# Patient Record
Sex: MALE | Race: WHITE | NOT HISPANIC OR LATINO | URBAN - METROPOLITAN AREA
[De-identification: names, ages, dates, MRNs, and addresses within clinical notes are randomized per-mention and may not be internally consistent; named-entity substitution may affect disease eponyms.]

---

## 2018-01-19 PROBLEM — Z00.00 ENCOUNTER FOR PREVENTIVE HEALTH EXAMINATION: Status: ACTIVE | Noted: 2018-01-19

## 2018-09-23 ENCOUNTER — EMERGENCY (EMERGENCY)
Facility: HOSPITAL | Age: 68
LOS: 1 days | Discharge: HOME | End: 2018-09-23
Admitting: EMERGENCY MEDICINE
Payer: COMMERCIAL

## 2018-09-23 VITALS
TEMPERATURE: 99 F | OXYGEN SATURATION: 97 % | SYSTOLIC BLOOD PRESSURE: 148 MMHG | HEART RATE: 116 BPM | DIASTOLIC BLOOD PRESSURE: 86 MMHG | RESPIRATION RATE: 18 BRPM

## 2018-09-23 VITALS
OXYGEN SATURATION: 97 % | DIASTOLIC BLOOD PRESSURE: 82 MMHG | RESPIRATION RATE: 16 BRPM | HEART RATE: 97 BPM | TEMPERATURE: 98 F | SYSTOLIC BLOOD PRESSURE: 137 MMHG

## 2018-09-23 DIAGNOSIS — I48.91 UNSPECIFIED ATRIAL FIBRILLATION: ICD-10-CM

## 2018-09-23 DIAGNOSIS — K13.79 OTHER LESIONS OF ORAL MUCOSA: ICD-10-CM

## 2018-09-23 DIAGNOSIS — Z79.899 OTHER LONG TERM (CURRENT) DRUG THERAPY: ICD-10-CM

## 2018-09-23 LAB
ALBUMIN SERPL ELPH-MCNC: 3.5 G/DL — SIGNIFICANT CHANGE UP (ref 3.4–5)
ALP SERPL-CCNC: 93 U/L — SIGNIFICANT CHANGE UP (ref 40–120)
ALT FLD-CCNC: 33 U/L — SIGNIFICANT CHANGE UP (ref 12–42)
ANION GAP SERPL CALC-SCNC: 11 MMOL/L — SIGNIFICANT CHANGE UP (ref 9–16)
APTT BLD: 36.9 SEC — HIGH (ref 27.5–36.5)
AST SERPL-CCNC: 31 U/L — SIGNIFICANT CHANGE UP (ref 15–37)
BILIRUB SERPL-MCNC: 0.2 MG/DL — SIGNIFICANT CHANGE UP (ref 0.2–1.2)
BUN SERPL-MCNC: 21 MG/DL — SIGNIFICANT CHANGE UP (ref 7–23)
CALCIUM SERPL-MCNC: 8.6 MG/DL — SIGNIFICANT CHANGE UP (ref 8.5–10.5)
CHLORIDE SERPL-SCNC: 104 MMOL/L — SIGNIFICANT CHANGE UP (ref 96–108)
CO2 SERPL-SCNC: 22 MMOL/L — SIGNIFICANT CHANGE UP (ref 22–31)
CREAT SERPL-MCNC: 1.15 MG/DL — SIGNIFICANT CHANGE UP (ref 0.5–1.3)
GLUCOSE SERPL-MCNC: 153 MG/DL — HIGH (ref 70–99)
HCT VFR BLD CALC: 37.9 % — LOW (ref 39–50)
HGB BLD-MCNC: 11.6 G/DL — LOW (ref 13–17)
INR BLD: 1.48 — HIGH (ref 0.88–1.16)
MCHC RBC-ENTMCNC: 25.1 PG — LOW (ref 27–34)
MCHC RBC-ENTMCNC: 30.6 G/DL — LOW (ref 32–36)
MCV RBC AUTO: 81.9 FL — SIGNIFICANT CHANGE UP (ref 80–100)
PLATELET # BLD AUTO: 255 K/UL — SIGNIFICANT CHANGE UP (ref 150–400)
POTASSIUM SERPL-MCNC: 4.3 MMOL/L — SIGNIFICANT CHANGE UP (ref 3.5–5.3)
POTASSIUM SERPL-SCNC: 4.3 MMOL/L — SIGNIFICANT CHANGE UP (ref 3.5–5.3)
PROT SERPL-MCNC: 7.8 G/DL — SIGNIFICANT CHANGE UP (ref 6.4–8.2)
PROTHROM AB SERPL-ACNC: 16.4 SEC — HIGH (ref 9.8–12.7)
RBC # BLD: 4.63 M/UL — SIGNIFICANT CHANGE UP (ref 4.2–5.8)
RBC # FLD: 15.3 % — HIGH (ref 10.3–14.5)
SODIUM SERPL-SCNC: 137 MMOL/L — SIGNIFICANT CHANGE UP (ref 132–145)
WBC # BLD: 8 K/UL — SIGNIFICANT CHANGE UP (ref 3.8–10.5)
WBC # FLD AUTO: 8 K/UL — SIGNIFICANT CHANGE UP (ref 3.8–10.5)

## 2018-09-23 PROCEDURE — 99284 EMERGENCY DEPT VISIT MOD MDM: CPT | Mod: 25

## 2018-09-23 RX ORDER — DIPHENHYDRAMINE HCL 50 MG
25 CAPSULE ORAL ONCE
Qty: 0 | Refills: 0 | Status: COMPLETED | OUTPATIENT
Start: 2018-09-23 | End: 2018-09-23

## 2018-09-23 RX ORDER — SODIUM CHLORIDE 9 MG/ML
3 INJECTION INTRAMUSCULAR; INTRAVENOUS; SUBCUTANEOUS ONCE
Qty: 0 | Refills: 0 | Status: COMPLETED | OUTPATIENT
Start: 2018-09-23 | End: 2018-09-23

## 2018-09-23 RX ADMIN — Medication 25 MILLIGRAM(S): at 02:41

## 2018-09-23 RX ADMIN — SODIUM CHLORIDE 3 MILLILITER(S): 9 INJECTION INTRAMUSCULAR; INTRAVENOUS; SUBCUTANEOUS at 01:46

## 2018-09-23 NOTE — ED ADULT TRIAGE NOTE - CHIEF COMPLAINT QUOTE
Pt with complaint of lower gum bleeding that he noticed as he was in bed.  Pt on ASA and xarelto. History of afib and CABG. HR of 142, states he feels his heart is beating fast. Denies any CP.

## 2018-09-23 NOTE — ED PROVIDER NOTE - OBJECTIVE STATEMENT
68 y/o M with PMH of afib on Pradaxa and metoprolol presents to ED c/o spontaneous bleeding of his lower lip.  He states he cannot stop the bleeding and shows paper towels from home with multiple small blood stains.  He denies bleeding from other orifices (gums, penis, ears, rectum), denies blood in stool or dark tarry stool, denies hemoptysis.  Pt denies chest pain, SOB, abd pain, trauma/falls.

## 2018-09-23 NOTE — ED PROVIDER NOTE - PROGRESS NOTE DETAILS
Upon arrival to ED, pt heart rate noted to be elevated 140's.  Pt anxious and requests his IV to be removed and wants to leave.  Pt fully dressed but reassured and asked to stay.  Pt educated the lack of reversal/anecdotal agent for Pradaxa but the need to check H&H and examine for other sources of bleeding.  Pt tearful and explained he is scared.  Pt advised to apply constant pressure to lip for 15 minutes before recheck and amenable to stay.

## 2018-09-23 NOTE — ED PROVIDER NOTE - SKIN WOUND TYPE
small area of approx 0.5 cm to inner lower lip oozing minimal sanguinous drainage, no laceration or ulcr

## 2018-09-23 NOTE — ED PROVIDER NOTE - MEDICAL DECISION MAKING DETAILS
66 y/o M presents to ED with bleeding of lower inner lip.  Hemostasis achieved with constant pressure to area with gauze for approx 15 minutes.  Labs unremarkable.  No other areas of bleeding.  Pt heart rate initially 140's in afib but decreased to 110's once deescalated.  Pt noted to have ETOH on breath but appropriate, clear speech and not ataxic.  Pt has copy of results and will f/u with PCP.  Strict return precautions advised.

## 2018-09-23 NOTE — ED ADULT NURSE NOTE - OBJECTIVE STATEMENT
pt aox4. neurologically wnl. no sob or difficulty breathing noted. lung sounds clear bilaterally. skin appropriate for ethnicity, warm and dry. cap refill < 2 secs. pulses 2+ and irregular. rapid afib noted to tele with hr 120's. pt presents with bleeding from gums. pt on blood thinners for afib. denies any pain or trauma.

## 2018-09-23 NOTE — ED PROVIDER NOTE - CONSTITUTIONAL, MLM
normal... Well appearing, well nourished, awake, alert, oriented to person, place, time/situation and in no apparent distress.  + ETOH on breath

## 2019-01-04 ENCOUNTER — EMERGENCY (EMERGENCY)
Facility: HOSPITAL | Age: 69
LOS: 1 days | Discharge: HOME | End: 2019-01-04
Attending: EMERGENCY MEDICINE | Admitting: EMERGENCY MEDICINE
Payer: COMMERCIAL

## 2019-01-04 VITALS
HEART RATE: 101 BPM | DIASTOLIC BLOOD PRESSURE: 85 MMHG | RESPIRATION RATE: 18 BRPM | OXYGEN SATURATION: 98 % | SYSTOLIC BLOOD PRESSURE: 147 MMHG | TEMPERATURE: 100 F

## 2019-01-04 VITALS
DIASTOLIC BLOOD PRESSURE: 68 MMHG | TEMPERATURE: 99 F | OXYGEN SATURATION: 97 % | RESPIRATION RATE: 18 BRPM | HEART RATE: 92 BPM | SYSTOLIC BLOOD PRESSURE: 120 MMHG

## 2019-01-04 DIAGNOSIS — M62.838 OTHER MUSCLE SPASM: ICD-10-CM

## 2019-01-04 DIAGNOSIS — M79.662 PAIN IN LEFT LOWER LEG: ICD-10-CM

## 2019-01-04 DIAGNOSIS — F17.210 NICOTINE DEPENDENCE, CIGARETTES, UNCOMPLICATED: ICD-10-CM

## 2019-01-04 DIAGNOSIS — M79.661 PAIN IN RIGHT LOWER LEG: ICD-10-CM

## 2019-01-04 DIAGNOSIS — Z88.6 ALLERGY STATUS TO ANALGESIC AGENT: ICD-10-CM

## 2019-01-04 DIAGNOSIS — Z95.1 PRESENCE OF AORTOCORONARY BYPASS GRAFT: Chronic | ICD-10-CM

## 2019-01-04 DIAGNOSIS — I25.10 ATHEROSCLEROTIC HEART DISEASE OF NATIVE CORONARY ARTERY WITHOUT ANGINA PECTORIS: ICD-10-CM

## 2019-01-04 DIAGNOSIS — R25.1 TREMOR, UNSPECIFIED: ICD-10-CM

## 2019-01-04 DIAGNOSIS — G47.00 INSOMNIA, UNSPECIFIED: ICD-10-CM

## 2019-01-04 PROBLEM — I48.91 UNSPECIFIED ATRIAL FIBRILLATION: Chronic | Status: ACTIVE | Noted: 2018-09-30

## 2019-01-04 LAB
ALBUMIN SERPL ELPH-MCNC: 4 G/DL — SIGNIFICANT CHANGE UP (ref 3.4–5)
ALP SERPL-CCNC: 106 U/L — SIGNIFICANT CHANGE UP (ref 40–120)
ALT FLD-CCNC: 48 U/L — HIGH (ref 12–42)
ANION GAP SERPL CALC-SCNC: 7 MMOL/L — LOW (ref 9–16)
APTT BLD: 38.7 SEC — HIGH (ref 27.5–36.3)
AST SERPL-CCNC: 49 U/L — HIGH (ref 15–37)
BASOPHILS NFR BLD AUTO: 0.3 % — SIGNIFICANT CHANGE UP (ref 0–2)
BILIRUB SERPL-MCNC: 0.5 MG/DL — SIGNIFICANT CHANGE UP (ref 0.2–1.2)
BUN SERPL-MCNC: 19 MG/DL — SIGNIFICANT CHANGE UP (ref 7–23)
CALCIUM SERPL-MCNC: 9 MG/DL — SIGNIFICANT CHANGE UP (ref 8.5–10.5)
CHLORIDE SERPL-SCNC: 100 MMOL/L — SIGNIFICANT CHANGE UP (ref 96–108)
CK SERPL-CCNC: 590 U/L — HIGH (ref 39–308)
CO2 SERPL-SCNC: 29 MMOL/L — SIGNIFICANT CHANGE UP (ref 22–31)
CREAT SERPL-MCNC: 1.34 MG/DL — HIGH (ref 0.5–1.3)
EOSINOPHIL NFR BLD AUTO: 0.5 % — SIGNIFICANT CHANGE UP (ref 0–6)
GLUCOSE SERPL-MCNC: 80 MG/DL — SIGNIFICANT CHANGE UP (ref 70–99)
HCT VFR BLD CALC: 31.6 % — LOW (ref 39–50)
HGB BLD-MCNC: 9.6 G/DL — LOW (ref 13–17)
IMM GRANULOCYTES NFR BLD AUTO: 0.3 % — SIGNIFICANT CHANGE UP (ref 0–1.5)
INR BLD: 1.78 — HIGH (ref 0.88–1.16)
LYMPHOCYTES # BLD AUTO: 16.4 % — SIGNIFICANT CHANGE UP (ref 13–44)
MAGNESIUM SERPL-MCNC: 2.1 MG/DL — SIGNIFICANT CHANGE UP (ref 1.6–2.6)
MCHC RBC-ENTMCNC: 22.2 PG — LOW (ref 27–34)
MCHC RBC-ENTMCNC: 30.4 G/DL — LOW (ref 32–36)
MCV RBC AUTO: 73.1 FL — LOW (ref 80–100)
MONOCYTES NFR BLD AUTO: 12.2 % — SIGNIFICANT CHANGE UP (ref 2–14)
NEUTROPHILS NFR BLD AUTO: 70.3 % — SIGNIFICANT CHANGE UP (ref 43–77)
NT-PROBNP SERPL-SCNC: 4002 PG/ML — HIGH
PLATELET # BLD AUTO: 200 K/UL — SIGNIFICANT CHANGE UP (ref 150–400)
POTASSIUM SERPL-MCNC: 4.1 MMOL/L — SIGNIFICANT CHANGE UP (ref 3.5–5.3)
POTASSIUM SERPL-SCNC: 4.1 MMOL/L — SIGNIFICANT CHANGE UP (ref 3.5–5.3)
PROT SERPL-MCNC: 7.7 G/DL — SIGNIFICANT CHANGE UP (ref 6.4–8.2)
PROTHROM AB SERPL-ACNC: 20.1 SEC — HIGH (ref 10–12.9)
RBC # BLD: 4.32 M/UL — SIGNIFICANT CHANGE UP (ref 4.2–5.8)
RBC # FLD: 16.3 % — HIGH (ref 10.3–14.5)
SODIUM SERPL-SCNC: 136 MMOL/L — SIGNIFICANT CHANGE UP (ref 132–145)
TROPONIN I SERPL-MCNC: <0.017 NG/ML — LOW (ref 0.02–0.06)
WBC # BLD: 9.7 K/UL — SIGNIFICANT CHANGE UP (ref 3.8–10.5)
WBC # FLD AUTO: 9.7 K/UL — SIGNIFICANT CHANGE UP (ref 3.8–10.5)

## 2019-01-04 PROCEDURE — 93010 ELECTROCARDIOGRAM REPORT: CPT

## 2019-01-04 PROCEDURE — 99284 EMERGENCY DEPT VISIT MOD MDM: CPT | Mod: 25

## 2019-01-04 RX ADMIN — Medication 75 MILLIGRAM(S): at 11:27

## 2019-01-04 RX ADMIN — Medication 0.5 MILLIGRAM(S): at 09:10

## 2019-01-04 NOTE — ED PROVIDER NOTE - PROGRESS NOTE DETAILS
Pt has slept for the past 4 hours.  Pt admits to RN that he drank heavily last night.  Sxs or shaking and spasms have resolved.  Will ensure safe discharge and have him f/u with his PCP.

## 2019-01-04 NOTE — ED PROVIDER NOTE - NSFOLLOWUPCLINICS_GEN_ALL_ED_FT
[facility]  Family Medicine  [facility]. [address]  [place], [place] [facility]  Phone: [phone]  Fax:   Follow Up Time:

## 2019-01-04 NOTE — ED PROVIDER NOTE - OBJECTIVE STATEMENT
Pt is a 69yo M with a h/o AF, CHF, and CAD.  P/w body shaking, LE muscle spasms, and insomnia x 2 days.  Denies similar in past.  Denies any current CP, SOB, N/V, F/C, or other concerns.  Shaking appears to be tremors that come and go.  Causing pt much distress.  Denies any alcohol use.

## 2019-01-04 NOTE — ED PROVIDER NOTE - MEDICAL DECISION MAKING DETAILS
Tremors and muscle spasms.  Pt does not know his medications and does not carry list with him.  Will attempt to contact his pharmacy.  Will check blood work to evaluate for electrolyte abnormalities.  Will give valium now for anxiety and spasms.

## 2019-01-04 NOTE — ED PROVIDER NOTE - PHYSICAL EXAMINATION
VITAL SIGNS: I have reviewed nursing notes and confirm.  CONSTITUTIONAL: Well-developed; well-nourished; pt is anxious and has coarse tremors throughout that come and go.  SKIN: Skin is warm and dry, no acute rash.  HEAD: Normocephalic; atraumatic.  EYES: PERRL, EOM intact; conjunctiva and sclera clear.  ENT: No nasal discharge; airway clear.  NECK: Supple; non tender.  CARD: S1, S2 normal; no murmurs, gallops, or rubs. Regular rate and rhythm.  RESP: No wheezes, rales or rhonchi.  ABD: Normal bowel sounds; soft; non-distended; non-tender; no hepatosplenomegaly.  EXT: Normal ROM.  No palpable spasms at this time.  No significant edema to LEs.   NEURO: Alert, oriented. Grossly unremarkable.  PSYCH: Cooperative, appropriate but anxious.

## 2019-01-04 NOTE — ED ADULT NURSE NOTE - NSIMPLEMENTINTERV_GEN_ALL_ED
Implemented All Universal Safety Interventions:  [place] to call system. Call bell, personal items and telephone within reach. Instruct patient to call for assistance. Room bathroom lighting operational. Non-slip footwear when patient is off stretcher. Physically safe environment: no spills, clutter or unnecessary equipment. Stretcher in lowest position, wheels locked, appropriate side rails in place.

## 2019-01-04 NOTE — ED PROVIDER NOTE - NSFOLLOWUPINSTRUCTIONS_ED_ALL_ED_FT
PLEASE FOLLOW-UP WITH YOUR PRIMARY CARE DOCTOR IN 1-2 DAY FOR FURTHER EVALUATION.  PLEASE TAKE ALL PAPERWORK FROM TODAY'S VISIT TO YOUR PRIMARY DOCTOR.  IF YOU DO NOT HAVE A PRIMARY CARE DOCTOR PLEASE REFER TO THE OFFICE/CLINIC INFORMATION GIVEN ABOVE.  YOU MAY ALSO CALL [phone] AND ASK FOR MS. BIGG ALDANA.  SHE CAN HELP YOU MAKE A FOLLOW-UP APPOINTMENT.  HER HOURS ARE 11AM-7PM MONDAY - FRIDAY.    PLEASE RETURN TO THE ER IMMEDIATELY OR CALL 911 FOR ANY HIGH FEVER, TROUBLE BREATHING, VOMITING, SEVERE PAIN, OR ANY OTHER CONCERNS.

## 2019-01-04 NOTE — ED ADULT NURSE NOTE - CHPI ED NUR SYMPTOMS NEG
oob and ambulating steady gait, anxious./no back pain/no bruising/no tingling/no difficulty bearing weight/no deformity/no fever/no abrasion/no numbness/no stiffness/no weakness

## 2019-11-02 ENCOUNTER — EMERGENCY (EMERGENCY)
Facility: HOSPITAL | Age: 69
LOS: 1 days | Discharge: HOME | End: 2019-11-02
Attending: EMERGENCY MEDICINE | Admitting: EMERGENCY MEDICINE
Payer: MEDICARE

## 2019-11-02 VITALS
WEIGHT: 145.06 LBS | SYSTOLIC BLOOD PRESSURE: 111 MMHG | DIASTOLIC BLOOD PRESSURE: 74 MMHG | RESPIRATION RATE: 18 BRPM | HEIGHT: 68 IN | OXYGEN SATURATION: 97 % | HEART RATE: 89 BPM | TEMPERATURE: 98 F

## 2019-11-02 VITALS
HEART RATE: 91 BPM | OXYGEN SATURATION: 98 % | RESPIRATION RATE: 18 BRPM | TEMPERATURE: 98 F | SYSTOLIC BLOOD PRESSURE: 122 MMHG | DIASTOLIC BLOOD PRESSURE: 69 MMHG

## 2019-11-02 DIAGNOSIS — I63.9 CEREBRAL INFARCTION, UNSPECIFIED: ICD-10-CM

## 2019-11-02 DIAGNOSIS — Z95.1 PRESENCE OF AORTOCORONARY BYPASS GRAFT: Chronic | ICD-10-CM

## 2019-11-02 DIAGNOSIS — R53.1 WEAKNESS: ICD-10-CM

## 2019-11-02 LAB
ALBUMIN SERPL ELPH-MCNC: 3.6 G/DL — SIGNIFICANT CHANGE UP (ref 3.4–5)
ALP SERPL-CCNC: 70 U/L — SIGNIFICANT CHANGE UP (ref 40–120)
ALT FLD-CCNC: 28 U/L — SIGNIFICANT CHANGE UP (ref 12–42)
ANION GAP SERPL CALC-SCNC: 7 MMOL/L — LOW (ref 9–16)
ANISOCYTOSIS BLD QL: SIGNIFICANT CHANGE UP
AST SERPL-CCNC: 25 U/L — SIGNIFICANT CHANGE UP (ref 15–37)
BASOPHILS # BLD AUTO: 0.04 K/UL — SIGNIFICANT CHANGE UP (ref 0–0.2)
BASOPHILS NFR BLD AUTO: 0.4 % — SIGNIFICANT CHANGE UP (ref 0–2)
BILIRUB SERPL-MCNC: 0.6 MG/DL — SIGNIFICANT CHANGE UP (ref 0.2–1.2)
BUN SERPL-MCNC: 28 MG/DL — HIGH (ref 7–23)
CALCIUM SERPL-MCNC: 8.7 MG/DL — SIGNIFICANT CHANGE UP (ref 8.5–10.5)
CHLORIDE SERPL-SCNC: 101 MMOL/L — SIGNIFICANT CHANGE UP (ref 96–108)
CK MB BLD-MCNC: 2.31 % — SIGNIFICANT CHANGE UP
CK MB CFR SERPL CALC: 2.4 NG/ML — SIGNIFICANT CHANGE UP (ref 0.5–3.6)
CK SERPL-CCNC: 104 U/L — SIGNIFICANT CHANGE UP (ref 39–308)
CO2 SERPL-SCNC: 28 MMOL/L — SIGNIFICANT CHANGE UP (ref 22–31)
CREAT SERPL-MCNC: 1.14 MG/DL — SIGNIFICANT CHANGE UP (ref 0.5–1.3)
DACRYOCYTES BLD QL SMEAR: SIGNIFICANT CHANGE UP
ELLIPTOCYTES BLD QL SMEAR: SIGNIFICANT CHANGE UP
EOSINOPHIL # BLD AUTO: 0.18 K/UL — SIGNIFICANT CHANGE UP (ref 0–0.5)
EOSINOPHIL NFR BLD AUTO: 1.9 % — SIGNIFICANT CHANGE UP (ref 0–6)
GLUCOSE SERPL-MCNC: 82 MG/DL — SIGNIFICANT CHANGE UP (ref 70–99)
HCT VFR BLD CALC: 34 % — LOW (ref 39–50)
HGB BLD-MCNC: 9.9 G/DL — LOW (ref 13–17)
IMM GRANULOCYTES NFR BLD AUTO: 0.3 % — SIGNIFICANT CHANGE UP (ref 0–1.5)
LYMPHOCYTES # BLD AUTO: 1.57 K/UL — SIGNIFICANT CHANGE UP (ref 1–3.3)
LYMPHOCYTES # BLD AUTO: 16.7 % — SIGNIFICANT CHANGE UP (ref 13–44)
MANUAL SMEAR VERIFICATION: SIGNIFICANT CHANGE UP
MCHC RBC-ENTMCNC: 19.5 PG — LOW (ref 27–34)
MCHC RBC-ENTMCNC: 29.1 GM/DL — LOW (ref 32–36)
MCV RBC AUTO: 66.9 FL — LOW (ref 80–100)
MICROCYTES BLD QL: SIGNIFICANT CHANGE UP
MONOCYTES # BLD AUTO: 1.36 K/UL — HIGH (ref 0–0.9)
MONOCYTES NFR BLD AUTO: 14.5 % — HIGH (ref 2–14)
NEUTROPHILS # BLD AUTO: 6.2 K/UL — SIGNIFICANT CHANGE UP (ref 1.8–7.4)
NEUTROPHILS NFR BLD AUTO: 66.2 % — SIGNIFICANT CHANGE UP (ref 43–77)
NRBC # BLD: 0 /100 WBCS — SIGNIFICANT CHANGE UP (ref 0–0)
OVALOCYTES BLD QL SMEAR: SIGNIFICANT CHANGE UP
PLAT MORPH BLD: NORMAL — SIGNIFICANT CHANGE UP
PLATELET # BLD AUTO: 222 K/UL — SIGNIFICANT CHANGE UP (ref 150–400)
POTASSIUM SERPL-MCNC: 4.3 MMOL/L — SIGNIFICANT CHANGE UP (ref 3.5–5.3)
POTASSIUM SERPL-SCNC: 4.3 MMOL/L — SIGNIFICANT CHANGE UP (ref 3.5–5.3)
PROT SERPL-MCNC: 6.9 G/DL — SIGNIFICANT CHANGE UP (ref 6.4–8.2)
RBC # BLD: 5.08 M/UL — SIGNIFICANT CHANGE UP (ref 4.2–5.8)
RBC # FLD: 20.1 % — HIGH (ref 10.3–14.5)
RBC BLD AUTO: ABNORMAL
SODIUM SERPL-SCNC: 136 MMOL/L — SIGNIFICANT CHANGE UP (ref 132–145)
SPHEROCYTES BLD QL SMEAR: SIGNIFICANT CHANGE UP
TROPONIN I SERPL-MCNC: <0.017 NG/ML — LOW (ref 0.02–0.06)
WBC # BLD: 9.38 K/UL — SIGNIFICANT CHANGE UP (ref 3.8–10.5)
WBC # FLD AUTO: 9.38 K/UL — SIGNIFICANT CHANGE UP (ref 3.8–10.5)

## 2019-11-02 PROCEDURE — 70450 CT HEAD/BRAIN W/O DYE: CPT | Mod: 26

## 2019-11-02 PROCEDURE — 99284 EMERGENCY DEPT VISIT MOD MDM: CPT

## 2019-11-02 PROCEDURE — 93010 ELECTROCARDIOGRAM REPORT: CPT

## 2019-11-02 NOTE — ED PROVIDER NOTE - NEUROLOGICAL, MLM
clear and coherent speech, normal cranial nerve exam, normal finger to nose and JANA. No pronator drift. clear and coherent speech, normal cranial nerve exam, normal finger to nose and JANA. No pronator drift. (+) chronic LUE weakness, unchanged, no new focal weakness.

## 2019-11-02 NOTE — ED PROVIDER NOTE - PATIENT PORTAL LINK FT
You can access the FollowMyHealth Patient Portal offered by [facility] by registering at the following website: [email or web address]. By joining [employer]’s FollowMyHealth portal, you will also be able to view your health information using other applications (apps) compatible with our system.

## 2019-11-02 NOTE — ED PROVIDER NOTE - CARE PLAN
Principal Discharge DX:	Stroke Principal Discharge DX:	Stroke  Secondary Diagnosis:	Left against medical advice

## 2019-11-02 NOTE — ED PROVIDER NOTE - CLINICAL SUMMARY MEDICAL DECISION MAKING FREE TEXT BOX
69 y.o. male with sx concerning for new stroke, hx AFIB MI with hx weakness (see HPI for details) CT with changes of known old stroke, consulted stroke dr carter, [facility] workup for seizure, as sx don't fit with stroke. BP controlled. [facility] inpatient admission for seizure workup.  Pt refused inpatient admission, stated he wants to go home, refused workup for seizure.  Patient is aaox3 and is asking to leave. The patient does not want to be admitted and understands the risk of leaving including permanent disability and DEATH. Risk, benefit, hazards, alternatives and precautions were reviewed and the pt voices understanding. he is aware that he may return at anytime and that close follow up with primary care doctor is recommended as soon as possible. he voices understanding. Left before the papers could be signed.

## 2019-11-02 NOTE — ED ADULT NURSE NOTE - NSIMPLEMENTINTERV_GEN_ALL_ED
Implemented All Universal Safety Interventions:  [place] to call system. Call bell, personal items and telephone within reach. Instruct patient to call for assistance. Room bathroom lighting operational. Non-slip footwear when patient is off stretcher. Physically safe environment: no spills, clutter or unnecessary equipment. Stretcher in lowest position, wheels locked, appropriate side rails in place.

## 2019-11-02 NOTE — ED PROVIDER NOTE - PROGRESS NOTE DETAILS
consulted stroke dr carter, [facility] workup for seizure, as sx don't fit with stroke. BP controlled. [facility] inpatient admission for seizure workup. consulted stroke dr carter, [facility] workup for seizure, as sx don't fit with stroke. BP controlled. [facility] inpatient admission for seizure workup.  Pt refused inpatient admission, stated he wants to go home, refused workup for seizure.  Patient is aaox3 and is asking to leave. The patient does not want to be admitted and understands the risk of leaving including permanent disability and DEATH. Risk, benefit, hazards, alternatives and precautions were reviewed and the pt voices understanding. he is aware that he may return at anytime and that close follow up with primary care doctor is recommended as soon as possible. he voices understanding. Left before the papers could be signed.

## 2019-11-02 NOTE — ED PROVIDER NOTE - OBJECTIVE STATEMENT
68 yo M w/ history of stroke 3 months ago with residual left hand  deficit, MI and atrial fibrillation complains of generalized weakness since last night. Pt is a poor historian; states he was drinking heavily last night to celebrate his birthday and reports onset of generalized "heaviness" after getting into bed last night. Pt did not sleep last night and notes weakness has not resolved. Denies HA, dizziness, numbness, tingling, change in vision/hearing/mental status/speech, focal weakness, neck pain, rash, fever, chills, stiffness, CP, SOB, N/V/D/C, abdominal pain, change in urinary/bowel function. Pt cannot list his medical history or the medications he currently takes.

## 2019-11-06 ENCOUNTER — EMERGENCY (EMERGENCY)
Facility: HOSPITAL | Age: 69
LOS: 1 days | Discharge: HOME | End: 2019-11-06
Attending: EMERGENCY MEDICINE | Admitting: EMERGENCY MEDICINE
Payer: MEDICARE

## 2019-11-06 VITALS
HEIGHT: 70 IN | TEMPERATURE: 98 F | WEIGHT: 179.9 LBS | RESPIRATION RATE: 18 BRPM | OXYGEN SATURATION: 98 % | HEART RATE: 106 BPM | DIASTOLIC BLOOD PRESSURE: 69 MMHG | SYSTOLIC BLOOD PRESSURE: 101 MMHG

## 2019-11-06 VITALS
RESPIRATION RATE: 18 BRPM | DIASTOLIC BLOOD PRESSURE: 73 MMHG | TEMPERATURE: 98 F | OXYGEN SATURATION: 95 % | HEART RATE: 105 BPM | SYSTOLIC BLOOD PRESSURE: 132 MMHG

## 2019-11-06 DIAGNOSIS — Z95.1 PRESENCE OF AORTOCORONARY BYPASS GRAFT: Chronic | ICD-10-CM

## 2019-11-06 LAB
ALBUMIN SERPL ELPH-MCNC: 3.5 G/DL — SIGNIFICANT CHANGE UP (ref 3.4–5)
ALP SERPL-CCNC: 71 U/L — SIGNIFICANT CHANGE UP (ref 40–120)
ALT FLD-CCNC: 20 U/L — SIGNIFICANT CHANGE UP (ref 12–42)
ANION GAP SERPL CALC-SCNC: 6 MMOL/L — LOW (ref 9–16)
ANISOCYTOSIS BLD QL: SIGNIFICANT CHANGE UP
AST SERPL-CCNC: 29 U/L — SIGNIFICANT CHANGE UP (ref 15–37)
BILIRUB SERPL-MCNC: 0.5 MG/DL — SIGNIFICANT CHANGE UP (ref 0.2–1.2)
BUN SERPL-MCNC: 29 MG/DL — HIGH (ref 7–23)
CALCIUM SERPL-MCNC: 8.6 MG/DL — SIGNIFICANT CHANGE UP (ref 8.5–10.5)
CHLORIDE SERPL-SCNC: 104 MMOL/L — SIGNIFICANT CHANGE UP (ref 96–108)
CO2 SERPL-SCNC: 30 MMOL/L — SIGNIFICANT CHANGE UP (ref 22–31)
CREAT SERPL-MCNC: 1.21 MG/DL — SIGNIFICANT CHANGE UP (ref 0.5–1.3)
ELLIPTOCYTES BLD QL SMEAR: SLIGHT — SIGNIFICANT CHANGE UP
GIANT PLATELETS BLD QL SMEAR: PRESENT — SIGNIFICANT CHANGE UP
GLUCOSE SERPL-MCNC: 114 MG/DL — HIGH (ref 70–99)
HCT VFR BLD CALC: 35.7 % — LOW (ref 39–50)
HGB BLD-MCNC: 10.5 G/DL — LOW (ref 13–17)
LG PLATELETS BLD QL AUTO: SLIGHT — SIGNIFICANT CHANGE UP
MACROCYTES BLD QL: SLIGHT — SIGNIFICANT CHANGE UP
MAGNESIUM SERPL-MCNC: 2 MG/DL — SIGNIFICANT CHANGE UP (ref 1.6–2.6)
MANUAL SMEAR VERIFICATION: SIGNIFICANT CHANGE UP
MCHC RBC-ENTMCNC: 19.7 PG — LOW (ref 27–34)
MCHC RBC-ENTMCNC: 29.4 GM/DL — LOW (ref 32–36)
MCV RBC AUTO: 67.1 FL — LOW (ref 80–100)
MICROCYTES BLD QL: SIGNIFICANT CHANGE UP
NRBC # BLD: 0 /100 WBCS — SIGNIFICANT CHANGE UP (ref 0–0)
PLAT MORPH BLD: ABNORMAL
PLATELET # BLD AUTO: 215 K/UL — SIGNIFICANT CHANGE UP (ref 150–400)
PLATELET COUNT - ESTIMATE: NORMAL — SIGNIFICANT CHANGE UP
POIKILOCYTOSIS BLD QL AUTO: SLIGHT — SIGNIFICANT CHANGE UP
POLYCHROMASIA BLD QL SMEAR: SLIGHT — SIGNIFICANT CHANGE UP
POTASSIUM SERPL-MCNC: 4.8 MMOL/L — SIGNIFICANT CHANGE UP (ref 3.5–5.3)
POTASSIUM SERPL-SCNC: 4.8 MMOL/L — SIGNIFICANT CHANGE UP (ref 3.5–5.3)
PROT SERPL-MCNC: 7.1 G/DL — SIGNIFICANT CHANGE UP (ref 6.4–8.2)
RBC # BLD: 5.32 M/UL — SIGNIFICANT CHANGE UP (ref 4.2–5.8)
RBC # FLD: 20.6 % — HIGH (ref 10.3–14.5)
RBC BLD AUTO: ABNORMAL
SODIUM SERPL-SCNC: 140 MMOL/L — SIGNIFICANT CHANGE UP (ref 132–145)
TARGETS BLD QL SMEAR: SLIGHT — SIGNIFICANT CHANGE UP
WBC # BLD: 9.52 K/UL — SIGNIFICANT CHANGE UP (ref 3.8–10.5)
WBC # FLD AUTO: 9.52 K/UL — SIGNIFICANT CHANGE UP (ref 3.8–10.5)

## 2019-11-06 PROCEDURE — 99284 EMERGENCY DEPT VISIT MOD MDM: CPT

## 2019-11-06 PROCEDURE — 71046 X-RAY EXAM CHEST 2 VIEWS: CPT | Mod: 26

## 2019-11-06 RX ORDER — CEFUROXIME AXETIL 250 MG
250 TABLET ORAL ONCE
Refills: 0 | Status: COMPLETED | OUTPATIENT
Start: 2019-11-06 | End: 2019-11-06

## 2019-11-06 RX ORDER — IPRATROPIUM/ALBUTEROL SULFATE 18-103MCG
3 AEROSOL WITH ADAPTER (GRAM) INHALATION ONCE
Refills: 0 | Status: COMPLETED | OUTPATIENT
Start: 2019-11-06 | End: 2019-11-06

## 2019-11-06 RX ORDER — MAGNESIUM SULFATE 500 MG/ML
2 VIAL (ML) INJECTION ONCE
Refills: 0 | Status: COMPLETED | OUTPATIENT
Start: 2019-11-06 | End: 2019-11-06

## 2019-11-06 RX ORDER — SODIUM CHLORIDE 9 MG/ML
1000 INJECTION INTRAMUSCULAR; INTRAVENOUS; SUBCUTANEOUS ONCE
Refills: 0 | Status: COMPLETED | OUTPATIENT
Start: 2019-11-06 | End: 2019-11-06

## 2019-11-06 RX ADMIN — SODIUM CHLORIDE 1000 MILLILITER(S): 9 INJECTION INTRAMUSCULAR; INTRAVENOUS; SUBCUTANEOUS at 15:36

## 2019-11-06 RX ADMIN — Medication 0.25 MILLIGRAM(S): at 15:35

## 2019-11-06 RX ADMIN — Medication 3 MILLILITER(S): at 15:35

## 2019-11-06 RX ADMIN — Medication 100 GRAM(S): at 15:34

## 2019-11-06 RX ADMIN — Medication 125 MILLIGRAM(S): at 15:36

## 2019-11-06 NOTE — ED PROVIDER NOTE - PATIENT PORTAL LINK FT
You can access the FollowMyHealth Patient Portal offered by [facility] by registering at the following website: [email or web address]. By joining [employer]’s FollowMyHealth portal, you will also be able to view your health information using other applications (apps) compatible with our system.

## 2019-11-06 NOTE — ED PROVIDER NOTE - CLINICAL SUMMARY MEDICAL DECISION MAKING FREE TEXT BOX
70 y/o M presenting with SOB and productive coughs. DDx includes PNA, COPD exacerbation, and bronchitis. Will treat with Duoneb, Medrol, Magnesium Sulfate and Brethine. Plan for basic labs and CXR. Will reassess afterwards.

## 2019-11-06 NOTE — ED ADULT NURSE NOTE - OBJECTIVE STATEMENT
69y male 69y male presents to ED c/o shortness of breath. Pt is poor historian. Has hx of asthma but unsure what medications he takes at home. Pt states he has been in the hospital before but doesn't remember if he was ever intubated. Hx of heart disease. A&Ox3.

## 2019-11-06 NOTE — ED PROVIDER NOTE - PROGRESS NOTE DETAILS
Pt feels completely asymptomatic now, requesting dc. recommended 5 days steroid taper, ceftin but refused stating " I don't need that". Pt left the ED prior to receiving ED instructions.

## 2019-11-06 NOTE — ED PROVIDER NOTE - OBJECTIVE STATEMENT
68 y/o M with PMHx of asthma, emphysema and COPD, on Prednisone, presents to the ED for c/o SOB and productive coughing. Pt also endorses mild chills. He denies fever, N/V/D, and hemoptysis. Of note, Pt reports he smokes 6-7 cigarettes per day.

## 2019-11-11 DIAGNOSIS — J44.1 CHRONIC OBSTRUCTIVE PULMONARY DISEASE WITH (ACUTE) EXACERBATION: ICD-10-CM

## 2019-11-11 DIAGNOSIS — R05 COUGH: ICD-10-CM

## 2020-03-09 ENCOUNTER — EMERGENCY (EMERGENCY)
Facility: HOSPITAL | Age: 70
LOS: 1 days | Discharge: LEFT BEFORE DISCHARGE | End: 2020-03-09
Attending: EMERGENCY MEDICINE | Admitting: EMERGENCY MEDICINE
Payer: MEDICARE

## 2020-03-09 VITALS
RESPIRATION RATE: 16 BRPM | TEMPERATURE: 98 F | HEART RATE: 102 BPM | DIASTOLIC BLOOD PRESSURE: 85 MMHG | SYSTOLIC BLOOD PRESSURE: 145 MMHG | OXYGEN SATURATION: 98 %

## 2020-03-09 DIAGNOSIS — J43.9 EMPHYSEMA, UNSPECIFIED: ICD-10-CM

## 2020-03-09 DIAGNOSIS — Z95.1 PRESENCE OF AORTOCORONARY BYPASS GRAFT: Chronic | ICD-10-CM

## 2020-03-09 DIAGNOSIS — F17.210 NICOTINE DEPENDENCE, CIGARETTES, UNCOMPLICATED: ICD-10-CM

## 2020-03-09 DIAGNOSIS — R06.02 SHORTNESS OF BREATH: ICD-10-CM

## 2020-03-09 LAB
ALBUMIN SERPL ELPH-MCNC: 3.7 G/DL — SIGNIFICANT CHANGE UP (ref 3.4–5)
ALP SERPL-CCNC: 65 U/L — SIGNIFICANT CHANGE UP (ref 40–120)
ALT FLD-CCNC: 34 U/L — SIGNIFICANT CHANGE UP (ref 12–42)
ANION GAP SERPL CALC-SCNC: 6 MMOL/L — LOW (ref 9–16)
APTT BLD: 24.3 SEC — LOW (ref 27.5–36.3)
AST SERPL-CCNC: 20 U/L — SIGNIFICANT CHANGE UP (ref 15–37)
BASOPHILS # BLD AUTO: 0.03 K/UL — SIGNIFICANT CHANGE UP (ref 0–0.2)
BASOPHILS NFR BLD AUTO: 0.2 % — SIGNIFICANT CHANGE UP (ref 0–2)
BILIRUB SERPL-MCNC: 0.4 MG/DL — SIGNIFICANT CHANGE UP (ref 0.2–1.2)
BUN SERPL-MCNC: 27 MG/DL — HIGH (ref 7–23)
CALCIUM SERPL-MCNC: 9.3 MG/DL — SIGNIFICANT CHANGE UP (ref 8.5–10.5)
CHLORIDE SERPL-SCNC: 104 MMOL/L — SIGNIFICANT CHANGE UP (ref 96–108)
CO2 SERPL-SCNC: 29 MMOL/L — SIGNIFICANT CHANGE UP (ref 22–31)
CREAT SERPL-MCNC: 1.15 MG/DL — SIGNIFICANT CHANGE UP (ref 0.5–1.3)
EOSINOPHIL # BLD AUTO: 0.04 K/UL — SIGNIFICANT CHANGE UP (ref 0–0.5)
EOSINOPHIL NFR BLD AUTO: 0.3 % — SIGNIFICANT CHANGE UP (ref 0–6)
ETHANOL SERPL-MCNC: <3 MG/DL — SIGNIFICANT CHANGE UP
FLU A RESULT: SIGNIFICANT CHANGE UP
FLU A RESULT: SIGNIFICANT CHANGE UP
FLUAV AG NPH QL: SIGNIFICANT CHANGE UP
FLUBV AG NPH QL: SIGNIFICANT CHANGE UP
GLUCOSE SERPL-MCNC: 114 MG/DL — HIGH (ref 70–99)
HCT VFR BLD CALC: 48.4 % — SIGNIFICANT CHANGE UP (ref 39–50)
HGB BLD-MCNC: 15.6 G/DL — SIGNIFICANT CHANGE UP (ref 13–17)
IMM GRANULOCYTES NFR BLD AUTO: 0.9 % — SIGNIFICANT CHANGE UP (ref 0–1.5)
INR BLD: 1.11 — SIGNIFICANT CHANGE UP (ref 0.88–1.16)
LYMPHOCYTES # BLD AUTO: 1.7 K/UL — SIGNIFICANT CHANGE UP (ref 1–3.3)
LYMPHOCYTES # BLD AUTO: 11.7 % — LOW (ref 13–44)
MAGNESIUM SERPL-MCNC: 1.8 MG/DL — SIGNIFICANT CHANGE UP (ref 1.6–2.6)
MCHC RBC-ENTMCNC: 27.4 PG — SIGNIFICANT CHANGE UP (ref 27–34)
MCHC RBC-ENTMCNC: 32.2 GM/DL — SIGNIFICANT CHANGE UP (ref 32–36)
MCV RBC AUTO: 84.9 FL — SIGNIFICANT CHANGE UP (ref 80–100)
MONOCYTES # BLD AUTO: 0.83 K/UL — SIGNIFICANT CHANGE UP (ref 0–0.9)
MONOCYTES NFR BLD AUTO: 5.7 % — SIGNIFICANT CHANGE UP (ref 2–14)
NEUTROPHILS # BLD AUTO: 11.8 K/UL — HIGH (ref 1.8–7.4)
NEUTROPHILS NFR BLD AUTO: 81.2 % — HIGH (ref 43–77)
NRBC # BLD: 0 /100 WBCS — SIGNIFICANT CHANGE UP (ref 0–0)
NT-PROBNP SERPL-SCNC: 1783 PG/ML — HIGH
PCO2 BLDV: 47 MMHG — SIGNIFICANT CHANGE UP (ref 41–51)
PH BLDV: 7.42 — SIGNIFICANT CHANGE UP (ref 7.32–7.43)
PLATELET # BLD AUTO: 203 K/UL — SIGNIFICANT CHANGE UP (ref 150–400)
PO2 BLDV: 50 MMHG — HIGH (ref 35–40)
POTASSIUM SERPL-MCNC: 4.4 MMOL/L — SIGNIFICANT CHANGE UP (ref 3.5–5.3)
POTASSIUM SERPL-SCNC: 4.4 MMOL/L — SIGNIFICANT CHANGE UP (ref 3.5–5.3)
PROT SERPL-MCNC: 6.8 G/DL — SIGNIFICANT CHANGE UP (ref 6.4–8.2)
PROTHROM AB SERPL-ACNC: 12.3 SEC — SIGNIFICANT CHANGE UP (ref 10–12.9)
RBC # BLD: 5.7 M/UL — SIGNIFICANT CHANGE UP (ref 4.2–5.8)
RBC # FLD: 20.1 % — HIGH (ref 10.3–14.5)
RSV RESULT: SIGNIFICANT CHANGE UP
RSV RNA RESP QL NAA+PROBE: SIGNIFICANT CHANGE UP
SAO2 % BLDV: 86 % — SIGNIFICANT CHANGE UP
SODIUM SERPL-SCNC: 139 MMOL/L — SIGNIFICANT CHANGE UP (ref 132–145)
TROPONIN I SERPL-MCNC: 0.02 NG/ML — SIGNIFICANT CHANGE UP (ref 0.02–0.06)
WBC # BLD: 14.53 K/UL — HIGH (ref 3.8–10.5)
WBC # FLD AUTO: 14.53 K/UL — HIGH (ref 3.8–10.5)

## 2020-03-09 PROCEDURE — 99285 EMERGENCY DEPT VISIT HI MDM: CPT | Mod: 25

## 2020-03-09 PROCEDURE — 71045 X-RAY EXAM CHEST 1 VIEW: CPT | Mod: 26

## 2020-03-09 PROCEDURE — 93010 ELECTROCARDIOGRAM REPORT: CPT

## 2020-03-09 RX ORDER — NICOTINE POLACRILEX 2 MG
1 GUM BUCCAL ONCE
Refills: 0 | Status: COMPLETED | OUTPATIENT
Start: 2020-03-09 | End: 2020-03-09

## 2020-03-09 RX ADMIN — Medication 1 PATCH: at 20:03

## 2020-03-09 NOTE — ED PROVIDER NOTE - CARE PLAN
Principal Discharge DX:	SOB (shortness of breath) Principal Discharge DX:	Pulmonary emphysema, unspecified emphysema type

## 2020-03-09 NOTE — ED PROVIDER NOTE - OBJECTIVE STATEMENT
Pt is a 70yo M with a h/o COPD (active smoker), asthma, and AF.  Pt reports progressively worsening SOB over the past month.  Exacerbated with tobacco use.  Reports mild until yesterday then worsened.

## 2020-03-09 NOTE — ED ADULT NURSE NOTE - OBJECTIVE STATEMENT
pt. c/o shortness of breath after smoking, pt. continuously asking for a nicotine patch. Pt. denies any chest pain. Once given nicotine patch pt. requesting to leave. Pt. encouraged to stay and await his lab and radiology results and agreed.

## 2020-03-09 NOTE — ED PROVIDER NOTE - DIAGNOSTIC INTERPRETATION
Chest x-ray interpreted by ER Physician Dr. David  Findings: heart size normal, no infiltrates, lungs fully expanded, +COPD changes - flattened diaphragm.

## 2020-03-09 NOTE — ED PROVIDER NOTE - PHYSICAL EXAMINATION
VITAL SIGNS: I have reviewed nursing notes and confirm.  CONSTITUTIONAL: Well-developed; well-nourished; +tachypnea but speaking full sentances.   SKIN: Skin is warm and dry, no acute rash.  HEAD: Normocephalic; atraumatic.  EYES: PERRL, EOM intact; conjunctiva and sclera clear.  ENT: No nasal discharge; airway clear.  NECK: Supple; non tender.  CARD: S1, S2 normal; no murmurs, gallops, or rubs. Irregularly irregular rhythm.  HR in 90s-low 100s.   RESP: No wheezes, rales or rhonchi.  ABD: Normal bowel sounds; soft; non-distended; non-tender; no hepatosplenomegaly.  MSK: Normal ROM. No clubbing, cyanosis or edema.  NEURO: Alert, oriented. Grossly unremarkable.  PSYCH: Cooperative, appropriate.

## 2020-03-09 NOTE — ED PROVIDER NOTE - CONTEXT
From: Janae Salazar  To: Elier Hoffman MD  Sent: 1/11/2019 12:09 PM CST  Subject: Medication Question    Hi Dr. Denisha Wolfe,    I have increased my dose of gabapentin to 600mg at HS. I was taking 400mg but it was becoming ineffective for controlling the hotflashes at that dose. Would you be able to contact my pharmacy to increase the prescription amount to reflect the increase in dose? I use the 100mg size for ease in swallowing so a 90 day supply would be #540.  Thank you!   ([facility] pharmacy at [address] [place] [address] and [address] [facility] [address]) unknown

## 2020-03-10 PROBLEM — J43.9 EMPHYSEMA, UNSPECIFIED: Chronic | Status: ACTIVE | Noted: 2019-11-06

## 2020-03-10 PROBLEM — J45.909 UNSPECIFIED ASTHMA, UNCOMPLICATED: Chronic | Status: ACTIVE | Noted: 2019-11-06

## 2020-03-11 ENCOUNTER — EMERGENCY (EMERGENCY)
Facility: HOSPITAL | Age: 70
LOS: 1 days | Discharge: HOME | End: 2020-03-11
Attending: EMERGENCY MEDICINE | Admitting: EMERGENCY MEDICINE
Payer: MEDICARE

## 2020-03-11 VITALS
WEIGHT: 160.06 LBS | DIASTOLIC BLOOD PRESSURE: 113 MMHG | RESPIRATION RATE: 17 BRPM | HEART RATE: 98 BPM | OXYGEN SATURATION: 98 % | TEMPERATURE: 98 F | SYSTOLIC BLOOD PRESSURE: 150 MMHG | HEIGHT: 65 IN

## 2020-03-11 DIAGNOSIS — Z87.891 PERSONAL HISTORY OF NICOTINE DEPENDENCE: ICD-10-CM

## 2020-03-11 DIAGNOSIS — E78.5 HYPERLIPIDEMIA, UNSPECIFIED: ICD-10-CM

## 2020-03-11 DIAGNOSIS — Z88.6 ALLERGY STATUS TO ANALGESIC AGENT: ICD-10-CM

## 2020-03-11 DIAGNOSIS — Z79.899 OTHER LONG TERM (CURRENT) DRUG THERAPY: ICD-10-CM

## 2020-03-11 DIAGNOSIS — Z79.82 LONG TERM (CURRENT) USE OF ASPIRIN: ICD-10-CM

## 2020-03-11 DIAGNOSIS — Z95.1 PRESENCE OF AORTOCORONARY BYPASS GRAFT: Chronic | ICD-10-CM

## 2020-03-11 DIAGNOSIS — J43.9 EMPHYSEMA, UNSPECIFIED: ICD-10-CM

## 2020-03-11 DIAGNOSIS — I10 ESSENTIAL (PRIMARY) HYPERTENSION: ICD-10-CM

## 2020-03-11 DIAGNOSIS — R06.02 SHORTNESS OF BREATH: ICD-10-CM

## 2020-03-11 DIAGNOSIS — I48.91 UNSPECIFIED ATRIAL FIBRILLATION: ICD-10-CM

## 2020-03-11 DIAGNOSIS — J45.909 UNSPECIFIED ASTHMA, UNCOMPLICATED: ICD-10-CM

## 2020-03-11 DIAGNOSIS — Z79.52 LONG TERM (CURRENT) USE OF SYSTEMIC STEROIDS: ICD-10-CM

## 2020-03-11 LAB
ALBUMIN SERPL ELPH-MCNC: 3.5 G/DL — SIGNIFICANT CHANGE UP (ref 3.4–5)
ALP SERPL-CCNC: 55 U/L — SIGNIFICANT CHANGE UP (ref 40–120)
ALT FLD-CCNC: 23 U/L — SIGNIFICANT CHANGE UP (ref 12–42)
ANION GAP SERPL CALC-SCNC: 3 MMOL/L — LOW (ref 9–16)
AST SERPL-CCNC: 11 U/L — LOW (ref 15–37)
BASOPHILS # BLD AUTO: 0.06 K/UL — SIGNIFICANT CHANGE UP (ref 0–0.2)
BASOPHILS NFR BLD AUTO: 0.6 % — SIGNIFICANT CHANGE UP (ref 0–2)
BILIRUB SERPL-MCNC: 0.6 MG/DL — SIGNIFICANT CHANGE UP (ref 0.2–1.2)
BUN SERPL-MCNC: 32 MG/DL — HIGH (ref 7–23)
CALCIUM SERPL-MCNC: 8.9 MG/DL — SIGNIFICANT CHANGE UP (ref 8.5–10.5)
CHLORIDE SERPL-SCNC: 102 MMOL/L — SIGNIFICANT CHANGE UP (ref 96–108)
CK MB BLD-MCNC: 3.16 % — SIGNIFICANT CHANGE UP
CK MB CFR SERPL CALC: 0.6 NG/ML — SIGNIFICANT CHANGE UP (ref 0.5–3.6)
CK SERPL-CCNC: 19 U/L — LOW (ref 39–308)
CO2 SERPL-SCNC: 34 MMOL/L — HIGH (ref 22–31)
CREAT SERPL-MCNC: 1.18 MG/DL — SIGNIFICANT CHANGE UP (ref 0.5–1.3)
D DIMER BLD IA.RAPID-MCNC: 256 NG/ML DDU — HIGH
EOSINOPHIL # BLD AUTO: 0.16 K/UL — SIGNIFICANT CHANGE UP (ref 0–0.5)
EOSINOPHIL NFR BLD AUTO: 1.6 % — SIGNIFICANT CHANGE UP (ref 0–6)
GLUCOSE SERPL-MCNC: 109 MG/DL — HIGH (ref 70–99)
HCT VFR BLD CALC: 50.2 % — HIGH (ref 39–50)
HGB BLD-MCNC: 15.7 G/DL — SIGNIFICANT CHANGE UP (ref 13–17)
IMM GRANULOCYTES NFR BLD AUTO: 1 % — SIGNIFICANT CHANGE UP (ref 0–1.5)
LYMPHOCYTES # BLD AUTO: 1.9 K/UL — SIGNIFICANT CHANGE UP (ref 1–3.3)
LYMPHOCYTES # BLD AUTO: 18.9 % — SIGNIFICANT CHANGE UP (ref 13–44)
MCHC RBC-ENTMCNC: 27.4 PG — SIGNIFICANT CHANGE UP (ref 27–34)
MCHC RBC-ENTMCNC: 31.3 GM/DL — LOW (ref 32–36)
MCV RBC AUTO: 87.6 FL — SIGNIFICANT CHANGE UP (ref 80–100)
MONOCYTES # BLD AUTO: 0.92 K/UL — HIGH (ref 0–0.9)
MONOCYTES NFR BLD AUTO: 9.1 % — SIGNIFICANT CHANGE UP (ref 2–14)
NEUTROPHILS # BLD AUTO: 6.93 K/UL — SIGNIFICANT CHANGE UP (ref 1.8–7.4)
NEUTROPHILS NFR BLD AUTO: 68.8 % — SIGNIFICANT CHANGE UP (ref 43–77)
NRBC # BLD: 0 /100 WBCS — SIGNIFICANT CHANGE UP (ref 0–0)
NT-PROBNP SERPL-SCNC: 1262 PG/ML — HIGH
PLATELET # BLD AUTO: 173 K/UL — SIGNIFICANT CHANGE UP (ref 150–400)
POTASSIUM SERPL-MCNC: 4.4 MMOL/L — SIGNIFICANT CHANGE UP (ref 3.5–5.3)
POTASSIUM SERPL-SCNC: 4.4 MMOL/L — SIGNIFICANT CHANGE UP (ref 3.5–5.3)
PROT SERPL-MCNC: 6.7 G/DL — SIGNIFICANT CHANGE UP (ref 6.4–8.2)
RBC # BLD: 5.73 M/UL — SIGNIFICANT CHANGE UP (ref 4.2–5.8)
RBC # FLD: 19.7 % — HIGH (ref 10.3–14.5)
SODIUM SERPL-SCNC: 139 MMOL/L — SIGNIFICANT CHANGE UP (ref 132–145)
TROPONIN I SERPL-MCNC: <0.017 NG/ML — LOW (ref 0.02–0.06)
WBC # BLD: 10.07 K/UL — SIGNIFICANT CHANGE UP (ref 3.8–10.5)
WBC # FLD AUTO: 10.07 K/UL — SIGNIFICANT CHANGE UP (ref 3.8–10.5)

## 2020-03-11 PROCEDURE — 93010 ELECTROCARDIOGRAM REPORT: CPT

## 2020-03-11 PROCEDURE — 99285 EMERGENCY DEPT VISIT HI MDM: CPT

## 2020-03-11 PROCEDURE — 71046 X-RAY EXAM CHEST 2 VIEWS: CPT | Mod: 26

## 2020-03-11 RX ORDER — IPRATROPIUM/ALBUTEROL SULFATE 18-103MCG
3 AEROSOL WITH ADAPTER (GRAM) INHALATION
Refills: 0 | Status: COMPLETED | OUTPATIENT
Start: 2020-03-11 | End: 2020-03-11

## 2020-03-11 RX ORDER — METOPROLOL TARTRATE 50 MG
25 TABLET ORAL ONCE
Refills: 0 | Status: COMPLETED | OUTPATIENT
Start: 2020-03-11 | End: 2020-03-11

## 2020-03-11 RX ADMIN — Medication 125 MILLIGRAM(S): at 14:52

## 2020-03-11 RX ADMIN — Medication 3 MILLILITER(S): at 14:54

## 2020-03-11 RX ADMIN — Medication 3 MILLILITER(S): at 14:25

## 2020-03-11 RX ADMIN — Medication 25 MILLIGRAM(S): at 15:18

## 2020-03-11 RX ADMIN — Medication 3 MILLILITER(S): at 14:53

## 2020-03-11 NOTE — ED PROVIDER NOTE - PROGRESS NOTE DETAILS
pt with significantly improved res sx, lungs CTA no wheezing, ambulatory without return of wheezing, elevated ddimer noted on labs, recc CTA r/o PE< pt refused, agiven additional bb dose in ED for afib with HR 98, pt declined further workup, The patient is alert, cooperative with fluent and appropriate speech. The patient is requesting to leave the Emergency Department despite the recommendation of admission to the hospital.  The patient verbally expresses understanding of the risks of leaving including the possibility of permanent disability and death. The risks, benefits, hazards, alternatives and precautions pertaining to the patient's specific medical issue were discussed.  The patient verbalizes understanding of these risk, benefits, alternative and precautions back to me.  All lab and test results available at the time of the discussion with the patient were addressed.  The patient has been made aware that he/she is welcome to return at anytime to the Emergency Department for continued care. Close follow up with primary care doctor is recommended as soon as possible.  All of the patient's questions were answered.

## 2020-03-11 NOTE — ED PROVIDER NOTE - CLINICAL SUMMARY MEDICAL DECISION MAKING FREE TEXT BOX
Pt present with dyspnea/SOB.. Will check labs, EKG, cardiac enzymes, CXR. Treat for COPD exacerbation and do cardiac workup. Pt present with dyspnea/SOB.. Will check labs, EKG, cardiac enzymes, CXR. Treat for COPD exacerbation and do cardiac workup.    Patient is aaox3 and is asking to leave. The patient does not want to be admitted and understands the risk of leaving including permanent disability and DEATH. Risk, benefit, hazards, alternatives and precautions were reviewed and the pt voices understanding. he is aware that she may return at anytime and that close follow up with primary care doctor is recommended as soon as possible. He voices understanding. Will sign AMA    pt refused to wait for dc papers . rx sent to pharmacy.

## 2020-03-11 NOTE — ED PROVIDER NOTE - OBJECTIVE STATEMENT
68 y/o M with PMHx HTN, HLD, COPD, Afib, and major depression presents to ED c/o SOB for over a month with occasional productive cough. Pt states he feels hot but has not taken his temperature. He quit smoking 2 weeks ago. Pt presented to the ED 2 days ago with similar sx. Denies chills, N/V, abd pain, back pain, neck pain, headache, sick contacts, or recent travel.

## 2020-03-11 NOTE — ED ADULT NURSE REASSESSMENT NOTE - NS ED NURSE REASSESS COMMENT FT1
pt given nebs, states "I feel better and want to leave right now". md rogers at bedside. risks and benefits explained to pt. pt denies any further medical treatment and does not want to wait for blood results.

## 2020-03-11 NOTE — ED ADULT NURSE NOTE - OBJECTIVE STATEMENT
70 y/o M with "no past medical history" as per patient c/o shortness of breath today. pt denies any medical history but reports he takes 10 medications a day. denies fevers, nausea, vomiting, chest pain.

## 2020-03-11 NOTE — ED ADULT NURSE NOTE - NSIMPLEMENTINTERV_GEN_ALL_ED
Implemented All Universal Safety Interventions:  [place] to call system. Call bell, personal items and telephone within reach. Instruct patient to call for assistance. Room bathroom lighting operational. Non-slip footwear when patient is off stretcher. Physically safe environment: no spills, clutter or unnecessary equipment. Stretcher in lowest position, wheels locked, appropriate side rails in place.

## 2020-03-11 NOTE — ED PROVIDER NOTE - PATIENT PORTAL LINK FT
You can access the FollowMyHealth Patient Portal offered by [facility] by registering at the following website: [email or web address]. By joining [employer]’s FollowMyHealth portal, you will also be able to view your health information using other applications (apps) compatible with our system.

## 2020-03-15 LAB
CULTURE RESULTS: SIGNIFICANT CHANGE UP
CULTURE RESULTS: SIGNIFICANT CHANGE UP
SPECIMEN SOURCE: SIGNIFICANT CHANGE UP
SPECIMEN SOURCE: SIGNIFICANT CHANGE UP

## 2020-05-05 NOTE — ED PROVIDER NOTE - NS_ATTENDINGSCRIBE_ED_ALL_ED
Pt called,   Needs refills on flexeril, glipizide, metformin    Her blood sugars have been all in the 100s per pt and she is working hard on her diet. Refills sent  Follow up when we resume clincs in the community. I personally performed the service described in the documentation recorded by the scribe in my presence, and it accurately and completely records my words and actions.

## 2020-05-20 ENCOUNTER — EMERGENCY (EMERGENCY)
Facility: HOSPITAL | Age: 70
LOS: 1 days | Discharge: HOME | End: 2020-05-20
Attending: EMERGENCY MEDICINE | Admitting: EMERGENCY MEDICINE
Payer: SELF-PAY

## 2020-05-20 VITALS
RESPIRATION RATE: 18 BRPM | WEIGHT: 149.91 LBS | DIASTOLIC BLOOD PRESSURE: 89 MMHG | HEART RATE: 102 BPM | TEMPERATURE: 98 F | SYSTOLIC BLOOD PRESSURE: 135 MMHG | OXYGEN SATURATION: 98 %

## 2020-05-20 VITALS — HEART RATE: 92 BPM

## 2020-05-20 DIAGNOSIS — Z95.1 PRESENCE OF AORTOCORONARY BYPASS GRAFT: Chronic | ICD-10-CM

## 2020-05-20 LAB
ALBUMIN SERPL ELPH-MCNC: 3.7 G/DL — SIGNIFICANT CHANGE UP (ref 3.4–5)
ALP SERPL-CCNC: 64 U/L — SIGNIFICANT CHANGE UP (ref 40–120)
ALT FLD-CCNC: 19 U/L — SIGNIFICANT CHANGE UP (ref 12–42)
ANION GAP SERPL CALC-SCNC: 3 MMOL/L — LOW (ref 9–16)
AST SERPL-CCNC: 23 U/L — SIGNIFICANT CHANGE UP (ref 15–37)
BASOPHILS # BLD AUTO: 0.03 K/UL — SIGNIFICANT CHANGE UP (ref 0–0.2)
BASOPHILS NFR BLD AUTO: 0.4 % — SIGNIFICANT CHANGE UP (ref 0–2)
BILIRUB SERPL-MCNC: 0.6 MG/DL — SIGNIFICANT CHANGE UP (ref 0.2–1.2)
BUN SERPL-MCNC: 27 MG/DL — HIGH (ref 7–23)
CALCIUM SERPL-MCNC: 8.8 MG/DL — SIGNIFICANT CHANGE UP (ref 8.5–10.5)
CHLORIDE SERPL-SCNC: 104 MMOL/L — SIGNIFICANT CHANGE UP (ref 96–108)
CK SERPL-CCNC: 47 U/L — SIGNIFICANT CHANGE UP (ref 39–308)
CO2 SERPL-SCNC: 32 MMOL/L — HIGH (ref 22–31)
CREAT SERPL-MCNC: 1.13 MG/DL — SIGNIFICANT CHANGE UP (ref 0.5–1.3)
CRP SERPL-MCNC: 0.9 MG/DL — SIGNIFICANT CHANGE UP (ref 0–0.9)
D DIMER BLD IA.RAPID-MCNC: 244 NG/ML DDU — HIGH
EOSINOPHIL # BLD AUTO: 0.16 K/UL — SIGNIFICANT CHANGE UP (ref 0–0.5)
EOSINOPHIL NFR BLD AUTO: 2.3 % — SIGNIFICANT CHANGE UP (ref 0–6)
GLUCOSE SERPL-MCNC: 90 MG/DL — SIGNIFICANT CHANGE UP (ref 70–99)
HCT VFR BLD CALC: 45.3 % — SIGNIFICANT CHANGE UP (ref 39–50)
HGB BLD-MCNC: 14.5 G/DL — SIGNIFICANT CHANGE UP (ref 13–17)
IMM GRANULOCYTES NFR BLD AUTO: 0.4 % — SIGNIFICANT CHANGE UP (ref 0–1.5)
LYMPHOCYTES # BLD AUTO: 1.61 K/UL — SIGNIFICANT CHANGE UP (ref 1–3.3)
LYMPHOCYTES # BLD AUTO: 23.4 % — SIGNIFICANT CHANGE UP (ref 13–44)
MCHC RBC-ENTMCNC: 30.3 PG — SIGNIFICANT CHANGE UP (ref 27–34)
MCHC RBC-ENTMCNC: 32 GM/DL — SIGNIFICANT CHANGE UP (ref 32–36)
MCV RBC AUTO: 94.8 FL — SIGNIFICANT CHANGE UP (ref 80–100)
MONOCYTES # BLD AUTO: 0.86 K/UL — SIGNIFICANT CHANGE UP (ref 0–0.9)
MONOCYTES NFR BLD AUTO: 12.5 % — SIGNIFICANT CHANGE UP (ref 2–14)
NEUTROPHILS # BLD AUTO: 4.19 K/UL — SIGNIFICANT CHANGE UP (ref 1.8–7.4)
NEUTROPHILS NFR BLD AUTO: 61 % — SIGNIFICANT CHANGE UP (ref 43–77)
NRBC # BLD: 0 /100 WBCS — SIGNIFICANT CHANGE UP (ref 0–0)
NT-PROBNP SERPL-SCNC: 1698 PG/ML — HIGH
PLATELET # BLD AUTO: 186 K/UL — SIGNIFICANT CHANGE UP (ref 150–400)
POTASSIUM SERPL-MCNC: 4.5 MMOL/L — SIGNIFICANT CHANGE UP (ref 3.5–5.3)
POTASSIUM SERPL-SCNC: 4.5 MMOL/L — SIGNIFICANT CHANGE UP (ref 3.5–5.3)
PROT SERPL-MCNC: 6.9 G/DL — SIGNIFICANT CHANGE UP (ref 6.4–8.2)
RBC # BLD: 4.78 M/UL — SIGNIFICANT CHANGE UP (ref 4.2–5.8)
RBC # FLD: 14.3 % — SIGNIFICANT CHANGE UP (ref 10.3–14.5)
SARS-COV-2 RNA SPEC QL NAA+PROBE: SIGNIFICANT CHANGE UP
SODIUM SERPL-SCNC: 139 MMOL/L — SIGNIFICANT CHANGE UP (ref 132–145)
TROPONIN I SERPL-MCNC: <0.017 NG/ML — LOW (ref 0.02–0.06)
WBC # BLD: 6.88 K/UL — SIGNIFICANT CHANGE UP (ref 3.8–10.5)
WBC # FLD AUTO: 6.88 K/UL — SIGNIFICANT CHANGE UP (ref 3.8–10.5)

## 2020-05-20 PROCEDURE — 93010 ELECTROCARDIOGRAM REPORT: CPT

## 2020-05-20 PROCEDURE — 71046 X-RAY EXAM CHEST 2 VIEWS: CPT | Mod: 26

## 2020-05-20 PROCEDURE — 99285 EMERGENCY DEPT VISIT HI MDM: CPT

## 2020-05-20 RX ORDER — ALBUTEROL 90 UG/1
2 AEROSOL, METERED ORAL ONCE
Refills: 0 | Status: COMPLETED | OUTPATIENT
Start: 2020-05-20 | End: 2020-05-20

## 2020-05-20 RX ADMIN — Medication 60 MILLIGRAM(S): at 14:35

## 2020-05-20 RX ADMIN — ALBUTEROL 2 PUFF(S): 90 AEROSOL, METERED ORAL at 14:35

## 2020-05-20 NOTE — ED ADULT NURSE NOTE - NSIMPLEMENTINTERV_GEN_ALL_ED
Implemented All Universal Safety Interventions:  [place] to call system. Call bell, personal items and telephone within reach. Instruct patient to call for assistance. Room bathroom lighting operational. Non-slip footwear when patient is off stretcher. Physically safe environment: no spills, clutter or unnecessary equipment. Stretcher in lowest position, wheels locked, appropriate side rails in place.

## 2020-05-20 NOTE — ED PROVIDER NOTE - OBJECTIVE STATEMENT
68 y/o male, currently on many meds but unsure of name, presents to ED c/o SOB. Patient reports intermittent SOB for the past 1 year, but notes it has been worse over the past 2 days. As per EMS, O2 sat on the field was 100%. Patient denies any fever, chills, CP, or cough. Admits to smoking tobacco for >50 years. Patient currently resides in a homeless shelter.

## 2020-05-20 NOTE — ED PROVIDER NOTE - NEUROLOGICAL, MLM
Refill  8/22/2107  Seen  6/20/2017     Alert and oriented, no focal deficits, no motor or sensory deficits.

## 2020-05-20 NOTE — ED ADULT NURSE REASSESSMENT NOTE - NS ED NURSE REASSESS COMMENT FT1
As per MD Tamayo, pt verbalized improvement. Requesting to leave. Pt explained risks of leaving AMA. Pt aware and still chose to leave ED.

## 2020-05-20 NOTE — ED PROVIDER NOTE - PATIENT PORTAL LINK FT
You can access the FollowMyHealth Patient Portal offered by [facility] by registering at the following website: [email or web address]. By joining [employer]’s FollowMyHealth portal, you will also be able to view your health information using other applications (apps) compatible with our system.

## 2020-05-20 NOTE — ED ADULT NURSE NOTE - OBJECTIVE STATEMENT
70 y/o M c/o sob x2 days- states he is an everyday smoker, denies any fevers, nausea, chest pain. pt poor historian, states "I take meds for everything".

## 2020-05-20 NOTE — ED PROVIDER NOTE - CARE PROVIDER_API CALL
Zi Winter  CARDIOVASCULAR DISEASE  [address] [place] [address].  [place] [address]  Phone: [phone]  Fax: [phone]  Follow Up Time: 1-3 Days

## 2020-05-20 NOTE — ED ADULT NURSE NOTE - NS ED NURSE LEVEL OF CONSCIOUSNESS MENTAL STATUS
As part of our commitment to your overall care and recovery, we would like to check on you after your discharge.  You may receive intermittent non-urgent calls for approximately 30 days after your discharge to:   -Check on your recovery  -Assist with arranging follow-up appointments  -Answer prescription related questions  -Assist with other questions that you may have about your care    We understand that you are in your recovery period and these calls should be brief.    Thank you.  Please call if you have any questions.  Jenny King RN, CPQ, [facility]  Transition of Care Nurse  89    Laparoscopic or Open Partial Colectomy    Ways to Take Care of Yourself    Activity   1. Take short walks around the house three times a day.  Rest when you get tired.   2. NO HEAVY LIFTING!  Do not lift over 20 pounds for 4 weeks.   3. Do ankle pumps and ankle circles throughout the day.           Medications  1. You will be given narcotic and non-narcotic medication for pain.  You will most likely be given the narcotic Morphine or Hydrocodone-Acetaminophen for pain.  This should be taken only as needed.  2. Take tylenol (if you are not allergic) every 6-8 hours as needed for pain.  Do not take tylenol if taking Hydrocodone-Acetaminophen as this medication already contains tylenol.  3. Take ibuprofen (if you are not allergic) every 6-8  hours as needed for pain.  You can alternate ibuprofen with tylenol or take them together.  4. Take a stool softener (not a stimulant laxative) of your choice once to twice a day while requiring narcotic pain medication as narcotics can cause severe constipation.  5. If you feel that you will need a refill on your pain medication, please be aware that it is our clinic policy that we do not refill narcotic pain medications on the weekends. Please call during normal business hours, 8:30 am - 5 pm, Monday through Friday.    Diet   1. You should follow a \"Low Residue Diet\" or \"Low Fiber\" diet until seen  in follow up.  This means no salads or raw vegetables for 1 month. (You can eat cooked or steamed veggies. You should be able to put a fork through them).           2.   Do not use stimulate laxatives like senna or Ex-Lax   3.   Drink at least 8 glasses of non-cafeinated liquids a day.   4. If constipation occurs, may take an over the counter laxative.  Take daily stool softeners if taking narcotic pain medication.  5.     Small flakes of blood or blod clot in your stool can be expected for a week or so.    Dressing/Incision   1. Keep dressing clean and dry.  May shower.  Do not soak in a tub or swim in a pool until seen in follow up.     2.  If you have glue over your incision, it will fall of in 1-2 weeks.  It is OK to shower over these.       3.  If you have a gauze and tape dressing over your incision you can remove this 2 days after surgery. Steri-strips (little pieces of white tape) will be adherent to your skin under the gauze.  These will fall off on their own in 1-2 weeks.  You can shower over these.       4.  Keep an ice bag on the incision for 20 minutes as needed to help with pain and swelling.    Call Your Doctor if You Have Any of the Following (Red Flags):   1. Increasing pain, redness, swelling, or drainage from incision.   2. Fever over 101°F for longer than 24 hours.     3. Trouble breathing or chest pain.   4. Pain, redness, or swelling in your legs.   5. Nausea, vomiting, or diarrhea that lasts longer than 24 hours.    Physician: Jose Armando Hogan MD   Office Phone: [phone]      Office Address: [facility], [address]        [place] [id number]    Future Appointments  Date Time Provider Department Center   7/10/2018 10:45 AM Radha Rene MD [facility]   7/23/2018 11:00 AM [facility]   7/30/2018 12:30 PM [facility]   8/6/2018 12:30 PM [facility]   8/9/2018 10:00 AM SHIV[facility]   10/2/2018 2:00  Awake/Alert/Cooperative PM MD HARJIT [facility] Jr[facility]   11/26/2018 1:30 PM MD HARJIT [facility] Jr[facility]   2/8/2019 8:30 AM [facility]   2/14/2019 1:00 PM MD DANIEL [facility]

## 2020-05-20 NOTE — ED PROVIDER NOTE - ABNORMAL RHYTHM
Subjective:      Kaden Tariq is a 61 y.o. female who presents for possible ear infection. Symptoms include bilateral ear pain and congestion. Onset of symptoms was 2 days ago, gradually worsening since that time. Associated symptoms include bilateral ear pressure/pain and congestion, which have been present for 2 days . She is drinking plenty of fluids. Problem List:     Patient Active Problem List    Diagnosis Date Noted    Menorrhagia 2013    Fibroids 2013     Medical History:     Past Medical History   Diagnosis Date    Arthritis     GERD (gastroesophageal reflux disease)      Allergies: Allergies   Allergen Reactions    Codeine Nausea and Vomiting    Pcn [Penicillins] Hives and Swelling    Percocet [Oxycodone-Acetaminophen] Nausea and Vomiting    Sulfa (Sulfonamide Antibiotics) Other (comments)     Headache        Medications:     Current Outpatient Prescriptions   Medication Sig    losartan (COZAAR) 50 mg tablet TAKE 1-2 TABLETS BY MOUTH DAILY FOR BLOOD PRESSURE    azithromycin (ZITHROMAX) 250 mg tablet Take 2 tablets today, then take 1 tablet daily    Omeprazole delayed release (PRILOSEC D/R) 20 mg tablet Take 20 mg by mouth as needed.  cetirizine (ZYRTEC) 10 mg tablet Take 10 mg by mouth daily.  meloxicam (MOBIC) 15 mg tablet Take 15 mg by mouth daily. No current facility-administered medications for this visit.       Surgical History:     Past Surgical History   Procedure Laterality Date    Colonoscopy      Hx orthopaedic       knee scope    Hx  section      Hx gyn  13     TOTAL LAPAROSCOPIC HYSTERECTOMY AND BILATERAL SALPINGO OOPHRECTOMY & CYSTOSCOPY     Social History:     Social History     Social History    Marital status: SINGLE     Spouse name: N/A    Number of children: N/A    Years of education: N/A     Social History Main Topics    Smoking status: Never Smoker    Smokeless tobacco: Never Used    Alcohol use No    Drug use: No    Sexual activity: No     Other Topics Concern    None     Social History Narrative         Objective:     ROS:   Feeling well. No dyspnea or chest pain on exertion. No abdominal pain, change in bowel habits, black or bloody stools. No urinary tract symptoms. GYN ROS: normal menses, no abnormal bleeding, pelvic pain or discharge, no breast pain or new or enlarging lumps on self exam. No neurological complaints. OBJECTIVE:   The patient appears well, alert, oriented x 3, in no distress. Visit Vitals    /82    Pulse (!) 58    Temp 98.1 °F (36.7 °C) (Oral)    Resp 16    Ht 5' 5\" (1.651 m)    Wt 187 lb 3.2 oz (84.9 kg)    LMP 12/10/2013    SpO2 100%    BMI 31.15 kg/m2     HEENT:ENT bilateral Tm's red, bulgingl. Neck supple. No adenopathy or thyromegaly. RAMON. Chest: Lungs are clear, good air entry, no wheezes, rhonchi or rales. Cardiovascular: S1 and S2 normal, no murmurs, regular rate and rhythm. Abdomen: soft without tenderness, guarding, mass or organomegaly. Extremities: show no edema, normal peripheral pulses. Neurological: is normal, no focal findings. Assessment/Plan:       ICD-10-CM ICD-9-CM    1. Bilateral acute serous otitis media, recurrence not specified H65.03 381.01 azithromycin (ZITHROMAX) 250 mg tablet   . atrial fibrillation

## 2020-05-20 NOTE — ED PROVIDER NOTE - NSFOLLOWUPINSTRUCTIONS_ED_ALL_ED_FT
Pt informed of all lab and radiology results, demonstrates understanding of results.  Pt wants to be discharged despite being advised STRONGLY and REPEATEDLY to stay for admission.  A&Ox3, speaking clearly and coherently, demonstrates decision-making capacity.  Pt understands that the risks of leaving include, but not limited to, death and/or disability; pt accepts these risks.  Pt understands to be seen by PMD in 1-2 days; pt understands to return as soon as possible for persistence or any worsening of symptoms. Pt given opportunity to ask any and all questions.  Pt understands that the ER diagnosis being given today is a preliminary one, and, like many ER diagnoses, is a preliminary impression, often based on limited information, that may change as current Sx's evolve or new Sx's develop

## 2020-05-20 NOTE — ED PROVIDER NOTE - PROGRESS NOTE DETAILS
Patient stable protecting airway of reeval- no other complaints able to ambulate without assistance and speak in full sentences. Patient pending CTA -JS Patient wishes to sign out AMA. We discussed the risk of blood clot, CVA, MI, PE, and death. Patient verbalizes understanding the risks and wishes to leave AMA. Instructed patient to return to ED if symptoms worsen. Patient agrees and signed out AMA. Patient wants to be discharged despite being advised STRONGLY and REPEATEDLY to stay for labs and further work up. Patient is A&Ox3, speaking clearly and coherently, demonstrates decision-making capacity.

## 2020-05-24 DIAGNOSIS — R06.02 SHORTNESS OF BREATH: ICD-10-CM

## 2020-05-24 DIAGNOSIS — Z20.828 CONTACT WITH AND (SUSPECTED) EXPOSURE TO OTHER VIRAL COMMUNICABLE DISEASES: ICD-10-CM

## 2020-08-03 ENCOUNTER — EMERGENCY (EMERGENCY)
Facility: HOSPITAL | Age: 70
LOS: 1 days | Discharge: HOME | End: 2020-08-03
Attending: EMERGENCY MEDICINE | Admitting: EMERGENCY MEDICINE
Payer: MEDICARE

## 2020-08-03 VITALS
HEART RATE: 99 BPM | SYSTOLIC BLOOD PRESSURE: 145 MMHG | OXYGEN SATURATION: 98 % | DIASTOLIC BLOOD PRESSURE: 88 MMHG | TEMPERATURE: 98 F | RESPIRATION RATE: 18 BRPM

## 2020-08-03 VITALS
RESPIRATION RATE: 16 BRPM | HEIGHT: 66 IN | DIASTOLIC BLOOD PRESSURE: 70 MMHG | WEIGHT: 164.91 LBS | HEART RATE: 90 BPM | OXYGEN SATURATION: 97 % | TEMPERATURE: 98 F | SYSTOLIC BLOOD PRESSURE: 136 MMHG

## 2020-08-03 DIAGNOSIS — Z95.1 PRESENCE OF AORTOCORONARY BYPASS GRAFT: Chronic | ICD-10-CM

## 2020-08-03 PROBLEM — I10 ESSENTIAL (PRIMARY) HYPERTENSION: Chronic | Status: ACTIVE | Noted: 2020-03-11

## 2020-08-03 PROBLEM — E78.5 HYPERLIPIDEMIA, UNSPECIFIED: Chronic | Status: ACTIVE | Noted: 2020-03-11

## 2020-08-03 PROCEDURE — 74019 RADEX ABDOMEN 2 VIEWS: CPT | Mod: 26

## 2020-08-03 PROCEDURE — 99284 EMERGENCY DEPT VISIT MOD MDM: CPT | Mod: 25

## 2020-08-03 RX ORDER — LACTULOSE 10 G/15ML
15 SOLUTION ORAL ONCE
Refills: 0 | Status: DISCONTINUED | OUTPATIENT
Start: 2020-08-03 | End: 2020-08-03

## 2020-08-03 RX ORDER — MULTIVIT WITH MIN/MFOLATE/K2 340-15/3 G
1 POWDER (GRAM) ORAL ONCE
Refills: 0 | Status: DISCONTINUED | OUTPATIENT
Start: 2020-08-03 | End: 2020-08-03

## 2020-08-03 NOTE — ED ADULT NURSE NOTE - NSIMPLEMENTINTERV_GEN_ALL_ED
Implemented All Universal Safety Interventions:  [place] to call system. Call bell, personal items and telephone within reach. Instruct patient to call for assistance. Room bathroom lighting operational. Non-slip footwear when patient is off stretcher. Physically safe environment: no spills, clutter or unnecessary equipment. Stretcher in lowest position, wheels locked, appropriate side rails in place.

## 2020-08-03 NOTE — ED PROVIDER NOTE - PATIENT PORTAL LINK FT
You can access the FollowMyHealth Patient Portal offered by [facility] by registering at the following website: [email or web address]. By joining [employer]’s FollowMyHealth portal, you will also be able to view your health information using other applications (apps) compatible with our system.

## 2020-08-03 NOTE — ED PROVIDER NOTE - CLINICAL SUMMARY MEDICAL DECISION MAKING FREE TEXT BOX
70 y/o male who normally has a bowel movement everyday hasn't had a bowel movement in 2 days and is concerned. No history of abdominal surgeries in the past. Will obtain x-ray to r/o any evidence of obstruction. If negative, will proceed with Lactulose and Magnesium Citrate.

## 2020-08-03 NOTE — ED ADULT NURSE NOTE - NS ED NURSE LEVEL OF CONSCIOUSNESS SPEECH
----- Message from Carmelita Carpio sent at 8/15/2019 11:10 AM CDT -----  Calling to resched appt from 8/15    Pt contact [phone]   Speaking Coherently

## 2020-08-03 NOTE — ED PROVIDER NOTE - OBJECTIVE STATEMENT
70 y/o male with PMHx of Atrial Fibrillation, asthma, COPD, HLD, and HTN presents to the ED with complaints of constipation x 2 days. Patient states he normally has a bowel movement everyday, and since he has not had one in 2 days, Patient decided to seek medical attention. Reports not passing gas. Last bowel movement was normal. As per EMS, no recent opioid or narcotics use. No history of abdominal surgeries in the past. Denies fever, chills, chest pain, abdominal pain, N/V, dysuria, hematuria.

## 2020-08-03 NOTE — ED PROVIDER NOTE - DIAGNOSTIC INTERPRETATION
Radiology Interpretation performed by ED physician Moises  Abdomen x-ray, 2 views  No air fluid level, no evidence of SBO. +Evidence of stool burden.

## 2020-08-03 NOTE — ED PROVIDER NOTE - PROGRESS NOTE DETAILS
Patient came out of bathroom smiling and stating he had a bowel movement and feels better now. Patient did not receive any medication or intervention. Patient wants to go now. Will discharge and encourage high fiber diet and increased hydration.

## 2020-08-07 DIAGNOSIS — K59.00 CONSTIPATION, UNSPECIFIED: ICD-10-CM

## 2020-11-04 ENCOUNTER — EMERGENCY (EMERGENCY)
Facility: HOSPITAL | Age: 70
LOS: 1 days | Discharge: HOME | End: 2020-11-04
Attending: EMERGENCY MEDICINE | Admitting: EMERGENCY MEDICINE
Payer: MEDICARE

## 2020-11-04 VITALS
SYSTOLIC BLOOD PRESSURE: 140 MMHG | HEART RATE: 95 BPM | RESPIRATION RATE: 18 BRPM | DIASTOLIC BLOOD PRESSURE: 79 MMHG | OXYGEN SATURATION: 97 % | TEMPERATURE: 98 F | HEIGHT: 66 IN | WEIGHT: 160.06 LBS

## 2020-11-04 DIAGNOSIS — R07.89 OTHER CHEST PAIN: ICD-10-CM

## 2020-11-04 DIAGNOSIS — R06.02 SHORTNESS OF BREATH: ICD-10-CM

## 2020-11-04 DIAGNOSIS — Z20.828 CONTACT WITH AND (SUSPECTED) EXPOSURE TO OTHER VIRAL COMMUNICABLE DISEASES: ICD-10-CM

## 2020-11-04 DIAGNOSIS — Z95.1 PRESENCE OF AORTOCORONARY BYPASS GRAFT: Chronic | ICD-10-CM

## 2020-11-04 DIAGNOSIS — F41.9 ANXIETY DISORDER, UNSPECIFIED: ICD-10-CM

## 2020-11-04 LAB
ALBUMIN SERPL ELPH-MCNC: 3.6 G/DL — SIGNIFICANT CHANGE UP (ref 3.4–5)
ALP SERPL-CCNC: 60 U/L — SIGNIFICANT CHANGE UP (ref 40–120)
ALT FLD-CCNC: 45 U/L — HIGH (ref 12–42)
ANION GAP SERPL CALC-SCNC: 6 MMOL/L — LOW (ref 9–16)
AST SERPL-CCNC: 35 U/L — SIGNIFICANT CHANGE UP (ref 15–37)
BILIRUB SERPL-MCNC: 1.4 MG/DL — HIGH (ref 0.2–1.2)
BUN SERPL-MCNC: 32 MG/DL — HIGH (ref 7–23)
CALCIUM SERPL-MCNC: 9 MG/DL — SIGNIFICANT CHANGE UP (ref 8.5–10.5)
CHLORIDE SERPL-SCNC: 102 MMOL/L — SIGNIFICANT CHANGE UP (ref 96–108)
CO2 SERPL-SCNC: 31 MMOL/L — SIGNIFICANT CHANGE UP (ref 22–31)
CREAT SERPL-MCNC: 1.41 MG/DL — HIGH (ref 0.5–1.3)
D DIMER BLD IA.RAPID-MCNC: 220 NG/ML DDU — SIGNIFICANT CHANGE UP
FLUAV H1 2009 PAND RNA SPEC QL NAA+PROBE: SIGNIFICANT CHANGE UP
FLUAV H1 RNA SPEC QL NAA+PROBE: SIGNIFICANT CHANGE UP
FLUAV H3 RNA SPEC QL NAA+PROBE: SIGNIFICANT CHANGE UP
FLUAV SUBTYP SPEC NAA+PROBE: SIGNIFICANT CHANGE UP
FLUBV RNA SPEC QL NAA+PROBE: SIGNIFICANT CHANGE UP
GLUCOSE SERPL-MCNC: 79 MG/DL — SIGNIFICANT CHANGE UP (ref 70–99)
HCT VFR BLD CALC: 47 % — SIGNIFICANT CHANGE UP (ref 39–50)
HGB BLD-MCNC: 15 G/DL — SIGNIFICANT CHANGE UP (ref 13–17)
HPIV1 RNA SPEC QL NAA+PROBE: SIGNIFICANT CHANGE UP
HPIV2 RNA SPEC QL NAA+PROBE: SIGNIFICANT CHANGE UP
HPIV3 RNA SPEC QL NAA+PROBE: SIGNIFICANT CHANGE UP
HPIV4 RNA SPEC QL NAA+PROBE: SIGNIFICANT CHANGE UP
INR BLD: 1.11 — SIGNIFICANT CHANGE UP (ref 0.88–1.16)
LACTATE SERPL-SCNC: 1.4 MMOL/L — SIGNIFICANT CHANGE UP (ref 0.4–2)
MAGNESIUM SERPL-MCNC: 1.9 MG/DL — SIGNIFICANT CHANGE UP (ref 1.6–2.6)
MCHC RBC-ENTMCNC: 29.1 PG — SIGNIFICANT CHANGE UP (ref 27–34)
MCHC RBC-ENTMCNC: 31.9 GM/DL — LOW (ref 32–36)
MCV RBC AUTO: 91.1 FL — SIGNIFICANT CHANGE UP (ref 80–100)
NRBC # BLD: 0 /100 WBCS — SIGNIFICANT CHANGE UP (ref 0–0)
PCO2 BLDV: 51 MMHG — SIGNIFICANT CHANGE UP (ref 41–51)
PH BLDV: 7.4 — SIGNIFICANT CHANGE UP (ref 7.32–7.43)
PLATELET # BLD AUTO: 162 K/UL — SIGNIFICANT CHANGE UP (ref 150–400)
PO2 BLDV: 26 MMHG — LOW (ref 35–40)
POTASSIUM SERPL-MCNC: 4.4 MMOL/L — SIGNIFICANT CHANGE UP (ref 3.5–5.3)
POTASSIUM SERPL-SCNC: 4.4 MMOL/L — SIGNIFICANT CHANGE UP (ref 3.5–5.3)
PROT SERPL-MCNC: 6.9 G/DL — SIGNIFICANT CHANGE UP (ref 6.4–8.2)
PROTHROM AB SERPL-ACNC: 13.2 SEC — SIGNIFICANT CHANGE UP (ref 10.6–13.6)
RAPID RVP RESULT: SIGNIFICANT CHANGE UP
RBC # BLD: 5.16 M/UL — SIGNIFICANT CHANGE UP (ref 4.2–5.8)
RBC # FLD: 14.6 % — HIGH (ref 10.3–14.5)
RSV RNA SPEC QL NAA+PROBE: SIGNIFICANT CHANGE UP
SAO2 % BLDV: 46 % — SIGNIFICANT CHANGE UP
SARS-COV-2 RNA SPEC QL NAA+PROBE: SIGNIFICANT CHANGE UP
SODIUM SERPL-SCNC: 139 MMOL/L — SIGNIFICANT CHANGE UP (ref 132–145)
TROPONIN I SERPL-MCNC: <0.017 NG/ML — LOW (ref 0.02–0.06)
TSH SERPL-MCNC: 1.1 UIU/ML — SIGNIFICANT CHANGE UP (ref 0.36–3.74)
WBC # BLD: 9.06 K/UL — SIGNIFICANT CHANGE UP (ref 3.8–10.5)
WBC # FLD AUTO: 9.06 K/UL — SIGNIFICANT CHANGE UP (ref 3.8–10.5)

## 2020-11-04 PROCEDURE — 71046 X-RAY EXAM CHEST 2 VIEWS: CPT | Mod: 26

## 2020-11-04 PROCEDURE — 99285 EMERGENCY DEPT VISIT HI MDM: CPT | Mod: 25

## 2020-11-04 PROCEDURE — 93010 ELECTROCARDIOGRAM REPORT: CPT

## 2020-11-04 RX ORDER — IPRATROPIUM/ALBUTEROL SULFATE 18-103MCG
3 AEROSOL WITH ADAPTER (GRAM) INHALATION ONCE
Refills: 0 | Status: DISCONTINUED | OUTPATIENT
Start: 2020-11-04 | End: 2020-11-04

## 2020-11-04 RX ADMIN — Medication 0.5 MILLIGRAM(S): at 11:22

## 2020-11-04 RX ADMIN — Medication 125 MILLIGRAM(S): at 11:22

## 2020-11-04 NOTE — ED ADULT NURSE NOTE - CHPI ED NUR SYMPTOMS NEG
no vomiting/no back pain/no chest pain/no chills/no nausea/no syncope/no diaphoresis/no shortness of breath/no congestion/no dizziness/no fever

## 2020-11-04 NOTE — ED PROVIDER NOTE - PATIENT PORTAL LINK FT
You can access the FollowMyHealth Patient Portal offered by [facility] by registering at the following website: [email or web address]. By joining [employer]’s FollowMyHealth portal, you will also be able to view your health information using other applications (apps) compatible with our system.

## 2020-11-04 NOTE — ED PROVIDER NOTE - CLINICAL SUMMARY MEDICAL DECISION MAKING FREE TEXT BOX
71 y/o M with PMHx of A-fib, asthma, and COPD presenting with 3 days of worsening SOB. On exam, Pt appears well and in no apparent distress. DDx includes   CHF, pulmonary edema, anxiety, COVID-19, PNA, bronchitis, and acute bronchospasm. Plan for EKG, CXR, basic labs, and Tx with Duoneb, Ativan, and Methylprednisolone. Will reassess afterwards. 69 y/o M with PMHx of anxiety/panic attacks, A-fib, asthma, and COPD presenting with 3 days of worsening SOB. On exam, Pt appears well and in no apparent distress. DDx includes CHF, pulmonary edema, anxiety, COVID-19, PNA, bronchitis, and acute bronchospasm. Plan for EKG, CXR, basic labs, and Tx with Duoneb, Ativan, and Methylprednisolone. Will reassess afterwards.

## 2020-11-04 NOTE — ED PROVIDER NOTE - CPE EDP NEURO NORM
1  Pars defect  XR spine lumbar complete w bending minimum 6 views    Ambulatory referral to Physical Therapy   2  Chronic midline low back pain without sciatica  Ambulatory referral to Physical Therapy   3  Rotator cuff syndrome of left shoulder     4  Hemorrhagic prepatellar bursitis, left       Orders Placed This Encounter   Procedures    XR spine lumbar complete w bending minimum 6 views    Ambulatory referral to Physical Therapy        Imaging Studies (I personally reviewed images in PACS and report):  2020 x-ray lumbar spine:  No change of grade 1/2 anterolisthesis of L5  No change of anterior wedge compression fractures of L1    Prior imagin2019:  Left shoulder x-ray There is mild osteoarthritis of the left shoulder, left AC joint chondrocalcinosis, and findings suggesting calcific tendinitis of the rotator cuff   2019 x-ray left knee:  Chondrocalcinosis within joint spaces well as a small metallic foreign body medial condyle that measures 2 x 1 mm  2017 x-ray lumbars spine:  Mild T11, T12, and L1 anterior wedge compression deformity, degenerative changes of lumbar spine, bilateral L5 pars defect with grade 1/2 anterolisthesis    IMPRESSION:  1  Left shoulder rotator cuff tendonitis/calcific tendinitis resolved   2  Left knee Pre-patellar bursitis resolved  3   chronic low back pain with paresthesias of LLE possible left-sided radiculopathy    2019:  Left sub acromial bursa steroid injection  Interval follow-up:  2 months    Repeat X-ray next visit:   N/A      Return in about 2 months (around 3/7/2020)  Patient Instructions   Start physical therapy in regards to left lower extremity numbness likely related to the old compression fractures of her lumbar spine as well as degeneration within her lumbar spine    Follow-up to us    Back pain can often be caused by a spectrum of issues ranging from back strain which is a muscle tear in the back and heals with time to spasm associated with chronic back arthritis (degenerative disc disease) to Sciatica which is radiation of pain down the leg with or without numbness and tingling that is caused by a pinch of a nerve in the spine from bony arthritis or herniated disc or pinching of a nerve due to buttock muscle spasm known as piriformis syndrome  Mainstay of treatment is physical therapy coupled with pharmacologic management of pain  Bed rest is no longer recommended as it results in stiffness and delayed recovery  Physical therapy to toleration at first is recommended and proceeded by progression to strengthening  Drugs often used to treat back pain are anti-inflammatories (NSAIDs), Tylenol (acetaminophen), muscle relaxers  In recent years, study have shown that Tylenol does not offer much relief however it is used along with nsaids for increased relief especially when patients cannot take other medicines such as muscle relaxers  Occassionally, for refractory pain, steroid packs such as Medrol are used but studies show that these medications do not offer long term relief  Opiods/pain killers (Percocet, oxycodone, hydrocodone) carry a significant addictive and dependence risk and as such are used only in cases of severe pain and per the CDC limited to a few days of use to avoid dependence  Mris are reserved for patients with red flags including fevers, chills, unintentional weight loss, dropped foot or severe weakness, bowel or bladder incontinence which are signs of medical emergencies including cancer, infection, severely pinched nerve, and a medical complication known as cauda equina syndrome  If you ever have any of these symptoms then please notify physician immediately and present to the ER  If you continue to have symptoms then we will pursue MRI of your back after failing 4 weeks of physical therapy   Unless there are red flags as above, most insurances do not cover MRIs to be performed until after a trial of physical therapy  This is because most back pain resolves within 1-3 months and rarely requires invasive intervention  If back pain fails to improve with conservative measures (therapy, medicine, time) then I will order MRI and if appropriate refer to pain management to consider injections and other invasive intervention  Surgery is rarely warranted early on in back pain unless there is a significant red flag  CHIEF COMPLAINT:  Left shoulder pain and left knee follow-up and complains of left leg numbness    HPI:  Amina Hines is a 66 y o  male  who presents for       Summary Dr Fletcher Gomez note 11/04/2019: Follow-up left shoulder pain left knee pain  Previously had aspiration of left knee hemorrhagic prepatellar bursitis with cortisone injection  This resulted in resolution of knee pain and swelling  Patient treated with left subacromial corticosteroid injection for rotator cuff tendinitis and calcific tendinitis  Recommended physical therapy and re-evaluation in 2 months  Visit 01/07/2020:  1  Left shoulder secondary to rotator cuff syndrome/calcific tendonitis- last evaluated on 11/04/2019 in which he was given a subacromial cortisone injection along with physical therapy  Pain resolved at this time  Improved range of motion  Denies weakness, numbness/tingling  No new injuries  2  Left knee pain secondary to a prepatellar bursitis that was aspirated (found to be hemorrhagic) and subsequently given cortisone injection  He reports only one episode of 1-2/10 aching pain over lateral knee without precipitating injury about a few weeks ago - this resolved on its own  Denies further swelling  Denies clicking/popping, locking  No new injuries  3    Left leg numbness:  Intermittently has been occurring over his left inner thigh over the past several months the precipitating injury with non-radiating mild midline low back pain    Of note, he has a history of bilateral L5 pars defect with grade 1/2 anterolisthesis and anterior compression wedge fractures of his T11, T12 and L1 for lumbar radiographs on 04/05/2017  Denies weakness, numbness/tingling, bowel/bladder incontinence, or balance issues  Review of Systems   Constitutional: Negative for chills, fever and unexpected weight change  HENT: Negative for hearing loss, nosebleeds and sore throat  Eyes: Negative for pain, redness and visual disturbance  Respiratory: Negative for cough, shortness of breath and wheezing  Cardiovascular: Negative for chest pain, palpitations and leg swelling  Gastrointestinal: Negative for abdominal pain, nausea and vomiting  Endocrine: Negative for polyphagia and polyuria  Musculoskeletal:        As per HPI   Skin: Negative for rash and wound  Neurological:        As per HPI   Psychiatric/Behavioral: Negative for decreased concentration and suicidal ideas  The patient is not nervous/anxious  Following history reviewed and update:    Past Medical History:   Diagnosis Date    Arthritis     Asthma     Colon polyps     Community acquired pneumonia     last assessed: 5/1/2014    Diabetes mellitus ([facility] [address] )     Hepatitis C     High cholesterol     Hypertension      Past Surgical History:   Procedure Laterality Date    COLONOSCOPY      COLONOSCOPY W/ POLYPECTOMY      LITHOTRIPSY      MULTIPLE TOOTH EXTRACTIONS      PA COLONOSCOPY FLX DX W/COLLJ SPEC WHEN PFRMD N/A 5/17/2018    Procedure: COLONOSCOPY;  Surgeon: Genoveva Chao MD;  Location: [facility];   Service: Gastroenterology     Social History   Social History     Substance and Sexual Activity   Alcohol Use No     Social History     Substance and Sexual Activity   Drug Use No     Social History     Tobacco Use   Smoking Status Current Every Day Smoker    Packs/day: 0 50    Types: Cigarettes   Smokeless Tobacco Never Used   Tobacco Comment    started when he was about 21 yrs old     Family History   Problem Relation Age of Onset    Heart attack Family         at age 80     No Known Allergies       Physical Exam  /67 (BP Location: Left arm, Patient Position: Sitting, Cuff Size: Adult)   Pulse 101   Wt 83 kg (183 lb)   BMI 27 83 kg/m²     Constitutional:  see vital signs  Gen: well-developed, normocephalic/atraumatic, well-groomed  Eyes: No inflammation or discharge of conjunctiva or lids; sclera clear   Pharynx: no inflammation, lesion, or mass of lips  Neck: supple, no masses, non-distended  MSK: no inflammation, lesion, mass, or clubbing of nails and digits except for other than mentioned below  SKIN: no visible rashes or skin lesions  Pulmonary/Chest: Effort normal  No respiratory distress  NEURO: cranial nerves grossly intact  PSYCH:  Alert and oriented to person, place, and time; recent and remote memory intact; mood normal, no depression, anxiety, or agitation, judgment and insight good and intact     Ortho Exam    LEFT SHOULDER:  Erythema: no  Swelling: no  Increased Warmth: no    Tenderness: none    ROM  Touchdown sign: intact  Passive: symmetric    Strength  Abduction: 5/5  ER: 5/5  IR: 5/5    Drop-Arm: negative  Emptycan: negative  Belly Press: negative    Aguilera: positive  Neer: negative  Cross-Arm: negative  Speeds: negative    RIGHT SHOULDER:  Strength  Abduction: 5/5  ER: 5/5  IR: 5/5    ROM  Touchdown sign: intact  Appley Scratch Test: symmetric  Passive: symmetric    Empty can: negative    LEFT KNEE:  Erythema: no  Swelling: no  Increased Warmth: no  Tenderness: none  Flexion: intact  Extension: intact  Patellar Displacement:  Patellar Tilt:  Patellar Apprehension: negative  Patellar Grind Underwood's: positive  Lachman's: negative  Drawer: negative  Varus laxity: negative  Valgus laxity: negative  [facility]: negative     BACK EXAM:  Gait: normal, no trendelenberg gait, no antalgic gait  He is able to ambulate on his heels and toes      BACK TENDERNESS:  Spinous Processes: no  Paraspinal Muscles: no  SI Joint: no  Sacrum: no    ROM:  Flexion: intact  Extension: intact  Sidebending: intact    DERMATOMAL SENSATION:  L1: normal   L2: normal   L3: normal   L4: normal   L5: normal   S1: normal    STRENGTH (bilateral):  Knee Extension: 5/5  Knee Flexion: 5/5  Foot Dorsiflexion: 5/5  Great Toe Extension: 5/5  Foot Plantarflexion: 5/5  Hip Flexion: 5/5  Hip Abduction: 5/5    REFLEXES:  Patellar: 2+ bilateral  Achilles: 2+ bilateral  Clonus: negative bilateral    BACK:   SUPINE STRAIGHT LEG: negative    HIP:  LOG ROLL: negative  JUAN DANIEL: negative  FADIR: negative        ATTESTATION:  I have personally interviewed and examined patient  I have reviewed silva findings and plan with resident/fellow as outlined in note  I agree with exam and plan as documented in note  normal...

## 2020-11-04 NOTE — ED PROVIDER NOTE - DIAGNOSTIC INTERPRETATION
Interpreted by: Dr. Young     CXR 2 views - hyperinflation that is consistent with COPD / asthma Hx. No acute abnormalities.

## 2020-11-04 NOTE — ED PROVIDER NOTE - PROGRESS NOTE DETAILS
Pt reports symptoms have resolved after receiving IV Ativan and states he feels "much better." No tachypnea on re-evaluation. Pt endorses the anxiety has resolved. Findings are most consistent with a panic attack.

## 2020-11-04 NOTE — ED PROVIDER NOTE - ENMT, MLM
Airway patent, Nasal mucosa clear. Mouth with normal mucosa. Throat has no vesicles, no oropharyngeal exudates and uvula is midline. No JVD.

## 2020-11-12 ENCOUNTER — EMERGENCY (EMERGENCY)
Facility: HOSPITAL | Age: 70
LOS: 1 days | Discharge: HOME | End: 2020-11-12
Attending: EMERGENCY MEDICINE | Admitting: EMERGENCY MEDICINE
Payer: MEDICARE

## 2020-11-12 VITALS
TEMPERATURE: 98 F | WEIGHT: 160.06 LBS | RESPIRATION RATE: 20 BRPM | OXYGEN SATURATION: 98 % | SYSTOLIC BLOOD PRESSURE: 133 MMHG | HEIGHT: 66 IN | HEART RATE: 91 BPM | DIASTOLIC BLOOD PRESSURE: 82 MMHG

## 2020-11-12 DIAGNOSIS — I10 ESSENTIAL (PRIMARY) HYPERTENSION: ICD-10-CM

## 2020-11-12 DIAGNOSIS — Z88.8 ALLERGY STATUS TO OTHER DRUGS, MEDICAMENTS AND BIOLOGICAL SUBSTANCES: ICD-10-CM

## 2020-11-12 DIAGNOSIS — Z79.899 OTHER LONG TERM (CURRENT) DRUG THERAPY: ICD-10-CM

## 2020-11-12 DIAGNOSIS — R06.02 SHORTNESS OF BREATH: ICD-10-CM

## 2020-11-12 DIAGNOSIS — R06.09 OTHER FORMS OF DYSPNEA: ICD-10-CM

## 2020-11-12 DIAGNOSIS — Z95.1 PRESENCE OF AORTOCORONARY BYPASS GRAFT: Chronic | ICD-10-CM

## 2020-11-12 DIAGNOSIS — E78.5 HYPERLIPIDEMIA, UNSPECIFIED: ICD-10-CM

## 2020-11-12 DIAGNOSIS — F41.9 ANXIETY DISORDER, UNSPECIFIED: ICD-10-CM

## 2020-11-12 PROCEDURE — 93010 ELECTROCARDIOGRAM REPORT: CPT

## 2020-11-12 PROCEDURE — 99284 EMERGENCY DEPT VISIT MOD MDM: CPT

## 2020-11-12 NOTE — ED PROVIDER NOTE - NSFOLLOWUPINSTRUCTIONS_ED_ALL_ED_FT
Anxiety    Generalized anxiety disorder (COREY) is a mental disorder. It is defined as anxiety that is not necessarily related to specific events or activities or is out of proportion to said events. Symptoms include restlessness, fatigue, difficulty concentrations, irritability and difficulty concentrating. It may interfere with life functions, including relationships, work, and school. If you were started on a medication, make sure to take exactly as prescribed and follow up with a psychiatrist.    SEEK IMMEDIATE MEDICAL CARE IF YOU HAVE ANY OF THE FOLLOWING SYMPTOMS: thoughts about hurting killing yourself, thoughts about hurting or killing somebody else, hallucinations, or worsening depression.      Asthma    Asthma is a condition in which the airways tighten and narrow, making it difficult to breath. Asthma episodes, also called asthma attacks, range from minor to life-threatening. Symptoms include wheezing, coughing, chest tightness, or shortness of breath. The diagnosis of asthma is made by a review of your medical history and a physical exam, but may involve additional testing. Asthma cannot be cured, but medicines and lifestyle changes can help control it. Avoid triggers of asthma which may include animal dander, pollen, mold, smoke, air pollutants, etc.     SEEK IMMEDIATE MEDICAL CARE IF YOU HAVE ANY OF THE FOLLOWING SYMPTOMS: worsening of symptoms, shortness of breath at rest, chest pain, bluish discoloration to lips or fingertips, lightheadedness/dizziness, or fever.

## 2020-11-12 NOTE — ED ADULT NURSE NOTE - OBJECTIVE STATEMENT
Pt reports experiencing SOB "for a long time". Uses inhaler without relief. Speaking in full sentences. Ambulates with steady gait. Pt smoker

## 2020-11-12 NOTE — ED PROVIDER NOTE - OBJECTIVE STATEMENT
70 m here for SOB- after about 30 mins of being quite in the room asked to leave and said it was just anxiety.  He was seen for SOB and tx'd for asthma and anxiety. Had an extensive Ed eval with labs. CXR, EKG and CXR. COVID neg. Has a full inhaler which he uses about 5 x/day. No f/c. Not dyspneic on eval. Says the smoking cigs doesn't help.     PMHX:   Anxiety   Afib    Asthma    Emphysema/COPD    HLD (hyperlipidemia)    HTN (hypertension).

## 2020-11-12 NOTE — ED PROVIDER NOTE - PHYSICAL EXAMINATION
VITAL SIGNS: I have reviewed nursing notes and confirm.  CONSTITUTIONAL: Well-developed; well-nourished; in no acute distress.  SKIN: Skin is warm and dry, no acute rash.  HEAD: Normocephalic; atraumatic.  CARD: S1, S2 normal; no murmurs, gallops, or rubs. Regular rate and rhythm.  RESP: No wheezes, rales or rhonchi.  ABD: Soft; non-distended; non-tender; no rebound or guarding.  EXT: Normal ROM. No clubbing, cyanosis or edema.  NEURO: Alert, oriented. Grossly unremarkable. JEFFRIES, normal tone, no gross motor or sensory changes. Fluent speech.   PSYCH: Initially more anxious, calmed down. Cooperative, appropriate.

## 2020-11-12 NOTE — ED PROVIDER NOTE - PATIENT PORTAL LINK FT
You can access the FollowMyHealth Patient Portal offered by [facility] by registering at the following website: [email or web address]. By joining [employer]’s FollowMyHealth portal, you will also be able to view your health information using other applications (apps) compatible with our system.

## 2020-11-12 NOTE — ED PROVIDER NOTE - CLINICAL SUMMARY MEDICAL DECISION MAKING FREE TEXT BOX
Patient presenting with anxiety causing subjective SOB. Lungs clear. Did well with calming himself down. has inhaler. D/c'd/ EKG shows AF.

## 2020-11-27 ENCOUNTER — EMERGENCY (EMERGENCY)
Facility: HOSPITAL | Age: 70
LOS: 1 days | Discharge: HOME | End: 2020-11-27
Attending: EMERGENCY MEDICINE | Admitting: EMERGENCY MEDICINE
Payer: MEDICARE

## 2020-11-27 VITALS
HEART RATE: 107 BPM | SYSTOLIC BLOOD PRESSURE: 112 MMHG | HEIGHT: 66 IN | RESPIRATION RATE: 20 BRPM | DIASTOLIC BLOOD PRESSURE: 72 MMHG | OXYGEN SATURATION: 98 % | TEMPERATURE: 98 F | WEIGHT: 160.06 LBS

## 2020-11-27 VITALS
DIASTOLIC BLOOD PRESSURE: 81 MMHG | RESPIRATION RATE: 24 BRPM | HEART RATE: 104 BPM | SYSTOLIC BLOOD PRESSURE: 125 MMHG | OXYGEN SATURATION: 97 %

## 2020-11-27 DIAGNOSIS — J43.8 OTHER EMPHYSEMA: ICD-10-CM

## 2020-11-27 DIAGNOSIS — R06.02 SHORTNESS OF BREATH: ICD-10-CM

## 2020-11-27 DIAGNOSIS — Z88.8 ALLERGY STATUS TO OTHER DRUGS, MEDICAMENTS AND BIOLOGICAL SUBSTANCES: ICD-10-CM

## 2020-11-27 DIAGNOSIS — I10 ESSENTIAL (PRIMARY) HYPERTENSION: ICD-10-CM

## 2020-11-27 DIAGNOSIS — Z79.899 OTHER LONG TERM (CURRENT) DRUG THERAPY: ICD-10-CM

## 2020-11-27 DIAGNOSIS — Z95.1 PRESENCE OF AORTOCORONARY BYPASS GRAFT: Chronic | ICD-10-CM

## 2020-11-27 PROBLEM — F41.9 ANXIETY DISORDER, UNSPECIFIED: Chronic | Status: ACTIVE | Noted: 2020-11-12

## 2020-11-27 LAB
ALBUMIN SERPL ELPH-MCNC: 3.8 G/DL — SIGNIFICANT CHANGE UP (ref 3.4–5)
ALP SERPL-CCNC: 57 U/L — SIGNIFICANT CHANGE UP (ref 40–120)
ALT FLD-CCNC: 27 U/L — SIGNIFICANT CHANGE UP (ref 12–42)
ANION GAP SERPL CALC-SCNC: 6 MMOL/L — LOW (ref 9–16)
AST SERPL-CCNC: 26 U/L — SIGNIFICANT CHANGE UP (ref 15–37)
BASOPHILS # BLD AUTO: 0.04 K/UL — SIGNIFICANT CHANGE UP (ref 0–0.2)
BASOPHILS NFR BLD AUTO: 0.4 % — SIGNIFICANT CHANGE UP (ref 0–2)
BILIRUB SERPL-MCNC: 0.7 MG/DL — SIGNIFICANT CHANGE UP (ref 0.2–1.2)
BUN SERPL-MCNC: 20 MG/DL — SIGNIFICANT CHANGE UP (ref 7–23)
CALCIUM SERPL-MCNC: 9.4 MG/DL — SIGNIFICANT CHANGE UP (ref 8.5–10.5)
CHLORIDE SERPL-SCNC: 103 MMOL/L — SIGNIFICANT CHANGE UP (ref 96–108)
CK SERPL-CCNC: 45 U/L — SIGNIFICANT CHANGE UP (ref 39–308)
CO2 SERPL-SCNC: 33 MMOL/L — HIGH (ref 22–31)
COHGB MFR BLDV: 3.8 % — HIGH
CREAT SERPL-MCNC: 1.37 MG/DL — HIGH (ref 0.5–1.3)
D DIMER BLD IA.RAPID-MCNC: 275 NG/ML DDU — HIGH
EOSINOPHIL # BLD AUTO: 0.04 K/UL — SIGNIFICANT CHANGE UP (ref 0–0.5)
EOSINOPHIL NFR BLD AUTO: 0.4 % — SIGNIFICANT CHANGE UP (ref 0–6)
FLUAV H1 2009 PAND RNA SPEC QL NAA+PROBE: SIGNIFICANT CHANGE UP
FLUAV H1 RNA SPEC QL NAA+PROBE: SIGNIFICANT CHANGE UP
FLUAV H3 RNA SPEC QL NAA+PROBE: SIGNIFICANT CHANGE UP
FLUAV SUBTYP SPEC NAA+PROBE: SIGNIFICANT CHANGE UP
FLUBV RNA SPEC QL NAA+PROBE: SIGNIFICANT CHANGE UP
GLUCOSE SERPL-MCNC: 106 MG/DL — HIGH (ref 70–99)
HCT VFR BLD CALC: 48.6 % — SIGNIFICANT CHANGE UP (ref 39–50)
HGB BLD-MCNC: 15.6 G/DL — SIGNIFICANT CHANGE UP (ref 13–17)
IMM GRANULOCYTES NFR BLD AUTO: 0.7 % — SIGNIFICANT CHANGE UP (ref 0–1.5)
INR BLD: 2.37 — HIGH (ref 0.88–1.16)
LIDOCAIN IGE QN: 148 U/L — SIGNIFICANT CHANGE UP (ref 73–393)
LYMPHOCYTES # BLD AUTO: 0.61 K/UL — LOW (ref 1–3.3)
LYMPHOCYTES # BLD AUTO: 6.4 % — LOW (ref 13–44)
MAGNESIUM SERPL-MCNC: 2 MG/DL — SIGNIFICANT CHANGE UP (ref 1.6–2.6)
MCHC RBC-ENTMCNC: 29.4 PG — SIGNIFICANT CHANGE UP (ref 27–34)
MCHC RBC-ENTMCNC: 32.1 GM/DL — SIGNIFICANT CHANGE UP (ref 32–36)
MCV RBC AUTO: 91.5 FL — SIGNIFICANT CHANGE UP (ref 80–100)
MONOCYTES # BLD AUTO: 0.21 K/UL — SIGNIFICANT CHANGE UP (ref 0–0.9)
MONOCYTES NFR BLD AUTO: 2.2 % — SIGNIFICANT CHANGE UP (ref 2–14)
NEUTROPHILS # BLD AUTO: 8.58 K/UL — HIGH (ref 1.8–7.4)
NEUTROPHILS NFR BLD AUTO: 89.9 % — HIGH (ref 43–77)
NRBC # BLD: 0 /100 WBCS — SIGNIFICANT CHANGE UP (ref 0–0)
NT-PROBNP SERPL-SCNC: 2129 PG/ML — HIGH
PCO2 BLDV: 58 MMHG — HIGH (ref 41–51)
PH BLDV: 7.38 — SIGNIFICANT CHANGE UP (ref 7.32–7.43)
PLATELET # BLD AUTO: 192 K/UL — SIGNIFICANT CHANGE UP (ref 150–400)
PO2 BLDV: 22 MMHG — LOW (ref 35–40)
POTASSIUM SERPL-MCNC: 4.4 MMOL/L — SIGNIFICANT CHANGE UP (ref 3.5–5.3)
POTASSIUM SERPL-SCNC: 4.4 MMOL/L — SIGNIFICANT CHANGE UP (ref 3.5–5.3)
PROT SERPL-MCNC: 7.2 G/DL — SIGNIFICANT CHANGE UP (ref 6.4–8.2)
PROTHROM AB SERPL-ACNC: 27.3 SEC — HIGH (ref 10.6–13.6)
RAPID RVP RESULT: SIGNIFICANT CHANGE UP
RBC # BLD: 5.31 M/UL — SIGNIFICANT CHANGE UP (ref 4.2–5.8)
RBC # FLD: 13.8 % — SIGNIFICANT CHANGE UP (ref 10.3–14.5)
SAO2 % BLDV: 39 % — SIGNIFICANT CHANGE UP
SARS-COV-2 RNA SPEC QL NAA+PROBE: SIGNIFICANT CHANGE UP
SODIUM SERPL-SCNC: 142 MMOL/L — SIGNIFICANT CHANGE UP (ref 132–145)
TROPONIN I SERPL-MCNC: <0.017 NG/ML — LOW (ref 0.02–0.06)
TSH SERPL-MCNC: 1.19 UIU/ML — SIGNIFICANT CHANGE UP (ref 0.36–3.74)
WBC # BLD: 9.55 K/UL — SIGNIFICANT CHANGE UP (ref 3.8–10.5)
WBC # FLD AUTO: 9.55 K/UL — SIGNIFICANT CHANGE UP (ref 3.8–10.5)

## 2020-11-27 PROCEDURE — 93010 ELECTROCARDIOGRAM REPORT: CPT

## 2020-11-27 PROCEDURE — 99291 CRITICAL CARE FIRST HOUR: CPT

## 2020-11-27 RX ORDER — DEXAMETHASONE 0.5 MG/5ML
10 ELIXIR ORAL ONCE
Refills: 0 | Status: COMPLETED | OUTPATIENT
Start: 2020-11-27 | End: 2020-11-27

## 2020-11-27 RX ORDER — IPRATROPIUM/ALBUTEROL SULFATE 18-103MCG
3 AEROSOL WITH ADAPTER (GRAM) INHALATION ONCE
Refills: 0 | Status: COMPLETED | OUTPATIENT
Start: 2020-11-27 | End: 2020-11-27

## 2020-11-27 RX ADMIN — Medication 3 MILLILITER(S): at 16:15

## 2020-11-27 RX ADMIN — Medication 10 MILLIGRAM(S): at 16:14

## 2020-11-27 NOTE — ED PROVIDER NOTE - CHPI ED SYMPTOMS NEG
no back pain/no dizziness/no fever/no diaphoresis/no chills/no nausea/no syncope/no vomiting/no cough

## 2020-11-27 NOTE — ED PROVIDER NOTE - RESPIRATORY, MLM
+expiratory wheezing on the right, no crackles, rales or rhonchi. no respiratory distress, pt speaking in full sentences

## 2020-11-27 NOTE — ED PROVIDER NOTE - OBJECTIVE STATEMENT
69 yo M w/ PMHx of A-fib, anxiety COPD/emphysema/asthma, HLD, HTN presents to the ED c/o worsening SOB and cough today. 69 yo M w/ PMHx of A-fib, anxiety COPD/emphysema/asthma, HLD, HTN presents to the ED c/o worsening SOB and cough x "many years" with an exacerbation today. Reports he now can walk one block before feeling SOB and L sided chest pain, resolving on its own when he stops walking to rest. Denies any fever, cough, LOC, syncope, falls, recent trauma, LE swelling, history of DVT/PE, medication changes, or present chest pain when at rest.

## 2020-11-27 NOTE — ED PROVIDER NOTE - PATIENT PORTAL LINK FT
You can access the FollowMyHealth Patient Portal offered by [facility] by registering at the following website: [email or web address]. By joining [employer]’s FollowMyHealth portal, you will also be able to view your health information using other applications (apps) compatible with our system.

## 2020-11-27 NOTE — ED PROVIDER NOTE - CLINICAL SUMMARY MEDICAL DECISION MAKING FREE TEXT BOX
Pt presents to the ED c/o SOB and cough. DDx: COPD exacerbation vs ACS. Will obtain EKG/CXR/LABS including cardiac enzymes. Will give duonebs and decadron for symptom relief  2) Reassess Pt presents to the ED c/o SOB and cough. DDx: COPD exacerbation vs ACS. Will obtain EKG/CXR/LABS including cardiac enzymes. Will give duonebs and decadron for symptom relief. Concern for ACS vs. COVID vs. COPD exacerbation vs. PE. Patient felt better after receiving duonebs and despite receiving concerning results requiring further care, workup and possibility of admission to the hospital, patient adamantly refusing, asking to sign out against medical advice. Despite multiple attempts to convince patient to remain for further workup and admission, patient responded that he understood risks of leaving AMA including death, and that he would still want to sign out against medical advice. Patient advised he could always return to the ED for further care and admission should he change his mind.

## 2020-11-27 NOTE — ED ADULT NURSE NOTE - COVID-19 RESULT DATE/TIME
Refill of asmanex requested. Last refilled on 11/25/19 #1 with 3 refills.    3/2/2020 Last office visit    Wt Readings from Last 1 Encounters:   03/02/20 87.5 kg        BP Readings from Last 2 Encounters:   03/02/20 122/86   02/04/20 124/88   ]    Lab Results   Component Value Date    SODIUM 138 02/12/2019    POTASSIUM 4.1 02/12/2019    CHLORIDE 105 02/12/2019    CO2 26 02/12/2019    BUN 5 (L) 02/12/2019    CREATININE 0.80 02/12/2019    GLUCOSE 82 02/12/2019     No results found for: HGBA1C  TSH (mcUnits/mL)   Date Value   02/12/2019 3.401     Lab Results   Component Value Date    CHOLESTEROL 225 (H) 02/14/2017    HDL 60 02/14/2017    CALCLDL 143 (H) 02/14/2017    TRIGLYCERIDE 108 02/14/2017     Lab Results   Component Value Date    AST 11 02/12/2019    GPT 26 02/12/2019    ALKPT 91 02/12/2019    BILIRUBIN 0.2 02/12/2019        04-Nov-2020 13:10

## 2020-11-27 NOTE — ED PROVIDER NOTE - CRITICAL CARE PROVIDED
conducted a detailed discussion of DNR status/direct patient care (not related to procedure)/documentation/additional history taking/consult w/ pt's family directly relating to pts condition/interpretation of diagnostic studies

## 2020-11-27 NOTE — ED ADULT NURSE NOTE - OBJECTIVE STATEMENT
Pt presents c/o SOB.  Pt states he has PMD of CAD, w/ CABG and is a heavy smoker.  Pt denies cough, fever/chills, N/V/D.  Pt has +expiratory wheeze, but denies recent sick contacts or hx of asthma.  Pt Pt presents c/o SOB.  Pt states he has PMD of CAD, w/ CABG and is a heavy smoker.  Pt denies cough, fever/chills, N/V/D.  Pt has +expiratory wheeze, but denies recent sick contacts or hx of asthma.  Pt O2 sat on RA is 97%.  Pt pending labs.

## 2020-11-27 NOTE — ED PROVIDER NOTE - PROGRESS NOTE DETAILS
After duoneb treatment with decadron, patient informed of results so far and patient reports he would prefer to return home, and asking to sign out against medical advice. I feel that the patient has decision making capacity as the patient demonstrates ability to understand the current clinical situation and is able to communicate a choice for what they want to do. There is no evidence of intoxication of altered mentation which would preclude normal cognitive function.     The patient continually is able to express their understanding of the benefits, risks, and alternatives and continues to be able to make logical and rational choices, even though they are not in line with my recommended medical direction or advice. AMA forms signed. Patient continues to express he would rather be at home and that "I will take my chances" able to verbalize understanding of risks of signing out against medical advice including cardiac arrest, SOB, hypoxia, apnea, coma, and death.

## 2020-11-27 NOTE — ED ADULT TRIAGE NOTE - CHIEF COMPLAINT QUOTE
Pt with c/o worsening baez. States he has been experiencing symptoms "for a long time" but today is experiencing cough and has needed to stop to catch his breath after walking 1 bloc.  Speaking in full sentences, SpO2 98%RA

## 2020-11-28 LAB — OSMOLALITY SERPL: 299 MOSMOL/KG — SIGNIFICANT CHANGE UP (ref 280–301)

## 2020-12-14 ENCOUNTER — EMERGENCY (EMERGENCY)
Facility: HOSPITAL | Age: 70
LOS: 1 days | Discharge: HOME | End: 2020-12-14
Attending: EMERGENCY MEDICINE | Admitting: EMERGENCY MEDICINE
Payer: MEDICARE

## 2020-12-14 VITALS
RESPIRATION RATE: 22 BRPM | SYSTOLIC BLOOD PRESSURE: 160 MMHG | HEART RATE: 112 BPM | HEIGHT: 66 IN | DIASTOLIC BLOOD PRESSURE: 74 MMHG | OXYGEN SATURATION: 98 %

## 2020-12-14 VITALS
OXYGEN SATURATION: 98 % | RESPIRATION RATE: 20 BRPM | DIASTOLIC BLOOD PRESSURE: 78 MMHG | SYSTOLIC BLOOD PRESSURE: 125 MMHG | HEART RATE: 115 BPM

## 2020-12-14 DIAGNOSIS — E78.5 HYPERLIPIDEMIA, UNSPECIFIED: ICD-10-CM

## 2020-12-14 DIAGNOSIS — R06.02 SHORTNESS OF BREATH: ICD-10-CM

## 2020-12-14 DIAGNOSIS — R50.9 FEVER, UNSPECIFIED: ICD-10-CM

## 2020-12-14 DIAGNOSIS — Z95.1 PRESENCE OF AORTOCORONARY BYPASS GRAFT: Chronic | ICD-10-CM

## 2020-12-14 DIAGNOSIS — R06.00 DYSPNEA, UNSPECIFIED: ICD-10-CM

## 2020-12-14 DIAGNOSIS — Z88.8 ALLERGY STATUS TO OTHER DRUGS, MEDICAMENTS AND BIOLOGICAL SUBSTANCES STATUS: ICD-10-CM

## 2020-12-14 DIAGNOSIS — Z79.899 OTHER LONG TERM (CURRENT) DRUG THERAPY: ICD-10-CM

## 2020-12-14 DIAGNOSIS — I10 ESSENTIAL (PRIMARY) HYPERTENSION: ICD-10-CM

## 2020-12-14 LAB
ALBUMIN SERPL ELPH-MCNC: 3.7 G/DL — SIGNIFICANT CHANGE UP (ref 3.4–5)
ALP SERPL-CCNC: 56 U/L — SIGNIFICANT CHANGE UP (ref 40–120)
ALT FLD-CCNC: 29 U/L — SIGNIFICANT CHANGE UP (ref 12–42)
ANION GAP SERPL CALC-SCNC: 5 MMOL/L — LOW (ref 9–16)
APTT BLD: 37.7 SEC — HIGH (ref 27.5–35.5)
AST SERPL-CCNC: 28 U/L — SIGNIFICANT CHANGE UP (ref 15–37)
BASOPHILS # BLD AUTO: 0.05 K/UL — SIGNIFICANT CHANGE UP (ref 0–0.2)
BASOPHILS NFR BLD AUTO: 0.7 % — SIGNIFICANT CHANGE UP (ref 0–2)
BILIRUB SERPL-MCNC: 0.7 MG/DL — SIGNIFICANT CHANGE UP (ref 0.2–1.2)
BUN SERPL-MCNC: 19 MG/DL — SIGNIFICANT CHANGE UP (ref 7–23)
CALCIUM SERPL-MCNC: 8.8 MG/DL — SIGNIFICANT CHANGE UP (ref 8.5–10.5)
CHLORIDE SERPL-SCNC: 104 MMOL/L — SIGNIFICANT CHANGE UP (ref 96–108)
CO2 SERPL-SCNC: 30 MMOL/L — SIGNIFICANT CHANGE UP (ref 22–31)
CREAT SERPL-MCNC: 1.27 MG/DL — SIGNIFICANT CHANGE UP (ref 0.5–1.3)
EOSINOPHIL # BLD AUTO: 0.17 K/UL — SIGNIFICANT CHANGE UP (ref 0–0.5)
EOSINOPHIL NFR BLD AUTO: 2.4 % — SIGNIFICANT CHANGE UP (ref 0–6)
GLUCOSE SERPL-MCNC: 90 MG/DL — SIGNIFICANT CHANGE UP (ref 70–99)
HCT VFR BLD CALC: 46.3 % — SIGNIFICANT CHANGE UP (ref 39–50)
HGB BLD-MCNC: 15.5 G/DL — SIGNIFICANT CHANGE UP (ref 13–17)
IMM GRANULOCYTES NFR BLD AUTO: 0.6 % — SIGNIFICANT CHANGE UP (ref 0–1.5)
INR BLD: 1.26 — HIGH (ref 0.88–1.16)
LYMPHOCYTES # BLD AUTO: 1.53 K/UL — SIGNIFICANT CHANGE UP (ref 1–3.3)
LYMPHOCYTES # BLD AUTO: 21.7 % — SIGNIFICANT CHANGE UP (ref 13–44)
MAGNESIUM SERPL-MCNC: 2.1 MG/DL — SIGNIFICANT CHANGE UP (ref 1.6–2.6)
MCHC RBC-ENTMCNC: 30.5 PG — SIGNIFICANT CHANGE UP (ref 27–34)
MCHC RBC-ENTMCNC: 33.5 GM/DL — SIGNIFICANT CHANGE UP (ref 32–36)
MCV RBC AUTO: 91.1 FL — SIGNIFICANT CHANGE UP (ref 80–100)
MONOCYTES # BLD AUTO: 0.85 K/UL — SIGNIFICANT CHANGE UP (ref 0–0.9)
MONOCYTES NFR BLD AUTO: 12 % — SIGNIFICANT CHANGE UP (ref 2–14)
NEUTROPHILS # BLD AUTO: 4.42 K/UL — SIGNIFICANT CHANGE UP (ref 1.8–7.4)
NEUTROPHILS NFR BLD AUTO: 62.6 % — SIGNIFICANT CHANGE UP (ref 43–77)
NRBC # BLD: 0 /100 WBCS — SIGNIFICANT CHANGE UP (ref 0–0)
NT-PROBNP SERPL-SCNC: 1532 PG/ML — HIGH
PLATELET # BLD AUTO: 166 K/UL — SIGNIFICANT CHANGE UP (ref 150–400)
POTASSIUM SERPL-MCNC: 4.6 MMOL/L — SIGNIFICANT CHANGE UP (ref 3.5–5.3)
POTASSIUM SERPL-SCNC: 4.6 MMOL/L — SIGNIFICANT CHANGE UP (ref 3.5–5.3)
PROT SERPL-MCNC: 7 G/DL — SIGNIFICANT CHANGE UP (ref 6.4–8.2)
PROTHROM AB SERPL-ACNC: 14.9 SEC — HIGH (ref 10.6–13.6)
RBC # BLD: 5.08 M/UL — SIGNIFICANT CHANGE UP (ref 4.2–5.8)
RBC # FLD: 13.8 % — SIGNIFICANT CHANGE UP (ref 10.3–14.5)
SODIUM SERPL-SCNC: 139 MMOL/L — SIGNIFICANT CHANGE UP (ref 132–145)
TROPONIN I SERPL-MCNC: <0.017 NG/ML — LOW (ref 0.02–0.06)
WBC # BLD: 7.06 K/UL — SIGNIFICANT CHANGE UP (ref 3.8–10.5)
WBC # FLD AUTO: 7.06 K/UL — SIGNIFICANT CHANGE UP (ref 3.8–10.5)

## 2020-12-14 PROCEDURE — 99285 EMERGENCY DEPT VISIT HI MDM: CPT

## 2020-12-14 PROCEDURE — 93010 ELECTROCARDIOGRAM REPORT: CPT

## 2020-12-14 PROCEDURE — 71045 X-RAY EXAM CHEST 1 VIEW: CPT | Mod: 26

## 2020-12-14 RX ORDER — IPRATROPIUM/ALBUTEROL SULFATE 18-103MCG
3 AEROSOL WITH ADAPTER (GRAM) INHALATION
Refills: 0 | Status: COMPLETED | OUTPATIENT
Start: 2020-12-14 | End: 2020-12-14

## 2020-12-14 RX ADMIN — Medication 125 MILLIGRAM(S): at 13:13

## 2020-12-14 RX ADMIN — Medication 3 MILLILITER(S): at 13:40

## 2020-12-14 RX ADMIN — Medication 3 MILLILITER(S): at 13:05

## 2020-12-14 RX ADMIN — Medication 3 MILLILITER(S): at 13:20

## 2020-12-14 NOTE — ED ADULT NURSE NOTE - CHPI ED NUR SYMPTOMS NEG
no chills/no chest pain/no fever/no body aches no chest pain/no chills/no headache/no fever/no shortness of breath/no body aches/no hemoptysis

## 2020-12-14 NOTE — ED PROVIDER NOTE - NSFOLLOWUPINSTRUCTIONS_ED_ALL_ED_FT
Asthma/COPD exacerbation    Please see your PMD for a follow up within a week.     Asthma is a condition in which the airways tighten and narrow, making it difficult to breath. Asthma episodes, also called asthma attacks, range from minor to life-threatening. Symptoms include wheezing, coughing, chest tightness, or shortness of breath. The diagnosis of asthma is made by a review of your medical history and a physical exam, but may involve additional testing. Asthma cannot be cured, but medicines and lifestyle changes can help control it. Avoid triggers of asthma which may include animal dander, pollen, mold, smoke, air pollutants, etc.     SEEK IMMEDIATE MEDICAL CARE IF YOU HAVE ANY OF THE FOLLOWING SYMPTOMS: worsening of symptoms, shortness of breath at rest, chest pain, bluish discoloration to lips or fingertips, lightheadedness/dizziness, or fever.

## 2020-12-14 NOTE — ED ADULT TRIAGE NOTE - CHIEF COMPLAINT QUOTE
Pt walked into ED with c/o dyspnea on exertion. Pt Tachypneic and tachycardic on arrival. SpO2 98%RA.

## 2020-12-14 NOTE — ED ADULT NURSE NOTE - ISOLATION TYPE:
[employer] Customer [employer]  [facility]  [address], [address]  [place] [address]  Fax: [phone]  Phone: [phone]      2020      Joseline Bee  [address]  [address]  [place] [address]        Dear Joseline,    Thank you for your interest in becoming a [employer] user!    Your access code is: [email or web address]  Expires: 10/18/2020  2:03 PM     Please access the [employer] website at [email or web address].  Below the ID and password fields, select the  Sign Up Now  as New User.  You will be prompted to enter the access code listed above as well as additional personal information.  Please follow the directions carefully when creating your username and password.    If you allow your access code to , or if you have any questions please call a [employer] Representative at [phone] during normal clinic hours.     Sincerely,      [employer]  [facility]    
Droplet+Contact precautions

## 2020-12-14 NOTE — ED PROVIDER NOTE - PROGRESS NOTE DETAILS
Pt reports he is feeling much better and is requesting to be discharged; Labs are WNL. CXR is clear. Pt is breathing much more at ease. Will D/C with Rx for Prednisone. Pt states he will follow up with PCP. Pt reports he is feeling much better and is requesting to be discharged; Labs are WNL. CXR is clear. Pt is breathing much more at ease. Will D/C with Rx for Prednisone. Pt states he will follow up with PCP. Declined VS at d/c. Lungs clear, walking briskly with no issues.

## 2020-12-14 NOTE — ED PROVIDER NOTE - OBJECTIVE STATEMENT
69 y/o M with PMHx of A-fib, anxiety, asthma, COPD, HLD, and HTN presents to the ED for increasing SOB. Pt reports he has baseline SOB for many years. He reports he is unable to walk for more than 1 block without having SOB. Pt states he sometimes "has a fever." He had a similar visit to this ED on 11/27/20 where he had normal labs and was treated for COPD exacerbation. Pt was also seen by me in the beginning of November for SOB episodes related to anxiety that rapidly resolved without Tx. Pt endorses he is not anxious at this time. Pt denies any associated pain. Of note, Pt states he normally takes Prednisone daily but he recently ran out of medications 2 days ago.

## 2020-12-14 NOTE — ED ADULT NURSE NOTE - OBJECTIVE STATEMENT
Pt. presents with dyspnea on exertion starting today. SPO2 97% on RA. Pt. with pmxhx of COPD, HLD, HTN, anxiety and A-fib, presents with dyspnea on exertion starting today. Pt. states he usually has SOB but today it has worsen. Pt. is SPO2 97% on RA. Pt. is speaking in full sentences but noted with mild SOB. Pt. denies chest pain/palpitations, HA, dizziness, n/v/d or abdominal pain.

## 2020-12-14 NOTE — ED PROVIDER NOTE - PATIENT PORTAL LINK FT
You can access the FollowMyHealth Patient Portal offered by [facility] by registering at the following website: [email or web address]. By joining [employer]’s FollowMyHealth portal, you will also be able to view your health information using other applications (apps) compatible with our system.

## 2020-12-14 NOTE — ED PROVIDER NOTE - RESPIRATORY, MLM
Diffuse inspiratory and expiratory wheezing; Tachypneic but not in any respiratory distress. Speaking in full sentences.

## 2020-12-14 NOTE — ED PROVIDER NOTE - CLINICAL SUMMARY MEDICAL DECISION MAKING FREE TEXT BOX
71 y/o M with PMHx of asthma and COPD presenting with SOB in the setting of an apparent COPD exacerbation. Will treat with Nebulizers, Steroids, and reassess clinically. May require admission or observation.

## 2020-12-15 ENCOUNTER — EMERGENCY (EMERGENCY)
Facility: HOSPITAL | Age: 70
LOS: 1 days | Discharge: HOME | End: 2020-12-15
Attending: EMERGENCY MEDICINE | Admitting: EMERGENCY MEDICINE
Payer: MEDICARE

## 2020-12-15 VITALS
WEIGHT: 177.91 LBS | HEART RATE: 108 BPM | RESPIRATION RATE: 18 BRPM | OXYGEN SATURATION: 98 % | DIASTOLIC BLOOD PRESSURE: 79 MMHG | SYSTOLIC BLOOD PRESSURE: 144 MMHG | HEIGHT: 66 IN | TEMPERATURE: 98 F

## 2020-12-15 VITALS
OXYGEN SATURATION: 100 % | TEMPERATURE: 98 F | DIASTOLIC BLOOD PRESSURE: 84 MMHG | RESPIRATION RATE: 17 BRPM | SYSTOLIC BLOOD PRESSURE: 140 MMHG | HEART RATE: 105 BPM

## 2020-12-15 DIAGNOSIS — Z88.6 ALLERGY STATUS TO ANALGESIC AGENT: ICD-10-CM

## 2020-12-15 DIAGNOSIS — Z95.1 PRESENCE OF AORTOCORONARY BYPASS GRAFT: Chronic | ICD-10-CM

## 2020-12-15 DIAGNOSIS — R07.9 CHEST PAIN, UNSPECIFIED: ICD-10-CM

## 2020-12-15 DIAGNOSIS — R06.00 DYSPNEA, UNSPECIFIED: ICD-10-CM

## 2020-12-15 LAB
ALBUMIN SERPL ELPH-MCNC: 3.6 G/DL — SIGNIFICANT CHANGE UP (ref 3.4–5)
ALP SERPL-CCNC: 54 U/L — SIGNIFICANT CHANGE UP (ref 40–120)
ALT FLD-CCNC: 32 U/L — SIGNIFICANT CHANGE UP (ref 12–42)
ANION GAP SERPL CALC-SCNC: 6 MMOL/L — LOW (ref 9–16)
AST SERPL-CCNC: 40 U/L — HIGH (ref 15–37)
B PERT DNA SPEC QL NAA+PROBE: SIGNIFICANT CHANGE UP
BASOPHILS # BLD AUTO: 0.02 K/UL — SIGNIFICANT CHANGE UP (ref 0–0.2)
BASOPHILS NFR BLD AUTO: 0.1 % — SIGNIFICANT CHANGE UP (ref 0–2)
BILIRUB SERPL-MCNC: 0.8 MG/DL — SIGNIFICANT CHANGE UP (ref 0.2–1.2)
BUN SERPL-MCNC: 20 MG/DL — SIGNIFICANT CHANGE UP (ref 7–23)
C PNEUM DNA SPEC QL NAA+PROBE: SIGNIFICANT CHANGE UP
CALCIUM SERPL-MCNC: 9.2 MG/DL — SIGNIFICANT CHANGE UP (ref 8.5–10.5)
CHLORIDE SERPL-SCNC: 104 MMOL/L — SIGNIFICANT CHANGE UP (ref 96–108)
CO2 SERPL-SCNC: 26 MMOL/L — SIGNIFICANT CHANGE UP (ref 22–31)
CREAT SERPL-MCNC: 1.1 MG/DL — SIGNIFICANT CHANGE UP (ref 0.5–1.3)
EOSINOPHIL # BLD AUTO: 0 K/UL — SIGNIFICANT CHANGE UP (ref 0–0.5)
EOSINOPHIL NFR BLD AUTO: 0 % — SIGNIFICANT CHANGE UP (ref 0–6)
FLUAV H1 2009 PAND RNA SPEC QL NAA+PROBE: SIGNIFICANT CHANGE UP
FLUAV H1 RNA SPEC QL NAA+PROBE: SIGNIFICANT CHANGE UP
FLUAV H3 RNA SPEC QL NAA+PROBE: SIGNIFICANT CHANGE UP
FLUAV SUBTYP SPEC NAA+PROBE: SIGNIFICANT CHANGE UP
FLUBV RNA SPEC QL NAA+PROBE: SIGNIFICANT CHANGE UP
GLUCOSE SERPL-MCNC: 110 MG/DL — HIGH (ref 70–99)
HADV DNA SPEC QL NAA+PROBE: SIGNIFICANT CHANGE UP
HCOV PNL SPEC NAA+PROBE: SIGNIFICANT CHANGE UP
HCT VFR BLD CALC: 45 % — SIGNIFICANT CHANGE UP (ref 39–50)
HGB BLD-MCNC: 15 G/DL — SIGNIFICANT CHANGE UP (ref 13–17)
HMPV RNA SPEC QL NAA+PROBE: SIGNIFICANT CHANGE UP
HPIV1 RNA SPEC QL NAA+PROBE: SIGNIFICANT CHANGE UP
HPIV2 RNA SPEC QL NAA+PROBE: SIGNIFICANT CHANGE UP
HPIV3 RNA SPEC QL NAA+PROBE: SIGNIFICANT CHANGE UP
HPIV4 RNA SPEC QL NAA+PROBE: SIGNIFICANT CHANGE UP
IMM GRANULOCYTES NFR BLD AUTO: 0.8 % — SIGNIFICANT CHANGE UP (ref 0–1.5)
LYMPHOCYTES # BLD AUTO: 1.14 K/UL — SIGNIFICANT CHANGE UP (ref 1–3.3)
LYMPHOCYTES # BLD AUTO: 7.9 % — LOW (ref 13–44)
MCHC RBC-ENTMCNC: 30 PG — SIGNIFICANT CHANGE UP (ref 27–34)
MCHC RBC-ENTMCNC: 33.3 GM/DL — SIGNIFICANT CHANGE UP (ref 32–36)
MCV RBC AUTO: 90 FL — SIGNIFICANT CHANGE UP (ref 80–100)
MONOCYTES # BLD AUTO: 0.98 K/UL — HIGH (ref 0–0.9)
MONOCYTES NFR BLD AUTO: 6.8 % — SIGNIFICANT CHANGE UP (ref 2–14)
NEUTROPHILS # BLD AUTO: 12.2 K/UL — HIGH (ref 1.8–7.4)
NEUTROPHILS NFR BLD AUTO: 84.4 % — HIGH (ref 43–77)
NRBC # BLD: 0 /100 WBCS — SIGNIFICANT CHANGE UP (ref 0–0)
NT-PROBNP SERPL-SCNC: 3567 PG/ML — HIGH
PCO2 BLDV: 41 MMHG — SIGNIFICANT CHANGE UP (ref 41–51)
PH BLDV: 7.44 — HIGH (ref 7.32–7.43)
PLATELET # BLD AUTO: 194 K/UL — SIGNIFICANT CHANGE UP (ref 150–400)
PO2 BLDV: 86 MMHG — HIGH (ref 35–40)
POTASSIUM SERPL-MCNC: 5.1 MMOL/L — SIGNIFICANT CHANGE UP (ref 3.5–5.3)
POTASSIUM SERPL-SCNC: 5.1 MMOL/L — SIGNIFICANT CHANGE UP (ref 3.5–5.3)
PROT SERPL-MCNC: 7.2 G/DL — SIGNIFICANT CHANGE UP (ref 6.4–8.2)
RAPID RVP RESULT: SIGNIFICANT CHANGE UP
RBC # BLD: 5 M/UL — SIGNIFICANT CHANGE UP (ref 4.2–5.8)
RBC # FLD: 13.6 % — SIGNIFICANT CHANGE UP (ref 10.3–14.5)
RSV RNA SPEC QL NAA+PROBE: SIGNIFICANT CHANGE UP
RV+EV RNA SPEC QL NAA+PROBE: SIGNIFICANT CHANGE UP
SAO2 % BLDV: 96 % — SIGNIFICANT CHANGE UP
SARS-COV-2 RNA SPEC QL NAA+PROBE: SIGNIFICANT CHANGE UP
SODIUM SERPL-SCNC: 136 MMOL/L — SIGNIFICANT CHANGE UP (ref 132–145)
TROPONIN I SERPL-MCNC: <0.017 NG/ML — LOW (ref 0.02–0.06)
WBC # BLD: 14.45 K/UL — HIGH (ref 3.8–10.5)
WBC # FLD AUTO: 14.45 K/UL — HIGH (ref 3.8–10.5)

## 2020-12-15 PROCEDURE — 93010 ELECTROCARDIOGRAM REPORT: CPT

## 2020-12-15 PROCEDURE — 99285 EMERGENCY DEPT VISIT HI MDM: CPT

## 2020-12-15 PROCEDURE — 71275 CT ANGIOGRAPHY CHEST: CPT | Mod: 26

## 2020-12-15 RX ORDER — FUROSEMIDE 40 MG
20 TABLET ORAL ONCE
Refills: 0 | Status: COMPLETED | OUTPATIENT
Start: 2020-12-15 | End: 2020-12-15

## 2020-12-15 RX ADMIN — Medication 20 MILLIGRAM(S): at 11:43

## 2020-12-15 NOTE — ED ADULT TRIAGE NOTE - CHIEF COMPLAINT QUOTE
Walk in with c/o midsternal chest pain/pressure with SOB, sudden onset while walking to store. +cough, no fever. COVID negative on 11/27

## 2020-12-15 NOTE — ED PROVIDER NOTE - PROGRESS NOTE DETAILS
Didier Ahn DO PGY3 - Sx improved and pt feels better. CTA results appreciated and pt made aware of findigns. Given cardiomegaly and elevated probnp, pt was to be admitted for further w/u and evaluation by cards. However pt now feels better and does not want to stay for further management, understands our concern of heart failure and the risks of leaving w/o full eval but opts to follow up outpt with a cardiologist. Return precautions given and follow up provided. All questions answered.

## 2020-12-15 NOTE — ED PROVIDER NOTE - CARE PROVIDER_API CALL
Giovanni Badlwin)  Cardiology; Internal Medicine; Interventional Cardiology  [address] [address]  Phone: [phone]  Fax: [phone]  Follow Up Time:

## 2020-12-15 NOTE — ED PROVIDER NOTE - PHYSICAL EXAMINATION
Gen: Inc wob but not in overt distress, RR@20  Head: NCAT  HEENT: PERRL, MMM, normal conjunctiva, anicteric, neck supple  Lung: CTAB, no adventitious sounds  CV: RRR, no murmurs  Abd: soft, NTND, no rebound or guarding, no CVAT  MSK: No edema, no visible deformities  Neuro: Moving all extremity grossly  Skin: Warm and dry, no evidence of rash  Psych: normal mood and affect

## 2020-12-15 NOTE — ED PROVIDER NOTE - PATIENT PORTAL LINK FT
You can access the FollowMyHealth Patient Portal offered by [facility] by registering at the following website: [email or web address]. By joining [employer]’s FollowMyHealth portal, you will also be able to view your health information using other applications (apps) compatible with our system.

## 2020-12-15 NOTE — ED PROVIDER NOTE - OBJECTIVE STATEMENT
69 y/o M with PMHx of A-fib, anxiety, asthma, COPD, HLD, and HTN p/w dyspnea acute onset since last night. Was seen in the ED in the setting of worsening dyspnea x several months and was tx for COPD exacerbation and felt better and went home but no improvement with this new dyspnea w/ inhalers at home. Pt notes that he used to be on eliquis but hasn't taken since may as he has not picked up rx. Denies cp, fever, chills, abd pain, n/v/d. Notes he has some new nasal congestion.

## 2020-12-15 NOTE — ED PROVIDER NOTE - ATTENDING CONTRIBUTION TO CARE
71 y/o M with PMHx of A-fib, anxiety, asthma, COPD, HLD, and HTN p/w dyspnea acute onset since last night. Was seen in the ED yesterday and in the recent few months with similar symptoms.  All previous ED visits, the patient was discharged home.  Today with similar presentation.  Afib with rate in the low 100s, normal WOB, no tachypnea, no wheezing.  Appears slightly anxious.  ED workup initiated.  Labs and imaging reviewed.  BNP higher and WBC elevated compared with previous visits.  CXR without evidence of pulmonary edema.  IV diuresis ordered.  Placed on cardiac monitor.  Plan for admission for diuresis and ACS work up.  Shortly into his stay he requested to leave the ED.  Explained in detail reasons for admission and concern for cardiac cause of his symptoms.  He implored that he felt better and wanted to go home.  Repeat vitals reviewed and stable.  Close cardiology follow up encouraged.

## 2020-12-15 NOTE — ED PROVIDER NOTE - NS ED ROS FT
CONSTITUTIONAL: No fevers, no chills, no lightheadedness, no dizziness  EYES: no visual changes, no eye pain  EARS: no ear drainage, no ear pain, no change in hearing  NOSE: +nasal congestion  MOUTH/THROAT: no sore throat  CV: No chest pain, no palpitations  RESP: no new cough  GI: No n/v/d, no abd pain  : no dysuria, no hematuria, no flank pain  MSK: no back pain, no extremity pain  SKIN: no rashes  NEURO: no headache, no focal weakness, no decreased sensation/parasthesias

## 2020-12-15 NOTE — ED PROVIDER NOTE - NSFOLLOWUPINSTRUCTIONS_ED_ALL_ED_FT
You were offered admission for workup for possible congestive heart failure but you opted to not be admitted and to follow up with your primary care doctor and cardiologist instead    If you have any new or worsening symptoms or any other concern to you, you may return to the ER for further evaluation    Continue all prescribed medications

## 2020-12-17 ENCOUNTER — EMERGENCY (EMERGENCY)
Facility: HOSPITAL | Age: 70
LOS: 1 days | Discharge: LEFT BEFORE DISCHARGE | End: 2020-12-17
Admitting: EMERGENCY MEDICINE
Payer: MEDICARE

## 2020-12-17 VITALS
HEIGHT: 66 IN | TEMPERATURE: 98 F | SYSTOLIC BLOOD PRESSURE: 111 MMHG | OXYGEN SATURATION: 98 % | HEART RATE: 108 BPM | RESPIRATION RATE: 23 BRPM | DIASTOLIC BLOOD PRESSURE: 78 MMHG

## 2020-12-17 DIAGNOSIS — R06.02 SHORTNESS OF BREATH: ICD-10-CM

## 2020-12-17 DIAGNOSIS — Z95.1 PRESENCE OF AORTOCORONARY BYPASS GRAFT: Chronic | ICD-10-CM

## 2020-12-17 PROCEDURE — L9991: CPT

## 2020-12-17 NOTE — ED ADULT TRIAGE NOTE - CHIEF COMPLAINT QUOTE
pt. presents to the ED c/o worsening shortness of breath since being discharged 2 days ago, denies any chest pain.

## 2020-12-20 ENCOUNTER — EMERGENCY (EMERGENCY)
Facility: HOSPITAL | Age: 70
LOS: 1 days | Discharge: HOME | End: 2020-12-20
Attending: EMERGENCY MEDICINE | Admitting: EMERGENCY MEDICINE
Payer: MEDICARE

## 2020-12-20 VITALS
DIASTOLIC BLOOD PRESSURE: 85 MMHG | RESPIRATION RATE: 18 BRPM | HEART RATE: 79 BPM | TEMPERATURE: 98 F | SYSTOLIC BLOOD PRESSURE: 132 MMHG | OXYGEN SATURATION: 99 %

## 2020-12-20 VITALS
DIASTOLIC BLOOD PRESSURE: 84 MMHG | HEART RATE: 101 BPM | RESPIRATION RATE: 18 BRPM | SYSTOLIC BLOOD PRESSURE: 129 MMHG | HEIGHT: 66 IN | OXYGEN SATURATION: 100 % | TEMPERATURE: 97 F

## 2020-12-20 DIAGNOSIS — Z79.899 OTHER LONG TERM (CURRENT) DRUG THERAPY: ICD-10-CM

## 2020-12-20 DIAGNOSIS — Z95.1 PRESENCE OF AORTOCORONARY BYPASS GRAFT: Chronic | ICD-10-CM

## 2020-12-20 DIAGNOSIS — R06.09 OTHER FORMS OF DYSPNEA: ICD-10-CM

## 2020-12-20 DIAGNOSIS — Z88.8 ALLERGY STATUS TO OTHER DRUGS, MEDICAMENTS AND BIOLOGICAL SUBSTANCES: ICD-10-CM

## 2020-12-20 DIAGNOSIS — R05 COUGH: ICD-10-CM

## 2020-12-20 DIAGNOSIS — I10 ESSENTIAL (PRIMARY) HYPERTENSION: ICD-10-CM

## 2020-12-20 DIAGNOSIS — R06.02 SHORTNESS OF BREATH: ICD-10-CM

## 2020-12-20 DIAGNOSIS — E78.5 HYPERLIPIDEMIA, UNSPECIFIED: ICD-10-CM

## 2020-12-20 LAB
ALBUMIN SERPL ELPH-MCNC: 3.7 G/DL — SIGNIFICANT CHANGE UP (ref 3.4–5)
ALP SERPL-CCNC: 54 U/L — SIGNIFICANT CHANGE UP (ref 40–120)
ALT FLD-CCNC: 26 U/L — SIGNIFICANT CHANGE UP (ref 12–42)
ANION GAP SERPL CALC-SCNC: 1 MMOL/L — LOW (ref 9–16)
APTT BLD: 32.6 SEC — SIGNIFICANT CHANGE UP (ref 27.5–35.5)
AST SERPL-CCNC: 23 U/L — SIGNIFICANT CHANGE UP (ref 15–37)
B PERT DNA SPEC QL NAA+PROBE: SIGNIFICANT CHANGE UP
BASOPHILS # BLD AUTO: 0.04 K/UL — SIGNIFICANT CHANGE UP (ref 0–0.2)
BASOPHILS NFR BLD AUTO: 0.6 % — SIGNIFICANT CHANGE UP (ref 0–2)
BILIRUB SERPL-MCNC: 0.9 MG/DL — SIGNIFICANT CHANGE UP (ref 0.2–1.2)
BUN SERPL-MCNC: 24 MG/DL — HIGH (ref 7–23)
C PNEUM DNA SPEC QL NAA+PROBE: SIGNIFICANT CHANGE UP
CALCIUM SERPL-MCNC: 9 MG/DL — SIGNIFICANT CHANGE UP (ref 8.5–10.5)
CHLORIDE SERPL-SCNC: 104 MMOL/L — SIGNIFICANT CHANGE UP (ref 96–108)
CO2 SERPL-SCNC: 33 MMOL/L — HIGH (ref 22–31)
CREAT SERPL-MCNC: 1.29 MG/DL — SIGNIFICANT CHANGE UP (ref 0.5–1.3)
EOSINOPHIL # BLD AUTO: 0.21 K/UL — SIGNIFICANT CHANGE UP (ref 0–0.5)
EOSINOPHIL NFR BLD AUTO: 3.1 % — SIGNIFICANT CHANGE UP (ref 0–6)
FLUAV H1 2009 PAND RNA SPEC QL NAA+PROBE: SIGNIFICANT CHANGE UP
FLUAV H1 RNA SPEC QL NAA+PROBE: SIGNIFICANT CHANGE UP
FLUAV H3 RNA SPEC QL NAA+PROBE: SIGNIFICANT CHANGE UP
FLUAV SUBTYP SPEC NAA+PROBE: SIGNIFICANT CHANGE UP
FLUBV RNA SPEC QL NAA+PROBE: SIGNIFICANT CHANGE UP
GLUCOSE SERPL-MCNC: 97 MG/DL — SIGNIFICANT CHANGE UP (ref 70–99)
HADV DNA SPEC QL NAA+PROBE: SIGNIFICANT CHANGE UP
HCOV PNL SPEC NAA+PROBE: SIGNIFICANT CHANGE UP
HCT VFR BLD CALC: 48 % — SIGNIFICANT CHANGE UP (ref 39–50)
HGB BLD-MCNC: 15.3 G/DL — SIGNIFICANT CHANGE UP (ref 13–17)
HMPV RNA SPEC QL NAA+PROBE: SIGNIFICANT CHANGE UP
HPIV1 RNA SPEC QL NAA+PROBE: SIGNIFICANT CHANGE UP
HPIV2 RNA SPEC QL NAA+PROBE: SIGNIFICANT CHANGE UP
HPIV3 RNA SPEC QL NAA+PROBE: SIGNIFICANT CHANGE UP
HPIV4 RNA SPEC QL NAA+PROBE: SIGNIFICANT CHANGE UP
IMM GRANULOCYTES NFR BLD AUTO: 0.6 % — SIGNIFICANT CHANGE UP (ref 0–1.5)
INR BLD: 1.12 — SIGNIFICANT CHANGE UP (ref 0.88–1.16)
LACTATE SERPL-SCNC: 0.5 MMOL/L — SIGNIFICANT CHANGE UP (ref 0.4–2)
LYMPHOCYTES # BLD AUTO: 1.49 K/UL — SIGNIFICANT CHANGE UP (ref 1–3.3)
LYMPHOCYTES # BLD AUTO: 21.7 % — SIGNIFICANT CHANGE UP (ref 13–44)
MCHC RBC-ENTMCNC: 29.6 PG — SIGNIFICANT CHANGE UP (ref 27–34)
MCHC RBC-ENTMCNC: 31.9 GM/DL — LOW (ref 32–36)
MCV RBC AUTO: 92.8 FL — SIGNIFICANT CHANGE UP (ref 80–100)
MONOCYTES # BLD AUTO: 0.77 K/UL — SIGNIFICANT CHANGE UP (ref 0–0.9)
MONOCYTES NFR BLD AUTO: 11.2 % — SIGNIFICANT CHANGE UP (ref 2–14)
NEUTROPHILS # BLD AUTO: 4.31 K/UL — SIGNIFICANT CHANGE UP (ref 1.8–7.4)
NEUTROPHILS NFR BLD AUTO: 62.8 % — SIGNIFICANT CHANGE UP (ref 43–77)
NRBC # BLD: 0 /100 WBCS — SIGNIFICANT CHANGE UP (ref 0–0)
NT-PROBNP SERPL-SCNC: 1689 PG/ML — HIGH
PCO2 BLDV: 64 MMHG — HIGH (ref 41–51)
PH BLDV: 7.36 — SIGNIFICANT CHANGE UP (ref 7.32–7.43)
PLATELET # BLD AUTO: 202 K/UL — SIGNIFICANT CHANGE UP (ref 150–400)
PO2 BLDV: 16 MMHG — LOW (ref 35–40)
POTASSIUM SERPL-MCNC: 4.3 MMOL/L — SIGNIFICANT CHANGE UP (ref 3.5–5.3)
POTASSIUM SERPL-SCNC: 4.3 MMOL/L — SIGNIFICANT CHANGE UP (ref 3.5–5.3)
PROT SERPL-MCNC: 6.8 G/DL — SIGNIFICANT CHANGE UP (ref 6.4–8.2)
PROTHROM AB SERPL-ACNC: 13.4 SEC — SIGNIFICANT CHANGE UP (ref 10.6–13.6)
RAPID RVP RESULT: SIGNIFICANT CHANGE UP
RBC # BLD: 5.17 M/UL — SIGNIFICANT CHANGE UP (ref 4.2–5.8)
RBC # FLD: 13.5 % — SIGNIFICANT CHANGE UP (ref 10.3–14.5)
RV+EV RNA SPEC QL NAA+PROBE: SIGNIFICANT CHANGE UP
SAO2 % BLDV: 21 % — SIGNIFICANT CHANGE UP
SARS-COV-2 RNA SPEC QL NAA+PROBE: SIGNIFICANT CHANGE UP
SODIUM SERPL-SCNC: 138 MMOL/L — SIGNIFICANT CHANGE UP (ref 132–145)
TROPONIN I SERPL-MCNC: <0.017 NG/ML — LOW (ref 0.02–0.06)
WBC # BLD: 6.86 K/UL — SIGNIFICANT CHANGE UP (ref 3.8–10.5)
WBC # FLD AUTO: 6.86 K/UL — SIGNIFICANT CHANGE UP (ref 3.8–10.5)

## 2020-12-20 PROCEDURE — 71045 X-RAY EXAM CHEST 1 VIEW: CPT | Mod: 26

## 2020-12-20 PROCEDURE — 99285 EMERGENCY DEPT VISIT HI MDM: CPT | Mod: 25

## 2020-12-20 PROCEDURE — 93010 ELECTROCARDIOGRAM REPORT: CPT

## 2020-12-20 RX ORDER — FUROSEMIDE 40 MG
20 TABLET ORAL ONCE
Refills: 0 | Status: COMPLETED | OUTPATIENT
Start: 2020-12-20 | End: 2020-12-20

## 2020-12-20 RX ORDER — ALBUTEROL 90 UG/1
2 AEROSOL, METERED ORAL ONCE
Refills: 0 | Status: COMPLETED | OUTPATIENT
Start: 2020-12-20 | End: 2020-12-20

## 2020-12-20 RX ADMIN — Medication 20 MILLIGRAM(S): at 12:52

## 2020-12-20 RX ADMIN — Medication 125 MILLIGRAM(S): at 12:41

## 2020-12-20 RX ADMIN — ALBUTEROL 2 PUFF(S): 90 AEROSOL, METERED ORAL at 12:41

## 2020-12-20 NOTE — ED PROVIDER NOTE - CARE PROVIDER_API CALL
Zi Winter  CARDIOVASCULAR DISEASE  [address] [place] [address]  Phone: [phone]  Fax: [phone]  Follow Up Time:

## 2020-12-20 NOTE — ED ADULT NURSE NOTE - NSIMPLEMENTINTERV_GEN_ALL_ED
Implemented All Universal Safety Interventions:  [place] to call system. Call bell, personal items and telephone within reach. Instruct patient to call for assistance. Room bathroom lighting operational. Non-slip footwear when patient is off stretcher. Physically safe environment: no spills, clutter or unnecessary equipment. Stretcher in lowest position, wheels locked, appropriate side rails in place.

## 2020-12-20 NOTE — ED PROVIDER NOTE - OBJECTIVE STATEMENT
69 yo M w/ PMHx of A-fib, anxiety, daily smoker with known COPD/emphysema/asthma, HLD, HTN, known medical non-compliance (reports that he's taken no meds in weeks 2/2 inability to pay) today presents to the ED c/o worsening SOB and non-productive cough. As per previous visits, pt states his sx are exertional, only able to walk 1-2 blocks before having to stop/rest. Denies CP/palpitations. No LE pain/swelling. No fever/chills. No change in the quality of his cough. No abd pain or n/v. States his exercise tolerance is gradually decreasing over the past 1-2yrs. Denies any recent advanced cardiac testing.

## 2020-12-20 NOTE — ED PROVIDER NOTE - CARE PROVIDERS DIRECT ADDRESSES
[email or web address]

## 2020-12-20 NOTE — ED PROVIDER NOTE - CLINICAL SUMMARY MEDICAL DECISION MAKING FREE TEXT BOX
recent CTA Chest showing mild fluid overload, consistent with uptrending BNP and exertional dyspnea, known a.fib, probable undiagnosed CHF in setting of known obstructive lung disease/lifelong smoking. r/o COVID/infectious process. Given albuterol/steroids. Anticipate admission to cardiac unit for further evaluation and optimization of cardiac function. recent CTA Chest showing mild fluid overload, concerning for CHF in setting of known obstructive lung disease/lifelong smoking. r/o COVID/infectious process. Given albuterol/steroids. Offered admission to cardiology for further workup and optimization of cardiac function. Pt adamantly refusing, stating that he can f/u as an outpt but does not want to stay in the hospital. Pt is urinating well and ambulating around the ED briskly w/out SOB or other sx. Negative workup for ACS. Pt has scripts for his medications, encouraged to fill them. Given return precautions and anticipatory guidance.

## 2020-12-20 NOTE — ED ADULT NURSE REASSESSMENT NOTE - NS ED NURSE REASSESS COMMENT FT1
Pt recognized to be here last week although initial triage did not reflect that. Pt found in system with older MRN, new MRN errored out and pt retriaged with old MRN and ANGELLA made aware to merge charts. EKG done on initial triage, documented in flow sheets to update new triage, provider made aware.

## 2020-12-20 NOTE — ED ADULT NURSE NOTE - OBJECTIVE STATEMENT
Pt returns to ED c/o SOB, chest tightness and back pain. Pt reports become very SOB when walking x2 blocks which is not his normal. Pt was seen in ED for similar symptoms multiple times and leaves abruptly.

## 2020-12-20 NOTE — ED PROVIDER NOTE - NSFOLLOWUPINSTRUCTIONS_ED_ALL_ED_FT
Dyspnea    WHAT YOU NEED TO KNOW:    Dyspnea is breathing difficulty or discomfort. You may have labored, painful, or shallow breathing. You may feel breathless or short of breath. Dyspnea can occur during rest or with activity. You may have dyspnea for a short time, or it might become chronic. Dyspnea is often a symptom of a disease or condition.    DISCHARGE INSTRUCTIONS:    Return to the emergency department if:   •Your signs and symptoms are the same or worse within 24 hours of treatment.       •You have shaking chills or a fever over 102°F.       •You have new pain, pressure, or tightness in your chest.       •You have a new or worse cough or wheezing, or you cough up blood.      •You feel like you cannot get enough air.      •The skin over your ribs or on your neck sinks in when you breathe.       •You have a severe headache with vomiting and abdominal pain.       •You feel confused or dizzy.      Contact your healthcare provider or specialist if:   •You have questions or concerns about your condition or care.          Medicines:    •Medicines may be used to treat the cause of your dyspnea. Medicines may reduce swelling in your airway or decrease extra fluid from around your heart or lungs. Other medicines may be used to decrease anxiety and help you feel calm and relaxed.      •Take your medicine as directed. Contact your healthcare provider if you think your medicine is not helping or if you have side effects. Tell him or her if you are allergic to any medicine. Keep a list of the medicines, vitamins, and herbs you take. Include the amounts, and when and why you take them. Bring the list or the pill bottles to follow-up visits. Carry your medicine list with you in case of an emergency.      Manage long-term dyspnea:   •Create an action plan. You and your healthcare provider can work together to create a plan for how to handle episodes of dyspnea. The plan can include daily activities, treatment changes, and what to do if you have severe breathing problems.      •Lean forward on your elbows when you sit. This helps your lungs expand and may make it easier to breathe.      •Use pursed-lip breathing any time you feel short of breath. Breathe in through your nose and then slowly breathe out through your mouth with your lips slightly puckered. It should take you twice as long to breathe out as it did to breathe in.  Breathe in Breathe out           •Do not smoke. Nicotine and other chemicals in cigarettes and cigars can cause lung damage and make it harder to breathe. Ask your healthcare provider for information if you currently smoke and need help to quit. E-cigarettes or smokeless tobacco still contain nicotine. Talk to your healthcare provider before you use these products.       •Reach or maintain a healthy weight. Your healthcare provider can help you create a safe weight loss plan if you are overweight.      •Exercise as directed. Exercise can help your lungs work more easily. Exercise can also help you lose weight if needed. Try to get at least 30 minutes of exercise most days of the week. Your healthcare provider can help you create an exercise plan that is safe for you.      Follow up with your healthcare provider or specialist as directed: Write down your questions so you remember to ask them during your visits.

## 2020-12-20 NOTE — ED PROVIDER NOTE - PHYSICAL EXAMINATION
VITAL SIGNS: I have reviewed nursing notes and confirm.  CONSTITUTIONAL: Well-developed; well-nourished elderly man lying calmly in stretcher speaking clearly in complete sentences; in no acute distress.  SKIN: Skin exam is warm and dry, no acute rash.  HEAD: Normocephalic; atraumatic.  EYES: PERRL, EOM intact; conjunctiva and sclera clear.  ENT: No nasal discharge; airway clear.  NECK: Supple; non tender.  CARD: S1, S2 normal; no murmurs, gallops, or rubs. + irregularly irregular  RESP: Unlabored. + fine scattered expiratory wheeze b/l improving with cough, no increased work of breathing or tachypnea  ABD: soft; non-distended; non-tender  EXT: Normal ROM. No cyanosis or edema. Non-ttp all ext, distal pulses intact  NEURO: Alert, oriented. Grossly unremarkable.  PSYCH: Cooperative, appropriate.

## 2020-12-20 NOTE — ED PROVIDER NOTE - PATIENT PORTAL LINK FT
You can access the FollowMyHealth Patient Portal offered by [facility] by registering at the following website: [email or web address]. By joining [employer]’s FollowMyHealth portal, you will also be able to view your health information using other applications (apps) compatible with our system.

## 2020-12-28 ENCOUNTER — EMERGENCY (EMERGENCY)
Facility: HOSPITAL | Age: 70
LOS: 1 days | Discharge: LEFT BEFORE DISCHARGE | End: 2020-12-28
Attending: EMERGENCY MEDICINE | Admitting: EMERGENCY MEDICINE
Payer: MEDICARE

## 2020-12-28 VITALS
HEIGHT: 66 IN | HEART RATE: 95 BPM | DIASTOLIC BLOOD PRESSURE: 89 MMHG | SYSTOLIC BLOOD PRESSURE: 128 MMHG | TEMPERATURE: 98 F | OXYGEN SATURATION: 98 % | RESPIRATION RATE: 18 BRPM | WEIGHT: 188.94 LBS

## 2020-12-28 DIAGNOSIS — Z79.899 OTHER LONG TERM (CURRENT) DRUG THERAPY: ICD-10-CM

## 2020-12-28 DIAGNOSIS — Z95.1 PRESENCE OF AORTOCORONARY BYPASS GRAFT: Chronic | ICD-10-CM

## 2020-12-28 DIAGNOSIS — I10 ESSENTIAL (PRIMARY) HYPERTENSION: ICD-10-CM

## 2020-12-28 DIAGNOSIS — R10.31 RIGHT LOWER QUADRANT PAIN: ICD-10-CM

## 2020-12-28 DIAGNOSIS — J45.909 UNSPECIFIED ASTHMA, UNCOMPLICATED: ICD-10-CM

## 2020-12-28 DIAGNOSIS — Z79.51 LONG TERM (CURRENT) USE OF INHALED STEROIDS: ICD-10-CM

## 2020-12-28 LAB
ALBUMIN SERPL ELPH-MCNC: 3.7 G/DL — SIGNIFICANT CHANGE UP (ref 3.4–5)
ALP SERPL-CCNC: 50 U/L — SIGNIFICANT CHANGE UP (ref 40–120)
ALT FLD-CCNC: 22 U/L — SIGNIFICANT CHANGE UP (ref 12–42)
ANION GAP SERPL CALC-SCNC: 7 MMOL/L — LOW (ref 9–16)
APPEARANCE UR: CLEAR — SIGNIFICANT CHANGE UP
APTT BLD: 31.3 SEC — SIGNIFICANT CHANGE UP (ref 27.5–35.5)
AST SERPL-CCNC: 23 U/L — SIGNIFICANT CHANGE UP (ref 15–37)
BACTERIA # UR AUTO: PRESENT /HPF
BASOPHILS # BLD AUTO: 0.05 K/UL — SIGNIFICANT CHANGE UP (ref 0–0.2)
BASOPHILS NFR BLD AUTO: 0.5 % — SIGNIFICANT CHANGE UP (ref 0–2)
BILIRUB SERPL-MCNC: 0.6 MG/DL — SIGNIFICANT CHANGE UP (ref 0.2–1.2)
BILIRUB UR-MCNC: NEGATIVE — SIGNIFICANT CHANGE UP
BUN SERPL-MCNC: 19 MG/DL — SIGNIFICANT CHANGE UP (ref 7–23)
CALCIUM SERPL-MCNC: 9.2 MG/DL — SIGNIFICANT CHANGE UP (ref 8.5–10.5)
CHLORIDE SERPL-SCNC: 108 MMOL/L — SIGNIFICANT CHANGE UP (ref 96–108)
CO2 SERPL-SCNC: 31 MMOL/L — SIGNIFICANT CHANGE UP (ref 22–31)
COLOR SPEC: YELLOW — SIGNIFICANT CHANGE UP
CREAT SERPL-MCNC: 1.36 MG/DL — HIGH (ref 0.5–1.3)
DIFF PNL FLD: ABNORMAL
EOSINOPHIL # BLD AUTO: 0.18 K/UL — SIGNIFICANT CHANGE UP (ref 0–0.5)
EOSINOPHIL NFR BLD AUTO: 1.9 % — SIGNIFICANT CHANGE UP (ref 0–6)
EPI CELLS # UR: ABNORMAL /HPF (ref 0–5)
GLUCOSE SERPL-MCNC: 102 MG/DL — HIGH (ref 70–99)
GLUCOSE UR QL: NEGATIVE — SIGNIFICANT CHANGE UP
HCT VFR BLD CALC: 47.4 % — SIGNIFICANT CHANGE UP (ref 39–50)
HGB BLD-MCNC: 15.7 G/DL — SIGNIFICANT CHANGE UP (ref 13–17)
IMM GRANULOCYTES NFR BLD AUTO: 0.5 % — SIGNIFICANT CHANGE UP (ref 0–1.5)
INR BLD: 1.11 — SIGNIFICANT CHANGE UP (ref 0.88–1.16)
KETONES UR-MCNC: NEGATIVE — SIGNIFICANT CHANGE UP
LEUKOCYTE ESTERASE UR-ACNC: NEGATIVE — SIGNIFICANT CHANGE UP
LIDOCAIN IGE QN: 129 U/L — SIGNIFICANT CHANGE UP (ref 73–393)
LYMPHOCYTES # BLD AUTO: 1.37 K/UL — SIGNIFICANT CHANGE UP (ref 1–3.3)
LYMPHOCYTES # BLD AUTO: 14.6 % — SIGNIFICANT CHANGE UP (ref 13–44)
MCHC RBC-ENTMCNC: 30.3 PG — SIGNIFICANT CHANGE UP (ref 27–34)
MCHC RBC-ENTMCNC: 33.1 GM/DL — SIGNIFICANT CHANGE UP (ref 32–36)
MCV RBC AUTO: 91.5 FL — SIGNIFICANT CHANGE UP (ref 80–100)
MONOCYTES # BLD AUTO: 0.92 K/UL — HIGH (ref 0–0.9)
MONOCYTES NFR BLD AUTO: 9.8 % — SIGNIFICANT CHANGE UP (ref 2–14)
NEUTROPHILS # BLD AUTO: 6.79 K/UL — SIGNIFICANT CHANGE UP (ref 1.8–7.4)
NEUTROPHILS NFR BLD AUTO: 72.7 % — SIGNIFICANT CHANGE UP (ref 43–77)
NITRITE UR-MCNC: NEGATIVE — SIGNIFICANT CHANGE UP
NRBC # BLD: 0 /100 WBCS — SIGNIFICANT CHANGE UP (ref 0–0)
PH UR: 6 — SIGNIFICANT CHANGE UP (ref 5–8)
PLATELET # BLD AUTO: 179 K/UL — SIGNIFICANT CHANGE UP (ref 150–400)
POTASSIUM SERPL-MCNC: 5.7 MMOL/L — HIGH (ref 3.5–5.3)
POTASSIUM SERPL-SCNC: 5.7 MMOL/L — HIGH (ref 3.5–5.3)
PROT SERPL-MCNC: 6.9 G/DL — SIGNIFICANT CHANGE UP (ref 6.4–8.2)
PROT UR-MCNC: NEGATIVE MG/DL — SIGNIFICANT CHANGE UP
PROTHROM AB SERPL-ACNC: 13.3 SEC — SIGNIFICANT CHANGE UP (ref 10.6–13.6)
RBC # BLD: 5.18 M/UL — SIGNIFICANT CHANGE UP (ref 4.2–5.8)
RBC # FLD: 13.6 % — SIGNIFICANT CHANGE UP (ref 10.3–14.5)
RBC CASTS # UR COMP ASSIST: < 5 /HPF — SIGNIFICANT CHANGE UP
SODIUM SERPL-SCNC: 146 MMOL/L — HIGH (ref 132–145)
SP GR SPEC: 1.02 — SIGNIFICANT CHANGE UP (ref 1–1.03)
UROBILINOGEN FLD QL: 0.2 E.U./DL — SIGNIFICANT CHANGE UP
WBC # BLD: 9.36 K/UL — SIGNIFICANT CHANGE UP (ref 3.8–10.5)
WBC # FLD AUTO: 9.36 K/UL — SIGNIFICANT CHANGE UP (ref 3.8–10.5)
WBC UR QL: < 5 /HPF — SIGNIFICANT CHANGE UP

## 2020-12-28 PROCEDURE — 99285 EMERGENCY DEPT VISIT HI MDM: CPT

## 2020-12-28 PROCEDURE — 93010 ELECTROCARDIOGRAM REPORT: CPT

## 2020-12-28 RX ORDER — IOHEXOL 300 MG/ML
30 INJECTION, SOLUTION INTRAVENOUS ONCE
Refills: 0 | Status: COMPLETED | OUTPATIENT
Start: 2020-12-28 | End: 2020-12-28

## 2020-12-28 RX ORDER — SODIUM CHLORIDE 9 MG/ML
1000 INJECTION INTRAMUSCULAR; INTRAVENOUS; SUBCUTANEOUS ONCE
Refills: 0 | Status: COMPLETED | OUTPATIENT
Start: 2020-12-28 | End: 2020-12-28

## 2020-12-28 RX ADMIN — IOHEXOL 30 MILLILITER(S): 300 INJECTION, SOLUTION INTRAVENOUS at 11:15

## 2020-12-28 RX ADMIN — SODIUM CHLORIDE 1000 MILLILITER(S): 9 INJECTION INTRAMUSCULAR; INTRAVENOUS; SUBCUTANEOUS at 11:14

## 2020-12-28 NOTE — ED PROVIDER NOTE - PHYSICAL EXAMINATION
VITAL SIGNS: I have reviewed nursing notes and confirm.  CONSTITUTIONAL: Well-developed; well-nourished; in no acute distress.  SKIN: Skin is warm and dry, no acute rash.  HEAD: Normocephalic; atraumatic.  EYES: PERRL, EOM intact; conjunctiva and sclera clear.  ENT: No nasal discharge; airway clear.  NECK: Supple; non tender.  CARD: S1, S2 normal; no murmurs, gallops, or rubs. Regular rate and rhythm.  RESP: No wheezes, rales or rhonchi.  ABD: Mild tenderness to the R mid to lower Abd. No rebound, guarding, or distension. Normal bowel sounds.   MSK: Normal ROM. No clubbing, cyanosis or edema.  NEURO: Alert, oriented. Grossly unremarkable.  PSYCH: Cooperative, appropriate.

## 2020-12-28 NOTE — ED ADULT NURSE NOTE - OBJECTIVE STATEMENT
Pt. here for RLQ pain for a "few days". Pt. states he also has noticed SOB when he walks. Pt. denies nausea, vomiting or diarrhea. Pt. has RLQ tenderness on palpitations. Pt. denies any pain at this time. Pt. is speaking in full sentences with no use of accessory muscle, nasal flaring or cough. SPO2 98% on RA.

## 2020-12-28 NOTE — ED PROVIDER NOTE - NSCAREINITIATED _GEN_ER
Reactive fetal tracing throughout the night with no contractions. Plan to discharge with precautions re  labor.   Didier David(Attending)

## 2020-12-28 NOTE — ED PROVIDER NOTE - OBJECTIVE STATEMENT
71 y/o M with PMHx of A-fib, emphysema / COPD, anxiety, asthma, HLD, and HTN presents to the ED for 2 days of R sided mid-abdominal pain. Pt reports the pain is constant, achy, and not associated with PO intake. He denies fevers, chills, N/V/D, CP, SOB, and any relieving / exacerbating factors. Of note, Pt reported having CP at triage but when asked to specify, he states he only has Abd pain.

## 2020-12-28 NOTE — ED PROVIDER NOTE - CLINICAL SUMMARY MEDICAL DECISION MAKING FREE TEXT BOX
69 y/o M presenting with R sided mid-Abd pain x2 days that is described as achy and constant. Exam is remarkable for mild tenderness to the R mid to lower Abd. Plan for EKG, UA, basic labs, and CT Abd/pelvis with contrast. Will reassess clinically.

## 2020-12-28 NOTE — ED ADULT NURSE REASSESSMENT NOTE - NS ED NURSE REASSESS COMMENT FT1
IV fluids completed. Pt. ambulated to the bathroom with a steady gait. Pt. states he no longer wants to remain in hospital. MD David aware and IV removed. Pt. left.

## 2020-12-28 NOTE — ED PROVIDER NOTE - PROGRESS NOTE DETAILS
Pt is watching TV comfortably and reports pain has resolved. Pt is requesting to be discharged at this time but he was asked to stay for scan and additional urine and blood work. The patient appears to have eloped from the ED.  Entire ED and waiting area searched by staff.  Patient not found.  Will have SW attempt to contact patient.

## 2021-01-01 ENCOUNTER — EMERGENCY (EMERGENCY)
Facility: HOSPITAL | Age: 71
LOS: 1 days | Discharge: HOME | End: 2021-01-01
Attending: EMERGENCY MEDICINE | Admitting: EMERGENCY MEDICINE
Payer: COMMERCIAL

## 2021-01-01 VITALS
RESPIRATION RATE: 16 BRPM | TEMPERATURE: 98 F | DIASTOLIC BLOOD PRESSURE: 89 MMHG | HEART RATE: 101 BPM | SYSTOLIC BLOOD PRESSURE: 144 MMHG | OXYGEN SATURATION: 98 %

## 2021-01-01 VITALS
OXYGEN SATURATION: 99 % | WEIGHT: 160.06 LBS | DIASTOLIC BLOOD PRESSURE: 84 MMHG | RESPIRATION RATE: 18 BRPM | SYSTOLIC BLOOD PRESSURE: 166 MMHG | HEIGHT: 66 IN | HEART RATE: 133 BPM

## 2021-01-01 DIAGNOSIS — J44.9 CHRONIC OBSTRUCTIVE PULMONARY DISEASE, UNSPECIFIED: ICD-10-CM

## 2021-01-01 DIAGNOSIS — I48.91 UNSPECIFIED ATRIAL FIBRILLATION: ICD-10-CM

## 2021-01-01 DIAGNOSIS — Z95.1 PRESENCE OF AORTOCORONARY BYPASS GRAFT: Chronic | ICD-10-CM

## 2021-01-01 DIAGNOSIS — I10 ESSENTIAL (PRIMARY) HYPERTENSION: ICD-10-CM

## 2021-01-01 DIAGNOSIS — Z87.891 PERSONAL HISTORY OF NICOTINE DEPENDENCE: ICD-10-CM

## 2021-01-01 DIAGNOSIS — E78.5 HYPERLIPIDEMIA, UNSPECIFIED: ICD-10-CM

## 2021-01-01 DIAGNOSIS — Z20.822 CONTACT WITH AND (SUSPECTED) EXPOSURE TO COVID-19: ICD-10-CM

## 2021-01-01 DIAGNOSIS — Z79.51 LONG TERM (CURRENT) USE OF INHALED STEROIDS: ICD-10-CM

## 2021-01-01 DIAGNOSIS — R07.9 CHEST PAIN, UNSPECIFIED: ICD-10-CM

## 2021-01-01 DIAGNOSIS — Z79.899 OTHER LONG TERM (CURRENT) DRUG THERAPY: ICD-10-CM

## 2021-01-01 LAB
ALBUMIN SERPL ELPH-MCNC: 4.6 G/DL — SIGNIFICANT CHANGE UP (ref 3.3–5)
ALP SERPL-CCNC: 56 U/L — SIGNIFICANT CHANGE UP (ref 40–120)
ALT FLD-CCNC: 22 U/L — SIGNIFICANT CHANGE UP (ref 10–45)
ANION GAP SERPL CALC-SCNC: 18 MMOL/L — HIGH (ref 5–17)
APTT BLD: 31.7 SEC — SIGNIFICANT CHANGE UP (ref 27.5–35.5)
AST SERPL-CCNC: 31 U/L — SIGNIFICANT CHANGE UP (ref 10–40)
BASOPHILS # BLD AUTO: 0.03 K/UL — SIGNIFICANT CHANGE UP (ref 0–0.2)
BASOPHILS NFR BLD AUTO: 0.3 % — SIGNIFICANT CHANGE UP (ref 0–2)
BILIRUB SERPL-MCNC: 1.1 MG/DL — SIGNIFICANT CHANGE UP (ref 0.2–1.2)
BUN SERPL-MCNC: 24 MG/DL — HIGH (ref 7–23)
CALCIUM SERPL-MCNC: 9.1 MG/DL — SIGNIFICANT CHANGE UP (ref 8.4–10.5)
CHLORIDE SERPL-SCNC: 98 MMOL/L — SIGNIFICANT CHANGE UP (ref 96–108)
CK SERPL-CCNC: 105 U/L — SIGNIFICANT CHANGE UP (ref 30–200)
CO2 SERPL-SCNC: 24 MMOL/L — SIGNIFICANT CHANGE UP (ref 22–31)
CREAT SERPL-MCNC: 1.12 MG/DL — SIGNIFICANT CHANGE UP (ref 0.5–1.3)
EOSINOPHIL # BLD AUTO: 0.03 K/UL — SIGNIFICANT CHANGE UP (ref 0–0.5)
EOSINOPHIL NFR BLD AUTO: 0.3 % — SIGNIFICANT CHANGE UP (ref 0–6)
GLUCOSE SERPL-MCNC: 95 MG/DL — SIGNIFICANT CHANGE UP (ref 70–99)
HCT VFR BLD CALC: 48.2 % — SIGNIFICANT CHANGE UP (ref 39–50)
HGB BLD-MCNC: 15.8 G/DL — SIGNIFICANT CHANGE UP (ref 13–17)
IMM GRANULOCYTES NFR BLD AUTO: 0.5 % — SIGNIFICANT CHANGE UP (ref 0–1.5)
INR BLD: 1.11 — SIGNIFICANT CHANGE UP (ref 0.88–1.16)
LYMPHOCYTES # BLD AUTO: 0.78 K/UL — LOW (ref 1–3.3)
LYMPHOCYTES # BLD AUTO: 9.1 % — LOW (ref 13–44)
MCHC RBC-ENTMCNC: 29.8 PG — SIGNIFICANT CHANGE UP (ref 27–34)
MCHC RBC-ENTMCNC: 32.8 GM/DL — SIGNIFICANT CHANGE UP (ref 32–36)
MCV RBC AUTO: 90.8 FL — SIGNIFICANT CHANGE UP (ref 80–100)
MONOCYTES # BLD AUTO: 0.2 K/UL — SIGNIFICANT CHANGE UP (ref 0–0.9)
MONOCYTES NFR BLD AUTO: 2.3 % — SIGNIFICANT CHANGE UP (ref 2–14)
NEUTROPHILS # BLD AUTO: 7.51 K/UL — HIGH (ref 1.8–7.4)
NEUTROPHILS NFR BLD AUTO: 87.5 % — HIGH (ref 43–77)
NRBC # BLD: 0 /100 WBCS — SIGNIFICANT CHANGE UP (ref 0–0)
NT-PROBNP SERPL-SCNC: 2148 PG/ML — HIGH (ref 0–300)
PLATELET # BLD AUTO: 199 K/UL — SIGNIFICANT CHANGE UP (ref 150–400)
POTASSIUM SERPL-MCNC: 4.4 MMOL/L — SIGNIFICANT CHANGE UP (ref 3.5–5.3)
POTASSIUM SERPL-SCNC: 4.4 MMOL/L — SIGNIFICANT CHANGE UP (ref 3.5–5.3)
PROT SERPL-MCNC: 7.6 G/DL — SIGNIFICANT CHANGE UP (ref 6–8.3)
PROTHROM AB SERPL-ACNC: 13.3 SEC — SIGNIFICANT CHANGE UP (ref 10.6–13.6)
RBC # BLD: 5.31 M/UL — SIGNIFICANT CHANGE UP (ref 4.2–5.8)
RBC # FLD: 13.7 % — SIGNIFICANT CHANGE UP (ref 10.3–14.5)
SARS-COV-2 RNA SPEC QL NAA+PROBE: SIGNIFICANT CHANGE UP
SODIUM SERPL-SCNC: 140 MMOL/L — SIGNIFICANT CHANGE UP (ref 135–145)
TROPONIN T SERPL-MCNC: <0.01 NG/ML — SIGNIFICANT CHANGE UP (ref 0–0.01)
WBC # BLD: 8.59 K/UL — SIGNIFICANT CHANGE UP (ref 3.8–10.5)
WBC # FLD AUTO: 8.59 K/UL — SIGNIFICANT CHANGE UP (ref 3.8–10.5)

## 2021-01-01 PROCEDURE — 71045 X-RAY EXAM CHEST 1 VIEW: CPT

## 2021-01-01 PROCEDURE — U0003: CPT

## 2021-01-01 PROCEDURE — 36415 COLL VENOUS BLD VENIPUNCTURE: CPT

## 2021-01-01 PROCEDURE — 80053 COMPREHEN METABOLIC PANEL: CPT

## 2021-01-01 PROCEDURE — 85610 PROTHROMBIN TIME: CPT

## 2021-01-01 PROCEDURE — 93005 ELECTROCARDIOGRAM TRACING: CPT

## 2021-01-01 PROCEDURE — U0005: CPT

## 2021-01-01 PROCEDURE — 71045 X-RAY EXAM CHEST 1 VIEW: CPT | Mod: 26

## 2021-01-01 PROCEDURE — 82550 ASSAY OF CK (CPK): CPT

## 2021-01-01 PROCEDURE — 93010 ELECTROCARDIOGRAM REPORT: CPT

## 2021-01-01 PROCEDURE — 83880 ASSAY OF NATRIURETIC PEPTIDE: CPT

## 2021-01-01 PROCEDURE — 85025 COMPLETE CBC W/AUTO DIFF WBC: CPT

## 2021-01-01 PROCEDURE — 99285 EMERGENCY DEPT VISIT HI MDM: CPT | Mod: 25

## 2021-01-01 PROCEDURE — 85730 THROMBOPLASTIN TIME PARTIAL: CPT

## 2021-01-01 PROCEDURE — 99284 EMERGENCY DEPT VISIT MOD MDM: CPT | Mod: 25

## 2021-01-01 PROCEDURE — 84484 ASSAY OF TROPONIN QUANT: CPT

## 2021-01-01 RX ORDER — FUROSEMIDE 40 MG
1 TABLET ORAL
Qty: 30 | Refills: 0
Start: 2021-01-01 | End: 2021-01-30

## 2021-01-01 RX ORDER — BUDESONIDE AND FORMOTEROL FUMARATE DIHYDRATE 160; 4.5 UG/1; UG/1
2 AEROSOL RESPIRATORY (INHALATION)
Qty: 1 | Refills: 0
Start: 2021-01-01 | End: 2021-01-30

## 2021-01-01 RX ORDER — RIVAROXABAN 15 MG-20MG
1 KIT ORAL
Qty: 30 | Refills: 0
Start: 2021-01-01 | End: 2021-01-30

## 2021-01-01 RX ORDER — METOPROLOL TARTRATE 50 MG
25 TABLET ORAL DAILY
Refills: 0 | Status: DISCONTINUED | OUTPATIENT
Start: 2021-01-01 | End: 2021-01-05

## 2021-01-01 RX ORDER — RIVAROXABAN 15 MG-20MG
1 KIT ORAL
Qty: 0 | Refills: 0 | DISCHARGE

## 2021-01-01 RX ORDER — FUROSEMIDE 40 MG
1 TABLET ORAL
Qty: 0 | Refills: 0 | DISCHARGE

## 2021-01-01 RX ORDER — METOPROLOL TARTRATE 50 MG
1 TABLET ORAL
Qty: 0 | Refills: 0 | DISCHARGE

## 2021-01-01 RX ORDER — METOPROLOL TARTRATE 50 MG
1 TABLET ORAL
Qty: 30 | Refills: 0
Start: 2021-01-01 | End: 2021-01-30

## 2021-01-01 RX ORDER — BUDESONIDE AND FORMOTEROL FUMARATE DIHYDRATE 160; 4.5 UG/1; UG/1
2 AEROSOL RESPIRATORY (INHALATION)
Qty: 0 | Refills: 0 | DISCHARGE

## 2021-01-01 RX ADMIN — Medication 25 MILLIGRAM(S): at 12:53

## 2021-01-01 NOTE — ED ADULT NURSE NOTE - NSIMPLEMENTINTERV_GEN_ALL_ED
Implemented All Fall with Harm Risk Interventions:  [place] to call system. Call bell, personal items and telephone within reach. Instruct patient to call for assistance. Room bathroom lighting operational. Non-slip footwear when patient is off stretcher. Physically safe environment: no spills, clutter or unnecessary equipment. Stretcher in lowest position, wheels locked, appropriate side rails in place. Provide visual cue, wrist band, yellow gown, etc. Monitor gait and stability. Monitor for mental status changes and reorient to person, place, and time. Review medications for side effects contributing to fall risk. Reinforce activity limits and safety measures with patient and family. Provide visual clues: red socks.

## 2021-01-01 NOTE — ED PROVIDER NOTE - CLINICAL SUMMARY MEDICAL DECISION MAKING FREE TEXT BOX
noncompliant w meds- ext d/w px- difficult to determine which meds px is supposed to take- seen by CM - and pharmacy contacted- plan is to deliver meds  will restart- low dose beta blocker- re afib, also rivaroxaban, low dose lasix and symbicort- but px urged to FU with clinic in 1 week  several conversations by me/CM re compliance and fu

## 2021-01-01 NOTE — ED PROVIDER NOTE - PATIENT PORTAL LINK FT
You can access the FollowMyHealth Patient Portal offered by [facility] by registering at the following website: [email or web address]. By joining [email or web address]’s FollowMyHealth portal, you will also be able to view your health information using other applications (apps) compatible with our system.

## 2021-01-01 NOTE — ED PROVIDER NOTE - OBJECTIVE STATEMENT
71 y/o M with PMHx of A-fib, emphysema / COPD, anxiety, asthma, HLD, and HTN noncompliant w meds 71 y/o M with PMHx of A-fib, emphysema / COPD, anxiety, asthma, HLD, and HTN noncompliant w meds  co chronic chest pain, abd pain, sob- several visits [facility] [facility] w same complaint- [facility]  call to [facility]- px has not picked up meds, also is not on cardiac meds- unclear where px gets primary care  no f/c sob- after walking a few blocks- has not changed over the past few months  no loss of taste/smell  no orthopnea

## 2021-01-01 NOTE — ED PROVIDER NOTE - NSFOLLOWUPINSTRUCTIONS_ED_ALL_ED_FT
A-FIB (ATRIAL FIBRILLATION) - AfterCare(R) Instructions(ER/ED)           A-fib (Atrial Fibrillation)    WHAT YOU NEED TO KNOW:    A-fib may come and go, or it may be a long-term condition. A-fib can cause blood clots, stroke, or heart failure. These conditions may become life-threatening. It is important to treat and manage A-fib to help prevent a blood clot, stroke, or heart failure.    Heart Chambers         DISCHARGE INSTRUCTIONS:    Call your local emergency number (911 in the [place]) or have someone call if:   •You have any of the following signs of a heart attack: ?Squeezing, pressure, or pain in your chest      ?You may also have any of the following: ?Discomfort or pain in your back, neck, jaw, stomach, or arm      ?Shortness of breath      ?Nausea or vomiting      ?Lightheadedness or a sudden cold sweat        •You have any of the following signs of a stroke: ?Numbness or drooping on one side of your face       ?Weakness in an arm or leg      ?Confusion or difficulty speaking      ?Dizziness, a severe headache, or vision loss        Call your doctor or cardiologist if:   •Your arm or leg feels warm, tender, and painful. It may look swollen and red.      •Your heart rate is more than 110 beats per minute.      •You have new or worsening swelling in your legs, feet, ankles, or abdomen.       •You are short of breath, even at rest.      •You have questions or concerns about your condition or care.      Medicines: You may need any of the following:  •Heart medicines help control your heart rate or rhythm. You may need more than one medicine to treat your symptoms.      •Blood thinners help prevent blood clots. Clots can cause strokes, heart attacks, and death. The following are general safety guidelines to follow while you are taking a blood thinner:?Watch for bleeding and bruising while you take blood thinners. Watch for bleeding from your gums or nose. Watch for blood in your urine and bowel movements. Use a soft washcloth on your skin, and a soft toothbrush to brush your teeth. This can keep your skin and gums from bleeding. If you shave, use an electric shaver. Do not play contact sports.       ?Tell your dentist and other healthcare providers that you take a blood thinner. Wear a bracelet or necklace that says you take this medicine.       ?Do not start or stop any other medicines unless your healthcare provider tells you to. Many medicines cannot be used with blood thinners.      ?Take your blood thinner exactly as prescribed by your healthcare provider. Do not skip does or take less than prescribed. Tell your provider right away if you forget to take your blood thinner, or if you take too much.      ?Warfarin is a blood thinner that you may need to take. The following are things you should be aware of if you take warfarin: ?Foods and medicines can affect the amount of warfarin in your blood. Do not make major changes to your diet while you take warfarin. Warfarin works best when you eat about the same amount of vitamin K every day. Vitamin K is found in green leafy vegetables and certain other foods. Ask for more information about what to eat when you are taking warfarin.      ?You will need to see your healthcare provider for follow-up visits when you are on warfarin. You will need regular blood tests. These tests are used to decide how much medicine you need.         •Antiplatelets, such as aspirin, help prevent blood clots. Take your antiplatelet medicine exactly as directed. These medicines make it more likely for you to bleed or bruise. If you are told to take aspirin, do not take acetaminophen or ibuprofen instead.       •Take your medicine as directed. Contact your healthcare provider if you think your medicine is not helping or if you have side effects. Tell him or her if you are allergic to any medicine. Keep a list of the medicines, vitamins, and herbs you take. Include the amounts, and when and why you take them. Bring the list or the pill bottles to follow-up visits. Carry your medicine list with you in case of an emergency.      Manage A-fib:   •Know your target heart rate. Learn how to check your pulse and monitor your heart rate.  How to Take a Pulse           •Know the risks if you choose to drink alcohol. Alcohol can increase your risk for A-fib or make A-fib harder to manage. Ask your healthcare provider if it is okay for you to drink any alcohol. He or she can help you set limits for the number of drinks you have in 24 hours and in a week. A drink of alcohol is 12 ounces of beer, 5 ounces of wine, or 1½ ounces of liquor.      •Do not smoke. Nicotine can cause heart damage and make it more difficult to manage your A-fib. Do not use e-cigarettes or smokeless tobacco in place of cigarettes or to help you quit. They still contain nicotine. Ask your healthcare provider for information if you currently smoke and need help quitting.      •Eat heart-healthy foods. Heart healthy foods will help keep your cholesterol low. These include fruits, vegetables, whole-grain breads, low-fat dairy products, beans, lean meats, and fish. Replace butter and margarine with heart-healthy oils such as olive oil and canola oil.             •Maintain a healthy weight. Ask your healthcare provider what a healthy weight is for you. Ask him or her to help you create a safe weight loss plan if you are overweight. Even a small goal of a 10% weight loss can improve your heart health.      •Get regular physical activity. Physical activity helps improve your heart health. Get at least 150 minutes of moderate aerobic physical activity each week. Your healthcare provider can help you create an activity plan.  Walking for Exercise           •Manage other health conditions. This includes high blood pressure or cholesterol, sleep apnea, diabetes, and other heart conditions. Take medicine as directed and follow your treatment plan. Your healthcare provider may need to change a medicine you are taking if it is causing your A-fib. Do not stop taking any medicine unless directed by your provider.      Follow up with your doctor or cardiologist as directed: You will need regular blood tests and monitoring. Write down your questions so you remember to ask them during your visits.       © Copyright [employer] 2021           back to top                          © Copyright [employer] 2021

## 2021-01-01 NOTE — ED ADULT NURSE NOTE - OBJECTIVE STATEMENT
70 y.o M a&ox4 walked in from triage c.o cp. x1 mo of intermittent CP, SOB at night and when walking x 2 blocks. see at [facility] on dec 28th for similar complaints. non-complaint with medications PMHx of A-fib, emphysema / COPD, anxiety, asthma, HLD, and HTN. pt state " I took one medication today. I don't know the name." EKG Complete. placed on CCM, sinus tachycardiac to the 130s. well appearing, airway patent, speaking in clear sentences.

## 2021-01-05 ENCOUNTER — EMERGENCY (EMERGENCY)
Facility: HOSPITAL | Age: 71
LOS: 1 days | Discharge: HOME | End: 2021-01-05
Attending: EMERGENCY MEDICINE | Admitting: EMERGENCY MEDICINE
Payer: MEDICARE

## 2021-01-05 VITALS
TEMPERATURE: 99 F | RESPIRATION RATE: 16 BRPM | HEART RATE: 72 BPM | SYSTOLIC BLOOD PRESSURE: 149 MMHG | OXYGEN SATURATION: 99 % | DIASTOLIC BLOOD PRESSURE: 85 MMHG

## 2021-01-05 VITALS
TEMPERATURE: 99 F | SYSTOLIC BLOOD PRESSURE: 167 MMHG | OXYGEN SATURATION: 98 % | HEART RATE: 111 BPM | DIASTOLIC BLOOD PRESSURE: 93 MMHG | RESPIRATION RATE: 18 BRPM | HEIGHT: 66 IN | WEIGHT: 166.89 LBS

## 2021-01-05 DIAGNOSIS — I50.9 HEART FAILURE, UNSPECIFIED: ICD-10-CM

## 2021-01-05 DIAGNOSIS — Z95.1 PRESENCE OF AORTOCORONARY BYPASS GRAFT: Chronic | ICD-10-CM

## 2021-01-05 DIAGNOSIS — Z20.822 CONTACT WITH AND (SUSPECTED) EXPOSURE TO COVID-19: ICD-10-CM

## 2021-01-05 DIAGNOSIS — F17.200 NICOTINE DEPENDENCE, UNSPECIFIED, UNCOMPLICATED: ICD-10-CM

## 2021-01-05 DIAGNOSIS — Z91.14 PATIENT'S OTHER NONCOMPLIANCE WITH MEDICATION REGIMEN: ICD-10-CM

## 2021-01-05 DIAGNOSIS — J44.9 CHRONIC OBSTRUCTIVE PULMONARY DISEASE, UNSPECIFIED: ICD-10-CM

## 2021-01-05 DIAGNOSIS — Z88.8 ALLERGY STATUS TO OTHER DRUGS, MEDICAMENTS AND BIOLOGICAL SUBSTANCES: ICD-10-CM

## 2021-01-05 DIAGNOSIS — E78.5 HYPERLIPIDEMIA, UNSPECIFIED: ICD-10-CM

## 2021-01-05 DIAGNOSIS — I11.0 HYPERTENSIVE HEART DISEASE WITH HEART FAILURE: ICD-10-CM

## 2021-01-05 DIAGNOSIS — R07.9 CHEST PAIN, UNSPECIFIED: ICD-10-CM

## 2021-01-05 DIAGNOSIS — Z79.899 OTHER LONG TERM (CURRENT) DRUG THERAPY: ICD-10-CM

## 2021-01-05 LAB
ALBUMIN SERPL ELPH-MCNC: 4 G/DL — SIGNIFICANT CHANGE UP (ref 3.4–5)
ALP SERPL-CCNC: 64 U/L — SIGNIFICANT CHANGE UP (ref 40–120)
ALT FLD-CCNC: 62 U/L — HIGH (ref 12–42)
ANION GAP SERPL CALC-SCNC: 7 MMOL/L — LOW (ref 9–16)
AST SERPL-CCNC: 68 U/L — HIGH (ref 15–37)
BASOPHILS # BLD AUTO: 0.02 K/UL — SIGNIFICANT CHANGE UP (ref 0–0.2)
BASOPHILS NFR BLD AUTO: 0.2 % — SIGNIFICANT CHANGE UP (ref 0–2)
BILIRUB SERPL-MCNC: 1.1 MG/DL — SIGNIFICANT CHANGE UP (ref 0.2–1.2)
BUN SERPL-MCNC: 29 MG/DL — HIGH (ref 7–23)
CALCIUM SERPL-MCNC: 9.4 MG/DL — SIGNIFICANT CHANGE UP (ref 8.5–10.5)
CHLORIDE SERPL-SCNC: 105 MMOL/L — SIGNIFICANT CHANGE UP (ref 96–108)
CO2 SERPL-SCNC: 27 MMOL/L — SIGNIFICANT CHANGE UP (ref 22–31)
CREAT SERPL-MCNC: 1.32 MG/DL — HIGH (ref 0.5–1.3)
D DIMER BLD IA.RAPID-MCNC: 305 NG/ML DDU — HIGH
EOSINOPHIL # BLD AUTO: 0 K/UL — SIGNIFICANT CHANGE UP (ref 0–0.5)
EOSINOPHIL NFR BLD AUTO: 0 % — SIGNIFICANT CHANGE UP (ref 0–6)
GLUCOSE SERPL-MCNC: 124 MG/DL — HIGH (ref 70–99)
HCT VFR BLD CALC: 45 % — SIGNIFICANT CHANGE UP (ref 39–50)
HGB BLD-MCNC: 14.7 G/DL — SIGNIFICANT CHANGE UP (ref 13–17)
IMM GRANULOCYTES NFR BLD AUTO: 0.5 % — SIGNIFICANT CHANGE UP (ref 0–1.5)
LYMPHOCYTES # BLD AUTO: 0.7 K/UL — LOW (ref 1–3.3)
LYMPHOCYTES # BLD AUTO: 6.5 % — LOW (ref 13–44)
MAGNESIUM SERPL-MCNC: 1.9 MG/DL — SIGNIFICANT CHANGE UP (ref 1.6–2.6)
MCHC RBC-ENTMCNC: 29.9 PG — SIGNIFICANT CHANGE UP (ref 27–34)
MCHC RBC-ENTMCNC: 32.7 GM/DL — SIGNIFICANT CHANGE UP (ref 32–36)
MCV RBC AUTO: 91.6 FL — SIGNIFICANT CHANGE UP (ref 80–100)
MONOCYTES # BLD AUTO: 0.36 K/UL — SIGNIFICANT CHANGE UP (ref 0–0.9)
MONOCYTES NFR BLD AUTO: 3.4 % — SIGNIFICANT CHANGE UP (ref 2–14)
NEUTROPHILS # BLD AUTO: 9.6 K/UL — HIGH (ref 1.8–7.4)
NEUTROPHILS NFR BLD AUTO: 89.4 % — HIGH (ref 43–77)
NRBC # BLD: 0 /100 WBCS — SIGNIFICANT CHANGE UP (ref 0–0)
NT-PROBNP SERPL-SCNC: 2246 PG/ML — HIGH
PLATELET # BLD AUTO: 179 K/UL — SIGNIFICANT CHANGE UP (ref 150–400)
POTASSIUM SERPL-MCNC: 4.6 MMOL/L — SIGNIFICANT CHANGE UP (ref 3.5–5.3)
POTASSIUM SERPL-SCNC: 4.6 MMOL/L — SIGNIFICANT CHANGE UP (ref 3.5–5.3)
PROT SERPL-MCNC: 7 G/DL — SIGNIFICANT CHANGE UP (ref 6.4–8.2)
RBC # BLD: 4.91 M/UL — SIGNIFICANT CHANGE UP (ref 4.2–5.8)
RBC # FLD: 14.3 % — SIGNIFICANT CHANGE UP (ref 10.3–14.5)
SARS-COV-2 RNA SPEC QL NAA+PROBE: SIGNIFICANT CHANGE UP
SODIUM SERPL-SCNC: 139 MMOL/L — SIGNIFICANT CHANGE UP (ref 132–145)
TROPONIN I SERPL-MCNC: <0.017 NG/ML — LOW (ref 0.02–0.06)
WBC # BLD: 10.73 K/UL — HIGH (ref 3.8–10.5)
WBC # FLD AUTO: 10.73 K/UL — HIGH (ref 3.8–10.5)

## 2021-01-05 PROCEDURE — 71045 X-RAY EXAM CHEST 1 VIEW: CPT | Mod: 26

## 2021-01-05 PROCEDURE — 93010 ELECTROCARDIOGRAM REPORT: CPT

## 2021-01-05 PROCEDURE — 99284 EMERGENCY DEPT VISIT MOD MDM: CPT | Mod: 25

## 2021-01-05 RX ORDER — NITROGLYCERIN 6.5 MG
0.4 CAPSULE, EXTENDED RELEASE ORAL
Refills: 0 | Status: DISCONTINUED | OUTPATIENT
Start: 2021-01-05 | End: 2021-01-09

## 2021-01-05 RX ORDER — ASPIRIN/CALCIUM CARB/MAGNESIUM 324 MG
162 TABLET ORAL ONCE
Refills: 0 | Status: COMPLETED | OUTPATIENT
Start: 2021-01-05 | End: 2021-01-05

## 2021-01-05 RX ADMIN — Medication 162 MILLIGRAM(S): at 19:19

## 2021-01-05 RX ADMIN — Medication 0.4 MILLIGRAM(S): at 19:18

## 2021-01-05 NOTE — ED ADULT TRIAGE NOTE - CHIEF COMPLAINT QUOTE
pt walked in holding his chest c/o CP "for a long time now". Denies vision change/headache. Face symmetrical. Speech clear in Occitan language. Reports heart attack with cabg 2 yrs ago. non compliant with meds, did not take asa today. NO nitro before arrival. MD assessment at triage.

## 2021-01-05 NOTE — ED ADULT NURSE NOTE - CHIEF COMPLAINT QUOTE
pt walked in holding his chest c/o CP "for a long time now". Denies vision change/headache. Face symmetrical. Speech clear in Tajik language. Reports heart attack with cabg 2 yrs ago. non compliant with meds, did not take asa today. NO nitro before arrival. MD assessment at triage.

## 2021-01-05 NOTE — ED PROVIDER NOTE - CLINICAL SUMMARY MEDICAL DECISION MAKING FREE TEXT BOX
Patient with hx of chronic Atrial Fibrillation who presents with chest discomfort, SOB, and anxiety, and has a history of frequent visits to this ED in the past for similar symptoms.  Differential Dx includes:  STEMI, NSTEMI, unstable angina, CHF, PE, electrolyte imbalance, arrhythmia, pneumonia, anxiety  Labs/EKG/CXR ordered as well as Troponin & BNP.  Patient improved while in ED and did not appear to be in any respiratory distress.  Discussed treatment options with the patient, including but not limited to admission/transfer to [facility] for further evaluation by cardiology.  Patient later became anxious and refused to be transferred, and he insisted on being discharged home.  He decided to leave the ED against medical advice.  He was referred to Dr. Winter for follow up.

## 2021-01-05 NOTE — ED PROVIDER NOTE - OBJECTIVE STATEMENT
He is a very poor historian and appears somewhat anxious in triage, but he was much calmer once he was brought back into a treatment room.  He complains of substernal chest discomfort, heart palpitations/racing, and shortness of breath.  He has a history of known chronic Atrial fib/flutter and is supposed to be on a novel anticoagulant, but he states he has been off of all of his meds for at least 2-3 weeks.  He has not made any attempts at contacting his PCP, nor has he seen his cardiologist.  He denies any recent dizziness or syncope.  He denies any recent illness, fevers, or chills.  He denies a headache.  He denies any focal neurologic symptoms.

## 2021-01-05 NOTE — ED PROVIDER NOTE - CARE PROVIDER_API CALL
Zi Winter  CARDIOVASCULAR DISEASE  [address], [address] [place] [address]  Phone: [phone]  Fax: [phone]  Follow Up Time: Urgent

## 2021-01-05 NOTE — ED PROVIDER NOTE - CARE PLAN
Principal Discharge DX:	Chronic congestive heart failure, unspecified heart failure type  Secondary Diagnosis:	Anxiety  Secondary Diagnosis:	Emphysema/COPD  Secondary Diagnosis:	Noncompliance with medication regimen

## 2021-01-05 NOTE — ED PROVIDER NOTE - CONSTITUTIONAL, MLM
normal... Well appearing, awake, alert, oriented to person, place, time/situation and in no apparent distress, but he does seem very anxious.

## 2021-01-05 NOTE — ED PROVIDER NOTE - NSFOLLOWUPINSTRUCTIONS_ED_ALL_ED_FT
It was felt that you may have unstable angina and worsening congestive heart failure (likely due to not taking your medications.)  Admission was recommended, but you prefer to go home and understand the risks of being discharged prior to full evaluation/treatment (risks explained in English and in Tristanian).    Please contact CARDIOLOGIST tomorrow morning for follow-up appointment ASAP.    Angina pectoris, often called angina, is extreme discomfort in the chest, neck, or arm. The discomfort is caused by a lack of blood in the middle and thickest layer of the heart wall (myocardium). Angina is often described as a heaviness, tightness, aching, or pressure in the chest. The discomfort can also be felt in the left arm, neck, jaw, or, back. Angina can last a few minutes, or it can last longer.    There are different types of angina. It can be brought on by:    Physical activity.  Stress or excitement.  A large meal.  Exposure to cold temperatures.    There are four types of angina: stable, unstable, microvascular, and Prinzmetal or variant.    What are the causes?  This condition is most often caused by atherosclerosis. This is the buildup of fat and cholesterol (plaque) on the inside of the arteries. Over time, the plaque may narrow or block the artery, which lessens blood flow to the heart. Plaque can also break off inside of an artery that supplies blood to the heart (coronary artery) and form a clot that causes a sudden blockage.     Other causes of angina include:    Coronary spasm. This is a sudden tightening (spasm) in the coronary arteries.  Coronary artery dissection. This is when the lining of a coronary artery splits open and blocks blood flow to the heart.  Heart valve disease.  Cardiomyopathy, or other heart disease.  Radiation to the left side of the chest.    What increases the risk?  You are more likely to develop this condition if:    You have high cholesterol levels.  You have high blood pressure (hypertension).  You use tobacco.  You have diabetes.  You have a family history of heart disease.  You are obese.  You have an inactive (sedentary) lifestyle or do not exercise enough.  You eat a diet high in saturated fats.  You have depression.  You are exposed to high stress or emotional triggers of stress.  You use drugs, such as cocaine.  You have had radiation to the left side of the chest.    Women have a greater risk for angina if they:    Are over age 55.  Have gone through menopause (are postmenopausal).    What are the signs or symptoms?  Common symptoms in both men and women may include:    Chest pain.  The pain may feel like a crushing or squeezing in the chest, or a tightness, pressure, fullness, or heaviness in the chest.  The pain may last for more than a few minutes at a time, or it may stop and come back (recur) over the course of a few minutes.  Pain in the arms, neck, jaw, or back.  Unexplained heartburn or indigestion.  Shortness of breath.  Nausea.  Sudden cold sweats.  Sudden light-headedness.    Many women have chest discomfort and some of the other symptoms. However, women often have unusual (atypical) symptoms, such as:    Fatigue.  Unexplained feelings of nervousness or anxiety.  Unexplained weakness.  Dizziness or fainting.    Sometimes, women may have angina without any symptoms.    How is this diagnosed?  This condition may be diagnosed based on:    Your symptoms and medical history.  Electrocardiogram (ECG). This measures electrical activity in the heart.  Blood tests.  Exercise stress test. This looks for signs of blockage when the heart is being exercised.  Pharmacologic stress test. This test looks for signs of blockage when the heart is being stressed with a medicine.  CT angiogram. This procedure examines the heart and the area around the heart, including the blood flow to the heart.  Coronary angiogram. This procedure examines the coronary arteries to see if there is any blockage.    How is this treated?  Angina may be treated with:    Medicines, such as:  Anti-platelet medicines, such as aspirin, to decrease the risk of blood clots and heart attack.  Nitrates, which relax blood vessel walls and improve blood flow to the heart.  Blood pressure medicines. These medicines improve the pumping action of the heart and relax blood vessels that are spasming.  Cholesterol-reducing medicines that help treat atherosclerosis.  Healthy behavioral changes to reduce or control risk factors.  A procedure to widen a narrowed or blocked coronary artery (angioplasty). When angioplasty is performed, a mesh tube may be placed in a coronary artery to keep it open (coronary stenting).  Surgery to allow blood to go around a blockage in a coronary artery (coronary artery bypass surgery).    Follow these instructions at home:  Medicines    Take over-the-counter and prescription medicines only as told by your health care provider.  Do not take the following medicines unless your health care provider approves:  NSAIDs, such as ibuprofen, naproxen, or celecoxib.  Vitamin supplements that contain vitamin A, vitamin E, or both.  Hormone replacement therapy that contains estrogen with or without progestin.    Eating and drinking    Eat a heart-healthy diet. A heart-healthy diet includes plenty of fresh fruits and vegetables, whole grains, lowfat (lean) protein, and lowfat dairy products. Your health care provider may recommend that you work with a diet and nutrition specialist (registered dietitian) to educate you about healthy food choices.  Use healthy cooking methods such as roasting, grilling, broiling, baking, poaching, steaming, or stir-frying. Talk with a dietitian to learn more about healthy cooking methods.  Follow instructions from your health care provider about eating or drinking restrictions.     Activity    Follow an exercise program approved by your health care provider.  Return to your normal activities as told by your health care provider. Ask your health care provider what activities are safe for you.  When you feel fatigued, take a break. Plan rest periods if you know you are going to feel tired.    Lifestyle    Do not use any products that contain nicotine or tobacco, such as cigarettes and e-cigarettes. If you need help quitting, ask your health care provider.  If your health care provider approves, limit alcohol intake to no more than 1 drink a day for nonpregnant women and 2 drinks a day for men. One drink equals 12 oz of beer, 5 oz of wine, or 1½ oz of hard liquor.     General instructions    Maintain a healthy weight. If directed, work with your health care provider to lose weight.  Learn to manage stress. If you need help with this, ask your health care provider.  Take a depression screening test to see if you are at risk for depression. If you feel depressed, talk with your health care provider.  Work with your health care provider to manage any other health conditions you have, such as hypertension or diabetes.  Keep all follow-up visits as told by your health care provider. This is important.    Get help right away if:  You have pain in your chest, neck, arm, jaw, stomach, or back that:  Lasts more than a few minutes.  Is recurring.  Is not relieved by taking nitroglycerin medicines under the tongue (sublingual nitroglycerin).  Increases in intensity or frequency.  You have a lot of sweating without cause.  You have unexplained:  Heartburn or indigestion.  Shortness of breath or difficulty breathing.  Nausea or vomiting.  Fatigue.  Feelings of nervousness or anxiety.  Weakness.  Diarrhea.  You have sudden light-headedness or dizziness.  You faint.    These symptoms may represent a serious problem that is an emergency. Do not wait to see if the symptoms will go away. Get medical help right away. Call your local emergency services (911 in the [place] Do not drive yourself to the hospital.    Summary  Angina pectoris is extreme discomfort in the chest, neck, or arm that is caused by a lack of blood in the heart wall. Angina pectoris is most often caused by atherosclerosis.  There are four types of angina: stable, unstable, microvascular, and Prinzmetal or variant.  Symptoms of angina include chest pain, pain in the arms, neck, jaw, or back, unexplained heartburn or indigestion, shortness of breath, nausea, and sudden cold sweats or light-headedness. Atypical symptoms are more common in women and include fatigue, nervousness or anxiety, weakness, dizziness, or fainting.  Angina may be treated with behavioral changes, medicine, angioplasty, angioplasty with stenting, or bypass surgery.

## 2021-01-05 NOTE — ED PROVIDER NOTE - PATIENT PORTAL LINK FT
You can access the FollowMyHealth Patient Portal offered by [facility] by registering at the following website: [email or web address]. By joining [facility]’s FollowMyHealth portal, you will also be able to view your health information using other applications (apps) compatible with our system.

## 2021-01-08 ENCOUNTER — EMERGENCY (EMERGENCY)
Facility: HOSPITAL | Age: 71
LOS: 1 days | Discharge: HOME | End: 2021-01-08
Attending: EMERGENCY MEDICINE | Admitting: EMERGENCY MEDICINE
Payer: MEDICARE

## 2021-01-08 VITALS
HEART RATE: 86 BPM | DIASTOLIC BLOOD PRESSURE: 71 MMHG | OXYGEN SATURATION: 97 % | WEIGHT: 160.06 LBS | HEIGHT: 66 IN | TEMPERATURE: 98 F | SYSTOLIC BLOOD PRESSURE: 124 MMHG | RESPIRATION RATE: 22 BRPM

## 2021-01-08 VITALS
SYSTOLIC BLOOD PRESSURE: 109 MMHG | HEART RATE: 89 BPM | RESPIRATION RATE: 18 BRPM | OXYGEN SATURATION: 94 % | DIASTOLIC BLOOD PRESSURE: 69 MMHG

## 2021-01-08 DIAGNOSIS — Z20.822 CONTACT WITH AND (SUSPECTED) EXPOSURE TO COVID-19: ICD-10-CM

## 2021-01-08 DIAGNOSIS — Z79.82 LONG TERM (CURRENT) USE OF ASPIRIN: ICD-10-CM

## 2021-01-08 DIAGNOSIS — Z95.1 PRESENCE OF AORTOCORONARY BYPASS GRAFT: Chronic | ICD-10-CM

## 2021-01-08 DIAGNOSIS — I48.91 UNSPECIFIED ATRIAL FIBRILLATION: ICD-10-CM

## 2021-01-08 DIAGNOSIS — F41.9 ANXIETY DISORDER, UNSPECIFIED: ICD-10-CM

## 2021-01-08 DIAGNOSIS — J45.909 UNSPECIFIED ASTHMA, UNCOMPLICATED: ICD-10-CM

## 2021-01-08 DIAGNOSIS — R06.02 SHORTNESS OF BREATH: ICD-10-CM

## 2021-01-08 DIAGNOSIS — E78.5 HYPERLIPIDEMIA, UNSPECIFIED: ICD-10-CM

## 2021-01-08 DIAGNOSIS — Z79.899 OTHER LONG TERM (CURRENT) DRUG THERAPY: ICD-10-CM

## 2021-01-08 DIAGNOSIS — I10 ESSENTIAL (PRIMARY) HYPERTENSION: ICD-10-CM

## 2021-01-08 LAB
ALBUMIN SERPL ELPH-MCNC: 4 G/DL — SIGNIFICANT CHANGE UP (ref 3.4–5)
ALP SERPL-CCNC: 57 U/L — SIGNIFICANT CHANGE UP (ref 40–120)
ALT FLD-CCNC: 39 U/L — SIGNIFICANT CHANGE UP (ref 12–42)
ANION GAP SERPL CALC-SCNC: 5 MMOL/L — LOW (ref 9–16)
APPEARANCE UR: CLEAR — SIGNIFICANT CHANGE UP
AST SERPL-CCNC: 27 U/L — SIGNIFICANT CHANGE UP (ref 15–37)
BILIRUB SERPL-MCNC: 1.1 MG/DL — SIGNIFICANT CHANGE UP (ref 0.2–1.2)
BILIRUB UR-MCNC: NEGATIVE — SIGNIFICANT CHANGE UP
BUN SERPL-MCNC: 35 MG/DL — HIGH (ref 7–23)
CALCIUM SERPL-MCNC: 9.4 MG/DL — SIGNIFICANT CHANGE UP (ref 8.5–10.5)
CHLORIDE SERPL-SCNC: 102 MMOL/L — SIGNIFICANT CHANGE UP (ref 96–108)
CO2 SERPL-SCNC: 29 MMOL/L — SIGNIFICANT CHANGE UP (ref 22–31)
COLOR SPEC: YELLOW — SIGNIFICANT CHANGE UP
CREAT SERPL-MCNC: 1.65 MG/DL — HIGH (ref 0.5–1.3)
DIFF PNL FLD: NEGATIVE — SIGNIFICANT CHANGE UP
GLUCOSE SERPL-MCNC: 148 MG/DL — HIGH (ref 70–99)
GLUCOSE UR QL: NEGATIVE — SIGNIFICANT CHANGE UP
HCT VFR BLD CALC: 51.2 % — HIGH (ref 39–50)
HGB BLD-MCNC: 16.7 G/DL — SIGNIFICANT CHANGE UP (ref 13–17)
KETONES UR-MCNC: NEGATIVE — SIGNIFICANT CHANGE UP
LEUKOCYTE ESTERASE UR-ACNC: NEGATIVE — SIGNIFICANT CHANGE UP
MAGNESIUM SERPL-MCNC: 2 MG/DL — SIGNIFICANT CHANGE UP (ref 1.6–2.6)
MCHC RBC-ENTMCNC: 29.9 PG — SIGNIFICANT CHANGE UP (ref 27–34)
MCHC RBC-ENTMCNC: 32.6 GM/DL — SIGNIFICANT CHANGE UP (ref 32–36)
MCV RBC AUTO: 91.8 FL — SIGNIFICANT CHANGE UP (ref 80–100)
NITRITE UR-MCNC: NEGATIVE — SIGNIFICANT CHANGE UP
NRBC # BLD: 0 /100 WBCS — SIGNIFICANT CHANGE UP (ref 0–0)
NT-PROBNP SERPL-SCNC: 1345 PG/ML — HIGH
PCO2 BLDV: 49 MMHG — SIGNIFICANT CHANGE UP (ref 41–51)
PH BLDV: 7.41 — SIGNIFICANT CHANGE UP (ref 7.32–7.43)
PH UR: 6.5 — SIGNIFICANT CHANGE UP (ref 5–8)
PLATELET # BLD AUTO: 213 K/UL — SIGNIFICANT CHANGE UP (ref 150–400)
PO2 BLDV: 54 MMHG — HIGH (ref 35–40)
POTASSIUM SERPL-MCNC: 4.8 MMOL/L — SIGNIFICANT CHANGE UP (ref 3.5–5.3)
POTASSIUM SERPL-SCNC: 4.8 MMOL/L — SIGNIFICANT CHANGE UP (ref 3.5–5.3)
PROT SERPL-MCNC: 7.5 G/DL — SIGNIFICANT CHANGE UP (ref 6.4–8.2)
PROT UR-MCNC: NEGATIVE MG/DL — SIGNIFICANT CHANGE UP
RBC # BLD: 5.58 M/UL — SIGNIFICANT CHANGE UP (ref 4.2–5.8)
RBC # FLD: 13.9 % — SIGNIFICANT CHANGE UP (ref 10.3–14.5)
SAO2 % BLDV: 77 % — SIGNIFICANT CHANGE UP
SARS-COV-2 RNA SPEC QL NAA+PROBE: SIGNIFICANT CHANGE UP
SODIUM SERPL-SCNC: 136 MMOL/L — SIGNIFICANT CHANGE UP (ref 132–145)
SP GR SPEC: 1.02 — SIGNIFICANT CHANGE UP (ref 1–1.03)
TROPONIN I SERPL-MCNC: <0.017 NG/ML — LOW (ref 0.02–0.06)
UROBILINOGEN FLD QL: 0.2 E.U./DL — SIGNIFICANT CHANGE UP
WBC # BLD: 9.06 K/UL — SIGNIFICANT CHANGE UP (ref 3.8–10.5)
WBC # FLD AUTO: 9.06 K/UL — SIGNIFICANT CHANGE UP (ref 3.8–10.5)

## 2021-01-08 PROCEDURE — 71045 X-RAY EXAM CHEST 1 VIEW: CPT | Mod: 26

## 2021-01-08 PROCEDURE — 93010 ELECTROCARDIOGRAM REPORT: CPT

## 2021-01-08 PROCEDURE — 99285 EMERGENCY DEPT VISIT HI MDM: CPT

## 2021-01-08 RX ORDER — DILTIAZEM HCL 120 MG
5 CAPSULE, EXT RELEASE 24 HR ORAL
Qty: 125 | Refills: 0 | Status: DISCONTINUED | OUTPATIENT
Start: 2021-01-08 | End: 2021-01-12

## 2021-01-08 RX ORDER — DILTIAZEM HCL 120 MG
10 CAPSULE, EXT RELEASE 24 HR ORAL ONCE
Refills: 0 | Status: COMPLETED | OUTPATIENT
Start: 2021-01-08 | End: 2021-01-08

## 2021-01-08 RX ADMIN — Medication 10 MILLIGRAM(S): at 14:18

## 2021-01-08 RX ADMIN — Medication 5 MG/HR: at 14:29

## 2021-01-08 NOTE — ED PROVIDER NOTE - CLINICAL SUMMARY MEDICAL DECISION MAKING FREE TEXT BOX
Patient with hx of COPD, CHF, AF, Anxiety, and non-compliance.  Differential Dx:  COPD exacerbation, pneumonia, bronchitis, AF with RVR, MI, CHF, PE, PTX, anxiety, noncompliance.  Labs, EKG ordered.  Given breathing treatment empirically.  EKG shows AF with RVR - rates in the 110-120+ range.   Clinically, patient has mild CHF.  BNP is elevated.  Troponin is WNL.  Patient started on Diltiazem drip for rate control with significant improvement.  Patient later decided he does not want to be admitted and decided to leave [place] again.  He was encouraged to follow up with Cardiology ASAP

## 2021-01-08 NOTE — ED PROVIDER NOTE - CARE PROVIDER_API CALL
Zi Winter  CARDIOVASCULAR DISEASE  [address], [address] [place] [address]  Phone: [phone]  Fax: [phone]  Follow Up Time: 1-3 Days

## 2021-01-08 NOTE — ED PROVIDER NOTE - ENMT, MLM
stated Airway patent, Nasal mucosa clear. Mouth with normal mucosa. Throat has no vesicles, no oropharyngeal exudates and uvula is midline.

## 2021-01-08 NOTE — ED ADULT NURSE REASSESSMENT NOTE - NS ED NURSE REASSESS COMMENT FT1
Pt unwilling to stay in ED for further treatment. Dr Drake made aware. Pt had RN remove his IV and hovering over nursing station pressing staff for papers to sign AMA.
ambulated to the bathroom. alert and oriented x4.
labs sent and initiated. communicated with provider. treatment in progress.

## 2021-01-08 NOTE — ED ADULT TRIAGE NOTE - BMI (KG/M2)
25.8 DATE:  04/12/2018



SUBJECTIVE:  The patient is sitting comfortably in her chair in no apparent

distress.  She has no further episode of chest pain.  Did complain of headache

that has responded to tramadol.  She has had 3 sets of cardiac enzymes that were

negative, showed no evidence of myocardial infarction.  Her EKG showed that she

was in sinus rhythm at 74 beats per minute.  Her OK interval was slightly

prolonged at 208 indicating that she has first-degree AV block.  She has had an

echocardiogram, which basically showed that she has normal left ventricular

systolic function with normal ejection fraction of 65%.  She has normal left

ventricular segmental wall motion.



PHYSICAL EXAMINATION:

GENERAL:  When I examined her today, she looked well and was clearly in no

apparent respiratory distress, pale, but no jaundice, cyanosis or thyromegaly. 

No jugular venous distension.  No limb edema.

VITAL SIGNS:  Her heart rate was 60, blood pressure 147/74, temperature was

98.1, respiratory rate was 15 and oxygen saturation was 97%.

The rest of clinical examination is unremarkable.



LABORATORY DATA:  So far showed a white cell count of 5200, hemoglobin 13,

hematocrit 38, MCV 92 and platelet count 293,000.  Her chemistry showed serum

sodium of 141, potassium 3.8, chloride 105, bicarbonate 29, anion gap of 7, BUN

7, creatinine 0.7.  Estimated GFR was 90 mL per minute.



ASSESSMENT:  Chest pain, so far no EKG evidence of ischemic changes.  Three sets

of cardiac enzymes were normal.  Echocardiogram showed ejection fraction of 65%.

 Other medical problems include rheumatoid arthritis, systemic lupus

erythematosus, hypertension.  The patient has survived multiple cancers

including that of uterus, cervix, breast and skin and brain.  She has also

history of pericarditis and according to her also history of myocardial

infarction in 1998.





______________________________

DUTCH MATA MD



DR:  BALA/aguila  JOB#:  [id number] / [id number]

DD:  04/12/2018 13:23  DT:  04/12/2018 22:55 [General Appearance - Alert] : alert [Sclera] : the sclera and conjunctiva were normal [General Appearance - In No Acute Distress] : in no acute distress [Examination Of The Oral Cavity] : the lips and gums were normal [Oropharynx] : the oropharynx was normal [Edema] : there was no peripheral edema [Heart Rate And Rhythm] : heart rate was normal and rhythm regular [Abnormal Walk] : normal gait [No Spinal Tenderness] : no spinal tenderness [Motor Tone] : muscle strength and tone were normal [Musculoskeletal - Swelling] : no joint swelling seen [Motor Exam] : the motor exam was normal [Impaired Insight] : insight and judgment were intact [] : no respiratory distress [Oriented To Time, Place, And Person] : oriented to person, place, and time [FreeTextEntry1] : R shoulder with 140' abduction ,left shoulder with shoulder abduction to 180' ; tender over L bicipital groove upon resisted supination; ;Appropriate external rotation of the hips bilaterally with minimal tenderness along the joint lines.

## 2021-01-08 NOTE — ED PROVIDER NOTE - PATIENT PORTAL LINK FT
You can access the FollowMyHealth Patient Portal offered by [facility] by registering at the following website: [email or web address]. By joining [employer]’s FollowMyHealth portal, you will also be able to view your health information using other applications (apps) compatible with our system.

## 2021-01-08 NOTE — ED PROVIDER NOTE - CARE PLAN
Principal Discharge DX:	Atrial flutter with rapid ventricular response  Secondary Diagnosis:	Anxiety  Secondary Diagnosis:	Noncompliance with medication regimen

## 2021-01-08 NOTE — ED PROVIDER NOTE - OBJECTIVE STATEMENT
He has a history of chronic A-fib, COPD, asthma, hyperlipidemia, hypertension, anxiety, and medication noncompliance, and he has had multiple visits to this ED for similar complaints.  I saw him a few days ago and offered admission, but he left AMA.  He states he has been off his medication for several weeks, reportedly due to inability to afford them (although he is on Medicare, so it is unclear why he can't afford them).  He complains of POON, orthopnea, and generalized fatigue.  He denies productive cough, fevers or chills.  No diarrhea.  No loss of taste/smell.  He denies COVID exposure.  He lives alone.  He quit smoking several years ago.  He denies any chest pain.

## 2021-01-08 NOTE — ED ADULT NURSE NOTE - CHIEF COMPLAINT QUOTE
Pt walked in with c/o worsening SOB since yesterday. Pt with Hx of CHF. Tachypneic in triage. Speaking in full sentences.

## 2021-01-09 ENCOUNTER — EMERGENCY (EMERGENCY)
Facility: HOSPITAL | Age: 71
LOS: 1 days | Discharge: HOME | End: 2021-01-09
Attending: EMERGENCY MEDICINE | Admitting: EMERGENCY MEDICINE
Payer: MEDICARE

## 2021-01-09 VITALS
HEIGHT: 66 IN | WEIGHT: 160.06 LBS | OXYGEN SATURATION: 96 % | DIASTOLIC BLOOD PRESSURE: 93 MMHG | RESPIRATION RATE: 26 BRPM | HEART RATE: 92 BPM | SYSTOLIC BLOOD PRESSURE: 142 MMHG | TEMPERATURE: 98 F

## 2021-01-09 VITALS
HEART RATE: 90 BPM | OXYGEN SATURATION: 98 % | RESPIRATION RATE: 19 BRPM | DIASTOLIC BLOOD PRESSURE: 78 MMHG | SYSTOLIC BLOOD PRESSURE: 130 MMHG

## 2021-01-09 DIAGNOSIS — Z79.899 OTHER LONG TERM (CURRENT) DRUG THERAPY: ICD-10-CM

## 2021-01-09 DIAGNOSIS — Z88.8 ALLERGY STATUS TO OTHER DRUGS, MEDICAMENTS AND BIOLOGICAL SUBSTANCES STATUS: ICD-10-CM

## 2021-01-09 DIAGNOSIS — R06.02 SHORTNESS OF BREATH: ICD-10-CM

## 2021-01-09 DIAGNOSIS — Z91.14 PATIENT'S OTHER NONCOMPLIANCE WITH MEDICATION REGIMEN: ICD-10-CM

## 2021-01-09 DIAGNOSIS — I10 ESSENTIAL (PRIMARY) HYPERTENSION: ICD-10-CM

## 2021-01-09 DIAGNOSIS — I48.92 UNSPECIFIED ATRIAL FLUTTER: ICD-10-CM

## 2021-01-09 DIAGNOSIS — J44.9 CHRONIC OBSTRUCTIVE PULMONARY DISEASE, UNSPECIFIED: ICD-10-CM

## 2021-01-09 DIAGNOSIS — E78.5 HYPERLIPIDEMIA, UNSPECIFIED: ICD-10-CM

## 2021-01-09 DIAGNOSIS — Z95.1 PRESENCE OF AORTOCORONARY BYPASS GRAFT: Chronic | ICD-10-CM

## 2021-01-09 DIAGNOSIS — J43.9 EMPHYSEMA, UNSPECIFIED: ICD-10-CM

## 2021-01-09 DIAGNOSIS — F41.9 ANXIETY DISORDER, UNSPECIFIED: ICD-10-CM

## 2021-01-09 PROCEDURE — 93010 ELECTROCARDIOGRAM REPORT: CPT

## 2021-01-09 PROCEDURE — 99284 EMERGENCY DEPT VISIT MOD MDM: CPT

## 2021-01-09 RX ORDER — TRAZODONE HCL 50 MG
1 TABLET ORAL
Qty: 90 | Refills: 0
Start: 2021-01-09 | End: 2021-04-08

## 2021-01-09 RX ORDER — RIVAROXABAN 15 MG-20MG
1 KIT ORAL
Qty: 90 | Refills: 0
Start: 2021-01-09 | End: 2021-04-08

## 2021-01-09 RX ORDER — ASPIRIN/CALCIUM CARB/MAGNESIUM 324 MG
1 TABLET ORAL
Qty: 90 | Refills: 0
Start: 2021-01-09 | End: 2021-04-08

## 2021-01-09 RX ORDER — SERTRALINE 25 MG/1
1 TABLET, FILM COATED ORAL
Qty: 90 | Refills: 0
Start: 2021-01-09 | End: 2021-04-08

## 2021-01-09 RX ORDER — BUDESONIDE AND FORMOTEROL FUMARATE DIHYDRATE 160; 4.5 UG/1; UG/1
2 AEROSOL RESPIRATORY (INHALATION)
Qty: 1 | Refills: 1
Start: 2021-01-09

## 2021-01-09 RX ORDER — LISINOPRIL 2.5 MG/1
1 TABLET ORAL
Qty: 90 | Refills: 0
Start: 2021-01-09 | End: 2021-04-08

## 2021-01-09 RX ORDER — ATORVASTATIN CALCIUM 80 MG/1
1 TABLET, FILM COATED ORAL
Qty: 90 | Refills: 0
Start: 2021-01-09 | End: 2021-04-08

## 2021-01-09 RX ORDER — METOPROLOL TARTRATE 50 MG
1 TABLET ORAL
Qty: 90 | Refills: 0
Start: 2021-01-09 | End: 2021-04-08

## 2021-01-09 RX ORDER — ALBUTEROL 90 UG/1
2 AEROSOL, METERED ORAL
Qty: 1 | Refills: 1
Start: 2021-01-09

## 2021-01-09 RX ORDER — FUROSEMIDE 40 MG
1 TABLET ORAL
Qty: 90 | Refills: 0
Start: 2021-01-09 | End: 2021-04-08

## 2021-01-09 NOTE — ED PROVIDER NOTE - NSFOLLOWUPINSTRUCTIONS_ED_ALL_ED_FT
Atrial flutter is a type of abnormal heart rhythm (arrhythmia). The heart has an electrical system that tells the heart how to beat. In atrial flutter, the signals move rapidly in the top chambers of the heart (theatria). This makes your heart beat very fast. Atrial flutter can come and go, or it can be permanent.    If this condition is not treated it can cause serious complications, such as stroke or weakened heart muscle (cardiomyopathy).     What are the causes?  This condition may be caused by:    A heart condition or problem, such as:  A heart attack.  Heart failure.  A heart valve problem.  Heart surgery.  A lung problem, such as:  A blood clot in the lungs (pulmonary embolism, or PE).  Chronic obstructive pulmonary disease.  Poorly controlled high blood pressure (hypertension).  Overactive thyroid (hyperthyroidism).  Caffeine.  Some decongestant cold medicines.  Low levels of minerals called electrolytes in the blood.  Cocaine.    What increases the risk?  You are more likely to develop this condition if:    You are an elderly adult.  You are a man.  You are obese.  You have obstructive sleep apnea.  You have a family history of atrial flutter.  You have diabetes.    What are the signs or symptoms?  Symptoms of this condition include:    A feeling that your heart is pounding or racing (palpitations).  Shortness of breath.  Chest pain.  Feeling light-headed.  Dizziness.  Fainting.  Low blood pressure (hypotension).  Fatigue.    Sometimes there are no symptoms associated with arrhythmia.    How is this diagnosed?  This condition may be diagnosed based on:    An electrocardiogram (ECG). This is a test that records the electrical signals in the heart.  Ambulatory cardiac monitoring. This is a small recording device that is connected by wires to flat, sticky disks (electrodes) that are attached to your chest.  An echocardiogram. This is a test that uses sound waves to create pictures of your heart.  A transesophageal echocardiogram (CHARLES). In this test, a device is placed down your esophagus. This device then uses sounds waves to create even closer pictures of your heart.  Stress test. This test records your heartbeat while you exercise and checks to see if the heart muscle is receiving adequate blood supply.    How is this treated?  This condition may be treated with:    Medicines to:  Make your heart beat more slowly.  Keep your heart in normal rhythm.  Prevent a stroke.  Cardioversion. This uses medicines or an electrical shock to make the heart beat normally.  Ablation. This destroys the heart tissue that is causing the problem.    In some cases, your health care provider will treat other underlying conditions.    Follow these instructions at home:  Medicines    Take over-the-counter and prescription medicines only as told by your health care provider.  Make sure you take your medicines exactly as told by your health care provider.  Do not miss any doses.  Do not take any new medicines without talking to your health care provider.    Lifestyle    Eat heart-healthy foods. Talk with a dietitian to make an eating plan that is right for you.  Do not use any products that contain nicotine or tobacco, such as cigarettes and e-cigarettes. If you need help quitting, ask your health care provider.  Limit alcohol intake to no more than 1 drink per day for nonpregnant women and 2 drinks per day for men. One drink equals 12 oz of beer, 5 oz of wine, or 1½ oz of hard liquor.  Try to reduce any stress. Stress can make your symptoms worse.  Get screened for sleep apnea. If you have the condition, work with your health care provider to find a treatment that works for you.  Do not use drugs.  Avoid excessive caffeine.    General instructions    Lose weight if your health care provider tells you to do that.  Keep all follow-up visits as told by your health care provider. This is important.    Contact a health care provider if:  Your symptoms get worse.  You notice that your palpitations are increasing.    Get help right away if:  You have any symptoms of a stroke. "BE FAST" is an easy way to remember the main warning signs of a stroke:  B - Balance. Signs are dizziness, sudden trouble walking, or loss of balance.  E - Eyes. Signs are trouble seeing or a sudden change in vision.  F - Face. Signs are sudden weakness or numbness of the face, or the face or eyelid drooping on one side.  A - Arms. Signs are weakness or numbness in an arm. This happens suddenly and usually on one side of the body.  S - Speech. Signs are sudden trouble speaking, slurred speech, or trouble understanding what people say.  T - Time. Time to call emergency services. Write down what time symptoms started.  You have other signs of a stroke, such as:  A sudden, severe headache with no known cause.  Nausea or vomiting.  Seizure.  You have additional symptoms, such as:  Fainting.  Shortness of breath.  Pain or pressure in your chest.  Suddenly feeling nauseous or suddenly vomiting.  Increased sweating with no known cause.  These symptoms may represent a serious problem that is an emergency. Do not wait to see if the symptoms will go away. Get medical help right away. Call your local emergency services (911 in the [place] Do not drive yourself to the hospital.    Summary  Atrial flutter is an abnormal heart rhythm that can give you symptoms of palpitations, shortness of breath, or fatigue.   Atrial flutter is often treated with medicines to keep your heart in a normal rhythm and to prevent a stroke.  You should seek immediate help if you cannot catch your breath, have chest pain or pressure, or have weakness, especially on one side of your body. Atrial flutter is a type of abnormal heart rhythm (arrhythmia). The heart has an electrical system that tells the heart how to beat. In atrial flutter, the signals move rapidly in the top chambers of the heart (the atria). This makes your heart beat very fast. Atrial flutter can come and go, or it can be permanent.    If this condition is not treated it can cause serious complications, such as stroke or weakened heart muscle (cardiomyopathy).     What are the causes?  This condition may be caused by:    A heart condition or problem, such as:  A heart attack.  Heart failure.  A heart valve problem.  Heart surgery.  A lung problem, such as:  A blood clot in the lungs (pulmonary embolism, or PE).  Chronic obstructive pulmonary disease.  Poorly controlled high blood pressure (hypertension).  Overactive thyroid (hyperthyroidism).  Caffeine.  Some decongestant cold medicines.  Low levels of minerals called electrolytes in the blood.  Cocaine.    What increases the risk?  You are more likely to develop this condition if:    You are an elderly adult.  You are a man.  You are obese.  You have obstructive sleep apnea.  You have a family history of atrial flutter.  You have diabetes.    What are the signs or symptoms?  Symptoms of this condition include:    A feeling that your heart is pounding or racing (palpitations).  Shortness of breath.  Chest pain.  Feeling light-headed.  Dizziness.  Fainting.  Low blood pressure (hypotension).  Fatigue.    Sometimes there are no symptoms associated with arrhythmia.    How is this diagnosed?  This condition may be diagnosed based on:    An electrocardiogram (ECG). This is a test that records the electrical signals in the heart.  Ambulatory cardiac monitoring. This is a small recording device that is connected by wires to flat, sticky disks (electrodes) that are attached to your chest.  An echocardiogram. This is a test that uses sound waves to create pictures of your heart.  A transesophageal echocardiogram (CHARLES). In this test, a device is placed down your esophagus. This device then uses sounds waves to create even closer pictures of your heart.  Stress test. This test records your heartbeat while you exercise and checks to see if the heart muscle is receiving adequate blood supply.    How is this treated?  This condition may be treated with:    Medicines to:  Make your heart beat more slowly.  Keep your heart in normal rhythm.  Prevent a stroke.  Cardioversion. This uses medicines or an electrical shock to make the heart beat normally.  Ablation. This destroys the heart tissue that is causing the problem.    In some cases, your health care provider will treat other underlying conditions.    Follow these instructions at home:  Medicines    Take over-the-counter and prescription medicines only as told by your health care provider.  Make sure you take your medicines exactly as told by your health care provider.  Do not miss any doses.  Do not take any new medicines without talking to your health care provider.    Lifestyle    Eat heart-healthy foods. Talk with a dietitian to make an eating plan that is right for you.  Do not use any products that contain nicotine or tobacco, such as cigarettes and e-cigarettes. If you need help quitting, ask your health care provider.  Limit alcohol intake to no more than 1 drink per day for nonpregnant women and 2 drinks per day for men. One drink equals 12 oz of beer, 5 oz of wine, or 1½ oz of hard liquor.  Try to reduce any stress. Stress can make your symptoms worse.  Get screened for sleep apnea. If you have the condition, work with your health care provider to find a treatment that works for you.  Do not use drugs.  Avoid excessive caffeine.    General instructions    Lose weight if your health care provider tells you to do that.  Keep all follow-up visits as told by your health care provider. This is important.    Contact a health care provider if:  Your symptoms get worse.  You notice that your palpitations are increasing.    Get help right away if:  You have any symptoms of a stroke. "BE FAST" is an easy way to remember the main warning signs of a stroke:  B - Balance. Signs are dizziness, sudden trouble walking, or loss of balance.  E - Eyes. Signs are trouble seeing or a sudden change in vision.  F - Face. Signs are sudden weakness or numbness of the face, or the face or eyelid drooping on one side.  A - Arms. Signs are weakness or numbness in an arm. This happens suddenly and usually on one side of the body.  S - Speech. Signs are sudden trouble speaking, slurred speech, or trouble understanding what people say.  T - Time. Time to call emergency services. Write down what time symptoms started.  You have other signs of a stroke, such as:  A sudden, severe headache with no known cause.  Nausea or vomiting.  Seizure.  You have additional symptoms, such as:  Fainting.  Shortness of breath.  Pain or pressure in your chest.  Suddenly feeling nauseous or suddenly vomiting.  Increased sweating with no known cause.  These symptoms may represent a serious problem that is an emergency. Do not wait to see if the symptoms will go away. Get medical help right away. Call your local emergency services (911 in the [place] Do not drive yourself to the hospital.    Summary  Atrial flutter is an abnormal heart rhythm that can give you symptoms of palpitations, shortness of breath, or fatigue.   Atrial flutter is often treated with medicines to keep your heart in a normal rhythm and to prevent a stroke.  You should seek immediate help if you cannot catch your breath, have chest pain or pressure, or have weakness, especially on one side of your body.

## 2021-01-09 NOTE — ED PROVIDER NOTE - PATIENT PORTAL LINK FT
You can access the FollowMyHealth Patient Portal offered by [facility] by registering at the following website: [email or web address]. By joining [employer]’s FollowMyHealth portal, you will also be able to view your health information using other applications (apps) compatible with our system.

## 2021-01-09 NOTE — ED PROVIDER NOTE - CLINICAL SUMMARY MEDICAL DECISION MAKING FREE TEXT BOX
Patient, well-known to me due to frequent ED visits recently, presents with recurrent dyspnea & history of medication noncompliance.  Clinical presentation has not changed from previous ED visits.  He is hemodynamically stable.  He is in chronic atrial flutter  Adequate ED workup has already been completed recently, and patient has made it clear that he does NOT wish to be admitted.  He was given PRINTED hard copies of ALL of his prescribed medications, which he stated he would take to the pharmacy to get filled.  The patient lives alone and appears to be awake, alert, and oriented, and competent enough to make medical decisions.  I question whether or not the patient may actually have some degree of dementia which may need to be addressed at some point.  He was referred once again to Dr. Winter (cardiology).

## 2021-01-09 NOTE — ED PROVIDER NOTE - OBJECTIVE STATEMENT
The patient has a history of chronic A-fib, CHF, COPD/asthma, artificial valve replacement, HLD, HTN, and anxiety who presents once again for shortness of breath.  He has had multiple visits to the ED over the past several weeks, the last 3 for which I have been the ED provider.  I tried to offer admission after my first encounter with him, but he refused.  He returned the next day in AF with RVR and was started on a diltiazem drip, after which he finally agreed to be admitted/transferred to [facility].  However, he changed his mind and left the ED AMA once again.  Today, his breathing is not any worse and he denies any chest pain.  He is short of breath with exertion.  He still denies cough, fever, or chills.  He has a history of being noncompliant with his meds, and he was given refills (eSubmitted to his pharmacy) but he still hasn't filled them, citing that he could not afford them.  SW was consulted during one of his visits, and she verified that he has full Medicare coverage (along with supplement) which should cover ALL of his prescriptions, so it is unclear why he isn't getting them filled.  In addition to his cardiac meds and antihypertensive agent, he is supposed to be on a blood thinner (possibly a NOAC), but he has been off this for almost 2 months.  He has been informed about the risks of medication noncompliance during his previous visits, including MI, CHF, stroke, PE, hypertensive emergency, etc.

## 2021-01-09 NOTE — ED PROVIDER NOTE - CARE PLAN
Principal Discharge DX:	Atrial flutter, chronic  Secondary Diagnosis:	Noncompliance with medication regimen  Secondary Diagnosis:	Emphysema/COPD  Secondary Diagnosis:	HTN (hypertension)  Secondary Diagnosis:	Anxiety

## 2021-01-09 NOTE — ED ADULT NURSE NOTE - ED CARDIAC CAPILLARY REFILL
2 seconds or less Alert and oriented to person, place and time, memory intact, behavior appropriate to situation, PERRL.

## 2021-01-12 ENCOUNTER — EMERGENCY (EMERGENCY)
Facility: HOSPITAL | Age: 71
LOS: 1 days | Discharge: HOME | End: 2021-01-12
Attending: EMERGENCY MEDICINE | Admitting: EMERGENCY MEDICINE
Payer: MEDICARE

## 2021-01-12 VITALS
RESPIRATION RATE: 24 BRPM | HEIGHT: 66 IN | DIASTOLIC BLOOD PRESSURE: 88 MMHG | TEMPERATURE: 98 F | OXYGEN SATURATION: 99 % | SYSTOLIC BLOOD PRESSURE: 146 MMHG | WEIGHT: 160.06 LBS | HEART RATE: 91 BPM

## 2021-01-12 VITALS
RESPIRATION RATE: 20 BRPM | HEART RATE: 90 BPM | SYSTOLIC BLOOD PRESSURE: 140 MMHG | OXYGEN SATURATION: 99 % | DIASTOLIC BLOOD PRESSURE: 82 MMHG | TEMPERATURE: 98 F

## 2021-01-12 DIAGNOSIS — I10 ESSENTIAL (PRIMARY) HYPERTENSION: ICD-10-CM

## 2021-01-12 DIAGNOSIS — Z95.1 PRESENCE OF AORTOCORONARY BYPASS GRAFT: Chronic | ICD-10-CM

## 2021-01-12 DIAGNOSIS — E78.5 HYPERLIPIDEMIA, UNSPECIFIED: ICD-10-CM

## 2021-01-12 DIAGNOSIS — Z79.899 OTHER LONG TERM (CURRENT) DRUG THERAPY: ICD-10-CM

## 2021-01-12 DIAGNOSIS — Z88.8 ALLERGY STATUS TO OTHER DRUGS, MEDICAMENTS AND BIOLOGICAL SUBSTANCES STATUS: ICD-10-CM

## 2021-01-12 DIAGNOSIS — R06.00 DYSPNEA, UNSPECIFIED: ICD-10-CM

## 2021-01-12 LAB
ALBUMIN SERPL ELPH-MCNC: 3.4 G/DL — SIGNIFICANT CHANGE UP (ref 3.4–5)
ALP SERPL-CCNC: 52 U/L — SIGNIFICANT CHANGE UP (ref 40–120)
ALT FLD-CCNC: 34 U/L — SIGNIFICANT CHANGE UP (ref 12–42)
ANION GAP SERPL CALC-SCNC: 0 MMOL/L — LOW (ref 9–16)
APTT BLD: 26.7 SEC — LOW (ref 27.5–35.5)
AST SERPL-CCNC: 69 U/L — HIGH (ref 15–37)
BASOPHILS # BLD AUTO: 0.03 K/UL — SIGNIFICANT CHANGE UP (ref 0–0.2)
BASOPHILS NFR BLD AUTO: 0.3 % — SIGNIFICANT CHANGE UP (ref 0–2)
BILIRUB SERPL-MCNC: 0.6 MG/DL — SIGNIFICANT CHANGE UP (ref 0.2–1.2)
BUN SERPL-MCNC: 27 MG/DL — HIGH (ref 7–23)
CALCIUM SERPL-MCNC: 8.8 MG/DL — SIGNIFICANT CHANGE UP (ref 8.5–10.5)
CHLORIDE SERPL-SCNC: 102 MMOL/L — SIGNIFICANT CHANGE UP (ref 96–108)
CO2 SERPL-SCNC: 30 MMOL/L — SIGNIFICANT CHANGE UP (ref 22–31)
CREAT SERPL-MCNC: 1.13 MG/DL — SIGNIFICANT CHANGE UP (ref 0.5–1.3)
EOSINOPHIL # BLD AUTO: 0.19 K/UL — SIGNIFICANT CHANGE UP (ref 0–0.5)
EOSINOPHIL NFR BLD AUTO: 1.6 % — SIGNIFICANT CHANGE UP (ref 0–6)
GLUCOSE SERPL-MCNC: 98 MG/DL — SIGNIFICANT CHANGE UP (ref 70–99)
HCT VFR BLD CALC: 44.2 % — SIGNIFICANT CHANGE UP (ref 39–50)
HGB BLD-MCNC: 14.4 G/DL — SIGNIFICANT CHANGE UP (ref 13–17)
IMM GRANULOCYTES NFR BLD AUTO: 0.9 % — SIGNIFICANT CHANGE UP (ref 0–1.5)
INR BLD: 1.07 — SIGNIFICANT CHANGE UP (ref 0.88–1.16)
LYMPHOCYTES # BLD AUTO: 17.8 % — SIGNIFICANT CHANGE UP (ref 13–44)
LYMPHOCYTES # BLD AUTO: 2.06 K/UL — SIGNIFICANT CHANGE UP (ref 1–3.3)
MCHC RBC-ENTMCNC: 30 PG — SIGNIFICANT CHANGE UP (ref 27–34)
MCHC RBC-ENTMCNC: 32.6 GM/DL — SIGNIFICANT CHANGE UP (ref 32–36)
MCV RBC AUTO: 92.1 FL — SIGNIFICANT CHANGE UP (ref 80–100)
MONOCYTES # BLD AUTO: 1.42 K/UL — HIGH (ref 0–0.9)
MONOCYTES NFR BLD AUTO: 12.3 % — SIGNIFICANT CHANGE UP (ref 2–14)
NEUTROPHILS # BLD AUTO: 7.75 K/UL — HIGH (ref 1.8–7.4)
NEUTROPHILS NFR BLD AUTO: 67.1 % — SIGNIFICANT CHANGE UP (ref 43–77)
NRBC # BLD: 0 /100 WBCS — SIGNIFICANT CHANGE UP (ref 0–0)
NT-PROBNP SERPL-SCNC: 2306 PG/ML — HIGH
PCO2 BLDV: 47 MMHG — SIGNIFICANT CHANGE UP (ref 41–51)
PH BLDV: 7.43 — SIGNIFICANT CHANGE UP (ref 7.32–7.43)
PLATELET # BLD AUTO: 195 K/UL — SIGNIFICANT CHANGE UP (ref 150–400)
PO2 BLDV: 50 MMHG — HIGH (ref 35–40)
POTASSIUM SERPL-MCNC: SIGNIFICANT CHANGE UP MMOL/L (ref 3.5–5.3)
POTASSIUM SERPL-SCNC: SIGNIFICANT CHANGE UP MMOL/L (ref 3.5–5.3)
PROT SERPL-MCNC: 6.5 G/DL — SIGNIFICANT CHANGE UP (ref 6.4–8.2)
PROTHROM AB SERPL-ACNC: 12.8 SEC — SIGNIFICANT CHANGE UP (ref 10.6–13.6)
RBC # BLD: 4.8 M/UL — SIGNIFICANT CHANGE UP (ref 4.2–5.8)
RBC # FLD: 13.8 % — SIGNIFICANT CHANGE UP (ref 10.3–14.5)
SAO2 % BLDV: 87 % — SIGNIFICANT CHANGE UP
SODIUM SERPL-SCNC: 132 MMOL/L — SIGNIFICANT CHANGE UP (ref 132–145)
TROPONIN I SERPL-MCNC: <0.017 NG/ML — LOW (ref 0.02–0.06)
TSH SERPL-MCNC: 1.13 UIU/ML — SIGNIFICANT CHANGE UP (ref 0.36–3.74)
WBC # BLD: 11.55 K/UL — HIGH (ref 3.8–10.5)
WBC # FLD AUTO: 11.55 K/UL — HIGH (ref 3.8–10.5)

## 2021-01-12 PROCEDURE — 71045 X-RAY EXAM CHEST 1 VIEW: CPT | Mod: 26

## 2021-01-12 PROCEDURE — 99285 EMERGENCY DEPT VISIT HI MDM: CPT | Mod: 25

## 2021-01-12 PROCEDURE — 93010 ELECTROCARDIOGRAM REPORT: CPT

## 2021-01-12 RX ORDER — DEXAMETHASONE 0.5 MG/5ML
4 ELIXIR ORAL ONCE
Refills: 0 | Status: COMPLETED | OUTPATIENT
Start: 2021-01-12 | End: 2021-01-12

## 2021-01-12 RX ORDER — IPRATROPIUM/ALBUTEROL SULFATE 18-103MCG
3 AEROSOL WITH ADAPTER (GRAM) INHALATION ONCE
Refills: 0 | Status: COMPLETED | OUTPATIENT
Start: 2021-01-12 | End: 2021-01-12

## 2021-01-12 RX ORDER — FUROSEMIDE 40 MG
20 TABLET ORAL ONCE
Refills: 0 | Status: COMPLETED | OUTPATIENT
Start: 2021-01-12 | End: 2021-01-12

## 2021-01-12 RX ADMIN — Medication 3 MILLILITER(S): at 13:14

## 2021-01-12 RX ADMIN — Medication 20 MILLIGRAM(S): at 13:10

## 2021-01-12 RX ADMIN — Medication 4 MILLIGRAM(S): at 13:11

## 2021-01-12 NOTE — ED ADULT TRIAGE NOTE - CCCP TRG CHIEF CMPLNT
Principal Discharge DX:	Term birth of female   Assessment and plan of treatment:	- Follow-up with your pediatrician within 48 hours of discharge.     Routine Home Care Instructions:  - Please call us for help if you feel sad, blue or overwhelmed for more than a few days after discharge  - Umbilical cord care:        - Please keep your baby's cord clean and dry (do not apply alcohol)        - Please keep your baby's diaper below the umbilical cord until it has fallen off (~10-14 days)        - Please do not submerge your baby in a bath until the cord has fallen off (sponge bath instead)    - Continue feeding child at least every 3 hours, wake baby to feed if needed.     Please contact your pediatrician and return to the hospital if you notice any of the following:   - Fever  (T > 100.4)  - Reduced amount of wet diapers (< 5-6 per day) or no wet diaper in 12 hours  - Increased fussiness, irritability, or crying inconsolably  - Lethargy (excessively sleepy, difficult to arouse)  - Breathing difficulties (noisy breathing, breathing fast, using belly and neck muscles to breath)  - Changes in the baby’s color (yellow, blue, pale, gray)  - Seizure or loss of consciousness   shortness of breath Principal Discharge DX:	Term birth of female   Goal:	Well   Assessment and plan of treatment:	- Follow-up with your pediatrician within 48 hours of discharge.     Routine Home Care Instructions:  - Please call us for help if you feel sad, blue or overwhelmed for more than a few days after discharge  - Umbilical cord care:        - Please keep your baby's cord clean and dry (do not apply alcohol)        - Please keep your baby's diaper below the umbilical cord until it has fallen off (~10-14 days)        - Please do not submerge your baby in a bath until the cord has fallen off (sponge bath instead)    - Continue feeding child at least every 3 hours, wake baby to feed if needed.     Please contact your pediatrician and return to the hospital if you notice any of the following:   - Fever  (T > 100.4)  - Reduced amount of wet diapers (< 5-6 per day) or no wet diaper in 12 hours  - Increased fussiness, irritability, or crying inconsolably  - Lethargy (excessively sleepy, difficult to arouse)  - Breathing difficulties (noisy breathing, breathing fast, using belly and neck muscles to breath)  - Changes in the baby’s color (yellow, blue, pale, gray)  - Seizure or loss of consciousness

## 2021-01-12 NOTE — ED PROVIDER NOTE - PROGRESS NOTE DETAILS
pt felt better w dose of diuretic and bronchodilator. Spoke to SM who states his medication should be co-pay free since he has medicaid. Explained this to pt and informed him that he has scripts waiting for him in the pharmacy from his visit 3 days ago. Pt requested to be discharged as he was feeling better

## 2021-01-12 NOTE — ED PROVIDER NOTE - PATIENT PORTAL LINK FT
You can access the FollowMyHealth Patient Portal offered by [facility] by registering at the following website: [email or web address]. By joining [employer]’s FollowMyHealth portal, you will also be able to view your health information using other applications (apps) compatible with our system.

## 2021-01-12 NOTE — ED ADULT NURSE NOTE - ISOLATION TYPE:
Pre-Op call completed. Medications list reviewed and updated as needed.  Instructed patient to hold all medications am of surgery .      Patient instructed to arrive for surgery at 1015 on 10-16-19 at [facility].       Pre-op instructions reviewed, verbalized understanding.  Questions answered.  Call back number of [phone] given in case other concerns or questions arise.    Patient instructed to bring in current list of medications (name of medication, dose and frequency of daily use) day of surgery.     Patient instructed to call Pre Admission Testing Nurses if patient develops any fever/ chills or symptoms of general illness prior to surgery.           Pre-op Teaching Completed:    OR - Procedure, OR - Time and time of arrival, Location of Registration, NPO, Medications to take Day of Surgery, Skin Prep, Equipment to bring day of surgery and Discharge Policy: Ride/Responsibile Adult    
None

## 2021-01-12 NOTE — ED PROVIDER NOTE - OBJECTIVE STATEMENT
69 yo M w/ PMHx of A-fib, anxiety, daily smoker with known COPD/emphysema/asthma, HLD, HTN, known medical non-compliance (reports that he's taken no meds in weeks 2/2 inability to pay). most hx obtained from prior charts as pt is not clear about his diagnoses and treatment. Presents w worsening sob for 2 days, worse w exertion. no cough, no fever, no chills, no abd pain, no nausea, no vomiting. Of note pt has been to our ED several times in the last week and offered admission recently and pt signed out AMA. Pt states he has been unable to get his meds due to inability to pay for them.

## 2021-01-12 NOTE — ED PROVIDER NOTE - CLINICAL SUMMARY MEDICAL DECISION MAKING FREE TEXT BOX
Pt w undifferentiated dyspnea, has hx of both copd and cad/chf as per prior documentation. Will get labs, CXR, do a trial of diuretic and bronchodilator. Will reassess.

## 2021-01-12 NOTE — ED ADULT NURSE NOTE - OBJECTIVE STATEMENT
Pt. presents with SOB. Pt. states he can't walk too far without feeling SOB. Pt. is speaking in full sentences with no use of accessory muscle or cough. SPO2 % on RA. Place on monitor NSR in the 90s. Pt. also states he has not been able to take his medication

## 2021-01-15 ENCOUNTER — EMERGENCY (EMERGENCY)
Facility: HOSPITAL | Age: 71
LOS: 1 days | Discharge: LEFT BEFORE DISCHARGE | End: 2021-01-15
Admitting: EMERGENCY MEDICINE
Payer: MEDICARE

## 2021-01-15 VITALS
HEART RATE: 105 BPM | WEIGHT: 179.9 LBS | OXYGEN SATURATION: 98 % | TEMPERATURE: 99 F | SYSTOLIC BLOOD PRESSURE: 175 MMHG | HEIGHT: 66 IN | RESPIRATION RATE: 18 BRPM | DIASTOLIC BLOOD PRESSURE: 88 MMHG

## 2021-01-15 DIAGNOSIS — Z79.52 LONG TERM (CURRENT) USE OF SYSTEMIC STEROIDS: ICD-10-CM

## 2021-01-15 DIAGNOSIS — Z53.21 PROCEDURE AND TREATMENT NOT CARRIED OUT DUE TO PATIENT LEAVING PRIOR TO BEING SEEN BY HEALTH CARE PROVIDER: ICD-10-CM

## 2021-01-15 DIAGNOSIS — Z79.51 LONG TERM (CURRENT) USE OF INHALED STEROIDS: ICD-10-CM

## 2021-01-15 DIAGNOSIS — Z79.01 LONG TERM (CURRENT) USE OF ANTICOAGULANTS: ICD-10-CM

## 2021-01-15 DIAGNOSIS — Z79.891 LONG TERM (CURRENT) USE OF OPIATE ANALGESIC: ICD-10-CM

## 2021-01-15 DIAGNOSIS — Z88.8 ALLERGY STATUS TO OTHER DRUGS, MEDICAMENTS AND BIOLOGICAL SUBSTANCES: ICD-10-CM

## 2021-01-15 DIAGNOSIS — Z95.1 PRESENCE OF AORTOCORONARY BYPASS GRAFT: Chronic | ICD-10-CM

## 2021-01-15 DIAGNOSIS — Z79.82 LONG TERM (CURRENT) USE OF ASPIRIN: ICD-10-CM

## 2021-01-15 PROCEDURE — L9991: CPT

## 2021-01-15 NOTE — ED ADULT TRIAGE NOTE - CHIEF COMPLAINT QUOTE
Patient to ED complaining of SOB stating "I have water on my lungs".  Seen in ED numerous times for same complaint

## 2021-01-19 ENCOUNTER — EMERGENCY (EMERGENCY)
Facility: HOSPITAL | Age: 71
LOS: 1 days | Discharge: HOME | End: 2021-01-19
Attending: EMERGENCY MEDICINE | Admitting: EMERGENCY MEDICINE
Payer: MEDICARE

## 2021-01-19 VITALS
HEART RATE: 95 BPM | RESPIRATION RATE: 18 BRPM | OXYGEN SATURATION: 98 % | DIASTOLIC BLOOD PRESSURE: 81 MMHG | TEMPERATURE: 99 F | SYSTOLIC BLOOD PRESSURE: 136 MMHG | HEIGHT: 66 IN

## 2021-01-19 DIAGNOSIS — Z95.1 PRESENCE OF AORTOCORONARY BYPASS GRAFT: Chronic | ICD-10-CM

## 2021-01-19 DIAGNOSIS — Z79.899 OTHER LONG TERM (CURRENT) DRUG THERAPY: ICD-10-CM

## 2021-01-19 DIAGNOSIS — R05 COUGH: ICD-10-CM

## 2021-01-19 DIAGNOSIS — F17.200 NICOTINE DEPENDENCE, UNSPECIFIED, UNCOMPLICATED: ICD-10-CM

## 2021-01-19 DIAGNOSIS — R06.02 SHORTNESS OF BREATH: ICD-10-CM

## 2021-01-19 DIAGNOSIS — Z88.6 ALLERGY STATUS TO ANALGESIC AGENT: ICD-10-CM

## 2021-01-19 DIAGNOSIS — Z79.52 LONG TERM (CURRENT) USE OF SYSTEMIC STEROIDS: ICD-10-CM

## 2021-01-19 DIAGNOSIS — J44.9 CHRONIC OBSTRUCTIVE PULMONARY DISEASE, UNSPECIFIED: ICD-10-CM

## 2021-01-19 DIAGNOSIS — Z79.82 LONG TERM (CURRENT) USE OF ASPIRIN: ICD-10-CM

## 2021-01-19 DIAGNOSIS — E78.5 HYPERLIPIDEMIA, UNSPECIFIED: ICD-10-CM

## 2021-01-19 DIAGNOSIS — I10 ESSENTIAL (PRIMARY) HYPERTENSION: ICD-10-CM

## 2021-01-19 PROCEDURE — 93010 ELECTROCARDIOGRAM REPORT: CPT

## 2021-01-19 PROCEDURE — 99284 EMERGENCY DEPT VISIT MOD MDM: CPT

## 2021-01-19 NOTE — ED ADULT TRIAGE NOTE - CHIEF COMPLAINT QUOTE
Pt regular attender recently stating the same thing " I have water on my lungs" pt speaking in full sentences, whilst sitting at triage, nil obvious difficulty in breathing until he was assessed where he began to make groaning sounds , gcs 15 well perfused

## 2021-01-20 RX ORDER — FUROSEMIDE 40 MG
40 TABLET ORAL ONCE
Refills: 0 | Status: COMPLETED | OUTPATIENT
Start: 2021-01-20 | End: 2021-01-20

## 2021-01-20 RX ADMIN — Medication 40 MILLIGRAM(S): at 00:25

## 2021-01-20 NOTE — ED ADULT NURSE NOTE - OBJECTIVE STATEMENT
Pt presents to ED c/o SOB. Pt well known to ED for same complaint and usually AMA or elopes. Upon assessment pt attempted to leave, only agrees to IM medications.

## 2021-01-20 NOTE — ED PROVIDER NOTE - RESPIRATORY, MLM
Breath sounds clear and equal bilaterally.  Normal WOB, no wheezing, no tachypnea or accessory muscle use.

## 2021-01-20 NOTE — ED PROVIDER NOTE - PATIENT PORTAL LINK FT
You can access the FollowMyHealth Patient Portal offered by [facility] by registering at the following website: [email or web address]. By joining [employer]’s FollowMyHealth portal, you will also be able to view your health information using other applications (apps) compatible with our system.

## 2021-01-20 NOTE — ED ADULT NURSE NOTE - TEMPLATE LIST FOR HEAD TO TOE ASSESSMENT
Chart was reviewed for overdue Proactive [facility] Encounters (ROMERO)  topics  Updates were requested from care everywhere  Health Maintenance has been updated        
VIEW ALL

## 2021-01-20 NOTE — ED PROVIDER NOTE - OBJECTIVE STATEMENT
69 yo M w/ PMHx of A-fib, anxiety, daily smoker with known COPD/emphysema/asthma, HLD, HTN, known medical non-compliance (reports that he's taken no meds in weeks 2/2 inability to pay). most hx obtained from prior charts as pt is not clear about his diagnoses and treatment. Presents w worsening sob for 2 days, worse w exertion. no cough, no fever, no chills, no abd pain, no nausea, no vomiting. Of note pt has been to our ED 6 times this month and offered admission recently and pt signed out AMA. Pt states he has been unable to get his meds due to inability to pay for them.

## 2021-01-20 NOTE — ED PROVIDER NOTE - CLINICAL SUMMARY MEDICAL DECISION MAKING FREE TEXT BOX
71 yo M w/ PMHx of A-fib, anxiety, daily smoker with known COPD/emphysema/asthma, HLD, HTN, known medical non-compliance (reports that he's taken no meds in weeks 2/2 inability to pay). most hx obtained from prior charts as pt is not clear about his diagnoses and treatment. Presents w worsening sob for 2 days, worse w exertion. no cough, no fever, no chills, no abd pain, no nausea, no vomiting. Of note pt has been to our ED several times in the last week and offered admission recently and pt signed out AMA.  Shortly after evaluation patient is requesting to leave.  Vitals reviewed.  EKG reviewed.  Will discharge from the ED.

## 2021-02-08 ENCOUNTER — EMERGENCY (EMERGENCY)
Facility: HOSPITAL | Age: 71
LOS: 1 days | Discharge: HOME | End: 2021-02-08
Attending: EMERGENCY MEDICINE | Admitting: EMERGENCY MEDICINE
Payer: MEDICAID

## 2021-02-08 VITALS
OXYGEN SATURATION: 99 % | HEART RATE: 88 BPM | HEIGHT: 66 IN | SYSTOLIC BLOOD PRESSURE: 129 MMHG | RESPIRATION RATE: 16 BRPM | TEMPERATURE: 98 F | WEIGHT: 160.06 LBS | DIASTOLIC BLOOD PRESSURE: 90 MMHG

## 2021-02-08 DIAGNOSIS — R07.89 OTHER CHEST PAIN: ICD-10-CM

## 2021-02-08 DIAGNOSIS — Z95.1 PRESENCE OF AORTOCORONARY BYPASS GRAFT: Chronic | ICD-10-CM

## 2021-02-08 PROCEDURE — L9991: CPT

## 2021-02-08 NOTE — ED ADULT TRIAGE NOTE - CHIEF COMPLAINT QUOTE
Walk in with c/o intermittent CP with SOB on exertion chronic in nature, well known to ED for similar visits. Pt dc from BI yesterday for same.

## 2021-02-08 NOTE — ED ADULT TRIAGE NOTE - NSTRIAGECARE_GEN_A_ER
BIBA- Back pain since 6am unable to get better with pain management. HX back surgery April 2019.   Transfer paperwork states bump in spinal area, sent for CT/MRI of the spine R/O osteomyelitis, obstruction or abscess
Face Mask/EKG

## 2021-02-08 NOTE — ED ADULT NURSE NOTE - COVID-19 RESULT
NEGATIVE
Quality 265: Biopsy Follow-Up: Biopsy results reviewed, communicated, tracked, and documented
Detail Level: Simple

## 2021-03-05 NOTE — ED ADULT NURSE NOTE - HOW OFTEN DO YOU HAVE A DRINK CONTAINING ALCOHOL?
Group Topic: BH Process Group     Date: 3/5/2021  Start Time: 1030  End Time: 1115  Facilitators: KYLE Espinoza    Focus: Process  Number in attendance: 5  Patients were given the opportunity to share questions and concerns with the group. Then they provided feedback as appropriate to each other.       Method: Group  Attendance: Present  Participation: Active  Patient Response: Appropriate feedback, Attentive and Interactive  Mood: Depressed  Affect: Type: Depressed  Behavior/Socialization: Appropriate to group, Cooperative and Engaged  Thought Process: Tracking  Task Performance: Follows directions  Patient Evaluation: Independent - full participation   Pt engaged well with the group discussion and provided appropriate feedback. There were no behavioral concerns.  KYLE Serrano, LUIS CARLOS       Never

## 2021-03-10 ENCOUNTER — EMERGENCY (EMERGENCY)
Facility: HOSPITAL | Age: 71
LOS: 1 days | Discharge: HOME | End: 2021-03-10
Attending: EMERGENCY MEDICINE | Admitting: EMERGENCY MEDICINE
Payer: MEDICARE

## 2021-03-10 ENCOUNTER — EMERGENCY (EMERGENCY)
Facility: HOSPITAL | Age: 71
LOS: 1 days | Discharge: LEFT BEFORE DISCHARGE | End: 2021-03-10
Admitting: EMERGENCY MEDICINE
Payer: MEDICARE

## 2021-03-10 VITALS
RESPIRATION RATE: 24 BRPM | DIASTOLIC BLOOD PRESSURE: 77 MMHG | HEIGHT: 66 IN | HEART RATE: 107 BPM | OXYGEN SATURATION: 97 % | SYSTOLIC BLOOD PRESSURE: 135 MMHG | TEMPERATURE: 98 F | WEIGHT: 169.98 LBS

## 2021-03-10 VITALS
WEIGHT: 149.91 LBS | HEIGHT: 66 IN | DIASTOLIC BLOOD PRESSURE: 80 MMHG | SYSTOLIC BLOOD PRESSURE: 125 MMHG | OXYGEN SATURATION: 97 % | HEART RATE: 103 BPM | TEMPERATURE: 97 F | RESPIRATION RATE: 16 BRPM

## 2021-03-10 DIAGNOSIS — R07.9 CHEST PAIN, UNSPECIFIED: ICD-10-CM

## 2021-03-10 DIAGNOSIS — R06.02 SHORTNESS OF BREATH: ICD-10-CM

## 2021-03-10 DIAGNOSIS — J44.1 CHRONIC OBSTRUCTIVE PULMONARY DISEASE WITH (ACUTE) EXACERBATION: ICD-10-CM

## 2021-03-10 DIAGNOSIS — Z95.1 PRESENCE OF AORTOCORONARY BYPASS GRAFT: Chronic | ICD-10-CM

## 2021-03-10 LAB
ALBUMIN SERPL ELPH-MCNC: 3.5 G/DL — SIGNIFICANT CHANGE UP (ref 3.4–5)
ALP SERPL-CCNC: 53 U/L — SIGNIFICANT CHANGE UP (ref 40–120)
ALT FLD-CCNC: 19 U/L — SIGNIFICANT CHANGE UP (ref 12–42)
ANION GAP SERPL CALC-SCNC: 3 MMOL/L — LOW (ref 9–16)
AST SERPL-CCNC: 24 U/L — SIGNIFICANT CHANGE UP (ref 15–37)
BASOPHILS # BLD AUTO: 0.03 K/UL — SIGNIFICANT CHANGE UP (ref 0–0.2)
BASOPHILS NFR BLD AUTO: 0.4 % — SIGNIFICANT CHANGE UP (ref 0–2)
BILIRUB SERPL-MCNC: 0.7 MG/DL — SIGNIFICANT CHANGE UP (ref 0.2–1.2)
BUN SERPL-MCNC: 18 MG/DL — SIGNIFICANT CHANGE UP (ref 7–23)
CALCIUM SERPL-MCNC: 9.1 MG/DL — SIGNIFICANT CHANGE UP (ref 8.5–10.5)
CHLORIDE SERPL-SCNC: 104 MMOL/L — SIGNIFICANT CHANGE UP (ref 96–108)
CO2 SERPL-SCNC: 33 MMOL/L — HIGH (ref 22–31)
CREAT SERPL-MCNC: 1.25 MG/DL — SIGNIFICANT CHANGE UP (ref 0.5–1.3)
EOSINOPHIL # BLD AUTO: 0.16 K/UL — SIGNIFICANT CHANGE UP (ref 0–0.5)
EOSINOPHIL NFR BLD AUTO: 2.3 % — SIGNIFICANT CHANGE UP (ref 0–6)
FLUAV H1 2009 PAND RNA SPEC QL NAA+PROBE: SIGNIFICANT CHANGE UP
FLUAV H1 RNA SPEC QL NAA+PROBE: SIGNIFICANT CHANGE UP
FLUAV H3 RNA SPEC QL NAA+PROBE: SIGNIFICANT CHANGE UP
FLUAV SUBTYP SPEC NAA+PROBE: SIGNIFICANT CHANGE UP
FLUBV RNA SPEC QL NAA+PROBE: SIGNIFICANT CHANGE UP
GLUCOSE SERPL-MCNC: 80 MG/DL — SIGNIFICANT CHANGE UP (ref 70–99)
HCT VFR BLD CALC: 47.6 % — SIGNIFICANT CHANGE UP (ref 39–50)
HGB BLD-MCNC: 15.6 G/DL — SIGNIFICANT CHANGE UP (ref 13–17)
IMM GRANULOCYTES NFR BLD AUTO: 0.4 % — SIGNIFICANT CHANGE UP (ref 0–1.5)
LYMPHOCYTES # BLD AUTO: 1.64 K/UL — SIGNIFICANT CHANGE UP (ref 1–3.3)
LYMPHOCYTES # BLD AUTO: 23.3 % — SIGNIFICANT CHANGE UP (ref 13–44)
MAGNESIUM SERPL-MCNC: 1.9 MG/DL — SIGNIFICANT CHANGE UP (ref 1.6–2.6)
MCHC RBC-ENTMCNC: 30.9 PG — SIGNIFICANT CHANGE UP (ref 27–34)
MCHC RBC-ENTMCNC: 32.8 GM/DL — SIGNIFICANT CHANGE UP (ref 32–36)
MCV RBC AUTO: 94.3 FL — SIGNIFICANT CHANGE UP (ref 80–100)
MONOCYTES # BLD AUTO: 0.83 K/UL — SIGNIFICANT CHANGE UP (ref 0–0.9)
MONOCYTES NFR BLD AUTO: 11.8 % — SIGNIFICANT CHANGE UP (ref 2–14)
NEUTROPHILS # BLD AUTO: 4.35 K/UL — SIGNIFICANT CHANGE UP (ref 1.8–7.4)
NEUTROPHILS NFR BLD AUTO: 61.8 % — SIGNIFICANT CHANGE UP (ref 43–77)
NRBC # BLD: 0 /100 WBCS — SIGNIFICANT CHANGE UP (ref 0–0)
NT-PROBNP SERPL-SCNC: 1598 PG/ML — HIGH
PCO2 BLDV: 60 MMHG — HIGH (ref 41–51)
PH BLDV: 7.37 — SIGNIFICANT CHANGE UP (ref 7.32–7.43)
PLATELET # BLD AUTO: 178 K/UL — SIGNIFICANT CHANGE UP (ref 150–400)
PO2 BLDV: 23 MMHG — LOW (ref 35–40)
POTASSIUM SERPL-MCNC: 4.7 MMOL/L — SIGNIFICANT CHANGE UP (ref 3.5–5.3)
POTASSIUM SERPL-SCNC: 4.7 MMOL/L — SIGNIFICANT CHANGE UP (ref 3.5–5.3)
PROT SERPL-MCNC: 6.8 G/DL — SIGNIFICANT CHANGE UP (ref 6.4–8.2)
RAPID RVP RESULT: SIGNIFICANT CHANGE UP
RBC # BLD: 5.05 M/UL — SIGNIFICANT CHANGE UP (ref 4.2–5.8)
RBC # FLD: 13.1 % — SIGNIFICANT CHANGE UP (ref 10.3–14.5)
SAO2 % BLDV: 41 % — SIGNIFICANT CHANGE UP
SARS-COV-2 RNA SPEC QL NAA+PROBE: SIGNIFICANT CHANGE UP
SODIUM SERPL-SCNC: 140 MMOL/L — SIGNIFICANT CHANGE UP (ref 132–145)
TROPONIN I SERPL-MCNC: <0.017 NG/ML — LOW (ref 0.02–0.06)
TSH SERPL-MCNC: 1.19 UIU/ML — SIGNIFICANT CHANGE UP (ref 0.36–3.74)
WBC # BLD: 7.04 K/UL — SIGNIFICANT CHANGE UP (ref 3.8–10.5)
WBC # FLD AUTO: 7.04 K/UL — SIGNIFICANT CHANGE UP (ref 3.8–10.5)

## 2021-03-10 PROCEDURE — L9991: CPT

## 2021-03-10 PROCEDURE — 99285 EMERGENCY DEPT VISIT HI MDM: CPT

## 2021-03-10 PROCEDURE — 93010 ELECTROCARDIOGRAM REPORT: CPT

## 2021-03-10 RX ORDER — AZITHROMYCIN 500 MG/1
500 TABLET, FILM COATED ORAL ONCE
Refills: 0 | Status: DISCONTINUED | OUTPATIENT
Start: 2021-03-10 | End: 2021-03-10

## 2021-03-10 RX ORDER — MAGNESIUM SULFATE 500 MG/ML
2 VIAL (ML) INJECTION ONCE
Refills: 0 | Status: COMPLETED | OUTPATIENT
Start: 2021-03-10 | End: 2021-03-10

## 2021-03-10 RX ORDER — IPRATROPIUM/ALBUTEROL SULFATE 18-103MCG
3 AEROSOL WITH ADAPTER (GRAM) INHALATION ONCE
Refills: 0 | Status: COMPLETED | OUTPATIENT
Start: 2021-03-10 | End: 2021-03-10

## 2021-03-10 RX ORDER — AZITHROMYCIN 500 MG/1
500 TABLET, FILM COATED ORAL ONCE
Refills: 0 | Status: COMPLETED | OUTPATIENT
Start: 2021-03-10 | End: 2021-03-10

## 2021-03-10 RX ORDER — DEXAMETHASONE 0.5 MG/5ML
6 ELIXIR ORAL ONCE
Refills: 0 | Status: COMPLETED | OUTPATIENT
Start: 2021-03-10 | End: 2021-03-10

## 2021-03-10 RX ADMIN — Medication 6 MILLIGRAM(S): at 13:17

## 2021-03-10 RX ADMIN — Medication 50 GRAM(S): at 13:17

## 2021-03-10 RX ADMIN — Medication 3 MILLILITER(S): at 13:17

## 2021-03-10 RX ADMIN — AZITHROMYCIN 255 MILLIGRAM(S): 500 TABLET, FILM COATED ORAL at 13:17

## 2021-03-10 NOTE — ED ADULT NURSE REASSESSMENT NOTE - NS ED NURSE REASSESS COMMENT FT1
Pt leaving ER AMA,  work flow followed. Counseled pt on risk. consequences including death and disability. Pt verbalized understanding, form signed. Pt left against medical advice.

## 2021-03-10 NOTE — ED ADULT TRIAGE NOTE - CHIEF COMPLAINT QUOTE
difficulty breathing- over the past month, hx of "lung problem" pursed lipped breathing appreciated in triage, admits to smoking daily, POON

## 2021-03-10 NOTE — ED ADULT NURSE NOTE - OBJECTIVE STATEMENT
H/o  Atrial Fibrillation, anxiety, COPD (not O2 dependent), emphysema, HLD, and HTN. , Presents to ED for worsening SOB and productive cough with yellow phlegm. Denies fever, chills, chest pain, abdominal pain, N/V. As per MD pt with a h/o AMA and noncompliance.

## 2021-03-10 NOTE — ED PROVIDER NOTE - OBJECTIVE STATEMENT
69 y/o male with PMHx of Atrial Fibrillation, anxiety, COPD (not O2 dependent), emphysema, HLD, and HTN, with medical noncompliance as he doesn't have money for them, and daily smoker. Patient is known to our ED for coming in and getting treatment and then quickly wanted to be DC or AMA. Today, Patient states he has worsening SOB and productive cough with yellow phlegm. Denies fever, chills, chest pain, abdominal pain, N/V. Of note, Patient has multiple MRN from other visits ([id number] and [id number]). Majority of his visits are under MRN [id number]. There is also a complex care note attesting to noncompliance with medications and frequent visits with AMA.

## 2021-03-10 NOTE — ED PROVIDER NOTE - CLINICAL SUMMARY MEDICAL DECISION MAKING FREE TEXT BOX
Will do SOB workup including COVID swab, labs, and VBG. Will treat symptomatically for likely COPD exacerbation and reassess.

## 2021-03-10 NOTE — ED PROVIDER NOTE - PROGRESS NOTE DETAILS
Pt had his breathing treatments and requested to be discharged. NO work up results back. Pt GABRIEL'jeet. Hx of this in the past.

## 2021-03-10 NOTE — ED PROVIDER NOTE - PATIENT PORTAL LINK FT
You can access the FollowMyHealth Patient Portal offered by [facility] by registering at the following website: [email or web address]. By joining [facility]’s FollowMyHealth portal, you will also be able to view your health information using other applications (apps) compatible with our system.

## 2021-03-10 NOTE — ED ADULT TRIAGE NOTE - CHIEF COMPLAINT QUOTE
was just seen here earlier today for same complaint- pt was picked up at [facility] md for chest pain and sob- was given 162 mg PO of asa

## 2021-03-13 ENCOUNTER — EMERGENCY (EMERGENCY)
Facility: HOSPITAL | Age: 71
LOS: 1 days | Discharge: HOME | End: 2021-03-13
Attending: EMERGENCY MEDICINE | Admitting: EMERGENCY MEDICINE
Payer: MEDICARE

## 2021-03-13 VITALS
DIASTOLIC BLOOD PRESSURE: 82 MMHG | RESPIRATION RATE: 18 BRPM | HEART RATE: 86 BPM | OXYGEN SATURATION: 98 % | SYSTOLIC BLOOD PRESSURE: 128 MMHG

## 2021-03-13 VITALS
WEIGHT: 160.06 LBS | DIASTOLIC BLOOD PRESSURE: 87 MMHG | HEIGHT: 66 IN | RESPIRATION RATE: 23 BRPM | TEMPERATURE: 97 F | SYSTOLIC BLOOD PRESSURE: 132 MMHG | OXYGEN SATURATION: 99 % | HEART RATE: 84 BPM

## 2021-03-13 DIAGNOSIS — Z95.1 PRESENCE OF AORTOCORONARY BYPASS GRAFT: Chronic | ICD-10-CM

## 2021-03-13 PROCEDURE — 99284 EMERGENCY DEPT VISIT MOD MDM: CPT

## 2021-03-13 RX ORDER — IPRATROPIUM/ALBUTEROL SULFATE 18-103MCG
3 AEROSOL WITH ADAPTER (GRAM) INHALATION ONCE
Refills: 0 | Status: COMPLETED | OUTPATIENT
Start: 2021-03-13 | End: 2021-03-13

## 2021-03-13 RX ORDER — ALBUTEROL 90 UG/1
1 AEROSOL, METERED ORAL ONCE
Refills: 0 | Status: COMPLETED | OUTPATIENT
Start: 2021-03-13 | End: 2021-03-13

## 2021-03-13 RX ADMIN — Medication 3 MILLILITER(S): at 13:30

## 2021-03-13 RX ADMIN — ALBUTEROL 1 PUFF(S): 90 AEROSOL, METERED ORAL at 14:14

## 2021-03-13 NOTE — ED PROVIDER NOTE - OBJECTIVE STATEMENT
70 male pt, hx of afib, copd, presents for sob/wheezing. Well known to our dept for copd exacerbations. no fever, no chills, no abd pain, no nausea, no vomiitng.

## 2021-03-13 NOTE — ED PROVIDER NOTE - CLINICAL SUMMARY MEDICAL DECISION MAKING FREE TEXT BOX
ordered neb, EKG afib NVR, as per his usual pt had a neb tx and then requested to be discharged. will provide inhaler. refused labs, CXR.

## 2021-03-13 NOTE — ED PROVIDER NOTE - PATIENT PORTAL LINK FT
You can access the FollowMyHealth Patient Portal offered by [facility] by registering at the following website: [email or web address]. By joining [facility]’s FollowMyHealth portal, you will also be able to view your health information using other applications (apps) compatible with our system.

## 2021-03-13 NOTE — ED ADULT NURSE NOTE - OBJECTIVE STATEMENT
Pt returns to ED for Shortness of breath and  requesting albuterol treatment. Denies any chest pain. or dizziness

## 2021-03-17 DIAGNOSIS — R06.02 SHORTNESS OF BREATH: ICD-10-CM

## 2021-03-17 DIAGNOSIS — R06.2 WHEEZING: ICD-10-CM

## 2021-03-19 ENCOUNTER — EMERGENCY (EMERGENCY)
Facility: HOSPITAL | Age: 71
LOS: 1 days | Discharge: HOME | End: 2021-03-19
Attending: EMERGENCY MEDICINE | Admitting: EMERGENCY MEDICINE
Payer: MEDICARE

## 2021-03-19 VITALS
OXYGEN SATURATION: 98 % | WEIGHT: 149.91 LBS | RESPIRATION RATE: 16 BRPM | HEIGHT: 66 IN | HEART RATE: 91 BPM | SYSTOLIC BLOOD PRESSURE: 124 MMHG | TEMPERATURE: 98 F | DIASTOLIC BLOOD PRESSURE: 87 MMHG

## 2021-03-19 DIAGNOSIS — Z95.1 PRESENCE OF AORTOCORONARY BYPASS GRAFT: Chronic | ICD-10-CM

## 2021-03-19 DIAGNOSIS — R06.02 SHORTNESS OF BREATH: ICD-10-CM

## 2021-03-19 DIAGNOSIS — R53.83 OTHER FATIGUE: ICD-10-CM

## 2021-03-19 DIAGNOSIS — Z53.21 PROCEDURE AND TREATMENT NOT CARRIED OUT DUE TO PATIENT LEAVING PRIOR TO BEING SEEN BY HEALTH CARE PROVIDER: ICD-10-CM

## 2021-03-19 PROCEDURE — L9991: CPT

## 2021-03-21 ENCOUNTER — EMERGENCY (EMERGENCY)
Facility: HOSPITAL | Age: 71
LOS: 1 days | Discharge: HOME | End: 2021-03-21
Attending: EMERGENCY MEDICINE | Admitting: EMERGENCY MEDICINE
Payer: MEDICARE

## 2021-03-21 VITALS
DIASTOLIC BLOOD PRESSURE: 81 MMHG | RESPIRATION RATE: 16 BRPM | HEART RATE: 76 BPM | SYSTOLIC BLOOD PRESSURE: 133 MMHG | OXYGEN SATURATION: 99 %

## 2021-03-21 VITALS
TEMPERATURE: 98 F | WEIGHT: 149.91 LBS | DIASTOLIC BLOOD PRESSURE: 71 MMHG | OXYGEN SATURATION: 98 % | SYSTOLIC BLOOD PRESSURE: 112 MMHG | RESPIRATION RATE: 15 BRPM | HEIGHT: 66 IN | HEART RATE: 94 BPM

## 2021-03-21 DIAGNOSIS — Z79.899 OTHER LONG TERM (CURRENT) DRUG THERAPY: ICD-10-CM

## 2021-03-21 DIAGNOSIS — Z91.14 PATIENT'S OTHER NONCOMPLIANCE WITH MEDICATION REGIMEN: ICD-10-CM

## 2021-03-21 DIAGNOSIS — I48.92 UNSPECIFIED ATRIAL FLUTTER: ICD-10-CM

## 2021-03-21 DIAGNOSIS — F41.9 ANXIETY DISORDER, UNSPECIFIED: ICD-10-CM

## 2021-03-21 DIAGNOSIS — E78.5 HYPERLIPIDEMIA, UNSPECIFIED: ICD-10-CM

## 2021-03-21 DIAGNOSIS — Z79.01 LONG TERM (CURRENT) USE OF ANTICOAGULANTS: ICD-10-CM

## 2021-03-21 DIAGNOSIS — Z79.52 LONG TERM (CURRENT) USE OF SYSTEMIC STEROIDS: ICD-10-CM

## 2021-03-21 DIAGNOSIS — Z95.1 PRESENCE OF AORTOCORONARY BYPASS GRAFT: ICD-10-CM

## 2021-03-21 DIAGNOSIS — Z79.82 LONG TERM (CURRENT) USE OF ASPIRIN: ICD-10-CM

## 2021-03-21 DIAGNOSIS — J43.9 EMPHYSEMA, UNSPECIFIED: ICD-10-CM

## 2021-03-21 DIAGNOSIS — I50.9 HEART FAILURE, UNSPECIFIED: ICD-10-CM

## 2021-03-21 DIAGNOSIS — I11.0 HYPERTENSIVE HEART DISEASE WITH HEART FAILURE: ICD-10-CM

## 2021-03-21 DIAGNOSIS — I44.30 UNSPECIFIED ATRIOVENTRICULAR BLOCK: ICD-10-CM

## 2021-03-21 DIAGNOSIS — F17.210 NICOTINE DEPENDENCE, CIGARETTES, UNCOMPLICATED: ICD-10-CM

## 2021-03-21 DIAGNOSIS — Z95.2 PRESENCE OF PROSTHETIC HEART VALVE: ICD-10-CM

## 2021-03-21 DIAGNOSIS — I48.91 UNSPECIFIED ATRIAL FIBRILLATION: ICD-10-CM

## 2021-03-21 DIAGNOSIS — Z95.1 PRESENCE OF AORTOCORONARY BYPASS GRAFT: Chronic | ICD-10-CM

## 2021-03-21 DIAGNOSIS — R06.02 SHORTNESS OF BREATH: ICD-10-CM

## 2021-03-21 DIAGNOSIS — Z88.6 ALLERGY STATUS TO ANALGESIC AGENT: ICD-10-CM

## 2021-03-21 LAB
ALBUMIN SERPL ELPH-MCNC: 3.4 G/DL — SIGNIFICANT CHANGE UP (ref 3.4–5)
ALP SERPL-CCNC: 46 U/L — SIGNIFICANT CHANGE UP (ref 40–120)
ALT FLD-CCNC: 25 U/L — SIGNIFICANT CHANGE UP (ref 12–42)
ANION GAP SERPL CALC-SCNC: 2 MMOL/L — LOW (ref 9–16)
AST SERPL-CCNC: 22 U/L — SIGNIFICANT CHANGE UP (ref 15–37)
BASOPHILS # BLD AUTO: 0.04 K/UL — SIGNIFICANT CHANGE UP (ref 0–0.2)
BASOPHILS NFR BLD AUTO: 0.4 % — SIGNIFICANT CHANGE UP (ref 0–2)
BILIRUB SERPL-MCNC: 0.5 MG/DL — SIGNIFICANT CHANGE UP (ref 0.2–1.2)
BUN SERPL-MCNC: 25 MG/DL — HIGH (ref 7–23)
CALCIUM SERPL-MCNC: 8.8 MG/DL — SIGNIFICANT CHANGE UP (ref 8.5–10.5)
CHLORIDE SERPL-SCNC: 107 MMOL/L — SIGNIFICANT CHANGE UP (ref 96–108)
CO2 SERPL-SCNC: 33 MMOL/L — HIGH (ref 22–31)
CREAT SERPL-MCNC: 1.13 MG/DL — SIGNIFICANT CHANGE UP (ref 0.5–1.3)
D DIMER BLD IA.RAPID-MCNC: 250 NG/ML DDU — HIGH
EOSINOPHIL # BLD AUTO: 0.19 K/UL — SIGNIFICANT CHANGE UP (ref 0–0.5)
EOSINOPHIL NFR BLD AUTO: 2 % — SIGNIFICANT CHANGE UP (ref 0–6)
GLUCOSE SERPL-MCNC: 95 MG/DL — SIGNIFICANT CHANGE UP (ref 70–99)
HCT VFR BLD CALC: 45.8 % — SIGNIFICANT CHANGE UP (ref 39–50)
HGB BLD-MCNC: 15 G/DL — SIGNIFICANT CHANGE UP (ref 13–17)
IMM GRANULOCYTES NFR BLD AUTO: 0.4 % — SIGNIFICANT CHANGE UP (ref 0–1.5)
LYMPHOCYTES # BLD AUTO: 2.02 K/UL — SIGNIFICANT CHANGE UP (ref 1–3.3)
LYMPHOCYTES # BLD AUTO: 21.6 % — SIGNIFICANT CHANGE UP (ref 13–44)
MAGNESIUM SERPL-MCNC: 2 MG/DL — SIGNIFICANT CHANGE UP (ref 1.6–2.6)
MCHC RBC-ENTMCNC: 30.8 PG — SIGNIFICANT CHANGE UP (ref 27–34)
MCHC RBC-ENTMCNC: 32.8 GM/DL — SIGNIFICANT CHANGE UP (ref 32–36)
MCV RBC AUTO: 94 FL — SIGNIFICANT CHANGE UP (ref 80–100)
MONOCYTES # BLD AUTO: 0.86 K/UL — SIGNIFICANT CHANGE UP (ref 0–0.9)
MONOCYTES NFR BLD AUTO: 9.2 % — SIGNIFICANT CHANGE UP (ref 2–14)
NEUTROPHILS # BLD AUTO: 6.21 K/UL — SIGNIFICANT CHANGE UP (ref 1.8–7.4)
NEUTROPHILS NFR BLD AUTO: 66.4 % — SIGNIFICANT CHANGE UP (ref 43–77)
NRBC # BLD: 0 /100 WBCS — SIGNIFICANT CHANGE UP (ref 0–0)
NT-PROBNP SERPL-SCNC: 1692 PG/ML — HIGH
PLATELET # BLD AUTO: 192 K/UL — SIGNIFICANT CHANGE UP (ref 150–400)
POTASSIUM SERPL-MCNC: 4.6 MMOL/L — SIGNIFICANT CHANGE UP (ref 3.5–5.3)
POTASSIUM SERPL-SCNC: 4.6 MMOL/L — SIGNIFICANT CHANGE UP (ref 3.5–5.3)
PROT SERPL-MCNC: 6.4 G/DL — SIGNIFICANT CHANGE UP (ref 6.4–8.2)
RBC # BLD: 4.87 M/UL — SIGNIFICANT CHANGE UP (ref 4.2–5.8)
RBC # FLD: 13.2 % — SIGNIFICANT CHANGE UP (ref 10.3–14.5)
SODIUM SERPL-SCNC: 142 MMOL/L — SIGNIFICANT CHANGE UP (ref 132–145)
TROPONIN I SERPL-MCNC: <0.017 NG/ML — LOW (ref 0.02–0.06)
WBC # BLD: 9.36 K/UL — SIGNIFICANT CHANGE UP (ref 3.8–10.5)
WBC # FLD AUTO: 9.36 K/UL — SIGNIFICANT CHANGE UP (ref 3.8–10.5)

## 2021-03-21 PROCEDURE — 71045 X-RAY EXAM CHEST 1 VIEW: CPT | Mod: 26

## 2021-03-21 PROCEDURE — 99285 EMERGENCY DEPT VISIT HI MDM: CPT

## 2021-03-21 PROCEDURE — 93010 ELECTROCARDIOGRAM REPORT: CPT

## 2021-03-21 RX ORDER — LISINOPRIL 2.5 MG/1
1 TABLET ORAL
Qty: 0 | Refills: 0 | DISCHARGE

## 2021-03-21 RX ORDER — ATORVASTATIN CALCIUM 80 MG/1
1 TABLET, FILM COATED ORAL
Qty: 0 | Refills: 0 | DISCHARGE

## 2021-03-21 RX ORDER — ALBUTEROL 90 UG/1
2 AEROSOL, METERED ORAL
Qty: 1 | Refills: 2
Start: 2021-03-21

## 2021-03-21 RX ORDER — ATORVASTATIN CALCIUM 80 MG/1
1 TABLET, FILM COATED ORAL
Qty: 90 | Refills: 0
Start: 2021-03-21 | End: 2021-06-18

## 2021-03-21 RX ORDER — LISINOPRIL 2.5 MG/1
1 TABLET ORAL
Qty: 90 | Refills: 0
Start: 2021-03-21

## 2021-03-21 RX ORDER — FUROSEMIDE 40 MG
1 TABLET ORAL
Qty: 90 | Refills: 0
Start: 2021-03-21 | End: 2021-06-18

## 2021-03-21 RX ORDER — ALBUTEROL 90 UG/1
0 AEROSOL, METERED ORAL
Qty: 0 | Refills: 0 | DISCHARGE

## 2021-03-21 RX ORDER — ASPIRIN/CALCIUM CARB/MAGNESIUM 324 MG
1 TABLET ORAL
Qty: 90 | Refills: 0
Start: 2021-03-21 | End: 2021-06-18

## 2021-03-21 RX ORDER — BUDESONIDE AND FORMOTEROL FUMARATE DIHYDRATE 160; 4.5 UG/1; UG/1
2 AEROSOL RESPIRATORY (INHALATION)
Qty: 1 | Refills: 2
Start: 2021-03-21

## 2021-03-21 RX ORDER — SERTRALINE 25 MG/1
1 TABLET, FILM COATED ORAL
Qty: 0 | Refills: 0 | DISCHARGE

## 2021-03-21 RX ORDER — METOPROLOL TARTRATE 50 MG
1 TABLET ORAL
Qty: 90 | Refills: 0
Start: 2021-03-21 | End: 2021-06-18

## 2021-03-21 RX ORDER — RIVAROXABAN 15 MG-20MG
1 KIT ORAL
Qty: 90 | Refills: 0
Start: 2021-03-21 | End: 2021-06-18

## 2021-03-21 RX ORDER — TRAZODONE HCL 50 MG
1 TABLET ORAL
Qty: 90 | Refills: 0
Start: 2021-03-21 | End: 2021-06-18

## 2021-03-21 RX ORDER — TRAZODONE HCL 50 MG
0 TABLET ORAL
Qty: 0 | Refills: 0 | DISCHARGE

## 2021-03-21 RX ORDER — FUROSEMIDE 40 MG
20 TABLET ORAL ONCE
Refills: 0 | Status: COMPLETED | OUTPATIENT
Start: 2021-03-21 | End: 2021-03-21

## 2021-03-21 RX ORDER — SERTRALINE 25 MG/1
1 TABLET, FILM COATED ORAL
Qty: 90 | Refills: 0
Start: 2021-03-21 | End: 2021-06-18

## 2021-03-21 RX ORDER — ASPIRIN/CALCIUM CARB/MAGNESIUM 324 MG
1 TABLET ORAL
Qty: 0 | Refills: 0 | DISCHARGE

## 2021-03-21 RX ADMIN — Medication 20 MILLIGRAM(S): at 18:32

## 2021-03-21 NOTE — ED PROVIDER NOTE - CARE PROVIDER_API CALL
Zi Winter)  Cardiovascular Disease  [address] [facility] [place] [address]  Phone: [phone]  Fax: [phone]  Follow Up Time: 1-3 Days

## 2021-03-21 NOTE — ED PROVIDER NOTE - CLINICAL SUMMARY MEDICAL DECISION MAKING FREE TEXT BOX
Pt, well known to our ED for similar complaints (often leaves the ED prior to workup/signs out AMA), w/ Hx of of A-fib, anxiety, daily smoker with known COPD/emphysema/asthma, HLD, HTN, known medical non-compliance p/w SOB for 2 weeks. no cough, no fever, no chills, no abd pain, no nausea, no vomiting. Pt is amenable to staying and workup; will order EKG, CXR, LABS including cardiac enzymes. Will Reassess

## 2021-03-21 NOTE — ED PROVIDER NOTE - OBJECTIVE STATEMENT
72 yo M · HPI Objective Statement: 71 yo M w/ PMHx of A-fib, anxiety, daily smoker with known COPD/emphysema/asthma, HLD, HTN, known medical non-compliance (reports that he's taken no meds in weeks 2/2 inability to pay). most hx obtained from prior charts as pt is not clear about his diagnoses and treatment. Presents w worsening sob for 2 days, worse w exertion. no cough, no fever, no chills, no abd pain, no nausea, no vomiting. Of note pt has been to our ED 6 times this month and offered admission recently and pt signed out AMA. Pt states he has been unable to get his meds due to inability to pay for them. 71 yo M w/ PMHx of A-fib, anxiety, active smoker (approx 3 cigarettes a day) with known COPD/emphysema/asthma, HLD, HTN, known medical non-compliance (reports that he's taken no meds due to inability to pay). Hx obtained from prior charts as pt is a poor historian. Pt c/o SOB x 2 weeks. No cough, fever, chills, orthopnea, chest pain or exertional angina, abdominal pain, N/V/D, wheezing, hemoptysis, body aches, palpitations, HA, dizziness. Pt states he is unable walk more than 2 blocks without SOB. Pt has not followed up with cardiology; Pt with multiple ED visits of similar complaints (see complex care note).

## 2021-03-21 NOTE — ED PROVIDER NOTE - PATIENT PORTAL LINK FT
You can access the FollowMyHealth Patient Portal offered by [facility] by registering at the following website: [email or web address]. By joining [employer]’s FollowMyHealth portal, you will also be able to view your health information using other applications (apps) compatible with our system.

## 2021-03-21 NOTE — ED PROVIDER NOTE - CARE PLAN
Principal Discharge DX:	Atrial fibrillation with controlled ventricular response  Secondary Diagnosis:	CHF (congestive heart failure)  Secondary Diagnosis:	Anxiety  Secondary Diagnosis:	Noncompliance with medication regimen

## 2021-03-21 NOTE — ED PROVIDER NOTE - NSFOLLOWUPINSTRUCTIONS_ED_ALL_ED_FT
A-fib (Atrial Fibrillation)    WHAT YOU NEED TO KNOW:    A-fib may come and go, or it may be a long-term condition. A-fib can cause blood clots, stroke, or heart failure. These conditions may become life-threatening. It is important to treat and manage A-fib to help prevent a blood clot, stroke, or heart failure.    DISCHARGE INSTRUCTIONS:    Call your local emergency number (911 in the [place]) or have someone call if:     •You have any of the following signs of a heart attack: ?Squeezing, pressure, or pain in your chest    ?You may also have any of the following:  ?Discomfort or pain in your back, neck, jaw, stomach, or arm  ?Shortness of breath  ?Nausea or vomiting  ?Lightheadedness or a sudden cold sweat    •You have any of the following signs of a stroke:  ?Numbness or drooping on one side of your face   ?Weakness in an arm or leg  ?Confusion or difficulty speaking  ?Dizziness, a severe headache, or vision loss    Call your doctor or cardiologist if:   •Your arm or leg feels warm, tender, and painful. It may look swollen and red.  •Your heart rate is more than 110 beats per minute.  •You have new or worsening swelling in your legs, feet, ankles, or abdomen.   •You are short of breath, even at rest.  •You have questions or concerns about your condition or care.    Medicines: You may need any of the following:  •Heart medicines help control your heart rate or rhythm. You may need more than one medicine to treat your symptoms.  •Blood thinners help prevent blood clots. Clots can cause strokes, heart attacks, and death. The following are general safety guidelines to follow while you are taking a blood thinner:?Watch for bleeding and bruising while you take blood thinners. Watch for bleeding from your gums or nose. Watch for blood in your urine and bowel movements. Use a soft washcloth on your skin, and a soft toothbrush to brush your teeth. This can keep your skin and gums from bleeding. If you shave, use an electric shaver. Do not play contact sports.   ?Tell your dentist and other healthcare providers that you take a blood thinner. Wear a bracelet or necklace that says you take this medicine.   ?Do not start or stop any other medicines unless your healthcare provider tells you to. Many medicines cannot be used with blood thinners.  ?Take your blood thinner exactly as prescribed by your healthcare provider. Do not skip does or take less than prescribed. Tell your provider right away if you forget to take your blood thinner, or if you take too much.  ?Warfarin is a blood thinner that you may need to take. The following are things you should be aware of if you take warfarin: ?Foods and medicines can affect the amount of warfarin in your blood. Do not make major changes to your diet while you take warfarin. Warfarin works best when you eat about the same amount of vitamin K every day. Vitamin K is found in green leafy vegetables and certain other foods. Ask for more information about what to eat when you are taking warfarin.    ?You will need to see your healthcare provider for follow-up visits when you are on warfarin. You will need regular blood tests. These tests are used to decide how much medicine you need.     •Antiplatelets, such as aspirin, help prevent blood clots. Take your antiplatelet medicine exactly as directed. These medicines make it more likely for you to bleed or bruise. If you are told to take aspirin, do not take acetaminophen or ibuprofen instead.   •Take your medicine as directed. Contact your healthcare provider if you think your medicine is not helping or if you have side effects. Tell him or her if you are allergic to any medicine. Keep a list of the medicines, vitamins, and herbs you take. Include the amounts, and when and why you take them. Bring the list or the pill bottles to follow-up visits. Carry your medicine list with you in case of an emergency.    Manage A-fib:   •Know your target heart rate. Learn how to check your pulse and monitor your heart rate.  •Know the risks if you choose to drink alcohol. Alcohol can increase your risk for A-fib or make A-fib harder to manage. Ask your healthcare provider if it is okay for you to drink any alcohol. He or she can help you set limits for the number of drinks you have in 24 hours and in a week. A drink of alcohol is 12 ounces of beer, 5 ounces of wine, or 1½ ounces of liquor.  •Do not smoke. Nicotine can cause heart damage and make it more difficult to manage your A-fib. Do not use e-cigarettes or smokeless tobacco in place of cigarettes or to help you quit. They still contain nicotine. Ask your healthcare provider for information if you currently smoke and need help quitting.  •Eat heart-healthy foods. Heart healthy foods will help keep your cholesterol low. These include fruits, vegetables, whole-grain breads, low-fat dairy products, beans, lean meats, and fish. Replace butter and margarine with heart-healthy oils such as olive oil and canola oil.  •Maintain a healthy weight. Ask your healthcare provider what a healthy weight is for you. Ask him or her to help you create a safe weight loss plan if you are overweight. Even a small goal of a 10% weight loss can improve your heart health.  •Get regular physical activity. Physical activity helps improve your heart health. Get at least 150 minutes of moderate aerobic physical activity each week. Your healthcare provider can help you create an activity plan.  •Manage other health conditions. This includes high blood pressure or cholesterol, sleep apnea, diabetes, and other heart conditions. Take medicine as directed and follow your treatment plan. Your healthcare provider may need to change a medicine you are taking if it is causing your A-fib. Do not stop taking any medicine unless directed by your provider.  Follow up with your doctor or cardiologist as directed: You will need regular blood tests and monitoring. Write down your questions so you remember to ask them during your visits.

## 2021-03-21 NOTE — ED ADULT NURSE NOTE - OBJECTIVE STATEMENT
Pt came in c/o of sob. Pt reports sob x1 year. A&Ox3 speaking in full clear sentences. O2 sat 98% on RA. Pt reports history of stroke 10 years ago with no deficits. Pupils equal reactive to light, Bilateral strength/sensation to upper/lower extremities. Placed on continuous o2/cardiac monitoring. Denies fever, chills, cp, n/v/d at this time

## 2021-03-21 NOTE — ED ADULT NURSE NOTE - NSIMPLEMENTINTERV_GEN_ALL_ED
Implemented All Universal Safety Interventions:  [place] to call system. Call bell, personal items and telephone within reach. Instruct patient to call for assistance. Room bathroom lighting operational. Non-slip footwear when patient is off stretcher. Physically safe environment: no spills, clutter or unnecessary equipment. Stretcher in lowest position, wheels locked, appropriate side rails in place.

## 2021-03-21 NOTE — ED PROVIDER NOTE - CHPI ED SYMPTOMS NEG
no orthopnea, no abdominal pain, no N/V/D/no body aches/no chest pain/no cough/no edema/no fever/no headache/no wheezing/no chills

## 2021-03-22 ENCOUNTER — EMERGENCY (EMERGENCY)
Facility: HOSPITAL | Age: 71
LOS: 1 days | Discharge: HOME | End: 2021-03-22
Attending: EMERGENCY MEDICINE | Admitting: EMERGENCY MEDICINE
Payer: MEDICARE

## 2021-03-22 VITALS
RESPIRATION RATE: 18 BRPM | HEIGHT: 66 IN | WEIGHT: 160.06 LBS | DIASTOLIC BLOOD PRESSURE: 79 MMHG | HEART RATE: 99 BPM | TEMPERATURE: 98 F | SYSTOLIC BLOOD PRESSURE: 151 MMHG | OXYGEN SATURATION: 97 %

## 2021-03-22 DIAGNOSIS — F41.9 ANXIETY DISORDER, UNSPECIFIED: ICD-10-CM

## 2021-03-22 DIAGNOSIS — J44.9 CHRONIC OBSTRUCTIVE PULMONARY DISEASE, UNSPECIFIED: ICD-10-CM

## 2021-03-22 DIAGNOSIS — Z79.899 OTHER LONG TERM (CURRENT) DRUG THERAPY: ICD-10-CM

## 2021-03-22 DIAGNOSIS — I10 ESSENTIAL (PRIMARY) HYPERTENSION: ICD-10-CM

## 2021-03-22 DIAGNOSIS — E78.5 HYPERLIPIDEMIA, UNSPECIFIED: ICD-10-CM

## 2021-03-22 DIAGNOSIS — Z79.02 LONG TERM (CURRENT) USE OF ANTITHROMBOTICS/ANTIPLATELETS: ICD-10-CM

## 2021-03-22 DIAGNOSIS — I48.91 UNSPECIFIED ATRIAL FIBRILLATION: ICD-10-CM

## 2021-03-22 DIAGNOSIS — Z79.83 LONG TERM (CURRENT) USE OF BISPHOSPHONATES: ICD-10-CM

## 2021-03-22 DIAGNOSIS — Z79.52 LONG TERM (CURRENT) USE OF SYSTEMIC STEROIDS: ICD-10-CM

## 2021-03-22 DIAGNOSIS — Z79.82 LONG TERM (CURRENT) USE OF ASPIRIN: ICD-10-CM

## 2021-03-22 DIAGNOSIS — J45.909 UNSPECIFIED ASTHMA, UNCOMPLICATED: ICD-10-CM

## 2021-03-22 DIAGNOSIS — L29.8 OTHER PRURITUS: ICD-10-CM

## 2021-03-22 DIAGNOSIS — Z79.51 LONG TERM (CURRENT) USE OF INHALED STEROIDS: ICD-10-CM

## 2021-03-22 DIAGNOSIS — Z88.6 ALLERGY STATUS TO ANALGESIC AGENT: ICD-10-CM

## 2021-03-22 DIAGNOSIS — Z79.01 LONG TERM (CURRENT) USE OF ANTICOAGULANTS: ICD-10-CM

## 2021-03-22 DIAGNOSIS — Z95.1 PRESENCE OF AORTOCORONARY BYPASS GRAFT: Chronic | ICD-10-CM

## 2021-03-22 PROCEDURE — 99282 EMERGENCY DEPT VISIT SF MDM: CPT

## 2021-03-22 RX ORDER — PERMETHRIN CREAM 5% W/W 50 MG/G
1 CREAM TOPICAL
Qty: 1 | Refills: 0
Start: 2021-03-22

## 2021-03-22 RX ORDER — LANOLIN/MINERAL OIL
1 LOTION (ML) TOPICAL
Qty: 1 | Refills: 0
Start: 2021-03-22

## 2021-03-22 RX ORDER — DIPHENHYDRAMINE HCL 50 MG
25 CAPSULE ORAL ONCE
Refills: 0 | Status: COMPLETED | OUTPATIENT
Start: 2021-03-22 | End: 2021-03-22

## 2021-03-22 RX ORDER — DIPHENHYDRAMINE HCL 50 MG
1 CAPSULE ORAL
Qty: 14 | Refills: 0
Start: 2021-03-22 | End: 2021-03-28

## 2021-03-22 RX ADMIN — Medication 25 MILLIGRAM(S): at 18:39

## 2021-03-22 NOTE — ED PROVIDER NOTE - NS ED ROS FT
Constitutional:  No fever, No chills, No night sweats  Eyes:  No visual changes, No discharge, No redness  ENMT:  No epistaxis, no nasal congestion, no throat pain, no difficulty swallowing  CV:  No chest pain, No palpitations, No peripheral edema  Resp:  No cough, No shortness of breath  GI:  No abdominal pain, No vomiting, No diarrhea  MSK:  No neck pain or stiffness, No joint swelling or pain, No back pain  Neuro: no loss of consciousness, no gait abnormality, no headache, no sensory deficits, and no weakness.  Skin:  No abrasions, no lesions, no rashes, +itching  Psych:  No known mental health issues

## 2021-03-22 NOTE — ED PROVIDER NOTE - PHYSICAL EXAMINATION
Constitutional:  Well appearing, awake, alert, oriented to person, place, time/situation and in no apparent distress.  HEENT: Airway patent, Nasal mucosa clear. Mouth with normal mucosa. Throat has no vesicles, no oropharyngeal exudates and uvula is midline.  Eyes: Clear bilaterally, pupils equal, round and reactive to light.  Cardiac: Normal rate, regular rhythm.  Respiratory: Breath sounds clear and equal bilaterally.  GI: Abdomen soft, non-tender, no guarding.  MSK: Spine appears normal, range of motion is not limited, no muscle or joint tenderness  Neuro: Alert and oriented, no focal deficits, no motor or sensory deficits.  Skin: Skin normal color for race, warm, dry and intact.  Scalp with dry skin.  No lice noted.  No dandruff.  No rash.  No lesions elsewhere on body.

## 2021-03-22 NOTE — ED PROVIDER NOTE - NSFOLLOWUPINSTRUCTIONS_ED_ALL_ED_FT
Pruritus      Pruritus is an itchy feeling on the skin. One of the most common causes is dry skin, but many different things can cause itching. Most cases of itching do not require medical attention. Sometimes itchy skin can turn into a rash.      Follow these instructions at home:      Skin care      •Apply moisturizing lotion to your skin as needed. Lotion that contains petroleum jelly is best.    •Take medicines or apply medicated creams only as told by your health care provider. This may include:  •Corticosteroid cream.      •Anti-itch lotions.      •Oral antihistamines.        •Apply a cool, wet cloth (cool compress) to the affected areas.    •Take baths with one of the following:  •Epsom salts. You can get these at your local pharmacy or grocery store. Follow the instructions on the packaging.      •Baking soda. Pour a small amount into the bath as told by your health care provider.      •Colloidal oatmeal. You can get this at your local pharmacy or grocery store. Follow the instructions on the packaging.        •Apply baking soda paste to your skin. To make the paste, stir water into a small amount of baking soda until it reaches a paste-like consistency.      • Do not scratch your skin.      • Do not take hot showers or baths, which can make itching worse. A cool shower may help with itching as long as you apply moisturizing lotion after the shower.      • Do not use scented soaps, detergents, perfumes, and cosmetic products. Instead, use gentle, unscented versions of these items.      General instructions     •Avoid wearing tight clothes.    •Keep a journal to help find out what is causing your itching. Write down:  •What you eat and drink.      •What cosmetic products you use.      •What soaps or detergents you use.      •What you wear, including jewelry.        •Use a humidifier. This keeps the air moist, which helps to prevent dry skin.      •Be aware of any changes in your itchiness.        Contact a health care provider if:    •The itching does not go away after several days.      •You are unusually thirsty or urinating more than normal.      •Your skin tingles or feels numb.      •Your skin or the white parts of your eyes turn yellow (jaundice).      •You feel weak.    •You have any of the following:  •Night sweats.      •Tiredness (fatigue).      •Weight loss.      •Abdominal pain.          Summary    •Pruritus is an itchy feeling on the skin. One of the most common causes is dry skin, but many different conditions and factors can cause itching.      •Apply moisturizing lotion to your skin as needed. Lotion that contains petroleum jelly is best.      •Take medicines or apply medicated creams only as told by your health care provider.      • Do not take hot showers or baths. Do not use scented soaps, detergents, perfumes, or cosmetic products.      This information is not intended to replace advice given to you by your health care provider. Make sure you discuss any questions you have with your health care provider.

## 2021-03-22 NOTE — ED PROVIDER NOTE - CLINICAL SUMMARY MEDICAL DECISION MAKING FREE TEXT BOX
71yo M hx of AFib presents with 4d of itchy scalp.  On exam afebrile, VSS, well appearing.  Scalp appears dry; no obvious e/o lice.  no e/o fungal infection.  Will give PO benadryl now.  Recommended soothing gentle shampoo and conditioner instead of soap use on scalp.  Will give rx for Nix despite not seeing any lice at this time as pt may benefit from empiric treatment.  Discussed importance of washing everything at home in hot water and other measures for possible lice.  Return precautions discussed.  Stable for dc.

## 2021-03-22 NOTE — ED PROVIDER NOTE - PATIENT PORTAL LINK FT
You can access the FollowMyHealth Patient Portal offered by [facility] by registering at the following website: [email or web address]. By joining [employer]’s FollowMyHealth portal, you will also be able to view your health information using other applications (apps) compatible with our system.

## 2021-03-22 NOTE — ED PROVIDER NOTE - OBJECTIVE STATEMENT
71yo M hx of AFib presents with scalp itching x4 days.  Pt states itching is relieved with water to scalp.  Pt washes his scalp and hair daily with soap.  Lives alone- does not know of anyone around him with similar symptoms.  has not noticed any lice.  No itching elsewhere.

## 2021-03-24 ENCOUNTER — EMERGENCY (EMERGENCY)
Facility: HOSPITAL | Age: 71
LOS: 1 days | Discharge: LEFT BEFORE DISCHARGE | End: 2021-03-24
Admitting: EMERGENCY MEDICINE
Payer: MEDICARE

## 2021-03-24 VITALS
RESPIRATION RATE: 17 BRPM | HEIGHT: 66 IN | TEMPERATURE: 98 F | SYSTOLIC BLOOD PRESSURE: 119 MMHG | OXYGEN SATURATION: 99 % | DIASTOLIC BLOOD PRESSURE: 80 MMHG | HEART RATE: 91 BPM

## 2021-03-24 DIAGNOSIS — Z53.21 PROCEDURE AND TREATMENT NOT CARRIED OUT DUE TO PATIENT LEAVING PRIOR TO BEING SEEN BY HEALTH CARE PROVIDER: ICD-10-CM

## 2021-03-24 DIAGNOSIS — Z95.1 PRESENCE OF AORTOCORONARY BYPASS GRAFT: Chronic | ICD-10-CM

## 2021-03-24 DIAGNOSIS — Z88.6 ALLERGY STATUS TO ANALGESIC AGENT: ICD-10-CM

## 2021-03-24 PROCEDURE — L9991: CPT

## 2021-03-28 ENCOUNTER — EMERGENCY (EMERGENCY)
Facility: HOSPITAL | Age: 71
LOS: 1 days | Discharge: LEFT BEFORE DISCHARGE | End: 2021-03-28
Attending: EMERGENCY MEDICINE | Admitting: EMERGENCY MEDICINE
Payer: MEDICARE

## 2021-03-28 VITALS
OXYGEN SATURATION: 95 % | RESPIRATION RATE: 16 BRPM | WEIGHT: 149.91 LBS | HEART RATE: 109 BPM | DIASTOLIC BLOOD PRESSURE: 86 MMHG | HEIGHT: 66 IN | SYSTOLIC BLOOD PRESSURE: 145 MMHG | TEMPERATURE: 98 F

## 2021-03-28 DIAGNOSIS — Z95.1 PRESENCE OF AORTOCORONARY BYPASS GRAFT: Chronic | ICD-10-CM

## 2021-03-28 DIAGNOSIS — Z20.822 CONTACT WITH AND (SUSPECTED) EXPOSURE TO COVID-19: ICD-10-CM

## 2021-03-28 DIAGNOSIS — E78.5 HYPERLIPIDEMIA, UNSPECIFIED: ICD-10-CM

## 2021-03-28 DIAGNOSIS — J44.9 CHRONIC OBSTRUCTIVE PULMONARY DISEASE, UNSPECIFIED: ICD-10-CM

## 2021-03-28 DIAGNOSIS — I10 ESSENTIAL (PRIMARY) HYPERTENSION: ICD-10-CM

## 2021-03-28 DIAGNOSIS — R06.02 SHORTNESS OF BREATH: ICD-10-CM

## 2021-03-28 DIAGNOSIS — Z79.01 LONG TERM (CURRENT) USE OF ANTICOAGULANTS: ICD-10-CM

## 2021-03-28 DIAGNOSIS — Z79.899 OTHER LONG TERM (CURRENT) DRUG THERAPY: ICD-10-CM

## 2021-03-28 DIAGNOSIS — Z88.6 ALLERGY STATUS TO ANALGESIC AGENT: ICD-10-CM

## 2021-03-28 DIAGNOSIS — I48.91 UNSPECIFIED ATRIAL FIBRILLATION: ICD-10-CM

## 2021-03-28 DIAGNOSIS — Z79.82 LONG TERM (CURRENT) USE OF ASPIRIN: ICD-10-CM

## 2021-03-28 DIAGNOSIS — R50.9 FEVER, UNSPECIFIED: ICD-10-CM

## 2021-03-28 DIAGNOSIS — F41.9 ANXIETY DISORDER, UNSPECIFIED: ICD-10-CM

## 2021-03-28 LAB
ALBUMIN SERPL ELPH-MCNC: 3.5 G/DL — SIGNIFICANT CHANGE UP (ref 3.4–5)
ALP SERPL-CCNC: 45 U/L — SIGNIFICANT CHANGE UP (ref 40–120)
ALT FLD-CCNC: 26 U/L — SIGNIFICANT CHANGE UP (ref 12–42)
ANION GAP SERPL CALC-SCNC: 2 MMOL/L — LOW (ref 9–16)
APTT BLD: 26.8 SEC — LOW (ref 27.5–35.5)
AST SERPL-CCNC: 18 U/L — SIGNIFICANT CHANGE UP (ref 15–37)
BASOPHILS # BLD AUTO: 0.03 K/UL — SIGNIFICANT CHANGE UP (ref 0–0.2)
BASOPHILS NFR BLD AUTO: 0.2 % — SIGNIFICANT CHANGE UP (ref 0–2)
BILIRUB SERPL-MCNC: 0.6 MG/DL — SIGNIFICANT CHANGE UP (ref 0.2–1.2)
BUN SERPL-MCNC: 25 MG/DL — HIGH (ref 7–23)
CALCIUM SERPL-MCNC: 9.1 MG/DL — SIGNIFICANT CHANGE UP (ref 8.5–10.5)
CHLORIDE SERPL-SCNC: 106 MMOL/L — SIGNIFICANT CHANGE UP (ref 96–108)
CO2 SERPL-SCNC: 33 MMOL/L — HIGH (ref 22–31)
CREAT SERPL-MCNC: 1.03 MG/DL — SIGNIFICANT CHANGE UP (ref 0.5–1.3)
EOSINOPHIL # BLD AUTO: 0.27 K/UL — SIGNIFICANT CHANGE UP (ref 0–0.5)
EOSINOPHIL NFR BLD AUTO: 2 % — SIGNIFICANT CHANGE UP (ref 0–6)
ETHANOL SERPL-MCNC: <3 MG/DL — SIGNIFICANT CHANGE UP
FLUAV H1 2009 PAND RNA SPEC QL NAA+PROBE: SIGNIFICANT CHANGE UP
FLUAV H1 RNA SPEC QL NAA+PROBE: SIGNIFICANT CHANGE UP
FLUAV H3 RNA SPEC QL NAA+PROBE: SIGNIFICANT CHANGE UP
FLUAV SUBTYP SPEC NAA+PROBE: SIGNIFICANT CHANGE UP
FLUBV RNA SPEC QL NAA+PROBE: SIGNIFICANT CHANGE UP
GLUCOSE SERPL-MCNC: 72 MG/DL — SIGNIFICANT CHANGE UP (ref 70–99)
HCT VFR BLD CALC: 46.3 % — SIGNIFICANT CHANGE UP (ref 39–50)
HGB BLD-MCNC: 15.2 G/DL — SIGNIFICANT CHANGE UP (ref 13–17)
IMM GRANULOCYTES NFR BLD AUTO: 0.7 % — SIGNIFICANT CHANGE UP (ref 0–1.5)
INR BLD: 1.04 — SIGNIFICANT CHANGE UP (ref 0.88–1.16)
LACTATE SERPL-SCNC: 1.2 MMOL/L — SIGNIFICANT CHANGE UP (ref 0.4–2)
LIDOCAIN IGE QN: 204 U/L — SIGNIFICANT CHANGE UP (ref 73–393)
LYMPHOCYTES # BLD AUTO: 17.7 % — SIGNIFICANT CHANGE UP (ref 13–44)
LYMPHOCYTES # BLD AUTO: 2.33 K/UL — SIGNIFICANT CHANGE UP (ref 1–3.3)
MAGNESIUM SERPL-MCNC: 2 MG/DL — SIGNIFICANT CHANGE UP (ref 1.6–2.6)
MCHC RBC-ENTMCNC: 30.8 PG — SIGNIFICANT CHANGE UP (ref 27–34)
MCHC RBC-ENTMCNC: 32.8 GM/DL — SIGNIFICANT CHANGE UP (ref 32–36)
MCV RBC AUTO: 93.7 FL — SIGNIFICANT CHANGE UP (ref 80–100)
MONOCYTES # BLD AUTO: 1.25 K/UL — HIGH (ref 0–0.9)
MONOCYTES NFR BLD AUTO: 9.5 % — SIGNIFICANT CHANGE UP (ref 2–14)
NEUTROPHILS # BLD AUTO: 9.23 K/UL — HIGH (ref 1.8–7.4)
NEUTROPHILS NFR BLD AUTO: 69.9 % — SIGNIFICANT CHANGE UP (ref 43–77)
NRBC # BLD: 0 /100 WBCS — SIGNIFICANT CHANGE UP (ref 0–0)
NT-PROBNP SERPL-SCNC: 1884 PG/ML — HIGH
PCO2 BLDV: 55 MMHG — SIGNIFICANT CHANGE UP (ref 42–55)
PH BLDV: 7.41 — SIGNIFICANT CHANGE UP (ref 7.32–7.43)
PLATELET # BLD AUTO: 193 K/UL — SIGNIFICANT CHANGE UP (ref 150–400)
PO2 BLDV: 33 MMHG — LOW (ref 35–40)
POTASSIUM SERPL-MCNC: 4.2 MMOL/L — SIGNIFICANT CHANGE UP (ref 3.5–5.3)
POTASSIUM SERPL-SCNC: 4.2 MMOL/L — SIGNIFICANT CHANGE UP (ref 3.5–5.3)
PROT SERPL-MCNC: 6.4 G/DL — SIGNIFICANT CHANGE UP (ref 6.4–8.2)
PROTHROM AB SERPL-ACNC: 12.5 SEC — SIGNIFICANT CHANGE UP (ref 10.6–13.6)
RAPID RVP RESULT: SIGNIFICANT CHANGE UP
RBC # BLD: 4.94 M/UL — SIGNIFICANT CHANGE UP (ref 4.2–5.8)
RBC # FLD: 13.1 % — SIGNIFICANT CHANGE UP (ref 10.3–14.5)
SAO2 % BLDV: 64 % — SIGNIFICANT CHANGE UP
SARS-COV-2 RNA SPEC QL NAA+PROBE: SIGNIFICANT CHANGE UP
SODIUM SERPL-SCNC: 141 MMOL/L — SIGNIFICANT CHANGE UP (ref 132–145)
TROPONIN I SERPL-MCNC: <0.017 NG/ML — LOW (ref 0.02–0.06)
WBC # BLD: 13.2 K/UL — HIGH (ref 3.8–10.5)
WBC # FLD AUTO: 13.2 K/UL — HIGH (ref 3.8–10.5)

## 2021-03-28 PROCEDURE — 71046 X-RAY EXAM CHEST 2 VIEWS: CPT | Mod: 26

## 2021-03-28 PROCEDURE — 99284 EMERGENCY DEPT VISIT MOD MDM: CPT

## 2021-03-28 RX ORDER — IPRATROPIUM/ALBUTEROL SULFATE 18-103MCG
3 AEROSOL WITH ADAPTER (GRAM) INHALATION
Refills: 0 | Status: COMPLETED | OUTPATIENT
Start: 2021-03-28 | End: 2021-03-28

## 2021-03-28 RX ORDER — DEXAMETHASONE 0.5 MG/5ML
10 ELIXIR ORAL ONCE
Refills: 0 | Status: DISCONTINUED | OUTPATIENT
Start: 2021-03-28 | End: 2021-03-28

## 2021-03-28 RX ORDER — ALBUTEROL 90 UG/1
1 AEROSOL, METERED ORAL
Refills: 0 | Status: DISCONTINUED | OUTPATIENT
Start: 2021-03-28 | End: 2021-04-01

## 2021-03-28 RX ORDER — ACETAMINOPHEN 500 MG
650 TABLET ORAL ONCE
Refills: 0 | Status: COMPLETED | OUTPATIENT
Start: 2021-03-28 | End: 2021-03-28

## 2021-03-28 RX ORDER — DEXAMETHASONE 0.5 MG/5ML
10 ELIXIR ORAL ONCE
Refills: 0 | Status: COMPLETED | OUTPATIENT
Start: 2021-03-28 | End: 2021-03-28

## 2021-03-28 RX ADMIN — Medication 3 MILLILITER(S): at 14:48

## 2021-03-28 RX ADMIN — Medication 3 MILLILITER(S): at 14:39

## 2021-03-28 RX ADMIN — Medication 650 MILLIGRAM(S): at 14:38

## 2021-03-28 RX ADMIN — Medication 10 MILLIGRAM(S): at 14:51

## 2021-03-28 RX ADMIN — Medication 3 MILLILITER(S): at 14:37

## 2021-03-28 NOTE — ED PROVIDER NOTE - CLINICAL SUMMARY MEDICAL DECISION MAKING FREE TEXT BOX
71 y/o M with complex medical history, including AFIB and anxiety, presents to the ED often with SOB.  His vitals are often abnormal.  Shortly after arrival and work up, he requests to leave and will often sign out AMA.  He also becomes uncooperative with staff surrounding his discharge. Presents today for x1 days of SOB. Will do workup. Will rule out ACS with cardiac enzymes, CXR for pneumonia, COVID swab, treat symptoms and reassess.

## 2021-03-28 NOTE — ED PROVIDER NOTE - PATIENT PORTAL LINK FT
You can access the FollowMyHealth Patient Portal offered by [facility] by registering at the following website: [email or web address]. By joining [facility]’s FollowMyHealth portal, you will also be able to view your health information using other applications (apps) compatible with our system.

## 2021-03-28 NOTE — ED ADULT NURSE NOTE - OBJECTIVE STATEMENT
Pt came in c/o sob. PT states "I had a heart surgery 10 years ago. I don't know what the surgery was for. I had trouble with my breathing for two years. I feel sob when I walk a lot. I started feeling that this morning. My head is also very itchy." PT reports history of asthma/afib and dyspnea on exertion. Resting comfortably in bed O2 sat 97% on RA. Pt denies fever, chills, cp, n/v/d, headache/dizziness. A&Ox3 speaking in full sentences. Placed on continuous o2 monitoring.

## 2021-03-28 NOTE — ED ADULT NURSE REASSESSMENT NOTE - NS ED NURSE REASSESS COMMENT FT1
PT signed out AMA. MD sun. Risk and benefits reviewed with PT. A&Ox3 speaking in full sentences. IV removed. Refused discharge Vitals/paperwork

## 2021-03-28 NOTE — ED PROVIDER NOTE - OBJECTIVE STATEMENT
69 y/o M with complex medical history, including AFIB and anxiety, presents to the ED often with SOB.  His vitals are often abnormal.  Shortly after arrival and work up, he requests to leave and will often sign out AMA.  He also becomes uncooperative with staff surrounding his discharge. Pt also has Hx of Asthma and smoking and is here for previous similar complaints. Presents today for x1 day of mild generalized SOB that he feels he has never had before.  Pt endorses feeling subjective fevers. No pain, no LOC, no N/V, no diarrhea, and no abdominal pain.

## 2021-03-28 NOTE — ED ADULT TRIAGE NOTE - CHIEF COMPLAINT QUOTE
Patient is an active smoker c/o shortness of breath and dizziness. Patient is an active smoker c/o shortness of breath and dizziness. H/o asthma and Afib

## 2021-03-28 NOTE — ED PROVIDER NOTE - PROGRESS NOTE DETAILS
Patient observed to have walked out of Room 19, put on his clothes, and asking staff to remove his IV. Currently appears well, appears nontoxic, is in NAD, and can ambulate with steady, brisk, unassisted gait in the ED.    I feel that the patient has decision making capacity as the patient demonstrates ability to understand the current clinical situation and is able to communicate a choice for what they want to do. There is no evidence of intoxication of altered mentation which would preclude normal cognitive function.     The patient continually is able to express their understanding of the benefits, risks, and alternatives and continues to be able to make logical and rational choices, even though they are not in line with my recommended medical direction or advice. Pt verbalizes: "Yes, give me the forms to sign. I want to leave against medical advice. Take out this IV. I know I can die if I leave before knowing my results. See you in a few weeks."

## 2021-04-03 ENCOUNTER — EMERGENCY (EMERGENCY)
Facility: HOSPITAL | Age: 71
LOS: 1 days | Discharge: HOME | End: 2021-04-03
Attending: EMERGENCY MEDICINE | Admitting: EMERGENCY MEDICINE
Payer: MEDICARE

## 2021-04-03 VITALS
SYSTOLIC BLOOD PRESSURE: 130 MMHG | DIASTOLIC BLOOD PRESSURE: 82 MMHG | OXYGEN SATURATION: 96 % | HEART RATE: 98 BPM | RESPIRATION RATE: 18 BRPM | TEMPERATURE: 98 F

## 2021-04-03 VITALS
TEMPERATURE: 98 F | RESPIRATION RATE: 16 BRPM | DIASTOLIC BLOOD PRESSURE: 71 MMHG | OXYGEN SATURATION: 96 % | SYSTOLIC BLOOD PRESSURE: 110 MMHG | HEIGHT: 66 IN | WEIGHT: 149.91 LBS | HEART RATE: 91 BPM

## 2021-04-03 DIAGNOSIS — Z95.1 PRESENCE OF AORTOCORONARY BYPASS GRAFT: Chronic | ICD-10-CM

## 2021-04-03 DIAGNOSIS — Z95.2 PRESENCE OF PROSTHETIC HEART VALVE: Chronic | ICD-10-CM

## 2021-04-03 LAB
ALBUMIN SERPL ELPH-MCNC: 3.8 G/DL — SIGNIFICANT CHANGE UP (ref 3.4–5)
ALP SERPL-CCNC: 55 U/L — SIGNIFICANT CHANGE UP (ref 40–120)
ALT FLD-CCNC: 36 U/L — SIGNIFICANT CHANGE UP (ref 12–42)
ANION GAP SERPL CALC-SCNC: 4 MMOL/L — LOW (ref 9–16)
APTT BLD: 33.4 SEC — SIGNIFICANT CHANGE UP (ref 27.5–35.5)
AST SERPL-CCNC: 32 U/L — SIGNIFICANT CHANGE UP (ref 15–37)
BASOPHILS # BLD AUTO: 0.04 K/UL — SIGNIFICANT CHANGE UP (ref 0–0.2)
BASOPHILS NFR BLD AUTO: 0.5 % — SIGNIFICANT CHANGE UP (ref 0–2)
BILIRUB SERPL-MCNC: 0.8 MG/DL — SIGNIFICANT CHANGE UP (ref 0.2–1.2)
BUN SERPL-MCNC: 27 MG/DL — HIGH (ref 7–23)
CALCIUM SERPL-MCNC: 8.8 MG/DL — SIGNIFICANT CHANGE UP (ref 8.5–10.5)
CHLORIDE SERPL-SCNC: 102 MMOL/L — SIGNIFICANT CHANGE UP (ref 96–108)
CO2 SERPL-SCNC: 34 MMOL/L — HIGH (ref 22–31)
CREAT SERPL-MCNC: 1.19 MG/DL — SIGNIFICANT CHANGE UP (ref 0.5–1.3)
EOSINOPHIL # BLD AUTO: 0.22 K/UL — SIGNIFICANT CHANGE UP (ref 0–0.5)
EOSINOPHIL NFR BLD AUTO: 2.5 % — SIGNIFICANT CHANGE UP (ref 0–6)
GLUCOSE SERPL-MCNC: 109 MG/DL — HIGH (ref 70–99)
HCT VFR BLD CALC: 47.2 % — SIGNIFICANT CHANGE UP (ref 39–50)
HGB BLD-MCNC: 15.5 G/DL — SIGNIFICANT CHANGE UP (ref 13–17)
IMM GRANULOCYTES NFR BLD AUTO: 0.6 % — SIGNIFICANT CHANGE UP (ref 0–1.5)
INR BLD: 1.03 — SIGNIFICANT CHANGE UP (ref 0.88–1.16)
LYMPHOCYTES # BLD AUTO: 1.5 K/UL — SIGNIFICANT CHANGE UP (ref 1–3.3)
LYMPHOCYTES # BLD AUTO: 17.1 % — SIGNIFICANT CHANGE UP (ref 13–44)
MAGNESIUM SERPL-MCNC: 2 MG/DL — SIGNIFICANT CHANGE UP (ref 1.6–2.6)
MCHC RBC-ENTMCNC: 30.6 PG — SIGNIFICANT CHANGE UP (ref 27–34)
MCHC RBC-ENTMCNC: 32.8 GM/DL — SIGNIFICANT CHANGE UP (ref 32–36)
MCV RBC AUTO: 93.3 FL — SIGNIFICANT CHANGE UP (ref 80–100)
MONOCYTES # BLD AUTO: 0.97 K/UL — HIGH (ref 0–0.9)
MONOCYTES NFR BLD AUTO: 11.1 % — SIGNIFICANT CHANGE UP (ref 2–14)
NEUTROPHILS # BLD AUTO: 5.97 K/UL — SIGNIFICANT CHANGE UP (ref 1.8–7.4)
NEUTROPHILS NFR BLD AUTO: 68.2 % — SIGNIFICANT CHANGE UP (ref 43–77)
NRBC # BLD: 0 /100 WBCS — SIGNIFICANT CHANGE UP (ref 0–0)
NT-PROBNP SERPL-SCNC: 1945 PG/ML — HIGH
PLATELET # BLD AUTO: 194 K/UL — SIGNIFICANT CHANGE UP (ref 150–400)
POTASSIUM SERPL-MCNC: 4.2 MMOL/L — SIGNIFICANT CHANGE UP (ref 3.5–5.3)
POTASSIUM SERPL-SCNC: 4.2 MMOL/L — SIGNIFICANT CHANGE UP (ref 3.5–5.3)
PROT SERPL-MCNC: 6.8 G/DL — SIGNIFICANT CHANGE UP (ref 6.4–8.2)
PROTHROM AB SERPL-ACNC: 12.1 SEC — SIGNIFICANT CHANGE UP (ref 10.6–13.6)
RBC # BLD: 5.06 M/UL — SIGNIFICANT CHANGE UP (ref 4.2–5.8)
RBC # FLD: 13.2 % — SIGNIFICANT CHANGE UP (ref 10.3–14.5)
SARS-COV-2 RNA SPEC QL NAA+PROBE: SIGNIFICANT CHANGE UP
SODIUM SERPL-SCNC: 140 MMOL/L — SIGNIFICANT CHANGE UP (ref 132–145)
TROPONIN I SERPL-MCNC: <0.017 NG/ML — LOW (ref 0.02–0.06)
WBC # BLD: 8.75 K/UL — SIGNIFICANT CHANGE UP (ref 3.8–10.5)
WBC # FLD AUTO: 8.75 K/UL — SIGNIFICANT CHANGE UP (ref 3.8–10.5)

## 2021-04-03 PROCEDURE — 99285 EMERGENCY DEPT VISIT HI MDM: CPT | Mod: 25

## 2021-04-03 PROCEDURE — 71045 X-RAY EXAM CHEST 1 VIEW: CPT | Mod: 26

## 2021-04-03 RX ORDER — ASPIRIN/CALCIUM CARB/MAGNESIUM 324 MG
325 TABLET ORAL ONCE
Refills: 0 | Status: COMPLETED | OUTPATIENT
Start: 2021-04-03 | End: 2021-04-03

## 2021-04-03 RX ORDER — METOPROLOL TARTRATE 50 MG
25 TABLET ORAL ONCE
Refills: 0 | Status: COMPLETED | OUTPATIENT
Start: 2021-04-03 | End: 2021-04-03

## 2021-04-03 RX ADMIN — Medication 325 MILLIGRAM(S): at 14:11

## 2021-04-03 RX ADMIN — Medication 25 MILLIGRAM(S): at 14:12

## 2021-04-03 NOTE — ED PROVIDER NOTE - CLINICAL SUMMARY MEDICAL DECISION MAKING FREE TEXT BOX
SOB  IV, monitor, pulse ox.  Maintaining oxygenation on RA.  Will check EKG and labs.  Will perform CXR to r/o any infiltrates.  Will check cardiac enzymes and BNP. Will give pt aspirin and metoprolol.

## 2021-04-03 NOTE — ED PROVIDER NOTE - WR INTERPRETATION 1
CXR - Hyperinflation. Stable cardiomegaly, status post median sternotomy. Unchanged from CXR performed on 3/28/21.  No heidi infiltrates/effusions/pneumos.

## 2021-04-03 NOTE — ED PROVIDER NOTE - PROGRESS NOTE DETAILS
The patient appears to have eloped from the ED.  Entire ED and waiting area searched by staff.  Patient not found.

## 2021-04-03 NOTE — ED PROVIDER NOTE - OBJECTIVE STATEMENT
Pt is a 71yo M with a h/o HTN, HL, CAD (s/p CABG in past), anxiety, and COPD (not on home O2 and still actively smoking).  Pt is well known to ED as he has had multiple visits here for COPD/SOB.  Today pt reports 2 days of SOB, specifically described as POON with ET decreased to ~2 blocks.  Reports this doesn't feel like his COPD and denies wheezing.  Denies any chest pain.  Pt is noncompliant with his daily medications.  Pt denies any LE edema, abd pain, N/V, fever, chills, cough, URI sxs, or other concerns. Pt is a 69yo M with a h/o HTN, HL, CAD (s/p CABG in past), AVR, anxiety, and COPD (not on home O2 and still actively smoking).  Pt is well known to ED as he has had multiple visits here for COPD/SOB.  Today pt reports 2 days of SOB, specifically described as POON with ET decreased to ~2 blocks.  Reports this doesn't feel like his COPD and denies wheezing.  Denies any chest pain.  Pt is noncompliant with his daily medications.  Pt denies any LE edema, abd pain, N/V, fever, chills, cough, URI sxs, or other concerns.

## 2021-04-03 NOTE — ED PROVIDER NOTE - PHYSICAL EXAMINATION
VITAL SIGNS: I have reviewed nursing notes and confirm.  CONSTITUTIONAL: Well-developed; well-nourished; in no acute distress; smells strongly of cigarettes.   SKIN: Skin is warm and dry, no acute rash.  HEAD: Normocephalic; atraumatic.  EYES: PERRL, EOM intact; conjunctiva and sclera clear.  ENT: No nasal discharge; airway clear.  NECK: Supple; non tender.  CARD: Irregularly irregular rhythm in 90s. No LE edema. +2 radial pulses b/l.   RESP: Mild coarse BS to left but no significant wheezing or rhonchi.  Speaking full sentences.   ABD: Normal bowel sounds; soft; non-distended; non-tender; no hepatosplenomegaly.  MSK: Normal ROM. No clubbing, cyanosis or edema.  NEURO: Alert, oriented. Grossly unremarkable.  PSYCH: Cooperative, appropriate.

## 2021-04-07 DIAGNOSIS — R06.02 SHORTNESS OF BREATH: ICD-10-CM

## 2021-04-07 DIAGNOSIS — Z79.899 OTHER LONG TERM (CURRENT) DRUG THERAPY: ICD-10-CM

## 2021-04-07 DIAGNOSIS — E78.5 HYPERLIPIDEMIA, UNSPECIFIED: ICD-10-CM

## 2021-04-07 DIAGNOSIS — Z88.6 ALLERGY STATUS TO ANALGESIC AGENT: ICD-10-CM

## 2021-04-07 DIAGNOSIS — Z79.01 LONG TERM (CURRENT) USE OF ANTICOAGULANTS: ICD-10-CM

## 2021-04-07 DIAGNOSIS — F41.9 ANXIETY DISORDER, UNSPECIFIED: ICD-10-CM

## 2021-04-07 DIAGNOSIS — Z20.822 CONTACT WITH AND (SUSPECTED) EXPOSURE TO COVID-19: ICD-10-CM

## 2021-04-07 DIAGNOSIS — Z95.1 PRESENCE OF AORTOCORONARY BYPASS GRAFT: ICD-10-CM

## 2021-04-07 DIAGNOSIS — I48.91 UNSPECIFIED ATRIAL FIBRILLATION: ICD-10-CM

## 2021-04-07 DIAGNOSIS — I10 ESSENTIAL (PRIMARY) HYPERTENSION: ICD-10-CM

## 2021-04-07 DIAGNOSIS — Z95.2 PRESENCE OF PROSTHETIC HEART VALVE: ICD-10-CM

## 2021-04-07 DIAGNOSIS — F17.210 NICOTINE DEPENDENCE, CIGARETTES, UNCOMPLICATED: ICD-10-CM

## 2021-04-07 DIAGNOSIS — J44.9 CHRONIC OBSTRUCTIVE PULMONARY DISEASE, UNSPECIFIED: ICD-10-CM

## 2021-04-07 DIAGNOSIS — Z79.82 LONG TERM (CURRENT) USE OF ASPIRIN: ICD-10-CM

## 2021-04-12 ENCOUNTER — EMERGENCY (EMERGENCY)
Facility: HOSPITAL | Age: 71
LOS: 1 days | Discharge: LEFT BEFORE DISCHARGE | End: 2021-04-12
Attending: EMERGENCY MEDICINE | Admitting: EMERGENCY MEDICINE
Payer: MEDICARE

## 2021-04-12 VITALS
TEMPERATURE: 98 F | OXYGEN SATURATION: 98 % | WEIGHT: 169.98 LBS | RESPIRATION RATE: 18 BRPM | DIASTOLIC BLOOD PRESSURE: 101 MMHG | HEIGHT: 66 IN | SYSTOLIC BLOOD PRESSURE: 143 MMHG | HEART RATE: 87 BPM

## 2021-04-12 DIAGNOSIS — F17.200 NICOTINE DEPENDENCE, UNSPECIFIED, UNCOMPLICATED: ICD-10-CM

## 2021-04-12 DIAGNOSIS — F41.9 ANXIETY DISORDER, UNSPECIFIED: ICD-10-CM

## 2021-04-12 DIAGNOSIS — R05 COUGH: ICD-10-CM

## 2021-04-12 DIAGNOSIS — I48.92 UNSPECIFIED ATRIAL FLUTTER: ICD-10-CM

## 2021-04-12 DIAGNOSIS — Z95.1 PRESENCE OF AORTOCORONARY BYPASS GRAFT: Chronic | ICD-10-CM

## 2021-04-12 DIAGNOSIS — J44.9 CHRONIC OBSTRUCTIVE PULMONARY DISEASE, UNSPECIFIED: ICD-10-CM

## 2021-04-12 DIAGNOSIS — Z88.6 ALLERGY STATUS TO ANALGESIC AGENT: ICD-10-CM

## 2021-04-12 DIAGNOSIS — Z79.51 LONG TERM (CURRENT) USE OF INHALED STEROIDS: ICD-10-CM

## 2021-04-12 DIAGNOSIS — Z79.01 LONG TERM (CURRENT) USE OF ANTICOAGULANTS: ICD-10-CM

## 2021-04-12 DIAGNOSIS — R07.9 CHEST PAIN, UNSPECIFIED: ICD-10-CM

## 2021-04-12 DIAGNOSIS — I48.91 UNSPECIFIED ATRIAL FIBRILLATION: ICD-10-CM

## 2021-04-12 DIAGNOSIS — R06.02 SHORTNESS OF BREATH: ICD-10-CM

## 2021-04-12 DIAGNOSIS — E78.5 HYPERLIPIDEMIA, UNSPECIFIED: ICD-10-CM

## 2021-04-12 DIAGNOSIS — Z95.2 PRESENCE OF PROSTHETIC HEART VALVE: Chronic | ICD-10-CM

## 2021-04-12 DIAGNOSIS — Z79.82 LONG TERM (CURRENT) USE OF ASPIRIN: ICD-10-CM

## 2021-04-12 DIAGNOSIS — I25.10 ATHEROSCLEROTIC HEART DISEASE OF NATIVE CORONARY ARTERY WITHOUT ANGINA PECTORIS: ICD-10-CM

## 2021-04-12 DIAGNOSIS — I10 ESSENTIAL (PRIMARY) HYPERTENSION: ICD-10-CM

## 2021-04-12 PROCEDURE — 99284 EMERGENCY DEPT VISIT MOD MDM: CPT | Mod: 25

## 2021-04-12 PROCEDURE — 93010 ELECTROCARDIOGRAM REPORT: CPT

## 2021-04-12 RX ORDER — IPRATROPIUM BROMIDE 0.2 MG/ML
1 SOLUTION, NON-ORAL INHALATION ONCE
Refills: 0 | Status: DISCONTINUED | OUTPATIENT
Start: 2021-04-12 | End: 2021-04-16

## 2021-04-12 RX ORDER — ALBUTEROL 90 UG/1
4 AEROSOL, METERED ORAL ONCE
Refills: 0 | Status: DISCONTINUED | OUTPATIENT
Start: 2021-04-12 | End: 2021-04-16

## 2021-04-12 NOTE — ED PROVIDER NOTE - OBJECTIVE STATEMENT
70M w/ h/o cab, aflutter, asthma, copd, current smoker, HLD, HTN p/w exertional chest pressure and sob. States he walked one block and started having symptoms. Denies n/v, f/c. + cough. Chart review reveals patient presents for similar symptoms multiple times in the past and leaves AMA or elopes

## 2021-04-12 NOTE — ED PROVIDER NOTE - CLINICAL SUMMARY MEDICAL DECISION MAKING FREE TEXT BOX
70M w/ multiple co-morbidities presents w/ exertional chest pain and sob, similar to prior presentations, eloped prior to testing 70M w/ multiple co-morbidities presents w/ exertional chest pain and sob, similar to prior presentations, chf vs copd, lungs coarse, will treat for copd exacerbation and reassess, labs, trop, bnp, ekg

## 2021-04-12 NOTE — ED PROVIDER NOTE - PHYSICAL EXAMINATION
CONSTITUTIONAL: NAD, awake, alert  HEAD: Normocephalic; atraumatic  EYES: EOMI, no nystagmus  ENMT: External appears normal, MMM  NECK: no tenderness, FROM  CARD: Normal Sl, S2; no audible murmurs, cabg scar noted  RESP: coarse breath sounds throughout  ABD: soft, non-distended; non-tender  MSK: no edema, normal ROM in all four extremities  SKIN: Warm, dry, no rashes  NEURO: aaox3, moving all extremities spontaneously

## 2021-04-12 NOTE — ED PROVIDER NOTE - ATTENDING CONTRIBUTION TO CARE
Pt is a 71yo M with a h/o CAD, COPD, A. Flutter, HTN, HL, and active tobacco user.  Pt p/w CP and SOB.  Reports both are exertional and similar to previous presentations.  Pt is well known to me and has had multiple recent presentations where he elopes or signs out AMA.  He was seen by me on 4/3 and eloped prior to completion of work up.  Pt promises he will stay for complete work up today.   ROS - Denies n/v, f/c. + cough.   PE - agree with resident exam as above.   A/P - CP/SOB and POON.  We will obtain EKG, labs, CXR and give breathing treatments.  We will re-evaluate.     The patient appears to have eloped from the ED prior to RN work up.  Entire ED and waiting area searched by staff.  Patient not found.

## 2021-04-12 NOTE — ED PROVIDER NOTE - NS ED ROS FT
General: denies fever, chills  HENT: denies nasal congestion, rhinorrhea  CV: +  chest pain, palpitations  Resp: + difficulty breathing, + cough  Abdominal: denies nausea, vomiting, diarrhea, abdominal pain  MSK: denies muscle aches, leg swelling  Neuro: denies headaches, numbness, tingling  Skin: denies rashes, bruises

## 2021-04-12 NOTE — ED ADULT NURSE REASSESSMENT NOTE - NS ED NURSE REASSESS COMMENT FT1
patient was waiting to be moved to a clean room from stretcher in the hallway; made patient aware that RN at this time was going to start blood work and IV; patient stated he didn't want to stay anymore; MD David aware of this and at bedside; ok to leave as per MD Pedro; patient walked out with steady gait to exit

## 2021-04-20 ENCOUNTER — EMERGENCY (EMERGENCY)
Facility: HOSPITAL | Age: 71
LOS: 1 days | Discharge: LEFT BEFORE DISCHARGE | End: 2021-04-20
Admitting: EMERGENCY MEDICINE
Payer: MEDICARE

## 2021-04-20 VITALS
OXYGEN SATURATION: 98 % | HEART RATE: 99 BPM | SYSTOLIC BLOOD PRESSURE: 148 MMHG | HEIGHT: 66 IN | DIASTOLIC BLOOD PRESSURE: 96 MMHG | WEIGHT: 149.91 LBS | RESPIRATION RATE: 20 BRPM | TEMPERATURE: 98 F

## 2021-04-20 DIAGNOSIS — Z53.21 PROCEDURE AND TREATMENT NOT CARRIED OUT DUE TO PATIENT LEAVING PRIOR TO BEING SEEN BY HEALTH CARE PROVIDER: ICD-10-CM

## 2021-04-20 DIAGNOSIS — R06.02 SHORTNESS OF BREATH: ICD-10-CM

## 2021-04-20 DIAGNOSIS — I10 ESSENTIAL (PRIMARY) HYPERTENSION: ICD-10-CM

## 2021-04-20 DIAGNOSIS — Z95.2 PRESENCE OF PROSTHETIC HEART VALVE: Chronic | ICD-10-CM

## 2021-04-20 DIAGNOSIS — Z88.6 ALLERGY STATUS TO ANALGESIC AGENT: ICD-10-CM

## 2021-04-20 DIAGNOSIS — E78.5 HYPERLIPIDEMIA, UNSPECIFIED: ICD-10-CM

## 2021-04-20 DIAGNOSIS — J44.9 CHRONIC OBSTRUCTIVE PULMONARY DISEASE, UNSPECIFIED: ICD-10-CM

## 2021-04-20 DIAGNOSIS — F41.9 ANXIETY DISORDER, UNSPECIFIED: ICD-10-CM

## 2021-04-20 DIAGNOSIS — Z95.1 PRESENCE OF AORTOCORONARY BYPASS GRAFT: Chronic | ICD-10-CM

## 2021-04-20 PROCEDURE — L9991: CPT

## 2021-04-20 NOTE — ED ADULT NURSE NOTE - CAS TRG GEN SKIN CONDITION
HPI Comments: Patient is a 38 yo male with exposure to STD. States his GF was treated for STD one month prior and states now he has developed some burning/pain with urination. Denies penile discharge, no nausea or vomiting, no fevers or chills, no penis or testicle pain or swelling, no abdominal pain, no further complaints. Patient very well appearing, NAD. Patient is a 39 y.o. male presenting with STD exposure. The history is provided by the patient. No  was used. Exposure to STD   Pertinent negatives include no nausea, no vomiting and no abdominal pain. History reviewed. No pertinent past medical history. History reviewed. No pertinent surgical history. History reviewed. No pertinent family history. Social History     Social History    Marital status: SINGLE     Spouse name: N/A    Number of children: N/A    Years of education: N/A     Occupational History    Not on file. Social History Main Topics    Smoking status: Current Every Day Smoker    Smokeless tobacco: Never Used    Alcohol use Yes      Comment: occ    Drug use: No    Sexual activity: Not on file     Other Topics Concern    Not on file     Social History Narrative    No narrative on file         ALLERGIES: Review of patient's allergies indicates no known allergies. Review of Systems   Constitutional: Negative for chills and fever. HENT: Negative for rhinorrhea and sore throat. Eyes: Negative for visual disturbance. Respiratory: Negative for cough and shortness of breath. Cardiovascular: Negative for chest pain and leg swelling. Gastrointestinal: Negative for abdominal pain, diarrhea, nausea and vomiting. Genitourinary: Positive for dysuria. Musculoskeletal: Negative for back pain and neck pain. Skin: Negative for rash. Neurological: Negative for weakness and headaches. Psychiatric/Behavioral: The patient is not nervous/anxious.         Vitals:    10/22/17 1220   BP: 131/77   Pulse: (!) 56   Resp: 16   Temp: 98 °F (36.7 °C)   SpO2: 99%   Weight: 70.3 kg (155 lb)   Height: 5' 5\" (1.651 m)            Physical Exam   Constitutional: He is oriented to person, place, and time. He appears well-developed and well-nourished. HENT:   Head: Normocephalic. Right Ear: External ear normal.   Left Ear: External ear normal.   Eyes: Conjunctivae and EOM are normal. Pupils are equal, round, and reactive to light. Neck: Normal range of motion. Neck supple. No tracheal deviation present. Cardiovascular: Normal rate, regular rhythm, normal heart sounds and intact distal pulses. No murmur heard. Pulmonary/Chest: Effort normal and breath sounds normal. No respiratory distress. Abdominal: Soft. There is no tenderness. Genitourinary: Penis normal. No penile tenderness. Genitourinary Comments: Penis normal, testicles normal appearing, no penile discharge. Musculoskeletal: Normal range of motion. Neurological: He is alert and oriented to person, place, and time. No cranial nerve deficit. Skin: No rash noted. Nursing note and vitals reviewed. MDM  Number of Diagnoses or Management Options  Exposure to STD: new and requires workup     Amount and/or Complexity of Data Reviewed  Clinical lab tests: ordered and reviewed  Review and summarize past medical records: yes    Risk of Complications, Morbidity, and/or Mortality  Presenting problems: low  Diagnostic procedures: low  Management options: low    Patient Progress  Patient progress: stable    ED Course       Procedures    40 yo male exposure to STD:    Will treat for STD with rocephin and home on doxy. GC/Chlamydia sent from urine. Patient to follow up with PCP for results on these tests and further management or return with any nausea or vomiting, any abdominal pain, any fevers or chills or any further concerns. Patient very well appearing, NAD. Warm/Dry

## 2021-04-24 ENCOUNTER — EMERGENCY (EMERGENCY)
Facility: HOSPITAL | Age: 71
LOS: 1 days | Discharge: HOME | End: 2021-04-24
Attending: EMERGENCY MEDICINE | Admitting: EMERGENCY MEDICINE
Payer: MEDICARE

## 2021-04-24 VITALS
HEIGHT: 66 IN | SYSTOLIC BLOOD PRESSURE: 107 MMHG | OXYGEN SATURATION: 98 % | DIASTOLIC BLOOD PRESSURE: 74 MMHG | WEIGHT: 149.91 LBS | TEMPERATURE: 98 F | HEART RATE: 108 BPM | RESPIRATION RATE: 18 BRPM

## 2021-04-24 VITALS
TEMPERATURE: 98 F | OXYGEN SATURATION: 100 % | DIASTOLIC BLOOD PRESSURE: 78 MMHG | HEART RATE: 96 BPM | SYSTOLIC BLOOD PRESSURE: 140 MMHG | RESPIRATION RATE: 18 BRPM

## 2021-04-24 DIAGNOSIS — I48.91 UNSPECIFIED ATRIAL FIBRILLATION: ICD-10-CM

## 2021-04-24 DIAGNOSIS — J43.9 EMPHYSEMA, UNSPECIFIED: ICD-10-CM

## 2021-04-24 DIAGNOSIS — Z79.82 LONG TERM (CURRENT) USE OF ASPIRIN: ICD-10-CM

## 2021-04-24 DIAGNOSIS — J44.9 CHRONIC OBSTRUCTIVE PULMONARY DISEASE, UNSPECIFIED: ICD-10-CM

## 2021-04-24 DIAGNOSIS — Z88.6 ALLERGY STATUS TO ANALGESIC AGENT: ICD-10-CM

## 2021-04-24 DIAGNOSIS — Z79.52 LONG TERM (CURRENT) USE OF SYSTEMIC STEROIDS: ICD-10-CM

## 2021-04-24 DIAGNOSIS — Z79.51 LONG TERM (CURRENT) USE OF INHALED STEROIDS: ICD-10-CM

## 2021-04-24 DIAGNOSIS — I10 ESSENTIAL (PRIMARY) HYPERTENSION: ICD-10-CM

## 2021-04-24 DIAGNOSIS — Z79.899 OTHER LONG TERM (CURRENT) DRUG THERAPY: ICD-10-CM

## 2021-04-24 DIAGNOSIS — Z95.1 PRESENCE OF AORTOCORONARY BYPASS GRAFT: ICD-10-CM

## 2021-04-24 DIAGNOSIS — Z95.1 PRESENCE OF AORTOCORONARY BYPASS GRAFT: Chronic | ICD-10-CM

## 2021-04-24 DIAGNOSIS — R06.02 SHORTNESS OF BREATH: ICD-10-CM

## 2021-04-24 DIAGNOSIS — F17.210 NICOTINE DEPENDENCE, CIGARETTES, UNCOMPLICATED: ICD-10-CM

## 2021-04-24 DIAGNOSIS — Z95.2 PRESENCE OF PROSTHETIC HEART VALVE: Chronic | ICD-10-CM

## 2021-04-24 DIAGNOSIS — E78.5 HYPERLIPIDEMIA, UNSPECIFIED: ICD-10-CM

## 2021-04-24 DIAGNOSIS — F41.9 ANXIETY DISORDER, UNSPECIFIED: ICD-10-CM

## 2021-04-24 DIAGNOSIS — J45.909 UNSPECIFIED ASTHMA, UNCOMPLICATED: ICD-10-CM

## 2021-04-24 PROCEDURE — 93010 ELECTROCARDIOGRAM REPORT: CPT

## 2021-04-24 PROCEDURE — 99284 EMERGENCY DEPT VISIT MOD MDM: CPT

## 2021-04-24 RX ORDER — METOPROLOL TARTRATE 50 MG
25 TABLET ORAL ONCE
Refills: 0 | Status: COMPLETED | OUTPATIENT
Start: 2021-04-24 | End: 2021-04-24

## 2021-04-24 RX ORDER — ALBUTEROL 90 UG/1
2 AEROSOL, METERED ORAL ONCE
Refills: 0 | Status: COMPLETED | OUTPATIENT
Start: 2021-04-24 | End: 2021-04-24

## 2021-04-24 RX ADMIN — Medication 25 MILLIGRAM(S): at 15:12

## 2021-04-24 RX ADMIN — ALBUTEROL 2 PUFF(S): 90 AEROSOL, METERED ORAL at 15:12

## 2021-04-24 NOTE — ED PROVIDER NOTE - PATIENT PORTAL LINK FT
You can access the FollowMyHealth Patient Portal offered by [facility] by registering at the following website: [email or web address]. By joining [employer]’s FollowMyHealth portal, you will also be able to view your health information using other applications (apps) compatible with our system.

## 2021-04-24 NOTE — ED PROVIDER NOTE - OBJECTIVE STATEMENT
69 yo M w/ PMHx of anxiety, Afib, COPD/asthma, HLD, HTN, current active smoker, presents to the ED w/ SOB on exertion after waking 2 blocks today. Pt states he forgot the Albuterol inhaler. Pt with multiple ED visits for the same complaints; during most recent visit Pt was given an Albuterol inhaler and oral metoprolol for known Afib. On arrival, pt's O2sat 100% on room air. Pt denies fever, chills, chest pain, cough, HA, dizziness, N/V, abdominal pain. Pt is walking without difficulty around the ED.

## 2021-04-24 NOTE — ED PROVIDER NOTE - CLINICAL SUMMARY MEDICAL DECISION MAKING FREE TEXT BOX
Pt, well known to the ED, presents to the ED w/ mild COPD exacerbation in the context of exertion and forgetting Albuterol pump. on arrival, Pt is mildly tachycardic with O2sat 98% on room air; Pt is walking around the ED comfortably without difficulty. Will give 25 of metoprolol for atrial fibrillation and an Albuterol pump for symptom relief. EKG ordered. Anticipate discharge.

## 2021-04-24 NOTE — ED ADULT NURSE NOTE - NSIMPLEMENTINTERV_GEN_ALL_ED
Implemented All Universal Safety Interventions:  [place] to call system. Call bell, personal items and telephone within reach. Instruct patient to call for assistance. Room bathroom lighting operational. Non-slip footwear when patient is off stretcher. Physically safe environment: no spills, clutter or unnecessary equipment. Stretcher in lowest position, wheels locked, appropriate side rails in place.

## 2021-04-24 NOTE — ED PROVIDER NOTE - RESPIRATORY, MLM
Breath sounds clear and equal bilaterally. speaking in full sentences, ambulating around the ED without difficulty

## 2021-04-29 ENCOUNTER — EMERGENCY (EMERGENCY)
Facility: HOSPITAL | Age: 71
LOS: 1 days | Discharge: HOME | End: 2021-04-29
Attending: EMERGENCY MEDICINE | Admitting: EMERGENCY MEDICINE
Payer: MEDICARE

## 2021-04-29 VITALS
WEIGHT: 149.91 LBS | OXYGEN SATURATION: 98 % | TEMPERATURE: 98 F | DIASTOLIC BLOOD PRESSURE: 72 MMHG | SYSTOLIC BLOOD PRESSURE: 114 MMHG | HEART RATE: 91 BPM | RESPIRATION RATE: 18 BRPM | HEIGHT: 66 IN

## 2021-04-29 VITALS
SYSTOLIC BLOOD PRESSURE: 120 MMHG | OXYGEN SATURATION: 100 % | RESPIRATION RATE: 18 BRPM | DIASTOLIC BLOOD PRESSURE: 73 MMHG | HEART RATE: 88 BPM

## 2021-04-29 DIAGNOSIS — F41.9 ANXIETY DISORDER, UNSPECIFIED: ICD-10-CM

## 2021-04-29 DIAGNOSIS — J44.1 CHRONIC OBSTRUCTIVE PULMONARY DISEASE WITH (ACUTE) EXACERBATION: ICD-10-CM

## 2021-04-29 DIAGNOSIS — E78.5 HYPERLIPIDEMIA, UNSPECIFIED: ICD-10-CM

## 2021-04-29 DIAGNOSIS — R06.02 SHORTNESS OF BREATH: ICD-10-CM

## 2021-04-29 DIAGNOSIS — F17.200 NICOTINE DEPENDENCE, UNSPECIFIED, UNCOMPLICATED: ICD-10-CM

## 2021-04-29 DIAGNOSIS — Z95.1 PRESENCE OF AORTOCORONARY BYPASS GRAFT: Chronic | ICD-10-CM

## 2021-04-29 DIAGNOSIS — Z88.6 ALLERGY STATUS TO ANALGESIC AGENT: ICD-10-CM

## 2021-04-29 DIAGNOSIS — Z95.2 PRESENCE OF PROSTHETIC HEART VALVE: Chronic | ICD-10-CM

## 2021-04-29 DIAGNOSIS — J45.909 UNSPECIFIED ASTHMA, UNCOMPLICATED: ICD-10-CM

## 2021-04-29 DIAGNOSIS — I10 ESSENTIAL (PRIMARY) HYPERTENSION: ICD-10-CM

## 2021-04-29 PROCEDURE — 93010 ELECTROCARDIOGRAM REPORT: CPT

## 2021-04-29 PROCEDURE — 71046 X-RAY EXAM CHEST 2 VIEWS: CPT | Mod: 26

## 2021-04-29 PROCEDURE — 99284 EMERGENCY DEPT VISIT MOD MDM: CPT | Mod: 25

## 2021-04-29 RX ORDER — IPRATROPIUM/ALBUTEROL SULFATE 18-103MCG
3 AEROSOL WITH ADAPTER (GRAM) INHALATION ONCE
Refills: 0 | Status: COMPLETED | OUTPATIENT
Start: 2021-04-29 | End: 2021-04-29

## 2021-04-29 RX ADMIN — Medication 60 MILLIGRAM(S): at 12:41

## 2021-04-29 RX ADMIN — Medication 3 MILLILITER(S): at 12:41

## 2021-04-29 RX ADMIN — Medication 3 MILLILITER(S): at 12:35

## 2021-04-29 NOTE — ED ADULT NURSE NOTE - OBJECTIVE STATEMENT
Pt well-known to this ED w/ PMH of COPD and A-fib/A-flutter presents to ED today c/o worsening SOB while walking.  Pt denies cough, fever/chills or CP.  Pt pending medication administration.  Pt ECG in progress.  Pt 100% on RA.

## 2021-04-29 NOTE — ED PROVIDER NOTE - PHYSICAL EXAMINATION
VITAL SIGNS: I have reviewed nursing notes and confirm.  CONSTITUTIONAL: Pt smells of cigarettes.   SKIN: Skin is warm and dry, no acute rash.  HEAD: Normocephalic; atraumatic.  EYES: PERRL, EOM intact; conjunctiva and sclera clear.  ENT: No nasal discharge; airway clear.  NECK: Supple; non tender.  CARD: S1, S2 normal; no murmurs, gallops, or rubs. Regular rate and rhythm.  RESP: Scant scattered expiratory wheezes bilaterally.   ABD: Normal bowel sounds; soft; non-distended; non-tender.  MSK: Normal ROM. No clubbing, cyanosis or edema.  NEURO: Alert, oriented. Grossly unremarkable.  PSYCH: Cooperative, appropriate.

## 2021-04-29 NOTE — ED PROVIDER NOTE - NSFOLLOWUPINSTRUCTIONS_ED_ALL_ED_FT
-PLEASE FOLLOW-UP WITH YOUR PRIMARY CARE DOCTOR IN 1-2 DAYS.  BRING ALL PAPERWORK FROM TODAY'S VISIT TO YOUR FOLLOW-UP VISIT.      -PLEASE GO TO YOUR PHARMACY TO FILL YOUR PRESCRIPTIONS.  PLEASE START TODAY AND USE AS DIRECTED.    -PLEASE RETURN TO THE ER IMMEDIATELY OR CALL 911 FOR ANY HIGH FEVER, TROUBLE BREATHING, VOMITING, SEVERE PAIN, OR ANY OTHER CONCERNS.

## 2021-04-29 NOTE — ED ADULT NURSE NOTE - CHPI ED NUR SYMPTOMS NEG
no body aches/no chest pain/no chills/no cough/no diaphoresis/no edema/no fever/no headache/no hemoptysis/no wheezing
no

## 2021-04-29 NOTE — ED PROVIDER NOTE - OBJECTIVE STATEMENT
71 y/o M with PMHx of HTN, asthma, and COPD (active smoker) presents to the ED for SOB that is consistent with prior episodes of COPD exacerbation. Pt reports he was having SOB after walking a few blocks. He denies fevers, chills, and CP. Of note, Pt is well known to this ED secondary to frequent visits for COPD exacerbation. Pt typically elopes after receiving treatment (see complex care note).

## 2021-04-29 NOTE — ED PROVIDER NOTE - CLINICAL SUMMARY MEDICAL DECISION MAKING FREE TEXT BOX
71 y/o M with Hx of HTN, asthma, and COPD (active smoker) presenting with mild COPD exacerbation. Exam is remarkable for scant scattered expiratory wheezes bilaterally. Pt otherwise appears well and in no acute distress. Will treat with Duoneb and Prednisone. Will reassess afterwards.

## 2021-04-29 NOTE — ED PROVIDER NOTE - PROGRESS NOTE DETAILS
Patient received nebulizer treatments in the ED and feels much better.  Reauscultation shows very clear lungs, no use of accessory muscles of respiration, and the patient is breathing comfortably.  No evidence suggests ACS/PE or PTX as etiologies - this episode is c/w known h/o COPD.

## 2021-04-29 NOTE — ED PROVIDER NOTE - PATIENT PORTAL LINK FT
You can access the FollowMyHealth Patient Portal offered by [facility] by registering at the following website: [email or web address]. By joining [employer]’s FollowMyHealth portal, you will also be able to view your health information using other applications (apps) compatible with our system.

## 2021-05-02 ENCOUNTER — EMERGENCY (EMERGENCY)
Facility: HOSPITAL | Age: 71
LOS: 1 days | Discharge: LEFT BEFORE DISCHARGE | End: 2021-05-02
Admitting: EMERGENCY MEDICINE
Payer: MEDICARE

## 2021-05-02 VITALS
WEIGHT: 149.91 LBS | HEART RATE: 100 BPM | OXYGEN SATURATION: 95 % | SYSTOLIC BLOOD PRESSURE: 109 MMHG | TEMPERATURE: 97 F | HEIGHT: 66 IN | RESPIRATION RATE: 16 BRPM | DIASTOLIC BLOOD PRESSURE: 74 MMHG

## 2021-05-02 DIAGNOSIS — R06.02 SHORTNESS OF BREATH: ICD-10-CM

## 2021-05-02 DIAGNOSIS — Z53.21 PROCEDURE AND TREATMENT NOT CARRIED OUT DUE TO PATIENT LEAVING PRIOR TO BEING SEEN BY HEALTH CARE PROVIDER: ICD-10-CM

## 2021-05-02 DIAGNOSIS — Z95.1 PRESENCE OF AORTOCORONARY BYPASS GRAFT: Chronic | ICD-10-CM

## 2021-05-02 DIAGNOSIS — Z95.2 PRESENCE OF PROSTHETIC HEART VALVE: Chronic | ICD-10-CM

## 2021-05-02 PROCEDURE — L9991: CPT

## 2021-05-05 ENCOUNTER — EMERGENCY (EMERGENCY)
Facility: HOSPITAL | Age: 71
LOS: 1 days | Discharge: HOME | End: 2021-05-05
Attending: EMERGENCY MEDICINE | Admitting: EMERGENCY MEDICINE
Payer: MEDICARE

## 2021-05-05 VITALS
OXYGEN SATURATION: 97 % | HEIGHT: 66 IN | TEMPERATURE: 98 F | DIASTOLIC BLOOD PRESSURE: 77 MMHG | HEART RATE: 98 BPM | WEIGHT: 149.91 LBS | SYSTOLIC BLOOD PRESSURE: 130 MMHG | RESPIRATION RATE: 22 BRPM

## 2021-05-05 DIAGNOSIS — J44.1 CHRONIC OBSTRUCTIVE PULMONARY DISEASE WITH (ACUTE) EXACERBATION: ICD-10-CM

## 2021-05-05 DIAGNOSIS — F17.200 NICOTINE DEPENDENCE, UNSPECIFIED, UNCOMPLICATED: ICD-10-CM

## 2021-05-05 DIAGNOSIS — Z88.6 ALLERGY STATUS TO ANALGESIC AGENT: ICD-10-CM

## 2021-05-05 DIAGNOSIS — R06.02 SHORTNESS OF BREATH: ICD-10-CM

## 2021-05-05 DIAGNOSIS — Z95.1 PRESENCE OF AORTOCORONARY BYPASS GRAFT: ICD-10-CM

## 2021-05-05 DIAGNOSIS — Z95.1 PRESENCE OF AORTOCORONARY BYPASS GRAFT: Chronic | ICD-10-CM

## 2021-05-05 DIAGNOSIS — I10 ESSENTIAL (PRIMARY) HYPERTENSION: ICD-10-CM

## 2021-05-05 DIAGNOSIS — I48.91 UNSPECIFIED ATRIAL FIBRILLATION: ICD-10-CM

## 2021-05-05 DIAGNOSIS — J44.9 CHRONIC OBSTRUCTIVE PULMONARY DISEASE, UNSPECIFIED: ICD-10-CM

## 2021-05-05 DIAGNOSIS — Z79.899 OTHER LONG TERM (CURRENT) DRUG THERAPY: ICD-10-CM

## 2021-05-05 DIAGNOSIS — Z95.2 PRESENCE OF PROSTHETIC HEART VALVE: Chronic | ICD-10-CM

## 2021-05-05 PROCEDURE — 99284 EMERGENCY DEPT VISIT MOD MDM: CPT | Mod: 25

## 2021-05-05 PROCEDURE — 93010 ELECTROCARDIOGRAM REPORT: CPT

## 2021-05-05 RX ORDER — IPRATROPIUM/ALBUTEROL SULFATE 18-103MCG
3 AEROSOL WITH ADAPTER (GRAM) INHALATION ONCE
Refills: 0 | Status: COMPLETED | OUTPATIENT
Start: 2021-05-05 | End: 2021-05-05

## 2021-05-05 RX ORDER — DEXAMETHASONE 0.5 MG/5ML
6 ELIXIR ORAL ONCE
Refills: 0 | Status: COMPLETED | OUTPATIENT
Start: 2021-05-05 | End: 2021-05-05

## 2021-05-05 RX ADMIN — Medication 3 MILLILITER(S): at 12:22

## 2021-05-05 RX ADMIN — Medication 6 MILLIGRAM(S): at 12:21

## 2021-05-05 NOTE — ED PROVIDER NOTE - PATIENT PORTAL LINK FT
You can access the FollowMyHealth Patient Portal offered by [facility] by registering at the following website: [email or web address]. By joining [employer]’s FollowMyHealth portal, you will also be able to view your health information using other applications (apps) compatible with our system.

## 2021-05-05 NOTE — ED PROVIDER NOTE - CLINICAL SUMMARY MEDICAL DECISION MAKING FREE TEXT BOX
Pt treated for copd exacerbation, similar to prior episodes, as usual pt refused blood work or further work up and requested to be discharged after breathing txs and dose of decadron.

## 2021-05-05 NOTE — ED ADULT NURSE NOTE - OBJECTIVE STATEMENT
Pt came in c/o of sob and cp when taking deep breaths after walking two blocks today. Pt states this has been an ongoing problem for years. Pt denies fever, chills, n/v/d. A&Ox3 speaking in full sentences. Pt placed on continuous o2/hr monitoring. 97% on RA

## 2021-05-05 NOTE — ED PROVIDER NOTE - OBJECTIVE STATEMENT
71 yo male pt, hx of afib, HTN, COPD, active smoker, well known to our ED for visits related to COPD exacerbation. Presents w sob, worse w walking a couple of blocks. no chest pain, no fever, no chills, no HA, no neck pain, no abd pain. Similar to prior presentation with copd exacerbation. Pt known to be uncompliant w outpt treatment and usually does not complete diagnostic testing in ED.

## 2021-05-07 ENCOUNTER — EMERGENCY (EMERGENCY)
Facility: HOSPITAL | Age: 71
LOS: 1 days | Discharge: HOME | End: 2021-05-07
Admitting: EMERGENCY MEDICINE
Payer: MEDICARE

## 2021-05-07 VITALS
DIASTOLIC BLOOD PRESSURE: 74 MMHG | HEART RATE: 100 BPM | TEMPERATURE: 99 F | OXYGEN SATURATION: 98 % | HEIGHT: 66 IN | WEIGHT: 160.06 LBS | RESPIRATION RATE: 18 BRPM | SYSTOLIC BLOOD PRESSURE: 130 MMHG

## 2021-05-07 DIAGNOSIS — Z95.2 PRESENCE OF PROSTHETIC HEART VALVE: Chronic | ICD-10-CM

## 2021-05-07 DIAGNOSIS — Z95.1 PRESENCE OF AORTOCORONARY BYPASS GRAFT: Chronic | ICD-10-CM

## 2021-05-07 DIAGNOSIS — I10 ESSENTIAL (PRIMARY) HYPERTENSION: ICD-10-CM

## 2021-05-07 DIAGNOSIS — R07.89 OTHER CHEST PAIN: ICD-10-CM

## 2021-05-07 DIAGNOSIS — F17.200 NICOTINE DEPENDENCE, UNSPECIFIED, UNCOMPLICATED: ICD-10-CM

## 2021-05-07 DIAGNOSIS — F41.9 ANXIETY DISORDER, UNSPECIFIED: ICD-10-CM

## 2021-05-07 DIAGNOSIS — Z88.6 ALLERGY STATUS TO ANALGESIC AGENT: ICD-10-CM

## 2021-05-07 DIAGNOSIS — E78.5 HYPERLIPIDEMIA, UNSPECIFIED: ICD-10-CM

## 2021-05-07 DIAGNOSIS — I48.91 UNSPECIFIED ATRIAL FIBRILLATION: ICD-10-CM

## 2021-05-07 DIAGNOSIS — J44.1 CHRONIC OBSTRUCTIVE PULMONARY DISEASE WITH (ACUTE) EXACERBATION: ICD-10-CM

## 2021-05-07 PROCEDURE — 93010 ELECTROCARDIOGRAM REPORT: CPT

## 2021-05-07 PROCEDURE — 99285 EMERGENCY DEPT VISIT HI MDM: CPT

## 2021-05-07 RX ORDER — DEXAMETHASONE 0.5 MG/5ML
10 ELIXIR ORAL ONCE
Refills: 0 | Status: COMPLETED | OUTPATIENT
Start: 2021-05-07 | End: 2021-05-07

## 2021-05-07 RX ORDER — IPRATROPIUM/ALBUTEROL SULFATE 18-103MCG
3 AEROSOL WITH ADAPTER (GRAM) INHALATION ONCE
Refills: 0 | Status: COMPLETED | OUTPATIENT
Start: 2021-05-07 | End: 2021-05-07

## 2021-05-07 RX ADMIN — Medication 10 MILLIGRAM(S): at 19:06

## 2021-05-07 RX ADMIN — Medication 3 MILLILITER(S): at 18:45

## 2021-05-07 RX ADMIN — Medication 3 MILLILITER(S): at 19:06

## 2021-05-07 NOTE — ED PROVIDER NOTE - CLINICAL SUMMARY MEDICAL DECISION MAKING FREE TEXT BOX
71 y/o M presents to ED c/o shortness of breath and cough similar to multiple prior visits.  Pt laying flat and prone on stretcher, sleeping during ED visit.  LSCTA.  Pt given nebs and Decadron.

## 2021-05-07 NOTE — ED PROVIDER NOTE - PATIENT PORTAL LINK FT
You can access the FollowMyHealth Patient Portal offered by [facility] by registering at the following website: [email or web address]. By joining [employer]’s FollowMyHealth portal, you will also be able to view your health information using other applications (apps) compatible with our system.

## 2021-05-07 NOTE — ED PROVIDER NOTE - OBJECTIVE STATEMENT
69 y/o M with PMH of anxiety, COPD, active smoker, HTN, HLD, afib presents to ED c/o cough and SOB after walking 2 blocks.  Pt is familiar to ED.  He frequently visits with similar complaints and historically does not complete ED workup/testing and refuses further evaluation.  Pt states his shortness of breath is similar to prior presentations.  He denies fever/chills, chest pain (despite triage note), abd pain, n/v, weakness, numbness.

## 2021-05-07 NOTE — ED ADULT TRIAGE NOTE - CHIEF COMPLAINT QUOTE
non radiating chest pain and sob after walking. denies any n/v. ambulatory with steady gait, states compliant with medications.

## 2021-05-09 ENCOUNTER — EMERGENCY (EMERGENCY)
Facility: HOSPITAL | Age: 71
LOS: 1 days | Discharge: LEFT BEFORE DISCHARGE | End: 2021-05-09
Admitting: EMERGENCY MEDICINE
Payer: MEDICARE

## 2021-05-09 VITALS
OXYGEN SATURATION: 99 % | HEIGHT: 66 IN | TEMPERATURE: 97 F | HEART RATE: 90 BPM | RESPIRATION RATE: 18 BRPM | SYSTOLIC BLOOD PRESSURE: 148 MMHG | DIASTOLIC BLOOD PRESSURE: 89 MMHG

## 2021-05-09 DIAGNOSIS — I10 ESSENTIAL (PRIMARY) HYPERTENSION: ICD-10-CM

## 2021-05-09 DIAGNOSIS — Z95.2 PRESENCE OF PROSTHETIC HEART VALVE: Chronic | ICD-10-CM

## 2021-05-09 DIAGNOSIS — Z53.21 PROCEDURE AND TREATMENT NOT CARRIED OUT DUE TO PATIENT LEAVING PRIOR TO BEING SEEN BY HEALTH CARE PROVIDER: ICD-10-CM

## 2021-05-09 DIAGNOSIS — E78.5 HYPERLIPIDEMIA, UNSPECIFIED: ICD-10-CM

## 2021-05-09 DIAGNOSIS — Z95.1 PRESENCE OF AORTOCORONARY BYPASS GRAFT: Chronic | ICD-10-CM

## 2021-05-09 DIAGNOSIS — Z00.00 ENCOUNTER FOR GENERAL ADULT MEDICAL EXAMINATION WITHOUT ABNORMAL FINDINGS: ICD-10-CM

## 2021-05-09 PROCEDURE — L9991: CPT

## 2021-05-09 NOTE — ED ADULT NURSE NOTE - OBJECTIVE STATEMENT
Pt well-known to this ED presents c/o several days of inability to sleep.  Patient provided for rounding. Patient in no apparent distress. Resting comfortably. Patient provided for emotional support, comfort, safety, and review of plan of care. Will continue to monitor.

## 2021-05-13 ENCOUNTER — EMERGENCY (EMERGENCY)
Facility: HOSPITAL | Age: 71
LOS: 1 days | Discharge: LEFT BEFORE DISCHARGE | End: 2021-05-13
Attending: EMERGENCY MEDICINE | Admitting: EMERGENCY MEDICINE
Payer: MEDICARE

## 2021-05-13 VITALS
OXYGEN SATURATION: 98 % | DIASTOLIC BLOOD PRESSURE: 74 MMHG | HEART RATE: 100 BPM | TEMPERATURE: 99 F | SYSTOLIC BLOOD PRESSURE: 124 MMHG | RESPIRATION RATE: 18 BRPM | HEIGHT: 66 IN

## 2021-05-13 DIAGNOSIS — R05 COUGH: ICD-10-CM

## 2021-05-13 DIAGNOSIS — Z95.1 PRESENCE OF AORTOCORONARY BYPASS GRAFT: Chronic | ICD-10-CM

## 2021-05-13 DIAGNOSIS — Z95.2 PRESENCE OF PROSTHETIC HEART VALVE: Chronic | ICD-10-CM

## 2021-05-13 DIAGNOSIS — Z53.21 PROCEDURE AND TREATMENT NOT CARRIED OUT DUE TO PATIENT LEAVING PRIOR TO BEING SEEN BY HEALTH CARE PROVIDER: ICD-10-CM

## 2021-05-13 DIAGNOSIS — R06.02 SHORTNESS OF BREATH: ICD-10-CM

## 2021-05-13 PROCEDURE — L9991: CPT

## 2021-05-13 NOTE — ED ADULT NURSE NOTE - NSIMPLEMENTINTERV_GEN_ALL_ED
Implemented All Universal Safety Interventions:  [place] to call system. Call bell, personal items and telephone within reach. Instruct patient to call for assistance. Room bathroom lighting operational. Non-slip footwear when patient is off stretcher. Physically safe environment: no spills, clutter or unnecessary equipment. Stretcher in lowest position, wheels locked, appropriate side rails in place.

## 2021-05-15 ENCOUNTER — EMERGENCY (EMERGENCY)
Facility: HOSPITAL | Age: 71
LOS: 1 days | Discharge: LEFT BEFORE DISCHARGE | End: 2021-05-15
Admitting: EMERGENCY MEDICINE
Payer: MEDICARE

## 2021-05-15 VITALS
DIASTOLIC BLOOD PRESSURE: 78 MMHG | OXYGEN SATURATION: 97 % | SYSTOLIC BLOOD PRESSURE: 136 MMHG | WEIGHT: 149.91 LBS | TEMPERATURE: 98 F | HEART RATE: 88 BPM | HEIGHT: 66 IN | RESPIRATION RATE: 20 BRPM

## 2021-05-15 DIAGNOSIS — R07.89 OTHER CHEST PAIN: ICD-10-CM

## 2021-05-15 DIAGNOSIS — Z95.1 PRESENCE OF AORTOCORONARY BYPASS GRAFT: Chronic | ICD-10-CM

## 2021-05-15 DIAGNOSIS — R06.02 SHORTNESS OF BREATH: ICD-10-CM

## 2021-05-15 DIAGNOSIS — Z95.2 PRESENCE OF PROSTHETIC HEART VALVE: Chronic | ICD-10-CM

## 2021-05-15 DIAGNOSIS — Z53.21 PROCEDURE AND TREATMENT NOT CARRIED OUT DUE TO PATIENT LEAVING PRIOR TO BEING SEEN BY HEALTH CARE PROVIDER: ICD-10-CM

## 2021-05-15 PROCEDURE — L9991: CPT

## 2021-05-19 ENCOUNTER — EMERGENCY (EMERGENCY)
Facility: HOSPITAL | Age: 71
LOS: 1 days | Discharge: LEFT BEFORE DISCHARGE | End: 2021-05-19
Attending: EMERGENCY MEDICINE | Admitting: EMERGENCY MEDICINE
Payer: MEDICARE

## 2021-05-19 VITALS
RESPIRATION RATE: 18 BRPM | DIASTOLIC BLOOD PRESSURE: 65 MMHG | WEIGHT: 164.91 LBS | HEIGHT: 66 IN | TEMPERATURE: 98 F | OXYGEN SATURATION: 98 % | HEART RATE: 93 BPM | SYSTOLIC BLOOD PRESSURE: 107 MMHG

## 2021-05-19 DIAGNOSIS — Z53.21 PROCEDURE AND TREATMENT NOT CARRIED OUT DUE TO PATIENT LEAVING PRIOR TO BEING SEEN BY HEALTH CARE PROVIDER: ICD-10-CM

## 2021-05-19 DIAGNOSIS — R05 COUGH: ICD-10-CM

## 2021-05-19 DIAGNOSIS — Z95.1 PRESENCE OF AORTOCORONARY BYPASS GRAFT: Chronic | ICD-10-CM

## 2021-05-19 DIAGNOSIS — Z95.2 PRESENCE OF PROSTHETIC HEART VALVE: Chronic | ICD-10-CM

## 2021-05-19 PROCEDURE — L9991: CPT

## 2021-05-21 ENCOUNTER — EMERGENCY (EMERGENCY)
Facility: HOSPITAL | Age: 71
LOS: 1 days | Discharge: LEFT BEFORE DISCHARGE | End: 2021-05-21
Attending: EMERGENCY MEDICINE | Admitting: EMERGENCY MEDICINE
Payer: MEDICARE

## 2021-05-21 VITALS
HEIGHT: 66 IN | DIASTOLIC BLOOD PRESSURE: 82 MMHG | HEART RATE: 102 BPM | TEMPERATURE: 98 F | OXYGEN SATURATION: 97 % | RESPIRATION RATE: 18 BRPM | SYSTOLIC BLOOD PRESSURE: 126 MMHG

## 2021-05-21 DIAGNOSIS — Z95.2 PRESENCE OF PROSTHETIC HEART VALVE: Chronic | ICD-10-CM

## 2021-05-21 DIAGNOSIS — Z53.21 PROCEDURE AND TREATMENT NOT CARRIED OUT DUE TO PATIENT LEAVING PRIOR TO BEING SEEN BY HEALTH CARE PROVIDER: ICD-10-CM

## 2021-05-21 DIAGNOSIS — R05 COUGH: ICD-10-CM

## 2021-05-21 DIAGNOSIS — Z95.1 PRESENCE OF AORTOCORONARY BYPASS GRAFT: Chronic | ICD-10-CM

## 2021-05-21 DIAGNOSIS — Z88.6 ALLERGY STATUS TO ANALGESIC AGENT: ICD-10-CM

## 2021-05-21 PROCEDURE — L9991: CPT

## 2021-05-21 NOTE — ED PROVIDER NOTE - OBJECTIVE STATEMENT
Like two days ago, patient appears to have left immediately after triage. No in chair 5 mins after triage, checked traci cx 3 and checked bathrooms. Well known to us.

## 2021-05-23 ENCOUNTER — EMERGENCY (EMERGENCY)
Facility: HOSPITAL | Age: 71
LOS: 1 days | Discharge: HOME | End: 2021-05-23
Attending: EMERGENCY MEDICINE | Admitting: EMERGENCY MEDICINE
Payer: MEDICARE

## 2021-05-23 VITALS
HEART RATE: 98 BPM | RESPIRATION RATE: 16 BRPM | TEMPERATURE: 98 F | OXYGEN SATURATION: 97 % | HEIGHT: 66 IN | SYSTOLIC BLOOD PRESSURE: 113 MMHG | DIASTOLIC BLOOD PRESSURE: 79 MMHG

## 2021-05-23 DIAGNOSIS — R06.02 SHORTNESS OF BREATH: ICD-10-CM

## 2021-05-23 DIAGNOSIS — J44.1 CHRONIC OBSTRUCTIVE PULMONARY DISEASE WITH (ACUTE) EXACERBATION: ICD-10-CM

## 2021-05-23 DIAGNOSIS — J45.909 UNSPECIFIED ASTHMA, UNCOMPLICATED: ICD-10-CM

## 2021-05-23 DIAGNOSIS — Z95.2 PRESENCE OF PROSTHETIC HEART VALVE: Chronic | ICD-10-CM

## 2021-05-23 DIAGNOSIS — Z95.1 PRESENCE OF AORTOCORONARY BYPASS GRAFT: Chronic | ICD-10-CM

## 2021-05-23 DIAGNOSIS — Z88.6 ALLERGY STATUS TO ANALGESIC AGENT: ICD-10-CM

## 2021-05-23 DIAGNOSIS — F41.9 ANXIETY DISORDER, UNSPECIFIED: ICD-10-CM

## 2021-05-23 DIAGNOSIS — E78.5 HYPERLIPIDEMIA, UNSPECIFIED: ICD-10-CM

## 2021-05-23 DIAGNOSIS — F17.210 NICOTINE DEPENDENCE, CIGARETTES, UNCOMPLICATED: ICD-10-CM

## 2021-05-23 DIAGNOSIS — I10 ESSENTIAL (PRIMARY) HYPERTENSION: ICD-10-CM

## 2021-05-23 DIAGNOSIS — L29.9 PRURITUS, UNSPECIFIED: ICD-10-CM

## 2021-05-23 DIAGNOSIS — Z95.1 PRESENCE OF AORTOCORONARY BYPASS GRAFT: ICD-10-CM

## 2021-05-23 PROCEDURE — 99284 EMERGENCY DEPT VISIT MOD MDM: CPT

## 2021-05-23 RX ORDER — HYDROXYZINE HCL 10 MG
25 TABLET ORAL ONCE
Refills: 0 | Status: COMPLETED | OUTPATIENT
Start: 2021-05-23 | End: 2021-05-23

## 2021-05-23 RX ORDER — ALBUTEROL 90 UG/1
2 AEROSOL, METERED ORAL ONCE
Refills: 0 | Status: COMPLETED | OUTPATIENT
Start: 2021-05-23 | End: 2021-05-23

## 2021-05-23 RX ADMIN — Medication 25 MILLIGRAM(S): at 11:49

## 2021-05-23 RX ADMIN — ALBUTEROL 2 PUFF(S): 90 AEROSOL, METERED ORAL at 11:49

## 2021-05-23 NOTE — ED PROVIDER NOTE - PHYSICAL EXAMINATION
VITAL SIGNS: I have reviewed nursing notes and confirm.  CONSTITUTIONAL: Well-developed; well-nourished; in no acute distress.  Smells of cigarettes.   SKIN: Skin is warm and dry, no acute rash or infestations.   HEAD: Normocephalic; atraumatic.  EYES: PERRL, EOM intact; conjunctiva and sclera clear.  ENT: No nasal discharge; airway clear.  NECK: Supple; non tender.  CARD: S1, S2 normal; no murmurs, gallops, or rubs. Regular rate and rhythm.  RESP: Mild scattered wheezing, speaking full sentences, normal RR.    ABD: Normal bowel sounds; soft; non-distended; non-tender; no hepatosplenomegaly.  MSK: Normal ROM. No clubbing, cyanosis or edema.  NEURO: Alert, oriented. Grossly unremarkable.  PSYCH: Cooperative, appropriate.

## 2021-05-23 NOTE — ED PROVIDER NOTE - CLINICAL SUMMARY MEDICAL DECISION MAKING FREE TEXT BOX
1. Mild exacerbation of COPD - likely triggered from smoking.  COunseling performed.  Albuterol administered and pt improved.   2. Scalp itch - no e/o infestation or skin changes.  Gave atarax - improved.

## 2021-05-23 NOTE — ED PROVIDER NOTE - OBJECTIVE STATEMENT
Pt is a 71yo M with a h/o HTN, HL, COPD, anxiety, and active tobacco user who presents with SOB and itchy scalp.  Pt is requesting an albuterol pump and help with the itching.  He reports that this does not feel as bad as his usual COPD exacerbations.  Denies CP, exertional SOB, orthopnea, fever, chills, cough, or other respiratory concerns.     Scalp - Denies any changes in skin, denies any new exposures.

## 2021-05-25 ENCOUNTER — EMERGENCY (EMERGENCY)
Facility: HOSPITAL | Age: 71
LOS: 1 days | Discharge: HOME | End: 2021-05-25
Attending: EMERGENCY MEDICINE | Admitting: EMERGENCY MEDICINE
Payer: MEDICARE

## 2021-05-25 VITALS
WEIGHT: 149.91 LBS | OXYGEN SATURATION: 97 % | RESPIRATION RATE: 18 BRPM | TEMPERATURE: 97 F | HEIGHT: 66 IN | DIASTOLIC BLOOD PRESSURE: 67 MMHG | SYSTOLIC BLOOD PRESSURE: 123 MMHG | HEART RATE: 90 BPM

## 2021-05-25 DIAGNOSIS — Z95.2 PRESENCE OF PROSTHETIC HEART VALVE: Chronic | ICD-10-CM

## 2021-05-25 DIAGNOSIS — E78.5 HYPERLIPIDEMIA, UNSPECIFIED: ICD-10-CM

## 2021-05-25 DIAGNOSIS — J45.909 UNSPECIFIED ASTHMA, UNCOMPLICATED: ICD-10-CM

## 2021-05-25 DIAGNOSIS — Z95.1 PRESENCE OF AORTOCORONARY BYPASS GRAFT: Chronic | ICD-10-CM

## 2021-05-25 DIAGNOSIS — I10 ESSENTIAL (PRIMARY) HYPERTENSION: ICD-10-CM

## 2021-05-25 DIAGNOSIS — Z88.6 ALLERGY STATUS TO ANALGESIC AGENT: ICD-10-CM

## 2021-05-25 DIAGNOSIS — J43.9 EMPHYSEMA, UNSPECIFIED: ICD-10-CM

## 2021-05-25 DIAGNOSIS — R05 COUGH: ICD-10-CM

## 2021-05-25 DIAGNOSIS — F41.9 ANXIETY DISORDER, UNSPECIFIED: ICD-10-CM

## 2021-05-25 DIAGNOSIS — F17.200 NICOTINE DEPENDENCE, UNSPECIFIED, UNCOMPLICATED: ICD-10-CM

## 2021-05-25 PROCEDURE — 99284 EMERGENCY DEPT VISIT MOD MDM: CPT

## 2021-05-25 PROCEDURE — 71046 X-RAY EXAM CHEST 2 VIEWS: CPT | Mod: 26

## 2021-05-25 RX ORDER — DIPHENHYDRAMINE HCL 50 MG
25 CAPSULE ORAL EVERY 4 HOURS
Refills: 0 | Status: DISCONTINUED | OUTPATIENT
Start: 2021-05-25 | End: 2021-05-28

## 2021-05-25 RX ORDER — ALBUTEROL 90 UG/1
1 AEROSOL, METERED ORAL ONCE
Refills: 0 | Status: COMPLETED | OUTPATIENT
Start: 2021-05-25 | End: 2021-05-25

## 2021-05-25 RX ORDER — IPRATROPIUM/ALBUTEROL SULFATE 18-103MCG
3 AEROSOL WITH ADAPTER (GRAM) INHALATION ONCE
Refills: 0 | Status: COMPLETED | OUTPATIENT
Start: 2021-05-25 | End: 2021-05-25

## 2021-05-25 RX ADMIN — Medication 25 MILLIGRAM(S): at 12:46

## 2021-05-25 RX ADMIN — ALBUTEROL 1 PUFF(S): 90 AEROSOL, METERED ORAL at 12:46

## 2021-05-25 NOTE — ED PROVIDER NOTE - NSFOLLOWUPINSTRUCTIONS_ED_ALL_ED_FT
Chronic Bronchitis    WHAT YOU NEED TO KNOW:    What is chronic bronchitis? Chronic bronchitis is a long-term swelling and irritation in the air passages in your lungs. The irritation may damage your lungs. The lung damage often gets worse over time, and it is usually permanent. Chronic bronchitis is part of a group of lung diseases called chronic obstructive pulmonary disease (COPD).    What causes or increases my risk for chronic bronchitis? A family history of lung disease can increase your risk for chronic bronchitis. The following may cause lung damage that can increase your risk:   •Smoking causes most cases of chronic bronchitis. The more you smoke, the more damage you do to your lungs. You may also be at risk if you live or work around others who smoke, even if you do not smoke.       •Exposure to lung irritants such as dust and chemical fumes in your workplace can damage your lungs over time. Your risk also increases if you live in an area with heavy air pollution.      •Frequent lung infections may damage your lungs over time.      •Alpha-1 antitrypsin (AAT) deficiency means you lack a protein in your blood called alpha-1 antitrypsin. This protein helps protect your lungs from damage. A lack of AAT can increase your risk for lung problems. AAT deficiency is a rare genetic (inherited) problem that can be treated.      What are the signs and symptoms of chronic bronchitis?   •Early signs and symptoms may include shortness of breath, a runny or stuffy nose, or a headache. You may have a morning cough that brings up mucus from the lungs. As time passes, the amount of mucus coughed up begins to increase. The cough also starts to last longer during the day. You may have a bad taste in your mouth or bad breath.       •Later signs and symptoms may include your skin, nail beds, or lips turning dusky or blue. You may become more short of breath and get tired more easily. You may not be able to walk as far as you used to. You may wheeze. You may notice your breathing is faster than it used to be.       What is an exacerbation of chronic bronchitis? An exacerbation is when your symptoms get much worse very quickly. Exacerbations of chronic bronchitis can be triggered by infections such as a cold or the flu. Lung irritants such as air pollution, dust, fumes, or smoke can also trigger an exacerbation. Exacerbations of chronic bronchitis can be life-threatening.     How is chronic bronchitis diagnosed? Your healthcare provider will ask about your signs and symptoms. Tell him about other medical conditions you have. He will examine you and listen to your heart and lungs. You may also need any of the following:   •Blood tests may be used to show infection, test kidney function, or give information about your overall health.       •X-ray pictures of your lungs and heart may show signs of infection, such as pneumonia, or a collapsed lung. X-rays may also show tumors, broken ribs, or fluid around your heart and lungs.       •Pulmonary function tests (PFTs) help healthcare providers learn how well your body uses oxygen. You breathe into a mouthpiece connected to a machine. The machine measures how much air you breathe in and out over a certain amount of time. PFTs help your healthcare providers decide the best treatment for you.       •Spirometry is a type of lung function test that measures how well you can breathe. You will take a deep breath and then push the air out as fast as you can. This test measures how much air you are able to push out.      How is chronic bronchitis treated?   •Bronchodilators help you breathe easier and cough less. You may be given your medicine in a mist form so that you can breathe it into your lungs. Your medicine may be delivered through an inhaler or through a mask attached to oxygen. Ask your healthcare provider to show you how to use your inhaler correctly.   Metered Dose Inhaler           •Steroids help decrease inflammation in your airway so that you can breathe easier.      •Extra oxygen may be needed if your blood oxygen level is lower than it should be.       How can I care for myself?   •Sleep with your upper body raised. This will help you breathe easier. You can use foam wedges or elevate the head of your bed. Many devices are available to help raise your upper body while in bed. Use a device that will tilt your whole body, or bend your body at the waist. The device should not bend your body at the upper back or neck.       •Prevent the spread of germs: ?Wash your hands often with soap and water. Carry germ-killing gel with you. You can use the gel to clean your hands when soap and water are not available.   Handwashing           ?Do not touch your eyes, nose, or mouth unless you have washed your hands first.       ?Always cover your mouth when you cough. Cough into a tissue or your shirtsleeve so you do not spread germs from your hands.       ?Try to avoid people who have a cold or the flu. If you are sick, stay away from others as much as possible.       •Do not smoke. Nicotine and other substances can cause lung damage. Do not use e-cigarettes or smokeless tobacco without first talking to your healthcare provider. They still contain nicotine. Ask your healthcare provider for information if you currently smoke and need help to quit.       •Avoid lung irritants. Stay out of high altitudes and places with high humidity. Stay inside, or cover your mouth and nose with a scarf when you are outside during cold weather. Stay inside on days when air pollution or pollen counts are high. Do not use aerosol sprays such as deodorant, bug spray, and hair spray.       •Drink more liquids. This will help to keep your air passages moist and help you cough up mucus. Ask how much liquid to drink each day and which liquids are best for you.      Call 911 for any of the following:   •You have new chest pain or tightness.       •You become confused, dizzy, or feel like you may faint.       •You have a new or increased gray or blue tint to your nail beds, fingers, or lips.       When should I seek immediate care?   •You become tired easily from trying to get enough breath.       •The amount or color of your sputum changes, or your sputum becomes too hard to cough up.       When should I contact my healthcare provider?   •You have a fever.      •You use your inhalers more often than usual.       •You have new or increased swelling in your legs, ankles, or abdomen.       •You run out of breath easily when you talk or do your usual exercise or activities.       •You have questions or concerns about your condition or care.

## 2021-05-25 NOTE — ED PROVIDER NOTE - PATIENT PORTAL LINK FT
You can access the FollowMyHealth Patient Portal offered by [facility] by registering at the following website: [email or web address]. By joining [email or web address]’s FollowMyHealth portal, you will also be able to view your health information using other applications (apps) compatible with our system.

## 2021-05-25 NOTE — ED PROVIDER NOTE - PROGRESS NOTE DETAILS
patient feeling better, asking to leave. advised follow up with PMD. patient understands and agrees with plan

## 2021-05-25 NOTE — ED PROVIDER NOTE - OBJECTIVE STATEMENT
71 y/o M with PMHx of HTN and COPD (active smoker) presents to the ED for coughs. Pt was seen at this ED about 2 days ago for similar complaints. He endorses his coughs are not as bad as prior COPD exacerbations. Pt also complains of some scalp itching sensations. He denies CP, SOB, fevers, chills, or new exposures to the scalp.

## 2021-05-25 NOTE — ED PROVIDER NOTE - CLINICAL SUMMARY MEDICAL DECISION MAKING FREE TEXT BOX
69 y/o M with Hx of HTN and COPD (current daily smoker) presenting with chronic dry coughs that is likely secondary to smoking. On exam, Pt appears well and in no acute distress. No respiratory distress. Pt given 1 round of Duoneb and Benadryl for scalp itchiness. No signs of lice or rashes. Counseled Pt on smoking cessation and on the use of moisturizing shampoos. Will order CXR to r/o pneumothorax or consolidations. Advised Pt to follow up with PCP.

## 2021-05-27 ENCOUNTER — EMERGENCY (EMERGENCY)
Facility: HOSPITAL | Age: 71
LOS: 1 days | Discharge: LEFT BEFORE DISCHARGE | End: 2021-05-27
Attending: EMERGENCY MEDICINE | Admitting: EMERGENCY MEDICINE
Payer: MEDICARE

## 2021-05-27 VITALS
RESPIRATION RATE: 16 BRPM | HEART RATE: 111 BPM | SYSTOLIC BLOOD PRESSURE: 127 MMHG | WEIGHT: 169.98 LBS | OXYGEN SATURATION: 96 % | DIASTOLIC BLOOD PRESSURE: 90 MMHG | TEMPERATURE: 98 F | HEIGHT: 66 IN

## 2021-05-27 DIAGNOSIS — Z95.2 PRESENCE OF PROSTHETIC HEART VALVE: Chronic | ICD-10-CM

## 2021-05-27 DIAGNOSIS — Z53.21 PROCEDURE AND TREATMENT NOT CARRIED OUT DUE TO PATIENT LEAVING PRIOR TO BEING SEEN BY HEALTH CARE PROVIDER: ICD-10-CM

## 2021-05-27 DIAGNOSIS — Z95.1 PRESENCE OF AORTOCORONARY BYPASS GRAFT: Chronic | ICD-10-CM

## 2021-05-27 PROCEDURE — L9991: CPT

## 2021-05-27 NOTE — ED ADULT TRIAGE NOTE - RESPIRATORY RATE (BREATHS/MIN)
Doing well without complaints. Occasional cramping. No bleeding. +    Will schedule IOL for 3/29/18. 16

## 2021-05-28 ENCOUNTER — EMERGENCY (EMERGENCY)
Facility: HOSPITAL | Age: 71
LOS: 1 days | Discharge: LEFT BEFORE DISCHARGE | End: 2021-05-28
Attending: EMERGENCY MEDICINE | Admitting: EMERGENCY MEDICINE
Payer: MEDICARE

## 2021-05-28 VITALS
WEIGHT: 149.91 LBS | RESPIRATION RATE: 18 BRPM | OXYGEN SATURATION: 98 % | DIASTOLIC BLOOD PRESSURE: 76 MMHG | HEIGHT: 66 IN | HEART RATE: 92 BPM | TEMPERATURE: 98 F | SYSTOLIC BLOOD PRESSURE: 107 MMHG

## 2021-05-28 DIAGNOSIS — F17.200 NICOTINE DEPENDENCE, UNSPECIFIED, UNCOMPLICATED: ICD-10-CM

## 2021-05-28 DIAGNOSIS — F41.9 ANXIETY DISORDER, UNSPECIFIED: ICD-10-CM

## 2021-05-28 DIAGNOSIS — Z79.51 LONG TERM (CURRENT) USE OF INHALED STEROIDS: ICD-10-CM

## 2021-05-28 DIAGNOSIS — I10 ESSENTIAL (PRIMARY) HYPERTENSION: ICD-10-CM

## 2021-05-28 DIAGNOSIS — Z95.2 PRESENCE OF PROSTHETIC HEART VALVE: Chronic | ICD-10-CM

## 2021-05-28 DIAGNOSIS — I48.91 UNSPECIFIED ATRIAL FIBRILLATION: ICD-10-CM

## 2021-05-28 DIAGNOSIS — R06.02 SHORTNESS OF BREATH: ICD-10-CM

## 2021-05-28 DIAGNOSIS — E78.5 HYPERLIPIDEMIA, UNSPECIFIED: ICD-10-CM

## 2021-05-28 DIAGNOSIS — Z79.899 OTHER LONG TERM (CURRENT) DRUG THERAPY: ICD-10-CM

## 2021-05-28 DIAGNOSIS — J43.9 EMPHYSEMA, UNSPECIFIED: ICD-10-CM

## 2021-05-28 DIAGNOSIS — I45.19 OTHER RIGHT BUNDLE-BRANCH BLOCK: ICD-10-CM

## 2021-05-28 DIAGNOSIS — Z95.4 PRESENCE OF OTHER HEART-VALVE REPLACEMENT: ICD-10-CM

## 2021-05-28 DIAGNOSIS — Z95.1 PRESENCE OF AORTOCORONARY BYPASS GRAFT: Chronic | ICD-10-CM

## 2021-05-28 DIAGNOSIS — Z88.6 ALLERGY STATUS TO ANALGESIC AGENT: ICD-10-CM

## 2021-05-28 DIAGNOSIS — I25.810 ATHEROSCLEROSIS OF CORONARY ARTERY BYPASS GRAFT(S) WITHOUT ANGINA PECTORIS: ICD-10-CM

## 2021-05-28 PROCEDURE — 99284 EMERGENCY DEPT VISIT MOD MDM: CPT

## 2021-05-28 PROCEDURE — 93010 ELECTROCARDIOGRAM REPORT: CPT

## 2021-05-28 RX ORDER — DEXAMETHASONE 0.5 MG/5ML
10 ELIXIR ORAL ONCE
Refills: 0 | Status: DISCONTINUED | OUTPATIENT
Start: 2021-05-28 | End: 2021-05-31

## 2021-05-28 RX ORDER — ALBUTEROL 90 UG/1
1 AEROSOL, METERED ORAL ONCE
Refills: 0 | Status: DISCONTINUED | OUTPATIENT
Start: 2021-05-28 | End: 2021-05-31

## 2021-05-28 RX ORDER — DEXAMETHASONE 0.5 MG/5ML
10 ELIXIR ORAL ONCE
Refills: 0 | Status: DISCONTINUED | OUTPATIENT
Start: 2021-05-28 | End: 2021-05-28

## 2021-05-28 NOTE — ED PROVIDER NOTE - OBJECTIVE STATEMENT
71 y/o M with PMHx of HTN and COPD (active smoker) presents to ED complaining of SOB. Pt was seen at this ED a day or 2 ago for similar complaints. His cough remains but the itching on the top of his head has subsided. Vitals are stable. Pt denies CP, fever, and chills. Pt is requesting for an inhaler and steroids for his cough. Pt was previously counseled for smoking cessation but is still smoking.

## 2021-05-31 NOTE — ED ADULT NURSE NOTE - NS PRO AD NO ADVANCE DIRECTIVE
[facility] Hospitalist Progress Note     Name:  Shawna Hancock  Age:72 y.o. Sex:female   :  1948    MRN:  [id number]     Admit Date:  2021    Reason for Admission:  Suspected stroke patient last known to be well more than 2 hours ago [R09.89]    Hospital Course/Interval history:     Shawna Hancock is a 67 y.o. female with medical history of renal transplant, DM 2 with neuropathy, hypertension, CAD, gout, hypothyroidism and obesity who presented to ED with CC of difficulty speaking. Patient is accompanied by her  and was providing history. Poor historians but reports that patient woke up with left-sided facial weakness and difficulty in speech.  also reported to ER staff that he probably had similar symptoms yesterday. Now both report that symptoms come and go. In the ER she had elevated blood pressure and Code S was called. Telemetry neuro did not recommend TPA due to NIH stroke scale of 2 and rapidly evolving symptoms. CT of head was unremarkable and neurology recommended further work-up for stroke. Subjective (21): Patient seen and examined, states she feels better, family at bedside. Echo still pending. Reports speech is better. Review of Systems: 14 point review of systems is otherwise negative with the exception of the elements mentioned above. Assessment & Plan     Acute ischemic stroke:  MRI showed acute ischemic stroke, Continue aspirin, add Plavix with statin, get echo/PT/OT/SLP eval.     DM2:  Start sliding scale and Lantus 10 units. Adjust according to blood sugars.     S/p renal transplant:  Kidney function normal at this time, Resume home meds.     Hypertension:  Resume home meds and monitor blood pressure.     CAD s/p endarterectomy:  Resume aspirin.     Disposition/Expected LOS:  1 to 2 days.   Diet: DIET DIABETIC WITH OPTIONS  VTE ppx: SCD  Code status: Full Code  Surrogate decision-maker:  Shekhar Mackenzie: [phone]     Switch to inpatient  ROS and PFSH reviewed and there have been no changes, Please refer to admission H&P in EMR dated 5/30/21      Objective:     Patient Vitals for the past 24 hrs:   Temp Pulse Resp BP SpO2   05/31/21 0310 97.7 °F (36.5 °C) 62 20 (!) 181/72 99 %   05/30/21 2321 97.8 °F (36.6 °C) 75 20 (!) 175/62 99 %   05/30/21 1929 97.7 °F (36.5 °C) 97 20 (!) 171/69 100 %   05/30/21 1432  (!) 57  (!) 180/73 99 %   05/30/21 1412  (!) 58  (!) 209/84 99 %   05/30/21 1334  (!) 58  (!) 189/76 98 %   05/30/21 1249  61   98 %   05/30/21 1248  61  (!) 166/74    05/30/21 1245 97.8 °F (36.6 °C) 60 20 (!) 166/74 99 %     Oxygen Therapy  O2 Sat (%): 99 % (05/31/21 0310)  Pulse via Oximetry: 57 beats per minute (05/30/21 1432)  O2 Device: None (Room air) (05/31/21 0310)  O2 Flow Rate (L/min): 0 l/min (05/30/21 1929)    Body mass index is 29.49 kg/m².     Physical Exam:     General:  No acute distress, speaking in full sentences, no use of accessory muscles   Lungs:  Clear to auscultation bilaterally   CV:  Regular rate and rhythm with normal S1 and S2   Abdomen:  Soft, nontender, nondistended, normoactive bowel sounds   Extremities:  No cyanosis clubbing or edema   Neuro:   Nonfocal, A&O x3   Psych:  Normal affect     Data Review:  I have reviewed all labs, meds, and studies from the last 24 hours:    Labs:    Recent Results (from the past 24 hour(s))   CBC WITH AUTOMATED DIFF    Collection Time: 05/30/21 12:47 PM   Result Value Ref Range    WBC 6.4 4.3 - 11.1 K/uL    RBC 4.03 (L) 4.05 - 5.2 M/uL    HGB 11.9 11.7 - 15.4 g/dL    HCT 37.9 35.8 - 46.3 %    MCV 94.0 79.6 - 97.8 FL    MCH 29.5 26.1 - 32.9 PG    MCHC 31.4 31.4 - 35.0 g/dL    RDW 13.4 11.9 - 14.6 %    PLATELET 302 264 - 949 K/uL    MPV 9.2 (L) 9.4 - 12.3 FL    ABSOLUTE NRBC 0.00 0.0 - 0.2 K/uL    DF AUTOMATED      NEUTROPHILS 59 43 - 78 %    LYMPHOCYTES 26 13 - 44 %    MONOCYTES 9 4.0 - 12.0 %    EOSINOPHILS 5 0.5 - 7.8 %    BASOPHILS 1 0.0 - 2.0 %    IMMATURE GRANULOCYTES 1 0.0 - 5.0 %    ABS. NEUTROPHILS 3.8 1.7 - 8.2 K/UL    ABS. LYMPHOCYTES 1.7 0.5 - 4.6 K/UL    ABS. MONOCYTES 0.6 0.1 - 1.3 K/UL    ABS. EOSINOPHILS 0.3 0.0 - 0.8 K/UL    ABS. BASOPHILS 0.1 0.0 - 0.2 K/UL    ABS. IMM. GRANS. 0.0 0.0 - 0.5 K/UL   METABOLIC PANEL, BASIC    Collection Time: 05/30/21 12:47 PM   Result Value Ref Range    Sodium 142 136 - 145 mmol/L    Potassium 4.1 3.5 - 5.1 mmol/L    Chloride 110 (H) 98 - 107 mmol/L    CO2 24 21 - 32 mmol/L    Anion gap 8 7 - 16 mmol/L    Glucose 218 (H) 65 - 100 mg/dL    BUN 16 8 - 23 MG/DL    Creatinine 0.71 0.6 - 1.0 MG/DL    GFR est AA >60 >60 ml/min/1.73m2    GFR est non-AA >60 >60 ml/min/1.73m2    Calcium 10.0 8.3 - 10.4 MG/DL   PROTHROMBIN TIME + INR    Collection Time: 05/30/21 12:47 PM   Result Value Ref Range    Prothrombin time 13.8 12.5 - 14.7 sec    INR 1.0     TROPONIN-HIGH SENSITIVITY    Collection Time: 05/30/21 12:47 PM   Result Value Ref Range    Troponin-High Sensitivity 12.6 0 - 14 pg/mL   EKG, 12 LEAD, INITIAL    Collection Time: 05/30/21 12:47 PM   Result Value Ref Range    Ventricular Rate 62 BPM    Atrial Rate 62 BPM    P-R Interval 202 ms    QRS Duration 86 ms    Q-T Interval 424 ms    QTC Calculation (Bezet) 430 ms    Calculated P Axis 37 degrees    Calculated R Axis -3 degrees    Calculated T Axis 81 degrees    Diagnosis       !! AGE AND GENDER SPECIFIC ECG ANALYSIS !!   Normal sinus rhythm  Normal ECG    Confirmed by Bernardino Chaudhari MD (), DASIA D ([id number] on 5/30/2021 6:50:30 PM     GLUCOSE, POC    Collection Time: 05/30/21 12:54 PM   Result Value Ref Range    Glucose (POC) 229 (H) 65 - 100 mg/dL    Performed by Dunia    GLUCOSE, POC    Collection Time: 05/30/21  4:10 PM   Result Value Ref Range    Glucose (POC) 233 (H) 65 - 100 mg/dL    Performed by Seble    GLUCOSE, POC    Collection Time: 05/30/21  8:51 PM   Result Value Ref Range    Glucose (POC) 117 (H) 65 - 100 mg/dL    Performed by Saba Castaneda    LIPID PANEL Collection Time: 05/31/21  5:13 AM   Result Value Ref Range    Cholesterol, total 262 MG/DL    Triglyceride 101 35 - 150 MG/DL    HDL Cholesterol 89 (H) 40 - 60 MG/DL    LDL, calculated 152.8 (H) <100 MG/DL    VLDL, calculated 20.2 6.0 - 23.0 MG/DL    CHOL/HDL Ratio 2.9 <200     HEMOGLOBIN A1C WITH EAG    Collection Time: 05/31/21  5:13 AM   Result Value Ref Range    Hemoglobin A1c 7.5 (H) 4.2 - 6.3 %    Est. average glucose 169 mg/dL   CBC W/O DIFF    Collection Time: 05/31/21  5:13 AM   Result Value Ref Range    WBC 5.1 4.3 - 11.1 K/uL    RBC 3.82 (L) 4.05 - 5.2 M/uL    HGB 11.2 (L) 11.7 - 15.4 g/dL    HCT 35.6 (L) 35.8 - 46.3 %    MCV 93.2 79.6 - 97.8 FL    MCH 29.3 26.1 - 32.9 PG    MCHC 31.5 31.4 - 35.0 g/dL    RDW 13.5 11.9 - 14.6 %    PLATELET 313 154 - 939 K/uL    MPV 9.2 (L) 9.4 - 12.3 FL    ABSOLUTE NRBC 0.00 0.0 - 0.2 K/uL       All Micro Results     None          EKG Results     Procedure 720 Value Units Date/Time    Initial ECG [[id number]] Collected: 05/30/21 1247    Order Status: Completed Updated: 05/30/21 1850     Ventricular Rate 62 BPM      Atrial Rate 62 BPM      P-R Interval 202 ms      QRS Duration 86 ms      Q-T Interval 424 ms      QTC Calculation (Bezet) 430 ms      Calculated P Axis 37 degrees      Calculated R Axis -3 degrees      Calculated T Axis 81 degrees      Diagnosis --     !! AGE AND GENDER SPECIFIC ECG ANALYSIS !! Normal sinus rhythm  Normal ECG    Confirmed by Wesley Tellez MD (), DASIA ARIAS ([id number]) on 5/30/2021 6:50:30 PM            Other Studies:  CT CODE NEURO HEAD WO CONTRAST    Result Date: 5/30/2021  CT HEAD WITHOUT CONTRAST, 5/30/2021 History: Slurred speech beginning at 9:30 Comparison: None Technique:   5 mm axial scans from the skull base to the vertex. All CT scans performed at this facility use one or all of the following: Automated exposure control, adjustment of the mA and/or kVp according to patient's size, iterative reconstruction.  Findings:  No evidence of intracranial hemorrhage is seen. No abnormal extra-axial fluid collections are seen. Moderate age-appropriate chronic cortical volume loss is seen. No evidence for acute hydrocephalus is seen. No evidence of midline shift or herniation is seen. No abnormal edema pattern is seen in a vascular distribution to suggest large artery infarction. Somewhat defined hypodensity is seen in the left basal ganglia on image 16 suggesting a more subacute to chronic lacunar infarction. Evaluation with bone windows shows no acute osseous changes. The visualized sinuses, middle ears, and mastoid air cells are well aerated. 1.  No acute intracranial process evident by noncontrast CT study of the head. This report was made using voice transcription. Despite my best efforts to avoid any, transcription errors may persist. If there is any question about the accuracy of the report or need for clarification, then please call [phone], or text me through [employer] for clarification or correction.        Current Meds:   Current Facility-Administered Medications   Medication Dose Route Frequency    sodium chloride (NS) flush 5-10 mL  5-10 mL IntraVENous Q8H    sodium chloride (NS) flush 5-10 mL  5-10 mL IntraVENous PRN    sodium chloride (NS) flush 5-40 mL  5-40 mL IntraVENous PRN    ondansetron (ZOFRAN) injection 4 mg  4 mg IntraVENous Q6H PRN    aspirin chewable tablet 81 mg  81 mg Oral DAILY    clopidogreL (PLAVIX) tablet 75 mg  75 mg Oral DAILY    atorvastatin (LIPITOR) tablet 80 mg  80 mg Oral QHS    acetaminophen (TYLENOL) tablet 650 mg  650 mg Oral Q4H PRN    mycophenolate mofetil (CELLCEPT) capsule 250 mg  250 mg Oral ACB&D    insulin lispro (HUMALOG) injection   SubCUTAneous AC&HS    insulin glargine (LANTUS) injection 10 Units  10 Units SubCUTAneous QHS       Problem List:  Hospital Problems as of 5/31/2021 Date Reviewed: 5/27/2021        Codes Class Noted - Resolved POA    * (Principal) Suspected stroke patient last known to be well more than 2 hours ago ICD-10-CM: R09.89  ICD-9-CM: 785.9  5/30/2021 - Present Yes        Severe obesity ([facility] ICD-10-CM: E66.01  ICD-9-CM: 278.01  11/1/2018 - Present Yes        Renal transplant, status post (Chronic) ICD-10-CM: Z94.0  ICD-9-CM: V42.0  4/19/2016 - Present Yes        Hypertension ICD-10-CM: I10  ICD-9-CM: 401.9  Unknown - Present Yes        Hypercholesterolemia ICD-10-CM: E78.00  ICD-9-CM: 272.0  Unknown - Present Yes        Hypothyroidism ICD-10-CM: E03.9  ICD-9-CM: 244.9  Unknown - Present Yes        Uncontrolled type 2 diabetes mellitus with hyperglycemia ([facility] ICD-10-CM: E11.65  ICD-9-CM: 250.02  Unknown - Present Yes        Carotid artery disease ([facility] ICD-10-CM: I77.9  ICD-9-CM: 447.9  Unknown - Present Yes        Gout ICD-10-CM: M10.9  ICD-9-CM: 274.9  Unknown - Present Yes                   Part of this note was written by using a voice dictation software and the note has been proof read but may still contain some grammatical/other typographical errors.     Signed By: Radha Parker MD   [address] [place] [address] Service    May 31, 2021  5:15 PM No

## 2021-06-03 ENCOUNTER — EMERGENCY (EMERGENCY)
Facility: HOSPITAL | Age: 71
LOS: 1 days | Discharge: LEFT BEFORE DISCHARGE | End: 2021-06-03
Attending: EMERGENCY MEDICINE | Admitting: EMERGENCY MEDICINE
Payer: MEDICARE

## 2021-06-03 VITALS
OXYGEN SATURATION: 97 % | SYSTOLIC BLOOD PRESSURE: 110 MMHG | WEIGHT: 160.06 LBS | TEMPERATURE: 99 F | RESPIRATION RATE: 19 BRPM | HEIGHT: 66 IN | HEART RATE: 85 BPM | DIASTOLIC BLOOD PRESSURE: 69 MMHG

## 2021-06-03 VITALS — HEART RATE: 84 BPM

## 2021-06-03 DIAGNOSIS — I49.9 CARDIAC ARRHYTHMIA, UNSPECIFIED: ICD-10-CM

## 2021-06-03 DIAGNOSIS — R07.89 OTHER CHEST PAIN: ICD-10-CM

## 2021-06-03 DIAGNOSIS — R06.02 SHORTNESS OF BREATH: ICD-10-CM

## 2021-06-03 DIAGNOSIS — F17.210 NICOTINE DEPENDENCE, CIGARETTES, UNCOMPLICATED: ICD-10-CM

## 2021-06-03 DIAGNOSIS — F41.9 ANXIETY DISORDER, UNSPECIFIED: ICD-10-CM

## 2021-06-03 DIAGNOSIS — R06.2 WHEEZING: ICD-10-CM

## 2021-06-03 DIAGNOSIS — Z88.6 ALLERGY STATUS TO ANALGESIC AGENT: ICD-10-CM

## 2021-06-03 DIAGNOSIS — Z95.1 PRESENCE OF AORTOCORONARY BYPASS GRAFT: Chronic | ICD-10-CM

## 2021-06-03 DIAGNOSIS — Z95.2 PRESENCE OF PROSTHETIC HEART VALVE: Chronic | ICD-10-CM

## 2021-06-03 PROCEDURE — 99284 EMERGENCY DEPT VISIT MOD MDM: CPT

## 2021-06-03 PROCEDURE — 93010 ELECTROCARDIOGRAM REPORT: CPT

## 2021-06-03 NOTE — ED PROVIDER NOTE - CLINICAL SUMMARY MEDICAL DECISION MAKING FREE TEXT BOX
Pt with POON.  We wanted to give duoneb treatments for likely COPD exacerbation but pt eloped prior to treatment.  Pt witnessed walking briskly out of department.

## 2021-06-03 NOTE — ED ADULT TRIAGE NOTE - CHIEF COMPLAINT QUOTE
midsternal nonradiating chest pain and sob after walking two blocks +baez, denies any n/v. appears at baseline. speaking in short impatient phrases.

## 2021-06-03 NOTE — ED PROVIDER NOTE - OBJECTIVE STATEMENT
Pt is a 71yo M with a h/o COPD/asthma, active daily smoker, AF, and anxiety.  Pt is well known to ED staff for multiple visits.  Pt usually p/w CP or SOB/copd exacerbation and ultimately elopes or signed out AMA.  Today pt reports POON after walking two blocks.  REports CP.  CP is tightness in quality and similar to previous episodes.  Denies any syncope or palpitations.  Denies any F/C, cough, or other concerns.

## 2021-06-03 NOTE — ED ADULT TRIAGE NOTE - NSWEIGHTCALCTOOLDRUG_GEN_A_CORE
----- Message from Rebecca Sanchez sent at 3/8/2018  1:14 PM CST -----  Contact: self   Would like to consult with nurse regarding status of rx medication for iron. Please call back at [phone].    Pt uses:  [address] [address]  Phone: [phone] Fax: [phone]      Thanks,  Rebecca Sanchez      used

## 2021-06-03 NOTE — ED PROVIDER NOTE - PHYSICAL EXAMINATION
VITAL SIGNS: I have reviewed nursing notes and confirm.  CONSTITUTIONAL: Well-developed; well-nourished; in no acute distress.  Smells strongly of cigarettes.   SKIN: Skin is warm and dry, no acute rash.  HEAD: Normocephalic; atraumatic.  EYES: PERRL, EOM intact; conjunctiva and sclera clear.  ENT: No nasal discharge; airway clear.  NECK: Supple; non tender.  CARD: S1, S2 normal; no murmurs, gallops, or rubs. Irregularly irregular rhythm with rate in 80s.   RESP: Speaking full sentences.  Mild exp wheeze.  No tachypnea.   ABD: Normal bowel sounds; soft; non-distended; non-tender; no hepatosplenomegaly.  MSK: Normal ROM. No clubbing, cyanosis or edema.  NEURO: Alert, oriented. Grossly unremarkable.  PSYCH: Cooperative, appropriate.

## 2021-06-05 ENCOUNTER — EMERGENCY (EMERGENCY)
Facility: HOSPITAL | Age: 71
LOS: 1 days | Discharge: LEFT BEFORE DISCHARGE | End: 2021-06-05
Admitting: EMERGENCY MEDICINE
Payer: MEDICARE

## 2021-06-05 VITALS
SYSTOLIC BLOOD PRESSURE: 127 MMHG | WEIGHT: 164.91 LBS | OXYGEN SATURATION: 98 % | HEIGHT: 66 IN | DIASTOLIC BLOOD PRESSURE: 80 MMHG | RESPIRATION RATE: 17 BRPM | HEART RATE: 83 BPM | TEMPERATURE: 96 F

## 2021-06-05 DIAGNOSIS — Z95.2 PRESENCE OF PROSTHETIC HEART VALVE: Chronic | ICD-10-CM

## 2021-06-05 DIAGNOSIS — L29.9 PRURITUS, UNSPECIFIED: ICD-10-CM

## 2021-06-05 DIAGNOSIS — Z95.1 PRESENCE OF AORTOCORONARY BYPASS GRAFT: Chronic | ICD-10-CM

## 2021-06-05 DIAGNOSIS — Z53.21 PROCEDURE AND TREATMENT NOT CARRIED OUT DUE TO PATIENT LEAVING PRIOR TO BEING SEEN BY HEALTH CARE PROVIDER: ICD-10-CM

## 2021-06-05 PROCEDURE — L9991: CPT

## 2021-06-05 NOTE — ED ADULT TRIAGE NOTE - CHIEF COMPLAINT QUOTE
Pt walked into ED c.o itching x 3 days. Pt states "I started feeling itchy 3 days ago, I took one benadryl but it did not help". Pt has no swelling noted to lips or tongue. VSS. No rash noted or bugs noted at this time

## 2021-06-09 ENCOUNTER — EMERGENCY (EMERGENCY)
Facility: HOSPITAL | Age: 71
LOS: 1 days | Discharge: LEFT BEFORE DISCHARGE | End: 2021-06-09
Attending: EMERGENCY MEDICINE | Admitting: EMERGENCY MEDICINE
Payer: SELF-PAY

## 2021-06-09 ENCOUNTER — EMERGENCY (EMERGENCY)
Facility: HOSPITAL | Age: 71
LOS: 1 days | Discharge: HOME | End: 2021-06-09
Attending: EMERGENCY MEDICINE | Admitting: EMERGENCY MEDICINE
Payer: MEDICARE

## 2021-06-09 VITALS
OXYGEN SATURATION: 97 % | TEMPERATURE: 98 F | RESPIRATION RATE: 18 BRPM | DIASTOLIC BLOOD PRESSURE: 72 MMHG | WEIGHT: 149.91 LBS | SYSTOLIC BLOOD PRESSURE: 121 MMHG | HEART RATE: 94 BPM | HEIGHT: 66 IN

## 2021-06-09 VITALS
SYSTOLIC BLOOD PRESSURE: 121 MMHG | RESPIRATION RATE: 18 BRPM | TEMPERATURE: 98 F | OXYGEN SATURATION: 94 % | HEART RATE: 94 BPM | DIASTOLIC BLOOD PRESSURE: 72 MMHG | WEIGHT: 179.9 LBS

## 2021-06-09 DIAGNOSIS — R06.02 SHORTNESS OF BREATH: ICD-10-CM

## 2021-06-09 DIAGNOSIS — Z88.6 ALLERGY STATUS TO ANALGESIC AGENT: ICD-10-CM

## 2021-06-09 DIAGNOSIS — Z95.1 PRESENCE OF AORTOCORONARY BYPASS GRAFT: Chronic | ICD-10-CM

## 2021-06-09 DIAGNOSIS — R06.2 WHEEZING: ICD-10-CM

## 2021-06-09 DIAGNOSIS — Z95.2 PRESENCE OF PROSTHETIC HEART VALVE: Chronic | ICD-10-CM

## 2021-06-09 DIAGNOSIS — F17.200 NICOTINE DEPENDENCE, UNSPECIFIED, UNCOMPLICATED: ICD-10-CM

## 2021-06-09 DIAGNOSIS — Z53.21 PROCEDURE AND TREATMENT NOT CARRIED OUT DUE TO PATIENT LEAVING PRIOR TO BEING SEEN BY HEALTH CARE PROVIDER: ICD-10-CM

## 2021-06-09 PROCEDURE — 99284 EMERGENCY DEPT VISIT MOD MDM: CPT

## 2021-06-09 PROCEDURE — L9991: CPT

## 2021-06-09 PROCEDURE — 93010 ELECTROCARDIOGRAM REPORT: CPT

## 2021-06-09 RX ORDER — ALBUTEROL 90 UG/1
2 AEROSOL, METERED ORAL ONCE
Refills: 0 | Status: COMPLETED | OUTPATIENT
Start: 2021-06-09 | End: 2021-06-09

## 2021-06-09 RX ADMIN — Medication 50 MILLIGRAM(S): at 20:56

## 2021-06-09 RX ADMIN — ALBUTEROL 2 PUFF(S): 90 AEROSOL, METERED ORAL at 20:57

## 2021-06-09 NOTE — ED ADULT TRIAGE NOTE - CHIEF COMPLAINT QUOTE
Pt BIBA c/o shortness of breath. Hx of afib, CVA, +daily cigarette use. States sob has been ongoing for months but worsening today. Speaking full sentences, 97% on RA. Arrives with 20G IV to L hand placed by EMS.

## 2021-06-09 NOTE — ED PROVIDER NOTE - OBJECTIVE STATEMENT
69 y/o M w/hx COPD, smoker, AF, well known to ED, p/w SOB and unchanged baseline cough. No CP/palpitations. no fever/chills. No abd pain or n/v. Doesn't give any further info about duration of SOB. Says it feels like his COPD.

## 2021-06-09 NOTE — ED PROVIDER NOTE - PHYSICAL EXAMINATION
VITAL SIGNS: I have reviewed nursing notes and confirm.  CONSTITUTIONAL: Well-developed; well-nourished; in no acute distress.  SKIN: Skin exam is warm and dry, no acute rash.  HEAD: Normocephalic; atraumatic.  EYES: PERRL, EOM intact; conjunctiva and sclera clear.  ENT: No nasal discharge; airway clear.  NECK: Supple; non tender.  CARD: S1, S2 normal; no murmurs, gallops, or rubs. + irregularly irregular  RESP: Unlabored. + faint end expiratory wheeze  ABD: soft; non-distended; non-tender  EXT: Normal ROM. No cyanosis or edema. Non-ttp all ext, distal pulses intact  NEURO: Alert, oriented. Grossly unremarkable.  PSYCH: Cooperative, appropriate.

## 2021-06-09 NOTE — ED PROVIDER NOTE - CLINICAL SUMMARY MEDICAL DECISION MAKING FREE TEXT BOX
VSS, no hypoxia or increased work of breathing. given albuterol/steroids. d/c home with return precautions. Do not suspect cardiac etiology or infectious process.

## 2021-06-09 NOTE — ED PROVIDER NOTE - PATIENT PORTAL LINK FT
You can access the FollowMyHealth Patient Portal offered by [facility] by registering at the following website: [email or web address]. By joining [employer]’s FollowMyHealth portal, you will also be able to view your health information using other applications (apps) compatible with our system.

## 2021-06-10 ENCOUNTER — EMERGENCY (EMERGENCY)
Facility: HOSPITAL | Age: 71
LOS: 1 days | Discharge: HOME | End: 2021-06-10
Attending: EMERGENCY MEDICINE | Admitting: EMERGENCY MEDICINE
Payer: MEDICARE

## 2021-06-10 VITALS
HEIGHT: 67 IN | OXYGEN SATURATION: 97 % | RESPIRATION RATE: 16 BRPM | TEMPERATURE: 98 F | SYSTOLIC BLOOD PRESSURE: 114 MMHG | HEART RATE: 96 BPM | DIASTOLIC BLOOD PRESSURE: 66 MMHG | WEIGHT: 160.06 LBS

## 2021-06-10 DIAGNOSIS — R06.02 SHORTNESS OF BREATH: ICD-10-CM

## 2021-06-10 DIAGNOSIS — Z95.1 PRESENCE OF AORTOCORONARY BYPASS GRAFT: Chronic | ICD-10-CM

## 2021-06-10 DIAGNOSIS — Z53.21 PROCEDURE AND TREATMENT NOT CARRIED OUT DUE TO PATIENT LEAVING PRIOR TO BEING SEEN BY HEALTH CARE PROVIDER: ICD-10-CM

## 2021-06-10 DIAGNOSIS — Z95.2 PRESENCE OF PROSTHETIC HEART VALVE: Chronic | ICD-10-CM

## 2021-06-10 DIAGNOSIS — R05 COUGH: ICD-10-CM

## 2021-06-10 PROCEDURE — L9991: CPT

## 2021-06-10 NOTE — ED ADULT TRIAGE NOTE - CHIEF COMPLAINT QUOTE
sob, cough, current active smoker, endorsed worsening POON today- rhonchi appreciated in all lung fields

## 2021-06-10 NOTE — ED ADULT NURSE NOTE - PAIN: BODY LOCATION
Patient neuro appt was pushed back to next wednesday would like a week supply of pain med to make it to appt with neuro also requesting tablets instead of capsules
chest, midsternal

## 2021-06-10 NOTE — ED ADULT TRIAGE NOTE - PRO INTERPRETER NEED 2
English Rivaroxaban/Xarelto is used to treat and prevent blood clots.  If you are not able to swallow the tablets whole, you may crush the tablets and mix them with a small amount of applesauce and promptly take within four hours. Eat some food right after you swallow the mixture. Take 2.5mg or 10mg tablets of Rivaroxaban/Xarelto with or without food. Take 15mg or 20mg tablets of Rivaroxaban/Xarelto with food. Never skip a dose of Rivaroxaban/Xarelto. If you take Rivaroxaban/Xarelto once a day and you forget to take your dose, take a dose as soon as you remember on the same day. If you take Rivaroxaban/Xarelto 2.5 mg twice a day and you forget to take your dose, skip the missed dose and take your next dose at your usual time. DO NOT take an extra pill to ‘catch up’. If you take Rivaroxaban/Xarelto 15 mg twice a day and you forget to take your dose, take a dose as soon as you remember on the same day. You may take 2 doses at the same time to make up for missed dose ONLY if you take a total of 30mg per day. Notify your doctor that you missed a dose. Take Rivaroxaban/Xarelto at the same time each morning and evening. Rivaroxaban/Xarelto may be taken with other medication or food.

## 2021-06-19 ENCOUNTER — EMERGENCY (EMERGENCY)
Facility: HOSPITAL | Age: 71
LOS: 1 days | Discharge: LEFT BEFORE DISCHARGE | End: 2021-06-19
Attending: EMERGENCY MEDICINE | Admitting: EMERGENCY MEDICINE
Payer: MEDICARE

## 2021-06-19 VITALS
OXYGEN SATURATION: 96 % | DIASTOLIC BLOOD PRESSURE: 57 MMHG | HEIGHT: 67 IN | SYSTOLIC BLOOD PRESSURE: 106 MMHG | HEART RATE: 95 BPM | RESPIRATION RATE: 16 BRPM | WEIGHT: 149.91 LBS | TEMPERATURE: 99 F

## 2021-06-19 DIAGNOSIS — I10 ESSENTIAL (PRIMARY) HYPERTENSION: ICD-10-CM

## 2021-06-19 DIAGNOSIS — E78.5 HYPERLIPIDEMIA, UNSPECIFIED: ICD-10-CM

## 2021-06-19 DIAGNOSIS — R06.02 SHORTNESS OF BREATH: ICD-10-CM

## 2021-06-19 DIAGNOSIS — F41.9 ANXIETY DISORDER, UNSPECIFIED: ICD-10-CM

## 2021-06-19 DIAGNOSIS — F17.200 NICOTINE DEPENDENCE, UNSPECIFIED, UNCOMPLICATED: ICD-10-CM

## 2021-06-19 DIAGNOSIS — Z88.6 ALLERGY STATUS TO ANALGESIC AGENT: ICD-10-CM

## 2021-06-19 DIAGNOSIS — Z95.1 PRESENCE OF AORTOCORONARY BYPASS GRAFT: Chronic | ICD-10-CM

## 2021-06-19 DIAGNOSIS — J43.9 EMPHYSEMA, UNSPECIFIED: ICD-10-CM

## 2021-06-19 DIAGNOSIS — Z53.21 PROCEDURE AND TREATMENT NOT CARRIED OUT DUE TO PATIENT LEAVING PRIOR TO BEING SEEN BY HEALTH CARE PROVIDER: ICD-10-CM

## 2021-06-19 DIAGNOSIS — Z95.2 PRESENCE OF PROSTHETIC HEART VALVE: Chronic | ICD-10-CM

## 2021-06-19 LAB
ALBUMIN SERPL ELPH-MCNC: 3.7 G/DL — SIGNIFICANT CHANGE UP (ref 3.4–5)
ALP SERPL-CCNC: 52 U/L — SIGNIFICANT CHANGE UP (ref 40–120)
ALT FLD-CCNC: 17 U/L — SIGNIFICANT CHANGE UP (ref 12–42)
ANION GAP SERPL CALC-SCNC: 7 MMOL/L — LOW (ref 9–16)
AST SERPL-CCNC: 21 U/L — SIGNIFICANT CHANGE UP (ref 15–37)
BASOPHILS # BLD AUTO: 0.06 K/UL — SIGNIFICANT CHANGE UP (ref 0–0.2)
BASOPHILS NFR BLD AUTO: 0.7 % — SIGNIFICANT CHANGE UP (ref 0–2)
BILIRUB SERPL-MCNC: 0.6 MG/DL — SIGNIFICANT CHANGE UP (ref 0.2–1.2)
BUN SERPL-MCNC: 29 MG/DL — HIGH (ref 7–23)
CALCIUM SERPL-MCNC: 8.7 MG/DL — SIGNIFICANT CHANGE UP (ref 8.5–10.5)
CHLORIDE SERPL-SCNC: 103 MMOL/L — SIGNIFICANT CHANGE UP (ref 96–108)
CO2 SERPL-SCNC: 31 MMOL/L — SIGNIFICANT CHANGE UP (ref 22–31)
CREAT SERPL-MCNC: 1.17 MG/DL — SIGNIFICANT CHANGE UP (ref 0.5–1.3)
EOSINOPHIL # BLD AUTO: 0.19 K/UL — SIGNIFICANT CHANGE UP (ref 0–0.5)
EOSINOPHIL NFR BLD AUTO: 2.2 % — SIGNIFICANT CHANGE UP (ref 0–6)
GLUCOSE SERPL-MCNC: 95 MG/DL — SIGNIFICANT CHANGE UP (ref 70–99)
HCT VFR BLD CALC: 47.4 % — SIGNIFICANT CHANGE UP (ref 39–50)
HGB BLD-MCNC: 15.6 G/DL — SIGNIFICANT CHANGE UP (ref 13–17)
IMM GRANULOCYTES NFR BLD AUTO: 0.6 % — SIGNIFICANT CHANGE UP (ref 0–1.5)
LYMPHOCYTES # BLD AUTO: 1.76 K/UL — SIGNIFICANT CHANGE UP (ref 1–3.3)
LYMPHOCYTES # BLD AUTO: 20 % — SIGNIFICANT CHANGE UP (ref 13–44)
MCHC RBC-ENTMCNC: 30.8 PG — SIGNIFICANT CHANGE UP (ref 27–34)
MCHC RBC-ENTMCNC: 32.9 GM/DL — SIGNIFICANT CHANGE UP (ref 32–36)
MCV RBC AUTO: 93.5 FL — SIGNIFICANT CHANGE UP (ref 80–100)
MONOCYTES # BLD AUTO: 0.83 K/UL — SIGNIFICANT CHANGE UP (ref 0–0.9)
MONOCYTES NFR BLD AUTO: 9.5 % — SIGNIFICANT CHANGE UP (ref 2–14)
NEUTROPHILS # BLD AUTO: 5.89 K/UL — SIGNIFICANT CHANGE UP (ref 1.8–7.4)
NEUTROPHILS NFR BLD AUTO: 67 % — SIGNIFICANT CHANGE UP (ref 43–77)
NRBC # BLD: 0 /100 WBCS — SIGNIFICANT CHANGE UP (ref 0–0)
PLATELET # BLD AUTO: 172 K/UL — SIGNIFICANT CHANGE UP (ref 150–400)
POTASSIUM SERPL-MCNC: 4.1 MMOL/L — SIGNIFICANT CHANGE UP (ref 3.5–5.3)
POTASSIUM SERPL-SCNC: 4.1 MMOL/L — SIGNIFICANT CHANGE UP (ref 3.5–5.3)
PROT SERPL-MCNC: 6.9 G/DL — SIGNIFICANT CHANGE UP (ref 6.4–8.2)
RBC # BLD: 5.07 M/UL — SIGNIFICANT CHANGE UP (ref 4.2–5.8)
RBC # FLD: 13.8 % — SIGNIFICANT CHANGE UP (ref 10.3–14.5)
SODIUM SERPL-SCNC: 141 MMOL/L — SIGNIFICANT CHANGE UP (ref 132–145)
TROPONIN I SERPL-MCNC: <0.017 NG/ML — LOW (ref 0.02–0.06)
WBC # BLD: 8.78 K/UL — SIGNIFICANT CHANGE UP (ref 3.8–10.5)
WBC # FLD AUTO: 8.78 K/UL — SIGNIFICANT CHANGE UP (ref 3.8–10.5)

## 2021-06-19 PROCEDURE — 71046 X-RAY EXAM CHEST 2 VIEWS: CPT | Mod: 26

## 2021-06-19 PROCEDURE — 99284 EMERGENCY DEPT VISIT MOD MDM: CPT

## 2021-06-19 RX ORDER — IPRATROPIUM/ALBUTEROL SULFATE 18-103MCG
3 AEROSOL WITH ADAPTER (GRAM) INHALATION ONCE
Refills: 0 | Status: COMPLETED | OUTPATIENT
Start: 2021-06-19 | End: 2021-06-19

## 2021-06-19 RX ADMIN — Medication 50 MILLIGRAM(S): at 13:20

## 2021-06-19 NOTE — ED PROVIDER NOTE - PHYSICAL EXAMINATION
General:  Thin, poor hygiene, no respiratory distress   HEENT:  No conjunctival injection, no ocular discharge, external ears WNL  Chest:  Non-tender, no crepitance  Lungs:  +Bilateral Rhnochi  Heart:  Irregular   Abdomen:  soft, non-tender, non-distended  :  Deferred  Rectal:  Deferred  Extremities: No edema, normal perfusion, no joint swelling or tenderness, no calf swelling or tenderness   Neuro:  Alert and oriented x 3, cn2-12 intact, full strength, sensory grossly intact, normal gait  Psychiatry: Mildly anxious, at present cooperative,  no expression of suicidal or homicidal ideation

## 2021-06-19 NOTE — ED PROVIDER NOTE - CLINICAL SUMMARY MEDICAL DECISION MAKING FREE TEXT BOX
Patient presents with cough, sob.  Likely COPD exacerbation, but has complaint of chest pressure so will check ekg trop while admin neb, steroids, abx.  Patient will also have cxr.  At present, patient agrees to have xray and labs and treatment.

## 2021-06-19 NOTE — ED PROVIDER NOTE - NS ED ROS FT
Constitutional:  No fever, feels generally ill   HEENT:  No change in vision  Cardiac:  Pressure upper chest  Pulm:  +cough with yellow sputum, sob  GI:  No abd pain, no vomiting, no diarrhea  : No hematuria  Neuro:  No ha, no neck pain, no numbness, no weakness, no change in speech, vision or gait  MSK:  No joint pain or swelling  Skin:  No rash  Heme:  No abnormal bruising  Psychiatric: No suicidal or homicidal ideation, no hallucinations

## 2021-06-19 NOTE — ED ADULT NURSE REASSESSMENT NOTE - NS ED NURSE REASSESS COMMENT FT1
pt walked out of the ED premises. Stating he's fine and does not want to wait anymore for the results. Dr. Claudio made aware. ambulatory with steady gait.

## 2021-06-19 NOTE — ED PROVIDER NOTE - OBJECTIVE STATEMENT
Patient reports that he generally doesn't feel well, but other than having a cough and trouble breathing, does not give specific information  Has been to the ER multiple times in the past month for similar complaints.  Typically elopes before completing treatment  Denies fever, abd pain, n,vd, leg pain or swelling  States he has been taking his usual medications as directed and has used his inhaler multiple times today without relief

## 2021-06-19 NOTE — ED PROVIDER NOTE - PROGRESS NOTE DETAILS
Patient left the ER without completing treatment or waiting for me to speak with him to discuss results or alternatives

## 2021-06-19 NOTE — ED PROVIDER NOTE - PMH
Afib    Anxiety    Asthma    Emphysema/COPD    HLD (hyperlipidemia)    HTN (hypertension)    Hypertension, unspecified type

## 2021-06-26 ENCOUNTER — EMERGENCY (EMERGENCY)
Facility: HOSPITAL | Age: 71
LOS: 1 days | Discharge: HOME | End: 2021-06-26
Attending: EMERGENCY MEDICINE | Admitting: EMERGENCY MEDICINE
Payer: MEDICARE

## 2021-06-26 VITALS
HEART RATE: 86 BPM | HEIGHT: 67 IN | SYSTOLIC BLOOD PRESSURE: 150 MMHG | DIASTOLIC BLOOD PRESSURE: 90 MMHG | RESPIRATION RATE: 18 BRPM | OXYGEN SATURATION: 94 % | TEMPERATURE: 98 F | WEIGHT: 160.06 LBS

## 2021-06-26 DIAGNOSIS — I10 ESSENTIAL (PRIMARY) HYPERTENSION: ICD-10-CM

## 2021-06-26 DIAGNOSIS — I48.91 UNSPECIFIED ATRIAL FIBRILLATION: ICD-10-CM

## 2021-06-26 DIAGNOSIS — Z95.2 PRESENCE OF PROSTHETIC HEART VALVE: Chronic | ICD-10-CM

## 2021-06-26 DIAGNOSIS — Z79.82 LONG TERM (CURRENT) USE OF ASPIRIN: ICD-10-CM

## 2021-06-26 DIAGNOSIS — F17.200 NICOTINE DEPENDENCE, UNSPECIFIED, UNCOMPLICATED: ICD-10-CM

## 2021-06-26 DIAGNOSIS — J45.909 UNSPECIFIED ASTHMA, UNCOMPLICATED: ICD-10-CM

## 2021-06-26 DIAGNOSIS — L29.9 PRURITUS, UNSPECIFIED: ICD-10-CM

## 2021-06-26 DIAGNOSIS — F41.9 ANXIETY DISORDER, UNSPECIFIED: ICD-10-CM

## 2021-06-26 DIAGNOSIS — Z95.1 PRESENCE OF AORTOCORONARY BYPASS GRAFT: Chronic | ICD-10-CM

## 2021-06-26 DIAGNOSIS — L30.9 DERMATITIS, UNSPECIFIED: ICD-10-CM

## 2021-06-26 DIAGNOSIS — E78.5 HYPERLIPIDEMIA, UNSPECIFIED: ICD-10-CM

## 2021-06-26 DIAGNOSIS — Z88.1 ALLERGY STATUS TO OTHER ANTIBIOTIC AGENTS STATUS: ICD-10-CM

## 2021-06-26 PROCEDURE — 99283 EMERGENCY DEPT VISIT LOW MDM: CPT

## 2021-06-26 RX ORDER — KETOCONAZOLE 20 MG/G
1 AEROSOL, FOAM TOPICAL
Qty: 1 | Refills: 0
Start: 2021-06-26 | End: 2021-07-09

## 2021-06-26 RX ORDER — FAMOTIDINE 10 MG/ML
20 INJECTION INTRAVENOUS ONCE
Refills: 0 | Status: COMPLETED | OUTPATIENT
Start: 2021-06-26 | End: 2021-06-26

## 2021-06-26 RX ORDER — DIPHENHYDRAMINE HCL 50 MG
50 CAPSULE ORAL ONCE
Refills: 0 | Status: COMPLETED | OUTPATIENT
Start: 2021-06-26 | End: 2021-06-26

## 2021-06-26 RX ADMIN — Medication 50 MILLIGRAM(S): at 13:22

## 2021-06-26 RX ADMIN — FAMOTIDINE 20 MILLIGRAM(S): 10 INJECTION INTRAVENOUS at 13:22

## 2021-06-26 NOTE — ED PROVIDER NOTE - CARE PROVIDER_API CALL
Taya Harrell)  Dermatology  [address] [place] [address], [place] [address]  Phone: [phone]  Fax: [phone]  Follow Up Time:

## 2021-06-26 NOTE — ED PROVIDER NOTE - CLINICAL SUMMARY MEDICAL DECISION MAKING FREE TEXT BOX
69 y/o M with Hx of asthma and COPD presenting with itching to the scalp x2 months. Exam is remarkable for mild erythema to the forehead and the top of scalp where Pt has been itching. Will treat with Pepcid and Benadryl.

## 2021-06-26 NOTE — ED PROVIDER NOTE - PATIENT PORTAL LINK FT
You can access the FollowMyHealth Patient Portal offered by [facility] by registering at the following website: [email or web address]. By joining [email or web address]’s FollowMyHealth portal, you will also be able to view your health information using other applications (apps) compatible with our system.

## 2021-06-26 NOTE — ED PROVIDER NOTE - OBJECTIVE STATEMENT
69 y/o M with PMHx of asthma, COPD, and hypertension presents to the ED for scalp itching x2 months. Pt is well known to this ED due to frequent visits for similar complaints. He denies fevers, chills, or rash.

## 2021-06-28 ENCOUNTER — EMERGENCY (EMERGENCY)
Facility: HOSPITAL | Age: 71
LOS: 1 days | Discharge: LEFT BEFORE DISCHARGE | End: 2021-06-28
Admitting: EMERGENCY MEDICINE
Payer: MEDICARE

## 2021-06-28 VITALS
RESPIRATION RATE: 18 BRPM | DIASTOLIC BLOOD PRESSURE: 75 MMHG | WEIGHT: 149.91 LBS | TEMPERATURE: 99 F | SYSTOLIC BLOOD PRESSURE: 105 MMHG | OXYGEN SATURATION: 97 % | HEIGHT: 67 IN | HEART RATE: 77 BPM

## 2021-06-28 DIAGNOSIS — Z95.2 PRESENCE OF PROSTHETIC HEART VALVE: Chronic | ICD-10-CM

## 2021-06-28 DIAGNOSIS — Z53.21 PROCEDURE AND TREATMENT NOT CARRIED OUT DUE TO PATIENT LEAVING PRIOR TO BEING SEEN BY HEALTH CARE PROVIDER: ICD-10-CM

## 2021-06-28 DIAGNOSIS — R06.02 SHORTNESS OF BREATH: ICD-10-CM

## 2021-06-28 DIAGNOSIS — Z95.1 PRESENCE OF AORTOCORONARY BYPASS GRAFT: Chronic | ICD-10-CM

## 2021-06-28 PROCEDURE — L9991: CPT

## 2021-06-28 NOTE — ED ADULT TRIAGE NOTE - CHIEF COMPLAINT QUOTE
c/o SOB and head itching x several days. +even nonlabored breathing. resting comfortably in triage speaking in full sentences

## 2021-07-02 ENCOUNTER — EMERGENCY (EMERGENCY)
Facility: HOSPITAL | Age: 71
LOS: 1 days | Discharge: HOME | End: 2021-07-02
Admitting: EMERGENCY MEDICINE
Payer: MEDICARE

## 2021-07-02 VITALS
OXYGEN SATURATION: 98 % | HEIGHT: 67 IN | TEMPERATURE: 98 F | HEART RATE: 80 BPM | SYSTOLIC BLOOD PRESSURE: 135 MMHG | RESPIRATION RATE: 18 BRPM | DIASTOLIC BLOOD PRESSURE: 84 MMHG

## 2021-07-02 DIAGNOSIS — I10 ESSENTIAL (PRIMARY) HYPERTENSION: ICD-10-CM

## 2021-07-02 DIAGNOSIS — E78.5 HYPERLIPIDEMIA, UNSPECIFIED: ICD-10-CM

## 2021-07-02 DIAGNOSIS — L29.9 PRURITUS, UNSPECIFIED: ICD-10-CM

## 2021-07-02 DIAGNOSIS — Z79.82 LONG TERM (CURRENT) USE OF ASPIRIN: ICD-10-CM

## 2021-07-02 DIAGNOSIS — I48.91 UNSPECIFIED ATRIAL FIBRILLATION: ICD-10-CM

## 2021-07-02 DIAGNOSIS — Z95.2 PRESENCE OF PROSTHETIC HEART VALVE: Chronic | ICD-10-CM

## 2021-07-02 DIAGNOSIS — F41.9 ANXIETY DISORDER, UNSPECIFIED: ICD-10-CM

## 2021-07-02 DIAGNOSIS — Z88.6 ALLERGY STATUS TO ANALGESIC AGENT: ICD-10-CM

## 2021-07-02 DIAGNOSIS — F17.200 NICOTINE DEPENDENCE, UNSPECIFIED, UNCOMPLICATED: ICD-10-CM

## 2021-07-02 DIAGNOSIS — J44.9 CHRONIC OBSTRUCTIVE PULMONARY DISEASE, UNSPECIFIED: ICD-10-CM

## 2021-07-02 DIAGNOSIS — Z95.1 PRESENCE OF AORTOCORONARY BYPASS GRAFT: Chronic | ICD-10-CM

## 2021-07-02 PROCEDURE — 71045 X-RAY EXAM CHEST 1 VIEW: CPT | Mod: 26

## 2021-07-02 PROCEDURE — 99284 EMERGENCY DEPT VISIT MOD MDM: CPT | Mod: 25

## 2021-07-02 PROCEDURE — 71046 X-RAY EXAM CHEST 2 VIEWS: CPT | Mod: 26

## 2021-07-02 RX ORDER — PERMETHRIN CREAM 5% W/W 50 MG/G
1 CREAM TOPICAL ONCE
Refills: 0 | Status: COMPLETED | OUTPATIENT
Start: 2021-07-02 | End: 2021-07-02

## 2021-07-02 RX ORDER — AZITHROMYCIN 500 MG/1
500 TABLET, FILM COATED ORAL ONCE
Refills: 0 | Status: COMPLETED | OUTPATIENT
Start: 2021-07-02 | End: 2021-07-02

## 2021-07-02 RX ORDER — AZITHROMYCIN 500 MG/1
1 TABLET, FILM COATED ORAL
Qty: 4 | Refills: 0
Start: 2021-07-02 | End: 2021-07-05

## 2021-07-02 RX ADMIN — AZITHROMYCIN 500 MILLIGRAM(S): 500 TABLET, FILM COATED ORAL at 11:35

## 2021-07-02 RX ADMIN — PERMETHRIN CREAM 5% W/W 1 APPLICATION(S): 50 CREAM TOPICAL at 11:35

## 2021-07-02 NOTE — ED PROVIDER NOTE - CLINICAL SUMMARY MEDICAL DECISION MAKING FREE TEXT BOX
71 y/o M presenting with scalp itching. Pt seen multiple times in this ED for similar complaints. Will give Azithromycin and D/C afterwards.

## 2021-07-02 NOTE — ED ADULT NURSE NOTE - CAS ELECT INFOMATION PROVIDED
Patient left prior to receiving discharge papers. Patient seen safely ambulating off the unit/DC instructions

## 2021-07-02 NOTE — ED ADULT NURSE NOTE - OBJECTIVE STATEMENT
Pt c/o "itching all over" states this is a chronic problem. Denies any acute pain or complaints. No obvious rash noted.

## 2021-07-02 NOTE — ED PROVIDER NOTE - OBJECTIVE STATEMENT
71 y/o M with PMHx of asthma, COPD, and hypertension presents to the ED for scalp itching x2 months. Pt is well known to this ED due to frequent visits for similar complaints. He denies fevers, chills, or rash.

## 2021-07-02 NOTE — ED ADULT NURSE NOTE - NSIMPLEMENTINTERV_GEN_ALL_ED
Implemented All Universal Safety Interventions:  [place] to call system. Call bell, personal items and telephone within reach. Instruct patient to call for assistance. Room bathroom lighting operational. Non-slip footwear when patient is off stretcher. Physically safe environment: no spills, clutter or unnecessary equipment. Stretcher in lowest position, wheels locked, appropriate side rails in place.

## 2021-07-02 NOTE — ED PROVIDER NOTE - PATIENT PORTAL LINK FT
You can access the FollowMyHealth Patient Portal offered by [facility] by registering at the following website: [email or web address]. By joining [employer]’s FollowMyHealth portal, you will also be able to view your health information using other applications (apps) compatible with our system.

## 2021-07-05 ENCOUNTER — EMERGENCY (EMERGENCY)
Facility: HOSPITAL | Age: 71
LOS: 1 days | Discharge: LEFT BEFORE DISCHARGE | End: 2021-07-05
Attending: EMERGENCY MEDICINE | Admitting: EMERGENCY MEDICINE
Payer: SELF-PAY

## 2021-07-05 VITALS
TEMPERATURE: 98 F | OXYGEN SATURATION: 98 % | RESPIRATION RATE: 18 BRPM | HEART RATE: 93 BPM | SYSTOLIC BLOOD PRESSURE: 126 MMHG | DIASTOLIC BLOOD PRESSURE: 82 MMHG

## 2021-07-05 DIAGNOSIS — R06.02 SHORTNESS OF BREATH: ICD-10-CM

## 2021-07-05 DIAGNOSIS — Z53.21 PROCEDURE AND TREATMENT NOT CARRIED OUT DUE TO PATIENT LEAVING PRIOR TO BEING SEEN BY HEALTH CARE PROVIDER: ICD-10-CM

## 2021-07-05 PROCEDURE — L9991: CPT

## 2021-07-05 NOTE — ED ADULT TRIAGE NOTE - CHIEF COMPLAINT QUOTE
Walks into ER c/o sob with associated cough. Relevant pmh of current every day smoker, COPD, unsure of medications.

## 2021-07-10 ENCOUNTER — EMERGENCY (EMERGENCY)
Facility: HOSPITAL | Age: 71
LOS: 1 days | Discharge: LEFT BEFORE DISCHARGE | End: 2021-07-10
Attending: EMERGENCY MEDICINE | Admitting: EMERGENCY MEDICINE
Payer: MEDICARE

## 2021-07-10 VITALS
RESPIRATION RATE: 20 BRPM | TEMPERATURE: 98 F | HEART RATE: 97 BPM | OXYGEN SATURATION: 97 % | HEIGHT: 67 IN | DIASTOLIC BLOOD PRESSURE: 85 MMHG | SYSTOLIC BLOOD PRESSURE: 123 MMHG | WEIGHT: 149.91 LBS

## 2021-07-10 DIAGNOSIS — R53.83 OTHER FATIGUE: ICD-10-CM

## 2021-07-10 DIAGNOSIS — J45.909 UNSPECIFIED ASTHMA, UNCOMPLICATED: ICD-10-CM

## 2021-07-10 DIAGNOSIS — Z95.2 PRESENCE OF PROSTHETIC HEART VALVE: Chronic | ICD-10-CM

## 2021-07-10 DIAGNOSIS — F17.200 NICOTINE DEPENDENCE, UNSPECIFIED, UNCOMPLICATED: ICD-10-CM

## 2021-07-10 DIAGNOSIS — Z88.6 ALLERGY STATUS TO ANALGESIC AGENT: ICD-10-CM

## 2021-07-10 DIAGNOSIS — Z95.1 PRESENCE OF AORTOCORONARY BYPASS GRAFT: Chronic | ICD-10-CM

## 2021-07-10 PROCEDURE — 99283 EMERGENCY DEPT VISIT LOW MDM: CPT

## 2021-07-10 NOTE — ED PROVIDER NOTE - OBJECTIVE STATEMENT
69 y/o M w/hx asthma, well known to ED, frequent visits for myriad of complaints, known medical noncompliance. c/o fatigue. No acute SOB or change in baseline cough. No CP. No fever/chills.

## 2021-07-10 NOTE — ED PROVIDER NOTE - PATIENT PORTAL LINK FT
You can access the FollowMyHealth Patient Portal offered by [facility] by registering at the following website: [email or web address]. By joining [employer]’s FollowMyHealth portal, you will also be able to view your health information using other applications (apps) compatible with our system.

## 2021-07-10 NOTE — ED PROVIDER NOTE - CLINICAL SUMMARY MEDICAL DECISION MAKING FREE TEXT BOX
After pt slept in chair for 30min, woke up and requested discharge. Feeling at baseline state of health. d/c home.

## 2021-07-10 NOTE — ED PROVIDER NOTE - NSFOLLOWUPINSTRUCTIONS_ED_ALL_ED_FT
.  Fatigue    WHAT YOU NEED TO KNOW:    Fatigue is mental and physical exhaustion that does not get better with rest. Fatigue may make daily activities difficult or cause extreme sleepiness. It is normal to feel tired sometimes, but long-term fatigue may be a sign of serious illness.    DISCHARGE INSTRUCTIONS:    Return to the emergency department if:   •You have chest pain.       •You have difficulty breathing.       Contact your healthcare provider if:   •You have a cough that gets worse, or does not go away.       •You see blood in your urine or bowel movement.       •You have numbness or tingling around your mouth or in an arm or leg.       •You faint, feel dizzy, or have vision changes.       •You have swelling in your lymph nodes.       •You are a woman and have vaginal bleeding that is not normal for you, or is not expected.       •You lose weight without trying, or you have trouble eating.       •You feel weak or have muscle pain.       •You have pain or swelling in your joints.      •You have questions or concerns about your condition or care.       Follow up with your healthcare provider as directed: You may need more tests. Your healthcare provider may also refer you to a specialist. Write down your questions so you remember to ask them during your visits.    Manage fatigue:   •Keep a fatigue diary. Include anything that makes you feel more tired or less tired. Bring the diary with you to follow-up visits with your provider.      •Exercise as directed. Exercise can help you feel more alert. Exercise can also help you manage stress or relieve depression. Try to get at least 30 minutes of exercise most days of the week.  [employer] Walking for Exercise           •Keep a regular sleep schedule. Go to bed and wake up at the same times every day. Limit naps to 1 hour each day. A nap can improve fatigue, but a long nap may make it harder to go to sleep at night.      •Plan and limit your activities. Limit the number of activities such as shopping and cleaning you do each day. If possible, try to spread out your trips throughout the week. Plan ahead so you are not rushing to get something done. Only do activities that you have the energy to complete. Take breaks between activities. Ask for help if you need it. Another person may be able to drive you or help with daily activities.      •Eat a variety of healthy foods. Healthy foods include fruits, vegetables, whole-grain breads, low-fat dairy products, beans, lean meats, and fish. Good nutrition can help manage fatigue.  Healthy Foods           •Limit caffeine and alcohol. These can make it difficult to fall or stay asleep. Women should limit alcohol to 1 drink a day. Men should limit alcohol to 2 drinks a day. A drink of alcohol is 12 ounces of beer, 5 ounces of wine, or 1½ ounces of liquor. Ask our healthcare provider how much caffeine is safe for you.      •Do not smoke. Nicotine and other chemicals in cigarettes and cigars can cause lung damage and increase fatigue. Ask your healthcare provider for information if you currently smoke and need help to quit. E-cigarettes or smokeless tobacco still contain nicotine. Talk to your healthcare provider before you use these

## 2021-07-19 ENCOUNTER — EMERGENCY (EMERGENCY)
Facility: HOSPITAL | Age: 71
LOS: 1 days | Discharge: LEFT BEFORE DISCHARGE | End: 2021-07-19
Attending: EMERGENCY MEDICINE | Admitting: EMERGENCY MEDICINE
Payer: MEDICARE

## 2021-07-19 VITALS
RESPIRATION RATE: 18 BRPM | HEART RATE: 92 BPM | DIASTOLIC BLOOD PRESSURE: 75 MMHG | WEIGHT: 149.91 LBS | OXYGEN SATURATION: 98 % | HEIGHT: 67 IN | SYSTOLIC BLOOD PRESSURE: 109 MMHG | TEMPERATURE: 98 F

## 2021-07-19 DIAGNOSIS — R05 COUGH: ICD-10-CM

## 2021-07-19 DIAGNOSIS — Z95.1 PRESENCE OF AORTOCORONARY BYPASS GRAFT: Chronic | ICD-10-CM

## 2021-07-19 DIAGNOSIS — Z95.2 PRESENCE OF PROSTHETIC HEART VALVE: Chronic | ICD-10-CM

## 2021-07-19 DIAGNOSIS — Z53.21 PROCEDURE AND TREATMENT NOT CARRIED OUT DUE TO PATIENT LEAVING PRIOR TO BEING SEEN BY HEALTH CARE PROVIDER: ICD-10-CM

## 2021-07-19 PROCEDURE — L9991: CPT

## 2021-07-23 NOTE — ED PROVIDER NOTE - CPE EDP RESP NORM
PALLIATIVE MEDICINE          Consult noted and appreciated. We will be available to see Monday, 7/26/2021. Jessica Rashid.  [address] MSN, FNP-BC, [facility] normal...

## 2021-07-27 ENCOUNTER — EMERGENCY (EMERGENCY)
Facility: HOSPITAL | Age: 71
LOS: 1 days | Discharge: LEFT BEFORE DISCHARGE | End: 2021-07-27
Attending: EMERGENCY MEDICINE | Admitting: EMERGENCY MEDICINE
Payer: MEDICARE

## 2021-07-27 VITALS
HEART RATE: 88 BPM | TEMPERATURE: 99 F | RESPIRATION RATE: 20 BRPM | DIASTOLIC BLOOD PRESSURE: 69 MMHG | HEIGHT: 67 IN | SYSTOLIC BLOOD PRESSURE: 117 MMHG | OXYGEN SATURATION: 96 % | WEIGHT: 164.91 LBS

## 2021-07-27 DIAGNOSIS — Z88.6 ALLERGY STATUS TO ANALGESIC AGENT: ICD-10-CM

## 2021-07-27 DIAGNOSIS — E78.5 HYPERLIPIDEMIA, UNSPECIFIED: ICD-10-CM

## 2021-07-27 DIAGNOSIS — I10 ESSENTIAL (PRIMARY) HYPERTENSION: ICD-10-CM

## 2021-07-27 DIAGNOSIS — R06.02 SHORTNESS OF BREATH: ICD-10-CM

## 2021-07-27 DIAGNOSIS — F17.200 NICOTINE DEPENDENCE, UNSPECIFIED, UNCOMPLICATED: ICD-10-CM

## 2021-07-27 DIAGNOSIS — R05 COUGH: ICD-10-CM

## 2021-07-27 DIAGNOSIS — F41.9 ANXIETY DISORDER, UNSPECIFIED: ICD-10-CM

## 2021-07-27 DIAGNOSIS — Z95.1 PRESENCE OF AORTOCORONARY BYPASS GRAFT: Chronic | ICD-10-CM

## 2021-07-27 DIAGNOSIS — J43.9 EMPHYSEMA, UNSPECIFIED: ICD-10-CM

## 2021-07-27 DIAGNOSIS — Z95.2 PRESENCE OF PROSTHETIC HEART VALVE: Chronic | ICD-10-CM

## 2021-07-27 PROCEDURE — 99284 EMERGENCY DEPT VISIT MOD MDM: CPT

## 2021-07-27 RX ORDER — ALBUTEROL 90 UG/1
1 AEROSOL, METERED ORAL ONCE
Refills: 0 | Status: DISCONTINUED | OUTPATIENT
Start: 2021-07-27 | End: 2021-07-31

## 2021-07-27 NOTE — ED PROVIDER NOTE - CLINICAL SUMMARY MEDICAL DECISION MAKING FREE TEXT BOX
69 y/o M with Hx of COPD presenting with non-productive coughs. On exam, Pt appears well and in no acute distress. Plan for Tx with Albuterol. Will reassess afterwards.

## 2021-07-27 NOTE — ED PROVIDER NOTE - OBJECTIVE STATEMENT
69 y/o M with PMHx of COPD presents to the ED for non-productive coughs. Pt reports the coughs have been occurring for the past several days. He denies fevers, chills, CP, or SOB.

## 2021-07-27 NOTE — ED ADULT TRIAGE NOTE - CHIEF COMPLAINT QUOTE
Pt frequently seen in this ED for similar complaint requests endorsing cough 2ndary to known respiratory illness.  Pt states "I ran out of my medicine."  Pt denies fever/chills.

## 2021-07-27 NOTE — ED ADULT TRIAGE NOTE - NS ED NURSE BANDS TYPE
"Dr. Siddiqui,  It looks like the  is stating that \"Kacie\" would like new orders for OT.  Please advise.     " Name band;

## 2021-07-27 NOTE — ED ADULT NURSE NOTE - OBJECTIVE STATEMENT
Pt aox3 with steady gait on arrival. pt states chest discomfort with cough x days. pt denies fevers. Pt denies sob.

## 2021-08-14 ENCOUNTER — EMERGENCY (EMERGENCY)
Facility: HOSPITAL | Age: 71
LOS: 1 days | Discharge: HOME | End: 2021-08-14
Admitting: EMERGENCY MEDICINE
Payer: MEDICARE

## 2021-08-14 VITALS
DIASTOLIC BLOOD PRESSURE: 78 MMHG | HEIGHT: 68 IN | WEIGHT: 149.91 LBS | OXYGEN SATURATION: 98 % | SYSTOLIC BLOOD PRESSURE: 114 MMHG | RESPIRATION RATE: 15 BRPM | TEMPERATURE: 97 F | HEART RATE: 84 BPM

## 2021-08-14 DIAGNOSIS — Z95.2 PRESENCE OF PROSTHETIC HEART VALVE: Chronic | ICD-10-CM

## 2021-08-14 DIAGNOSIS — Z95.1 PRESENCE OF AORTOCORONARY BYPASS GRAFT: Chronic | ICD-10-CM

## 2021-08-14 DIAGNOSIS — L29.9 PRURITUS, UNSPECIFIED: ICD-10-CM

## 2021-08-14 PROCEDURE — 99282 EMERGENCY DEPT VISIT SF MDM: CPT

## 2021-08-14 RX ORDER — DIPHENHYDRAMINE HCL 50 MG
25 CAPSULE ORAL ONCE
Refills: 0 | Status: COMPLETED | OUTPATIENT
Start: 2021-08-14 | End: 2021-08-14

## 2021-08-14 RX ADMIN — Medication 25 MILLIGRAM(S): at 22:04

## 2021-08-14 NOTE — ED ADULT NURSE NOTE - OBJECTIVE STATEMENT
69 yo M c/o itching. Pt reports generalized body itching x2 weeks. Denies noting any bites to his skin, none noted at this time. Pt denies allergies and medications. Also denies substance use. Denies CP, SOB, N/V/D, headache, dizziness, fever/chills, numbness/tingling, change in bowel or bladder habits. Pt speaking in full complete sentences, ambulatory with steady gait.

## 2021-08-14 NOTE — ED PROVIDER NOTE - PATIENT PORTAL LINK FT
You can access the FollowMyHealth Patient Portal offered by [facility] by registering at the following website: [email or web address]. By joining [employer]’s FollowMyHealth portal, you will also be able to view your health information using other applications (apps) compatible with our system.

## 2021-08-14 NOTE — ED PROVIDER NOTE - OBJECTIVE STATEMENT
69 yo male hx cardiac disease c/o generalized body itching x 2 weeks. denies bug/insect bites. has not tried anything for symptoms.

## 2021-08-14 NOTE — ED PROVIDER NOTE - PHYSICAL EXAMINATION
CONSTITUTIONAL: Well-appearing; well-nourished; in no apparent distress.   	HEAD: Normocephalic; atraumatic.   	EYES:  clear  	ENT: airway patent. no pharyngeal swelling.  	NECK: Supple; non-tender;   	CARDIOVASCULAR: Normal S1, S2; no murmurs, rubs, or gallops. Regular rate and rhythm.   	RESPIRATORY: Breathing easily; breath sounds clear and equal bilaterally; no wheezes, rhonchi, or rales.  	GI: Soft; non-distended; non-tender  	EXT: JEFFRIES x 4. ambulatory  	SKIN: Normal for age and race; warm; dry; good turgor; no apparent lesions or rash.   	NEURO: A & O x 3; face symmetric; grossly unremarkable.   PSYCHOLOGICAL: The patient’s mood and manner are appropriate.

## 2021-08-14 NOTE — ED ADULT NURSE NOTE - NSIMPLEMENTINTERV_GEN_ALL_ED
Implemented All Universal Safety Interventions:  [place] to call system. Call bell, personal items and telephone within reach. Instruct patient to call for assistance. Room bathroom lighting operational. Non-slip footwear when patient is off stretcher. Physically safe environment: no spills, clutter or unnecessary equipment. Stretcher in lowest position, wheels locked, appropriate side rails in place.

## 2021-08-14 NOTE — ED ADULT TRIAGE NOTE - CHIEF COMPLAINT QUOTE
Pt with complaint of generalized body itching for "days." Pt states he has been showering multiple times a day without any relief. Denies any rashes or hives.

## 2021-08-18 ENCOUNTER — EMERGENCY (EMERGENCY)
Facility: HOSPITAL | Age: 71
LOS: 1 days | Discharge: LEFT BEFORE DISCHARGE | End: 2021-08-18
Attending: EMERGENCY MEDICINE | Admitting: EMERGENCY MEDICINE
Payer: MEDICARE

## 2021-08-18 VITALS
RESPIRATION RATE: 20 BRPM | HEIGHT: 67 IN | WEIGHT: 149.91 LBS | OXYGEN SATURATION: 97 % | SYSTOLIC BLOOD PRESSURE: 103 MMHG | HEART RATE: 92 BPM | DIASTOLIC BLOOD PRESSURE: 65 MMHG | TEMPERATURE: 98 F

## 2021-08-18 DIAGNOSIS — J45.909 UNSPECIFIED ASTHMA, UNCOMPLICATED: ICD-10-CM

## 2021-08-18 DIAGNOSIS — F17.210 NICOTINE DEPENDENCE, CIGARETTES, UNCOMPLICATED: ICD-10-CM

## 2021-08-18 DIAGNOSIS — R05 COUGH: ICD-10-CM

## 2021-08-18 DIAGNOSIS — Z95.2 PRESENCE OF PROSTHETIC HEART VALVE: Chronic | ICD-10-CM

## 2021-08-18 DIAGNOSIS — E78.5 HYPERLIPIDEMIA, UNSPECIFIED: ICD-10-CM

## 2021-08-18 DIAGNOSIS — I48.91 UNSPECIFIED ATRIAL FIBRILLATION: ICD-10-CM

## 2021-08-18 DIAGNOSIS — Z88.6 ALLERGY STATUS TO ANALGESIC AGENT: ICD-10-CM

## 2021-08-18 DIAGNOSIS — I10 ESSENTIAL (PRIMARY) HYPERTENSION: ICD-10-CM

## 2021-08-18 DIAGNOSIS — Z95.2 PRESENCE OF PROSTHETIC HEART VALVE: ICD-10-CM

## 2021-08-18 DIAGNOSIS — R06.02 SHORTNESS OF BREATH: ICD-10-CM

## 2021-08-18 DIAGNOSIS — F41.9 ANXIETY DISORDER, UNSPECIFIED: ICD-10-CM

## 2021-08-18 DIAGNOSIS — Z95.1 PRESENCE OF AORTOCORONARY BYPASS GRAFT: Chronic | ICD-10-CM

## 2021-08-18 DIAGNOSIS — Z95.1 PRESENCE OF AORTOCORONARY BYPASS GRAFT: ICD-10-CM

## 2021-08-18 PROCEDURE — 99284 EMERGENCY DEPT VISIT MOD MDM: CPT

## 2021-08-18 RX ORDER — IPRATROPIUM/ALBUTEROL SULFATE 18-103MCG
3 AEROSOL WITH ADAPTER (GRAM) INHALATION ONCE
Refills: 0 | Status: DISCONTINUED | OUTPATIENT
Start: 2021-08-18 | End: 2021-08-18

## 2021-08-18 NOTE — ED PROVIDER NOTE - NSICDXPASTMEDICALHX_GEN_ALL_CORE_FT
PAST MEDICAL HISTORY:  Afib     Anxiety     Asthma     Emphysema/COPD     HLD (hyperlipidemia)     HTN (hypertension)     Hypertension, unspecified type

## 2021-08-18 NOTE — ED PROVIDER NOTE - PHYSICAL EXAMINATION
VITAL SIGNS: I have reviewed nursing notes and confirm.  CONSTITUTIONAL: Well-developed; well-nourished; in no acute distress.  SKIN: Skin is warm and dry, no acute rash.  HEAD: Normocephalic; atraumatic.  EYES: PERRL, EOM intact; conjunctiva and sclera clear.  ENT: No nasal discharge; airway clear.  NECK: Supple; non tender.  CARD: S1, S2 normal; no murmurs, gallops, or rubs. Regular rate and rhythm.  RESP: Scant expiratory wheezes in bases.   ABD: Normal bowel sounds; soft; non-distended; non-tender; no hepatosplenomegaly.  MSK: Normal ROM. No clubbing, cyanosis or edema.  NEURO: Alert, oriented. Grossly unremarkable.  PSYCH: Cooperative, appropriate.

## 2021-08-18 NOTE — ED ADULT NURSE NOTE - OBJECTIVE STATEMENT
c/o sob x 2 hrs, able to speak in complete sentences, no s/s of distress noted, will continue to monitor

## 2021-08-18 NOTE — ED PROVIDER NOTE - CLINICAL SUMMARY MEDICAL DECISION MAKING FREE TEXT BOX
Concerned for COPD exacerbation - wrote for breathing treatments, EKG, and CXR.  Pt eloped soon after MD evaluation and did not have any testing or treatments performed.

## 2021-08-18 NOTE — ED PROVIDER NOTE - OBJECTIVE STATEMENT
Pt is a 71yo M with a h/o HTN, HL, COPD, and AF who p/w SOB and cough x today.  Pt well known to ED staff for multiple visits for COPD and chest pain.  Denies any CP today.  Denies any palpitations.  Pt reports SOB c/w his COPD and cough.  Cough is non productive.  Believes sxs Pt is a 71yo M with a h/o HTN, HL, COPD, and AF who p/w SOB and cough x today.  Pt well known to ED staff for multiple visits for COPD and chest pain.  Denies any CP today.  Denies any palpitations.  Pt reports SOB c/w his COPD and cough.  Cough is non productive.  Believes sxs ar 2/2 heat/humidity today.

## 2021-08-19 ENCOUNTER — EMERGENCY (EMERGENCY)
Facility: HOSPITAL | Age: 71
LOS: 1 days | Discharge: LEFT BEFORE DISCHARGE | End: 2021-08-19
Attending: EMERGENCY MEDICINE | Admitting: EMERGENCY MEDICINE
Payer: MEDICARE

## 2021-08-19 VITALS
TEMPERATURE: 98 F | DIASTOLIC BLOOD PRESSURE: 69 MMHG | OXYGEN SATURATION: 98 % | RESPIRATION RATE: 16 BRPM | HEIGHT: 67 IN | SYSTOLIC BLOOD PRESSURE: 114 MMHG | HEART RATE: 87 BPM

## 2021-08-19 DIAGNOSIS — Z95.2 PRESENCE OF PROSTHETIC HEART VALVE: Chronic | ICD-10-CM

## 2021-08-19 DIAGNOSIS — Z53.21 PROCEDURE AND TREATMENT NOT CARRIED OUT DUE TO PATIENT LEAVING PRIOR TO BEING SEEN BY HEALTH CARE PROVIDER: ICD-10-CM

## 2021-08-19 DIAGNOSIS — Z95.1 PRESENCE OF AORTOCORONARY BYPASS GRAFT: Chronic | ICD-10-CM

## 2021-08-19 DIAGNOSIS — R06.02 SHORTNESS OF BREATH: ICD-10-CM

## 2021-08-19 PROCEDURE — L9991: CPT

## 2021-08-19 NOTE — ED ADULT NURSE NOTE - DOES PATIENT HAVE ADVANCE DIRECTIVE
on call. Thank you! =)   No

## 2021-08-25 NOTE — ED ADULT NURSE NOTE - NSIMPLEMENTINTERV_GEN_ALL_ED
NPO with TPN Implemented All Universal Safety Interventions:  [place] to call system. Call bell, personal items and telephone within reach. Instruct patient to call for assistance. Room bathroom lighting operational. Non-slip footwear when patient is off stretcher. Physically safe environment: no spills, clutter or unnecessary equipment. Stretcher in lowest position, wheels locked, appropriate side rails in place.

## 2021-08-26 ENCOUNTER — EMERGENCY (EMERGENCY)
Facility: HOSPITAL | Age: 71
LOS: 1 days | Discharge: HOME | End: 2021-08-26
Admitting: EMERGENCY MEDICINE
Payer: MEDICARE

## 2021-08-26 VITALS
DIASTOLIC BLOOD PRESSURE: 85 MMHG | SYSTOLIC BLOOD PRESSURE: 131 MMHG | RESPIRATION RATE: 22 BRPM | TEMPERATURE: 98 F | HEIGHT: 66 IN | HEART RATE: 100 BPM | OXYGEN SATURATION: 98 %

## 2021-08-26 DIAGNOSIS — Z88.6 ALLERGY STATUS TO ANALGESIC AGENT: ICD-10-CM

## 2021-08-26 DIAGNOSIS — Z95.1 PRESENCE OF AORTOCORONARY BYPASS GRAFT: Chronic | ICD-10-CM

## 2021-08-26 DIAGNOSIS — R21 RASH AND OTHER NONSPECIFIC SKIN ERUPTION: ICD-10-CM

## 2021-08-26 DIAGNOSIS — Z95.2 PRESENCE OF PROSTHETIC HEART VALVE: Chronic | ICD-10-CM

## 2021-08-26 PROCEDURE — 99283 EMERGENCY DEPT VISIT LOW MDM: CPT

## 2021-08-26 RX ORDER — PERMETHRIN CREAM 5% W/W 50 MG/G
1 CREAM TOPICAL ONCE
Refills: 0 | Status: DISCONTINUED | OUTPATIENT
Start: 2021-08-26 | End: 2021-08-30

## 2021-08-26 RX ORDER — HYDROCORTISONE 1 %
1 OINTMENT (GRAM) TOPICAL ONCE
Refills: 0 | Status: DISCONTINUED | OUTPATIENT
Start: 2021-08-26 | End: 2021-08-30

## 2021-08-26 NOTE — ED PROVIDER NOTE - PATIENT PORTAL LINK FT
You can access the FollowMyHealth Patient Portal offered by [facility] by registering at the following website: [email or web address]. By joining [employer]’s FollowMyHealth portal, you will also be able to view your health information using other applications (apps) compatible with our system.

## 2021-08-26 NOTE — ED PROVIDER NOTE - CLINICAL SUMMARY MEDICAL DECISION MAKING FREE TEXT BOX
Patient here requesting permethrin. Exam unremarkable  d/c with return precautions   advised dermatology follow up

## 2021-08-26 NOTE — ED ADULT TRIAGE NOTE - CHIEF COMPLAINT QUOTE
pt c/o itchyness to whole body x 2 weeks. denies seeing any bugs, states, "I am very clean". pt denies any new medications or soap.

## 2021-09-09 NOTE — ED ADULT NURSE NOTE - PAIN: PRESENCE, MLM
Operative Note    Patient: Brandon Colon 43 year old male with PMH of HTN, DM, Seizures and Bipolar disorder admitted with abdominal pain, nausea and vomiting. CT scan shows jejunal obstruction, Surgery consult requested for management    MRN: [id number]    Surgeon(s): DRAKE RUIZ MD, FACS    Assistant(s): JAYSON SALAZAR    Pre-Op Diagnosis: SMALL BOWEL OBSTRUCTION     Post-Op Diagnosis: SMALL BOWEL OBSTRUCTION     Procedure: SMALL BOWEL RESECTION WITH ANASTOMOSIS, LYSIS OF ADHESIONS, EXPLORATORY LAPAROTOMY    Anesthesia Type: GENERAL                                   Complications: NONE    Findings: DISTENDED SB UP TO MID/DISTAL JEJUNUM, MULTIPLE ADHESIONS AND ANASTOMOTIC STRICTURE    Specimens Removed:   ID Type Source Tests Collected by Time   A : Small Bowel Tissue Large Intestine SURGICAL PATHOLOGY Drake Ruiz MD 9/9/2021 1430        Estimated Blood Loss: 150 mL     Assistant Tasks: POSITION, PREPPING AND DRAPING, ASSIST WITH EXPOSURE AND CLOSURE     Implants: NONE    INSTRUMENTS AND SPONGES COUNT COMPLETE           denies pain/discomfort

## 2021-09-10 ENCOUNTER — EMERGENCY (EMERGENCY)
Facility: HOSPITAL | Age: 71
LOS: 1 days | Discharge: HOME | End: 2021-09-10
Attending: EMERGENCY MEDICINE | Admitting: EMERGENCY MEDICINE
Payer: MEDICARE

## 2021-09-10 VITALS
HEART RATE: 103 BPM | RESPIRATION RATE: 18 BRPM | SYSTOLIC BLOOD PRESSURE: 148 MMHG | WEIGHT: 149.91 LBS | HEIGHT: 67 IN | OXYGEN SATURATION: 97 % | TEMPERATURE: 97 F | DIASTOLIC BLOOD PRESSURE: 100 MMHG

## 2021-09-10 DIAGNOSIS — I25.810 ATHEROSCLEROSIS OF CORONARY ARTERY BYPASS GRAFT(S) WITHOUT ANGINA PECTORIS: ICD-10-CM

## 2021-09-10 DIAGNOSIS — Z95.1 PRESENCE OF AORTOCORONARY BYPASS GRAFT: ICD-10-CM

## 2021-09-10 DIAGNOSIS — Z95.4 PRESENCE OF OTHER HEART-VALVE REPLACEMENT: ICD-10-CM

## 2021-09-10 DIAGNOSIS — I10 ESSENTIAL (PRIMARY) HYPERTENSION: ICD-10-CM

## 2021-09-10 DIAGNOSIS — Z86.79 PERSONAL HISTORY OF OTHER DISEASES OF THE CIRCULATORY SYSTEM: ICD-10-CM

## 2021-09-10 DIAGNOSIS — F41.9 ANXIETY DISORDER, UNSPECIFIED: ICD-10-CM

## 2021-09-10 DIAGNOSIS — J43.9 EMPHYSEMA, UNSPECIFIED: ICD-10-CM

## 2021-09-10 DIAGNOSIS — R06.02 SHORTNESS OF BREATH: ICD-10-CM

## 2021-09-10 DIAGNOSIS — Z79.82 LONG TERM (CURRENT) USE OF ASPIRIN: ICD-10-CM

## 2021-09-10 DIAGNOSIS — E78.5 HYPERLIPIDEMIA, UNSPECIFIED: ICD-10-CM

## 2021-09-10 DIAGNOSIS — L29.9 PRURITUS, UNSPECIFIED: ICD-10-CM

## 2021-09-10 DIAGNOSIS — Z95.2 PRESENCE OF PROSTHETIC HEART VALVE: Chronic | ICD-10-CM

## 2021-09-10 DIAGNOSIS — J45.909 UNSPECIFIED ASTHMA, UNCOMPLICATED: ICD-10-CM

## 2021-09-10 DIAGNOSIS — Z95.1 PRESENCE OF AORTOCORONARY BYPASS GRAFT: Chronic | ICD-10-CM

## 2021-09-10 DIAGNOSIS — F17.200 NICOTINE DEPENDENCE, UNSPECIFIED, UNCOMPLICATED: ICD-10-CM

## 2021-09-10 DIAGNOSIS — Z88.6 ALLERGY STATUS TO ANALGESIC AGENT: ICD-10-CM

## 2021-09-10 DIAGNOSIS — Z79.811 LONG TERM (CURRENT) USE OF AROMATASE INHIBITORS: ICD-10-CM

## 2021-09-10 PROCEDURE — 99283 EMERGENCY DEPT VISIT LOW MDM: CPT

## 2021-09-10 RX ORDER — HYDROXYZINE HCL 10 MG
25 TABLET ORAL ONCE
Refills: 0 | Status: DISCONTINUED | OUTPATIENT
Start: 2021-09-10 | End: 2021-09-14

## 2021-09-10 NOTE — ED ADULT NURSE REASSESSMENT NOTE - NS ED NURSE REASSESS COMMENT FT1
Pt was assigned to self and placed in Chair B, However, Pt left without being seen,. Chg RN and MD was informed.

## 2021-09-10 NOTE — ED PROVIDER NOTE - PATIENT PORTAL LINK FT
You can access the FollowMyHealth Patient Portal offered by [facility] by registering at the following website: [email or web address]. By joining [employer]’s FollowMyHealth portal, you will also be able to view your health information using other applications (apps) compatible with our system.

## 2021-09-10 NOTE — ED ADULT TRIAGE NOTE - IDEAL BODY WEIGHT(KG)
Relevant Problems   No relevant active problems       Anesthetic History   No history of anesthetic complications            Review of Systems / Medical History  Patient summary reviewed, nursing notes reviewed and pertinent labs reviewed    Pulmonary        Sleep apnea           Neuro/Psych   Within defined limits           Cardiovascular                  Exercise tolerance: >4 METS     GI/Hepatic/Renal     GERD           Endo/Other        Arthritis     Other Findings              Physical Exam    Airway  Mallampati: III  TM Distance: 4 - 6 cm  Neck ROM: normal range of motion   Mouth opening: Normal     Cardiovascular  Regular rate and rhythm,  S1 and S2 normal,  no murmur, click, rub, or gallop  Rhythm: regular  Rate: normal         Dental  No notable dental hx       Pulmonary  Breath sounds clear to auscultation               Abdominal  GI exam deferred       Other Findings            Anesthetic Plan    ASA: 3  Anesthesia type: general          Induction: Intravenous  Anesthetic plan and risks discussed with: Patient
65

## 2021-09-10 NOTE — ED PROVIDER NOTE - OBJECTIVE STATEMENT
69 y/o M with PMHx of Afib, anxiety, asthma, emphysema/COPD, and HLD presents to ED complaining of itchiness all over for weeks. Notes chronic SOB, unlikely unassociated. Pt has some cream, but doesn't know what. Pt has unable tell us the heart medication he is on. Pt is allergic to Motrin and is a current smoker.

## 2021-09-15 ENCOUNTER — EMERGENCY (EMERGENCY)
Facility: HOSPITAL | Age: 71
LOS: 1 days | Discharge: LEFT BEFORE DISCHARGE | End: 2021-09-15
Attending: STUDENT IN AN ORGANIZED HEALTH CARE EDUCATION/TRAINING PROGRAM | Admitting: STUDENT IN AN ORGANIZED HEALTH CARE EDUCATION/TRAINING PROGRAM
Payer: MEDICARE

## 2021-09-15 VITALS
HEART RATE: 92 BPM | DIASTOLIC BLOOD PRESSURE: 76 MMHG | OXYGEN SATURATION: 98 % | RESPIRATION RATE: 16 BRPM | SYSTOLIC BLOOD PRESSURE: 120 MMHG | HEIGHT: 67 IN | WEIGHT: 149.91 LBS
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
## 2021-09-15 DIAGNOSIS — Z79.82 LONG TERM (CURRENT) USE OF ASPIRIN: ICD-10-CM

## 2021-09-15 DIAGNOSIS — Z95.2 PRESENCE OF PROSTHETIC HEART VALVE: ICD-10-CM

## 2021-09-15 DIAGNOSIS — I10 ESSENTIAL (PRIMARY) HYPERTENSION: ICD-10-CM

## 2021-09-15 DIAGNOSIS — J43.9 EMPHYSEMA, UNSPECIFIED: ICD-10-CM

## 2021-09-15 DIAGNOSIS — F41.9 ANXIETY DISORDER, UNSPECIFIED: ICD-10-CM

## 2021-09-15 DIAGNOSIS — I48.91 UNSPECIFIED ATRIAL FIBRILLATION: ICD-10-CM

## 2021-09-15 DIAGNOSIS — M25.572 PAIN IN LEFT ANKLE AND JOINTS OF LEFT FOOT: ICD-10-CM

## 2021-09-15 DIAGNOSIS — E78.5 HYPERLIPIDEMIA, UNSPECIFIED: ICD-10-CM

## 2021-09-15 DIAGNOSIS — Z79.899 OTHER LONG TERM (CURRENT) DRUG THERAPY: ICD-10-CM
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
## 2021-09-15 DIAGNOSIS — J45.909 UNSPECIFIED ASTHMA, UNCOMPLICATED: ICD-10-CM

## 2021-09-15 DIAGNOSIS — Z88.6 ALLERGY STATUS TO ANALGESIC AGENT: ICD-10-CM

## 2021-09-15 DIAGNOSIS — Z95.1 PRESENCE OF AORTOCORONARY BYPASS GRAFT: Chronic | ICD-10-CM

## 2021-09-15 DIAGNOSIS — Z95.2 PRESENCE OF PROSTHETIC HEART VALVE: Chronic | ICD-10-CM

## 2021-09-15 DIAGNOSIS — Z95.1 PRESENCE OF AORTOCORONARY BYPASS GRAFT: ICD-10-CM

## 2021-09-15 DIAGNOSIS — S92.355A NONDISPLACED FRACTURE OF FIFTH METATARSAL BONE, LEFT FOOT, INITIAL ENCOUNTER FOR CLOSED FRACTURE: ICD-10-CM

## 2021-09-15 PROCEDURE — 73610 X-RAY EXAM OF ANKLE: CPT | Mod: 26,LT

## 2021-09-15 PROCEDURE — 73610 X-RAY EXAM OF ANKLE: CPT | Mod: 26

## 2021-09-15 PROCEDURE — 99284 EMERGENCY DEPT VISIT MOD MDM: CPT | Mod: 25

## 2021-09-15 PROCEDURE — 73630 X-RAY EXAM OF FOOT: CPT | Mod: 26,LT

## 2021-09-15 PROCEDURE — 73630 X-RAY EXAM OF FOOT: CPT

## 2021-09-15 PROCEDURE — 73610 X-RAY EXAM OF ANKLE: CPT

## 2021-09-15 RX ORDER — OXYCODONE HYDROCHLORIDE 5 MG/1
5 TABLET ORAL ONCE
Refills: 0 | Status: DISCONTINUED | OUTPATIENT
Start: 2021-09-15 | End: 2021-09-15

## 2021-09-15 RX ADMIN — OXYCODONE HYDROCHLORIDE 5 MILLIGRAM(S): 5 TABLET ORAL at 18:43

## 2021-09-15 RX ADMIN — OXYCODONE HYDROCHLORIDE 5 MILLIGRAM(S): 5 TABLET ORAL at 19:54

## 2021-09-15 NOTE — ED PROVIDER NOTE - PHYSICAL EXAMINATION
80
normal cap refill to L toes  warm, well perfused L foot  though swollen L foot as compared to R with ttp at dorsolateral aspect.

## 2021-09-15 NOTE — ED ADULT NURSE NOTE - OBJECTIVE STATEMENT
pt is a 70M presenting for 3 days of worsening L foot pain. States that 3 days ago he fell while walking, injuring his L foot. went to [facility] ED where he states "they did blood work but no xrays". States pain at present a 10/10, worse with attempts at weight-bearing. Denies hitting head or LOC during fall. Denies fevers. states that tylenol hasn't relieved the pain.

## 2021-09-15 NOTE — ED ADULT TRIAGE NOTE - CHIEF COMPLAINT QUOTE
Pt BIBA from home [place] Left ankle pain and swelling s/p fall 2 days ago.  Pt states "I went to the [facility] ER but they only did bloodwork and didn't do an XR."  denies head injury, loc, dizziness, N/V/D, SOB, Feves and CP.

## 2021-09-15 NOTE — ED ADULT NURSE NOTE - CHIEF COMPLAINT QUOTE
Pt BIBA from home CO Left ankle pain and swelling s/p fall 2 days ago.  Pt states "I went to the [facility] ER but they only did bloodwork and didn't do an XR."  denies head injury, loc, dizziness, N/V/D, SOB, Feves and CP.

## 2021-09-15 NOTE — ED PROVIDER NOTE - CLINICAL SUMMARY MEDICAL DECISION MAKING FREE TEXT BOX
uncomfortable appearing. L foot swollen as compared to R with ttp to dorsolateral aspect. Suspect fracture, possibly metatarsal vs saleem. Plan pain control, xrays, ortho/podiatry/splint as needed.

## 2021-09-15 NOTE — ED PROVIDER NOTE - OBJECTIVE STATEMENT
70M presenting for 3 days of worsening L foot pain. States that 3 days ago he fell while walking, injuring his L foot. went to [facility] ED where he states "they did blood work but no xrays". States pain at present a 10/10, worse with attempts at weight-bearing. Denies hitting head or LOC during fall. Denies fevers. states that tylenol hasn't relieved the pain.

## 2021-09-15 NOTE — CONSULT NOTE ADULT - ASSESSMENT
Assessment: 70M presenting for 3 days of worsening L foot pain. States that 3 days ago he fell while walking, injuring his L foot. Podiatry consulted for left foot Pseudo-Peter fracture 2/2 trauma.     Plan:  -Pt evaluated and X rays reviewed  -Pt eloped before a peter compression could be applied

## 2021-09-15 NOTE — CONSULT NOTE ADULT - SUBJECTIVE AND OBJECTIVE BOX
Attending: Dr. Tarango    Patient is a 70y old  Male who presents with a chief complaint of left foot and ankle pain.     HPI: 70M presenting for 3 days of worsening L foot pain. States that 3 days ago he fell while walking, injuring his L foot. Pt went to [facility] ED where he states "they did blood work but no xrays". States pain at present a 10/10, worse with attempts at weight-bearing. Denies hitting head or LOC during fall. Denies fevers. Pt states that tylenol hasn't relieved the pain. Pt keeps repeating he is very tired but denies any other injuries during the fall. Pt is a poor historian.     Review of systems negative except per HPI and as stated below  General:	 no weakness; no fevers, no chills  Skin/Breast: no rash  Respiratory and Thorax: no SOB, no cough  Cardiovascular:	No chest pain  Gastrointestinal:	 no nausea, vomiting , diarrhea  Genitourinary:	no dysuria, no difficulty urinating, no hematuria  Musculoskeletal:	no weakness, no joint swelling/pain  Neurological:	no focal weakness/numbness  Endocrine:	no polyuria, no polydipsia    PAST MEDICAL & SURGICAL HISTORY:  Afib    Asthma    Emphysema/COPD    HTN (hypertension)    HLD (hyperlipidemia)    Anxiety    Hypertension, unspecified type    S/P CABG x 3    H/O aortic valve replacement      Home Medications: takes medication for his heart does not know the name     Allergies    ibuprofen (Anaphylaxis)  ibuprofen (Other)    Intolerances      FAMILY HISTORY: NA    Social History: Unknown       LABS  Non labs      Vital Signs Last 24 Hrs  T(C): --  T(F): --  HR: 92 (15 Sep 2021 17:33) (92 - 92)  BP: 120/76 (15 Sep 2021 17:33) (120/76 - 120/76)  BP(mean): --  RR: 16 (15 Sep 2021 17:33) (16 - 16)  SpO2: 98% (15 Sep 2021 17:33) (98% - 98%)    PHYSICAL EXAM  General: NAD, AA0x3  Lower Extremity Left Foot Focused:  Vasc: DP/PT 2/4 , WWP, +1 pitting edema to dorsum of left foot,   Derm: No open laceration or abrasions, Diffuse ecchymosis over pts lateral midfoot extending to digits 2-5  Neuro: Protective sensation diminished  MSK: Pt able to actively move digits 1-5 and ankle joint, POP to lateral aspect of midfoot     RADIOLOGY  X ray 3 views left foot: Wet read; non-displaced pseudo-saleem fracture 5th metatarsal

## 2021-09-16 ENCOUNTER — EMERGENCY (EMERGENCY)
Facility: HOSPITAL | Age: 71
LOS: 1 days | Discharge: LEFT BEFORE DISCHARGE | End: 2021-09-16
Attending: EMERGENCY MEDICINE | Admitting: EMERGENCY MEDICINE
Payer: MEDICARE

## 2021-09-16 VITALS
OXYGEN SATURATION: 98 % | SYSTOLIC BLOOD PRESSURE: 116 MMHG | HEART RATE: 100 BPM | TEMPERATURE: 99 F | DIASTOLIC BLOOD PRESSURE: 79 MMHG | RESPIRATION RATE: 18 BRPM | HEIGHT: 67 IN | WEIGHT: 164.91 LBS

## 2021-09-16 DIAGNOSIS — M79.672 PAIN IN LEFT FOOT: ICD-10-CM

## 2021-09-16 DIAGNOSIS — Z53.21 PROCEDURE AND TREATMENT NOT CARRIED OUT DUE TO PATIENT LEAVING PRIOR TO BEING SEEN BY HEALTH CARE PROVIDER: ICD-10-CM

## 2021-09-16 DIAGNOSIS — Z95.1 PRESENCE OF AORTOCORONARY BYPASS GRAFT: Chronic | ICD-10-CM

## 2021-09-16 DIAGNOSIS — Z95.2 PRESENCE OF PROSTHETIC HEART VALVE: Chronic | ICD-10-CM

## 2021-09-16 PROCEDURE — L9991: CPT

## 2021-09-17 ENCOUNTER — EMERGENCY (EMERGENCY)
Facility: HOSPITAL | Age: 71
LOS: 1 days | Discharge: LEFT BEFORE DISCHARGE | End: 2021-09-17
Attending: EMERGENCY MEDICINE | Admitting: EMERGENCY MEDICINE
Payer: MEDICARE

## 2021-09-17 VITALS — HEIGHT: 67 IN

## 2021-09-17 DIAGNOSIS — Z95.1 PRESENCE OF AORTOCORONARY BYPASS GRAFT: Chronic | ICD-10-CM

## 2021-09-17 DIAGNOSIS — Z95.2 PRESENCE OF PROSTHETIC HEART VALVE: Chronic | ICD-10-CM

## 2021-09-17 DIAGNOSIS — Z53.21 PROCEDURE AND TREATMENT NOT CARRIED OUT DUE TO PATIENT LEAVING PRIOR TO BEING SEEN BY HEALTH CARE PROVIDER: ICD-10-CM

## 2021-09-17 DIAGNOSIS — M25.572 PAIN IN LEFT ANKLE AND JOINTS OF LEFT FOOT: ICD-10-CM

## 2021-09-17 PROCEDURE — L9991: CPT

## 2021-09-17 NOTE — ED ADULT TRIAGE NOTE - CHIEF COMPLAINT QUOTE
pt seen <12 hours ago in this ed with multiple visits using 5 MRNS presents today for left knee pain

## 2021-10-06 ENCOUNTER — EMERGENCY (EMERGENCY)
Facility: HOSPITAL | Age: 71
LOS: 1 days | Discharge: LEFT BEFORE DISCHARGE | End: 2021-10-06
Admitting: EMERGENCY MEDICINE
Payer: MEDICARE

## 2021-10-06 VITALS
HEIGHT: 67 IN | HEART RATE: 99 BPM | SYSTOLIC BLOOD PRESSURE: 113 MMHG | RESPIRATION RATE: 18 BRPM | WEIGHT: 149.91 LBS | OXYGEN SATURATION: 95 % | DIASTOLIC BLOOD PRESSURE: 64 MMHG | TEMPERATURE: 98 F

## 2021-10-06 DIAGNOSIS — R05.1 ACUTE COUGH: ICD-10-CM

## 2021-10-06 DIAGNOSIS — Z53.21 PROCEDURE AND TREATMENT NOT CARRIED OUT DUE TO PATIENT LEAVING PRIOR TO BEING SEEN BY HEALTH CARE PROVIDER: ICD-10-CM

## 2021-10-06 DIAGNOSIS — Z95.1 PRESENCE OF AORTOCORONARY BYPASS GRAFT: Chronic | ICD-10-CM

## 2021-10-06 DIAGNOSIS — Z95.2 PRESENCE OF PROSTHETIC HEART VALVE: Chronic | ICD-10-CM

## 2021-10-06 PROCEDURE — L9991: CPT

## 2021-10-06 NOTE — ED ADULT NURSE NOTE - OBJECTIVE STATEMENT
Pt presents c/o chronic cough.  Pt denies fever/chills, CP, SOB.  Pt well-appearing, no acute signs of distress.

## 2021-10-20 ENCOUNTER — EMERGENCY (EMERGENCY)
Facility: HOSPITAL | Age: 71
LOS: 1 days | Discharge: LEFT BEFORE DISCHARGE | End: 2021-10-20
Attending: EMERGENCY MEDICINE | Admitting: EMERGENCY MEDICINE
Payer: MEDICARE

## 2021-10-20 VITALS
DIASTOLIC BLOOD PRESSURE: 84 MMHG | TEMPERATURE: 98 F | OXYGEN SATURATION: 98 % | SYSTOLIC BLOOD PRESSURE: 127 MMHG | WEIGHT: 164.91 LBS | HEIGHT: 67 IN | RESPIRATION RATE: 18 BRPM | HEART RATE: 105 BPM

## 2021-10-20 DIAGNOSIS — Z95.1 PRESENCE OF AORTOCORONARY BYPASS GRAFT: Chronic | ICD-10-CM

## 2021-10-20 DIAGNOSIS — R05.9 COUGH, UNSPECIFIED: ICD-10-CM

## 2021-10-20 DIAGNOSIS — Z53.21 PROCEDURE AND TREATMENT NOT CARRIED OUT DUE TO PATIENT LEAVING PRIOR TO BEING SEEN BY HEALTH CARE PROVIDER: ICD-10-CM

## 2021-10-20 DIAGNOSIS — Z95.2 PRESENCE OF PROSTHETIC HEART VALVE: Chronic | ICD-10-CM

## 2021-10-20 DIAGNOSIS — Z88.6 ALLERGY STATUS TO ANALGESIC AGENT: ICD-10-CM

## 2021-10-20 PROCEDURE — L9991: CPT

## 2021-10-20 NOTE — ED ADULT NURSE NOTE - ED_CALLED_NO_RESPONSE_NOTE 3
patient was assigned to chair B but has not been seen by staff for 30 minutes; not seen or evaluated by MD or nurse; did not tell any ED staff they were leaving; not in chair B when RN went to assess patient at 1228; MD Tran made aware

## 2021-11-05 NOTE — ED PROVIDER NOTE - TEMPLATE, MLM
Chief Complaint   Patient presents with    Diabetes    Hypertension    Cholesterol Problem    Asthma     1. Have you been to the ER, urgent care clinic since your last visit? Hospitalized since your last visit? No    2. Have you seen or consulted any other health care providers outside of the [address] [place] [address] since your last visit? Include any pap smears or colon screening.  No General

## 2021-11-07 ENCOUNTER — EMERGENCY (EMERGENCY)
Facility: HOSPITAL | Age: 71
LOS: 1 days | Discharge: LEFT BEFORE DISCHARGE | End: 2021-11-07
Admitting: EMERGENCY MEDICINE
Payer: MEDICARE

## 2021-11-07 VITALS
HEART RATE: 102 BPM | OXYGEN SATURATION: 99 % | TEMPERATURE: 98 F | WEIGHT: 169.98 LBS | DIASTOLIC BLOOD PRESSURE: 82 MMHG | HEIGHT: 67 IN | RESPIRATION RATE: 18 BRPM | SYSTOLIC BLOOD PRESSURE: 151 MMHG

## 2021-11-07 DIAGNOSIS — R05.9 COUGH, UNSPECIFIED: ICD-10-CM

## 2021-11-07 DIAGNOSIS — Z95.1 PRESENCE OF AORTOCORONARY BYPASS GRAFT: Chronic | ICD-10-CM

## 2021-11-07 DIAGNOSIS — Z53.21 PROCEDURE AND TREATMENT NOT CARRIED OUT DUE TO PATIENT LEAVING PRIOR TO BEING SEEN BY HEALTH CARE PROVIDER: ICD-10-CM

## 2021-11-07 DIAGNOSIS — Z95.2 PRESENCE OF PROSTHETIC HEART VALVE: Chronic | ICD-10-CM

## 2021-11-07 PROCEDURE — L9991: CPT

## 2021-12-08 ENCOUNTER — EMERGENCY (EMERGENCY)
Facility: HOSPITAL | Age: 71
LOS: 1 days | Discharge: LEFT BEFORE DISCHARGE | End: 2021-12-08
Attending: EMERGENCY MEDICINE | Admitting: EMERGENCY MEDICINE
Payer: MEDICARE

## 2021-12-08 VITALS
HEART RATE: 92 BPM | SYSTOLIC BLOOD PRESSURE: 142 MMHG | RESPIRATION RATE: 18 BRPM | TEMPERATURE: 98 F | OXYGEN SATURATION: 98 % | WEIGHT: 169.98 LBS | DIASTOLIC BLOOD PRESSURE: 83 MMHG | HEIGHT: 67 IN

## 2021-12-08 DIAGNOSIS — L29.9 PRURITUS, UNSPECIFIED: ICD-10-CM

## 2021-12-08 DIAGNOSIS — Z95.1 PRESENCE OF AORTOCORONARY BYPASS GRAFT: Chronic | ICD-10-CM

## 2021-12-08 DIAGNOSIS — Z95.2 PRESENCE OF PROSTHETIC HEART VALVE: Chronic | ICD-10-CM

## 2021-12-08 DIAGNOSIS — Z53.21 PROCEDURE AND TREATMENT NOT CARRIED OUT DUE TO PATIENT LEAVING PRIOR TO BEING SEEN BY HEALTH CARE PROVIDER: ICD-10-CM

## 2021-12-08 PROCEDURE — L9991: CPT

## 2021-12-13 ENCOUNTER — EMERGENCY (EMERGENCY)
Facility: HOSPITAL | Age: 71
LOS: 1 days | Discharge: LEFT BEFORE DISCHARGE | End: 2021-12-13
Admitting: EMERGENCY MEDICINE
Payer: MEDICARE

## 2021-12-13 VITALS
HEIGHT: 67 IN | DIASTOLIC BLOOD PRESSURE: 84 MMHG | TEMPERATURE: 98 F | SYSTOLIC BLOOD PRESSURE: 131 MMHG | HEART RATE: 106 BPM | OXYGEN SATURATION: 97 % | RESPIRATION RATE: 20 BRPM

## 2021-12-13 DIAGNOSIS — Z53.21 PROCEDURE AND TREATMENT NOT CARRIED OUT DUE TO PATIENT LEAVING PRIOR TO BEING SEEN BY HEALTH CARE PROVIDER: ICD-10-CM

## 2021-12-13 DIAGNOSIS — Z95.1 PRESENCE OF AORTOCORONARY BYPASS GRAFT: Chronic | ICD-10-CM

## 2021-12-13 DIAGNOSIS — J45.909 UNSPECIFIED ASTHMA, UNCOMPLICATED: ICD-10-CM

## 2021-12-13 DIAGNOSIS — R06.02 SHORTNESS OF BREATH: ICD-10-CM

## 2021-12-13 DIAGNOSIS — Z95.2 PRESENCE OF PROSTHETIC HEART VALVE: Chronic | ICD-10-CM

## 2021-12-13 DIAGNOSIS — F17.210 NICOTINE DEPENDENCE, CIGARETTES, UNCOMPLICATED: ICD-10-CM

## 2021-12-13 PROCEDURE — L9991: CPT

## 2021-12-13 NOTE — ED ADULT TRIAGE NOTE - CHIEF COMPLAINT QUOTE
sob. states he smokes cigqrettes. asked pt if he has asthma and states no one ever told him if he has asthma or not. pox 97% , no resp distress.

## 2021-12-13 NOTE — ED ADULT TRIAGE NOTE - TEMPERATURE IN CELSIUS (DEGREES C)
Pt states his BP is running high, getting top numbers 159, 163, 167. He said he took 2 lisinoprils this am and it is still not coming down. Please advise him at [phone]. 36.9

## 2022-01-15 ENCOUNTER — EMERGENCY (EMERGENCY)
Facility: HOSPITAL | Age: 72
LOS: 1 days | Discharge: LEFT BEFORE DISCHARGE | End: 2022-01-15
Attending: EMERGENCY MEDICINE | Admitting: EMERGENCY MEDICINE
Payer: MEDICARE

## 2022-01-15 VITALS
WEIGHT: 149.91 LBS | DIASTOLIC BLOOD PRESSURE: 58 MMHG | SYSTOLIC BLOOD PRESSURE: 90 MMHG | RESPIRATION RATE: 20 BRPM | HEART RATE: 86 BPM | HEIGHT: 67 IN | OXYGEN SATURATION: 98 % | TEMPERATURE: 98 F

## 2022-01-15 DIAGNOSIS — R05.9 COUGH, UNSPECIFIED: ICD-10-CM

## 2022-01-15 DIAGNOSIS — R06.02 SHORTNESS OF BREATH: ICD-10-CM

## 2022-01-15 DIAGNOSIS — Z95.2 PRESENCE OF PROSTHETIC HEART VALVE: Chronic | ICD-10-CM

## 2022-01-15 DIAGNOSIS — Z95.1 PRESENCE OF AORTOCORONARY BYPASS GRAFT: Chronic | ICD-10-CM

## 2022-01-15 DIAGNOSIS — Z88.6 ALLERGY STATUS TO ANALGESIC AGENT: ICD-10-CM

## 2022-01-15 PROCEDURE — 99284 EMERGENCY DEPT VISIT MOD MDM: CPT

## 2022-01-15 NOTE — ED PROVIDER NOTE - CLINICAL SUMMARY MEDICAL DECISION MAKING FREE TEXT BOX
after initial interview -prior to nursing evaluation pt walked out of ER witnessed by [employer]   review of previous [facility] charts show multiped\ previous visits where pt has walked out of er.

## 2022-01-15 NOTE — ED PROVIDER NOTE - OBJECTIVE STATEMENT
70 yo male c/o non progressive non productive cough and sob and  x 1 day. no chest pain.  previously  had covod and j&j vaccince

## 2022-01-18 ENCOUNTER — EMERGENCY (EMERGENCY)
Facility: HOSPITAL | Age: 72
LOS: 1 days | Discharge: HOME | End: 2022-01-18
Attending: EMERGENCY MEDICINE | Admitting: EMERGENCY MEDICINE
Payer: MEDICARE

## 2022-01-18 ENCOUNTER — EMERGENCY (EMERGENCY)
Facility: HOSPITAL | Age: 72
LOS: 1 days | Discharge: LEFT BEFORE DISCHARGE | End: 2022-01-18
Admitting: EMERGENCY MEDICINE
Payer: MEDICARE

## 2022-01-18 VITALS
OXYGEN SATURATION: 95 % | WEIGHT: 160.06 LBS | RESPIRATION RATE: 18 BRPM | SYSTOLIC BLOOD PRESSURE: 112 MMHG | DIASTOLIC BLOOD PRESSURE: 76 MMHG | HEART RATE: 75 BPM | HEIGHT: 67 IN

## 2022-01-18 VITALS
OXYGEN SATURATION: 95 % | RESPIRATION RATE: 22 BRPM | HEIGHT: 67 IN | TEMPERATURE: 98 F | WEIGHT: 160.06 LBS | DIASTOLIC BLOOD PRESSURE: 56 MMHG | HEART RATE: 78 BPM | SYSTOLIC BLOOD PRESSURE: 95 MMHG

## 2022-01-18 DIAGNOSIS — R05.3 CHRONIC COUGH: ICD-10-CM

## 2022-01-18 DIAGNOSIS — U07.1 COVID-19: ICD-10-CM

## 2022-01-18 DIAGNOSIS — Z95.1 PRESENCE OF AORTOCORONARY BYPASS GRAFT: Chronic | ICD-10-CM

## 2022-01-18 DIAGNOSIS — F41.9 ANXIETY DISORDER, UNSPECIFIED: ICD-10-CM

## 2022-01-18 DIAGNOSIS — R06.02 SHORTNESS OF BREATH: ICD-10-CM

## 2022-01-18 DIAGNOSIS — E78.5 HYPERLIPIDEMIA, UNSPECIFIED: ICD-10-CM

## 2022-01-18 DIAGNOSIS — F17.200 NICOTINE DEPENDENCE, UNSPECIFIED, UNCOMPLICATED: ICD-10-CM

## 2022-01-18 DIAGNOSIS — Z88.6 ALLERGY STATUS TO ANALGESIC AGENT: ICD-10-CM

## 2022-01-18 DIAGNOSIS — Z95.1 PRESENCE OF AORTOCORONARY BYPASS GRAFT: ICD-10-CM

## 2022-01-18 DIAGNOSIS — Z53.21 PROCEDURE AND TREATMENT NOT CARRIED OUT DUE TO PATIENT LEAVING PRIOR TO BEING SEEN BY HEALTH CARE PROVIDER: ICD-10-CM

## 2022-01-18 DIAGNOSIS — I48.91 UNSPECIFIED ATRIAL FIBRILLATION: ICD-10-CM

## 2022-01-18 DIAGNOSIS — I10 ESSENTIAL (PRIMARY) HYPERTENSION: ICD-10-CM

## 2022-01-18 DIAGNOSIS — Z95.2 PRESENCE OF PROSTHETIC HEART VALVE: Chronic | ICD-10-CM

## 2022-01-18 DIAGNOSIS — Z79.82 LONG TERM (CURRENT) USE OF ASPIRIN: ICD-10-CM

## 2022-01-18 DIAGNOSIS — J43.9 EMPHYSEMA, UNSPECIFIED: ICD-10-CM

## 2022-01-18 LAB
GLUCOSE BLDC GLUCOMTR-MCNC: 93 MG/DL — SIGNIFICANT CHANGE UP (ref 70–99)
SARS-COV-2 RNA SPEC QL NAA+PROBE: DETECTED

## 2022-01-18 PROCEDURE — L9991: CPT

## 2022-01-18 PROCEDURE — 71045 X-RAY EXAM CHEST 1 VIEW: CPT | Mod: 26

## 2022-01-18 PROCEDURE — 99284 EMERGENCY DEPT VISIT MOD MDM: CPT

## 2022-01-18 RX ADMIN — Medication 100 MILLIGRAM(S): at 20:24

## 2022-01-18 NOTE — ED PROVIDER NOTE - CLINICAL SUMMARY MEDICAL DECISION MAKING FREE TEXT BOX
71y M w/ PMHx Afib, Anxiety, HTN, CABG s/p 3 stents not on any home medications presenting with chronic dry cough x2 months. VS WNL, lungs CTAB, patient well-appearing, no active coughing in room. Requesting COVID test, will obtain CXR to r/o pneumonia and will treat with tessalon perle for cough symptoms. Likely chronic cough given hx of daily smoking.

## 2022-01-18 NOTE — ED PROVIDER NOTE - OBJECTIVE STATEMENT
71y M w/ PMHx Afib, Anxiety, HTN, CABG s/p 3 stents not on any home medications presenting with chronic dry cough x2 months. States he is presenting to the ED today because "my cough won't go away". Contrary to triage note, patient not complaining of shortness of breath. Is an every day smoker and states he has had a cough "on and off for many years". Denies fever, chills, chest pain, nausea, vomiting, dysuria, hematuria, bloody stools. Does state he had 2 episodes of diarrhea 3 days ago. Not COVID vaccinated.

## 2022-01-18 NOTE — ED PROVIDER NOTE - PHYSICAL EXAMINATION
Physical Exam:  Gen: Disheveled appearing, awake, alert   HEENT: normal conjunctiva, tongue midline, oral mucosa moist  Lung: CTAB, no respiratory distress, no wheezes/rhonchi/rales B/L, speaking in full sentences  CV: RRR, no murmurs, rubs or gallops  Abd: soft, NT, ND, no guarding  MSK: no visible deformities, ROM normal in UE/LE, no back pain  Neuro: No focal sensory or motor deficits  Skin: Warm, well perfused, no rash, no leg swelling  Psych: normal affect, calm  Jumana Boggs D.O. -Resident

## 2022-01-18 NOTE — ED PROVIDER NOTE - PATIENT PORTAL LINK FT
You can access the FollowMyHealth Patient Portal offered by [facility] by registering at the following website: [email or web address]. By joining [employer]’s FollowMyHealth portal, you will also be able to view your health information using other applications (apps) compatible with our system.

## 2022-01-18 NOTE — ED ADULT NURSE NOTE - OBJECTIVE STATEMENT
Pt presents to ed reporting cough x 2 months Pt presents to ed reporting cough x 2 months. known smoker, denies fever/sob

## 2022-01-18 NOTE — ED PROVIDER NOTE - NS ED ROS FT
CONSTITUTIONAL: No fevers, no chills, no lightheadedness, no dizziness  NOSE: no nasal congestion  MOUTH/THROAT: no sore throat  CV: No chest pain, no palpitations  RESP: No SOB, +cough  GI: No n/v/d, no abd pain  : no dysuria, no hematuria  MSK: no back pain, no extremity pain  SKIN: no rashes  NEURO: no headache, no focal weakness, no decreased sensation/paresthesias

## 2022-01-18 NOTE — ED PROVIDER NOTE - ATTENDING CONTRIBUTION TO CARE
71y M w/ PMHx Afib, Anxiety, HTN, CABG s/p 3 stents not on any home medications presenting with chronic dry cough x2 months. CXR negative, supportive care, likely related to chronic cough and smoking, not hypoxic, COVID pending, stable for dc home

## 2022-01-18 NOTE — ED PROVIDER NOTE - NSFOLLOWUPINSTRUCTIONS_ED_ALL_ED_FT
- Lab and imaging results, if performed, were discussed with you along with your discharge diagnosis    - Return to the ED for any new, worsening, or concerning symptoms to you including difficulty breathing, fever, or bloody sputum     - Continue all prescribed medications    - Rest and keep yourself hydrated with fluids    - You can call the number at the top of the page to obtain your COVID results tomorrow

## 2022-01-19 ENCOUNTER — EMERGENCY (EMERGENCY)
Facility: HOSPITAL | Age: 72
LOS: 1 days | Discharge: LEFT BEFORE DISCHARGE | End: 2022-01-19
Attending: EMERGENCY MEDICINE | Admitting: EMERGENCY MEDICINE
Payer: MEDICARE

## 2022-01-19 VITALS
SYSTOLIC BLOOD PRESSURE: 95 MMHG | HEART RATE: 77 BPM | RESPIRATION RATE: 16 BRPM | TEMPERATURE: 98 F | DIASTOLIC BLOOD PRESSURE: 59 MMHG | HEIGHT: 67 IN | OXYGEN SATURATION: 99 %

## 2022-01-19 DIAGNOSIS — Z95.2 PRESENCE OF PROSTHETIC HEART VALVE: Chronic | ICD-10-CM

## 2022-01-19 DIAGNOSIS — J44.9 CHRONIC OBSTRUCTIVE PULMONARY DISEASE, UNSPECIFIED: ICD-10-CM

## 2022-01-19 DIAGNOSIS — R06.02 SHORTNESS OF BREATH: ICD-10-CM

## 2022-01-19 DIAGNOSIS — Z53.1 PROCEDURE AND TREATMENT NOT CARRIED OUT BECAUSE OF PATIENT'S DECISION FOR REASONS OF BELIEF AND GROUP PRESSURE: ICD-10-CM

## 2022-01-19 DIAGNOSIS — Z88.6 ALLERGY STATUS TO ANALGESIC AGENT: ICD-10-CM

## 2022-01-19 DIAGNOSIS — Z95.1 PRESENCE OF AORTOCORONARY BYPASS GRAFT: Chronic | ICD-10-CM

## 2022-01-19 PROCEDURE — L9991: CPT

## 2022-01-19 NOTE — ED ADULT NURSE NOTE - OBJECTIVE STATEMENT
1815 went to see pt; not in room.  1835 returned with MD, pt still has not returned to room. Presumed to have eloped.

## 2022-01-19 NOTE — ED PROVIDER NOTE - OBJECTIVE STATEMENT
71 M with COPD, very freq ED visits. Dx'd yesterday with COVID. Typically leaves before eval within mins of arrival. Seems to have done the same today. VSS 99% on RA.

## 2022-01-21 ENCOUNTER — EMERGENCY (EMERGENCY)
Facility: HOSPITAL | Age: 72
LOS: 1 days | Discharge: HOME | End: 2022-01-21
Attending: EMERGENCY MEDICINE | Admitting: EMERGENCY MEDICINE
Payer: MEDICARE

## 2022-01-21 VITALS
HEIGHT: 67 IN | RESPIRATION RATE: 15 BRPM | OXYGEN SATURATION: 99 % | DIASTOLIC BLOOD PRESSURE: 75 MMHG | SYSTOLIC BLOOD PRESSURE: 136 MMHG | TEMPERATURE: 98 F | WEIGHT: 169.98 LBS | HEART RATE: 68 BPM

## 2022-01-21 VITALS
TEMPERATURE: 98 F | HEART RATE: 70 BPM | RESPIRATION RATE: 18 BRPM | SYSTOLIC BLOOD PRESSURE: 103 MMHG | OXYGEN SATURATION: 98 % | DIASTOLIC BLOOD PRESSURE: 62 MMHG

## 2022-01-21 DIAGNOSIS — J43.9 EMPHYSEMA, UNSPECIFIED: ICD-10-CM

## 2022-01-21 DIAGNOSIS — Z79.01 LONG TERM (CURRENT) USE OF ANTICOAGULANTS: ICD-10-CM

## 2022-01-21 DIAGNOSIS — F17.200 NICOTINE DEPENDENCE, UNSPECIFIED, UNCOMPLICATED: ICD-10-CM

## 2022-01-21 DIAGNOSIS — Z88.6 ALLERGY STATUS TO ANALGESIC AGENT: ICD-10-CM

## 2022-01-21 DIAGNOSIS — R05.9 COUGH, UNSPECIFIED: ICD-10-CM

## 2022-01-21 DIAGNOSIS — Z95.1 PRESENCE OF AORTOCORONARY BYPASS GRAFT: Chronic | ICD-10-CM

## 2022-01-21 DIAGNOSIS — Z79.82 LONG TERM (CURRENT) USE OF ASPIRIN: ICD-10-CM

## 2022-01-21 DIAGNOSIS — Z95.2 PRESENCE OF PROSTHETIC HEART VALVE: Chronic | ICD-10-CM

## 2022-01-21 DIAGNOSIS — R63.8 OTHER SYMPTOMS AND SIGNS CONCERNING FOOD AND FLUID INTAKE: ICD-10-CM

## 2022-01-21 DIAGNOSIS — E78.5 HYPERLIPIDEMIA, UNSPECIFIED: ICD-10-CM

## 2022-01-21 DIAGNOSIS — U07.1 COVID-19: ICD-10-CM

## 2022-01-21 DIAGNOSIS — I10 ESSENTIAL (PRIMARY) HYPERTENSION: ICD-10-CM

## 2022-01-21 DIAGNOSIS — I48.91 UNSPECIFIED ATRIAL FIBRILLATION: ICD-10-CM

## 2022-01-21 PROCEDURE — 99283 EMERGENCY DEPT VISIT LOW MDM: CPT

## 2022-01-21 NOTE — ED PROVIDER NOTE - PATIENT PORTAL LINK FT
You can access the FollowMyHealth Patient Portal offered by [facility] by registering at the following website: [email or web address]. By joining [employer]’s FollowMyHealth portal, you will also be able to view your health information using other applications (apps) compatible with our system.

## 2022-01-21 NOTE — ED PROVIDER NOTE - PHYSICAL EXAMINATION
CONSTITUTIONAL: Well-appearing; well-nourished; in no apparent distress.   HEAD: Normocephalic; atraumatic.   EYES:  clear bilaterally  ENT: airway patent  Resp breathing comfortably with no distress  PSYCHOLOGICAL: The patient’s mood and manner are consistent with baseline

## 2022-01-21 NOTE — ED PROVIDER NOTE - NSFOLLOWUPINSTRUCTIONS_ED_ALL_ED_FT
You have Coronavirus.    Return to the ED immediately if you have shortness of breath, fever, pain, weakness, vomiting any concerns.    1. STAY HOME for 10 DAYS  2. Minimize Human contact to ONLY ESSENTIAL  3. Every time you wash your hands, sing the HAPPY BIRTHDAY Song so you know you're washing long enough.  Make sure to scrub the webspace between your fingers.  4. DRINK 1-3 Liters of fluids day x at least 5 days.  To remain hydrated. Your fatigue, lightheadedness, and body aches will decrease and your fever has a better chance of breaking if you are well hydrated.    5. For your Fever and Body aches takes Tylenol 650-100mg every 4-6h (max 4000mg/day). Try not to use ibuprofen, aspirin or naproxen (Advil, Motrin or Aleve) as these may worsen Coronavirus infection.  6. Use an inhaler for mild shortness of breath and cough  7. Take a double or triple dose of Vitamin C (5432-1644 mg) per day spread out over the day .  8. RETURN TO THE ER IMMEDIATELY IF YOU HAVE WORSENING SHORTNESS OF BREATH. SYMPTOMS USUALLY PEAK BETWEEN DAY 7-10.

## 2022-01-21 NOTE — ED PROVIDER NOTE - OBJECTIVE STATEMENT
71 M - well know to us for COPD, very frequent ED visits for SOB which he then recants. He typically walks out within minutes of triage. Today he is denying SOB and asking for food. + dx'd with COVID a few days ago.    He is walking around the ED- despite being asked to stay in a room.

## 2022-01-21 NOTE — ED PROVIDER NOTE - CARE PLAN
1 Principal Discharge DX:	[facility]  Secondary Diagnosis:	2019 novel coronavirus disease (COVID-19)

## 2022-01-23 ENCOUNTER — EMERGENCY (EMERGENCY)
Facility: HOSPITAL | Age: 72
LOS: 1 days | Discharge: LEFT BEFORE DISCHARGE | End: 2022-01-23
Attending: EMERGENCY MEDICINE | Admitting: EMERGENCY MEDICINE
Payer: MEDICARE

## 2022-01-23 VITALS
OXYGEN SATURATION: 100 % | HEART RATE: 92 BPM | RESPIRATION RATE: 18 BRPM | HEIGHT: 67 IN | TEMPERATURE: 98 F | DIASTOLIC BLOOD PRESSURE: 72 MMHG | WEIGHT: 145.06 LBS | SYSTOLIC BLOOD PRESSURE: 112 MMHG

## 2022-01-23 DIAGNOSIS — Z53.21 PROCEDURE AND TREATMENT NOT CARRIED OUT DUE TO PATIENT LEAVING PRIOR TO BEING SEEN BY HEALTH CARE PROVIDER: ICD-10-CM

## 2022-01-23 DIAGNOSIS — Z95.2 PRESENCE OF PROSTHETIC HEART VALVE: Chronic | ICD-10-CM

## 2022-01-23 DIAGNOSIS — Z95.1 PRESENCE OF AORTOCORONARY BYPASS GRAFT: Chronic | ICD-10-CM

## 2022-01-23 PROCEDURE — L9991: CPT

## 2022-01-23 NOTE — ED ADULT TRIAGE NOTE - CHIEF COMPLAINT QUOTE
Pt well-known to this ED for similar complaint presents c/o dyspnea on exertion.  Pt endorses used inhaler w/o relief.  No wheezing auscultated.  Pt able to speak in complete sentences.  Pt O2 sat 100% on RA.

## 2022-01-26 ENCOUNTER — EMERGENCY (EMERGENCY)
Facility: HOSPITAL | Age: 72
LOS: 1 days | Discharge: LEFT BEFORE DISCHARGE | End: 2022-01-26
Admitting: EMERGENCY MEDICINE
Payer: MEDICARE

## 2022-01-26 VITALS
DIASTOLIC BLOOD PRESSURE: 84 MMHG | OXYGEN SATURATION: 99 % | RESPIRATION RATE: 18 BRPM | SYSTOLIC BLOOD PRESSURE: 123 MMHG | HEIGHT: 67 IN | HEART RATE: 91 BPM | TEMPERATURE: 99 F | WEIGHT: 149.91 LBS

## 2022-01-26 DIAGNOSIS — Z95.1 PRESENCE OF AORTOCORONARY BYPASS GRAFT: Chronic | ICD-10-CM

## 2022-01-26 DIAGNOSIS — Z53.21 PROCEDURE AND TREATMENT NOT CARRIED OUT DUE TO PATIENT LEAVING PRIOR TO BEING SEEN BY HEALTH CARE PROVIDER: ICD-10-CM

## 2022-01-26 DIAGNOSIS — Z95.2 PRESENCE OF PROSTHETIC HEART VALVE: Chronic | ICD-10-CM

## 2022-01-26 PROCEDURE — L9991: CPT

## 2022-01-26 NOTE — ED ADULT TRIAGE NOTE - CHIEF COMPLAINT QUOTE
Pt. walked in c/o watery diarrhea x 4 days. Endorsing umbilical abdominal pain and nausea but denies vomiting.

## 2022-01-26 NOTE — ED ADULT TRIAGE NOTE - WILL THE PATIENT ACCEPT THE PFIZER COVID-19 VACCINE IF ELIGIBLE AND IT IS AVAILABLE?
Prescription faxed via computer as listed on medical record.  Patient needs to make an appointment with physician before any additional refills.     No

## 2022-01-29 ENCOUNTER — EMERGENCY (EMERGENCY)
Facility: HOSPITAL | Age: 72
LOS: 1 days | Discharge: HOME | End: 2022-01-29
Attending: EMERGENCY MEDICINE | Admitting: EMERGENCY MEDICINE
Payer: MEDICARE

## 2022-01-29 VITALS
RESPIRATION RATE: 19 BRPM | HEART RATE: 101 BPM | TEMPERATURE: 97 F | WEIGHT: 149.91 LBS | SYSTOLIC BLOOD PRESSURE: 110 MMHG | DIASTOLIC BLOOD PRESSURE: 74 MMHG | OXYGEN SATURATION: 99 % | HEIGHT: 67 IN

## 2022-01-29 DIAGNOSIS — R06.02 SHORTNESS OF BREATH: ICD-10-CM

## 2022-01-29 DIAGNOSIS — R05.9 COUGH, UNSPECIFIED: ICD-10-CM

## 2022-01-29 DIAGNOSIS — Z88.6 ALLERGY STATUS TO ANALGESIC AGENT: ICD-10-CM

## 2022-01-29 DIAGNOSIS — I25.10 ATHEROSCLEROTIC HEART DISEASE OF NATIVE CORONARY ARTERY WITHOUT ANGINA PECTORIS: ICD-10-CM

## 2022-01-29 DIAGNOSIS — I10 ESSENTIAL (PRIMARY) HYPERTENSION: ICD-10-CM

## 2022-01-29 DIAGNOSIS — Z95.1 PRESENCE OF AORTOCORONARY BYPASS GRAFT: Chronic | ICD-10-CM

## 2022-01-29 DIAGNOSIS — E78.5 HYPERLIPIDEMIA, UNSPECIFIED: ICD-10-CM

## 2022-01-29 DIAGNOSIS — J43.9 EMPHYSEMA, UNSPECIFIED: ICD-10-CM

## 2022-01-29 DIAGNOSIS — I48.91 UNSPECIFIED ATRIAL FIBRILLATION: ICD-10-CM

## 2022-01-29 DIAGNOSIS — Z95.2 PRESENCE OF PROSTHETIC HEART VALVE: Chronic | ICD-10-CM

## 2022-01-29 PROCEDURE — 99284 EMERGENCY DEPT VISIT MOD MDM: CPT

## 2022-01-29 PROCEDURE — 71045 X-RAY EXAM CHEST 1 VIEW: CPT | Mod: 26

## 2022-01-29 RX ORDER — ALBUTEROL 90 UG/1
4 AEROSOL, METERED ORAL ONCE
Refills: 0 | Status: COMPLETED | OUTPATIENT
Start: 2022-01-29 | End: 2022-01-29

## 2022-01-29 RX ADMIN — Medication 50 MILLIGRAM(S): at 16:10

## 2022-01-29 RX ADMIN — ALBUTEROL 4 PUFF(S): 90 AEROSOL, METERED ORAL at 16:10

## 2022-01-29 NOTE — ED PROVIDER NOTE - PHYSICAL EXAMINATION
VITAL SIGNS: I have reviewed nursing notes and confirm.  CONSTITUTIONAL: Smells of cigarettes. Well-developed; well-nourished; in no acute distress.  SKIN: Skin exam is warm and dry, no acute rash.  HEAD: Normocephalic; atraumatic.  EYES: Conjunctiva and sclera clear.  ENT: No nasal discharge; airway clear.  NECK: Supple; non tender.  CARD: S1, S2 normal; no murmurs, gallops, or rubs. Regular rate and rhythm.  RESP: Unlabored. No wheezes, rales or rhonchi.  ABD: soft; non-distended; non-tender  EXT: Normal ROM. No cyanosis or edema. Non-ttp all ext, distal pulses intact  LYMPH: No acute cervical adenopathy.  NEURO: Alert, oriented. Grossly unremarkable.  PSYCH: Cooperative, appropriate.

## 2022-01-29 NOTE — ED PROVIDER NOTE - NSFOLLOWUPINSTRUCTIONS_ED_ALL_ED_FT
Chronic Cough    WHAT YOU NEED TO KNOW:    A chronic cough is a cough that lasts more than 4 weeks in children or 8 weeks in adults.    DISCHARGE INSTRUCTIONS:    Call your local emergency number (911 in the [place]) for any of the following:   •You cough up blood.       •You faint when you cough.      •You have trouble breathing.      Call your doctor if:   •You have new or worsening symptoms.       •You have severe pain when you take a deep breath.      •You become very tired after a coughing fit.      •You have trouble sleeping because of the coughing.      •You have questions or concerns about your condition or care.      Medicines:   •Medicines may be needed to stop your cough. You may also need medicine to treat allergies or acid reflux, or decrease swelling in your airways. If you have a respiratory infection, you may need antibiotics. Medicine may be given as a pill or to use in an inhaler.      •Take your medicine as directed. Contact your healthcare provider if you think your medicine is not helping or if you have side effects. Tell him or her if you are allergic to any medicine. Keep a list of the medicines, vitamins, and herbs you take. Include the amounts, and when and why you take them. Bring the list or the pill bottles to follow-up visits. Carry your medicine list with you in case of an emergency.      Self-care:   •Prevent acid reflex. Acid reflux can make your chronic cough worse. Raise your head and upper back when you sleep. Place 2 or more pillows behind your head or sleep in a recliner. Do not lie down for at least 1 hour after you eat. Do not have foods or drinks that increase heartburn. Ask your healthcare provider for other ways to prevent acid reflux.  Prevent GERD           •Do not smoke. Encourage your adolescent child not to smoke. Nicotine and other chemicals in cigarettes and cigars can cause lung damage. They can also make your cough worse. Ask your healthcare provider for information if you currently smoke and need help to quit. E-cigarettes or smokeless tobacco still contain nicotine. Talk to your healthcare provider before you use these products.      •Stay away from secondhand smoke. Do not let people smoke in your car, home, or near your child. Do not stand near someone that is smoking. This includes anyone that is smoking an E-cigarrete.      •Avoid anything that triggers your allergies or irritates your throat. Allergens and irritants can make your chronic cough worse. Allergens may include dust mites, pollen, pet dander, or mold. Wear a mask if you work around pollutants or irritants. Ask your healthcare provider for more ways to decrease your exposure to allergens or irritants.      •Drink plenty of liquids as directed. Liquids may help relieve throat discomfort that causes you to cough. Add honey to tea or hot water to help ease your throat pain. Ask how much liquid to drink each day and which liquids are best for you.      Follow up with your healthcare provider as directed: You may need to return for more tests. Your healthcare provider may refer to you other specialists. Write down your questions so you remember to ask them during your visits.

## 2022-01-29 NOTE — ED PROVIDER NOTE - OBJECTIVE STATEMENT
72 y/o M, smoker, well known to the ED, Hx of CAD, HTN, A-fib, chronic cough, likely COPD uses an inhaler almost daily, has received 1 shot of the COVID vaccine, presents with worsening dry non productive cough with some associated shortness of breath. No chest pain or palpitations, no fever, no abdominal pain, no nausea. During the interview pt was requesting food which is typical. Pt normally elopes after vitals and a snack.

## 2022-01-29 NOTE — ED PROVIDER NOTE - CLINICAL SUMMARY MEDICAL DECISION MAKING FREE TEXT BOX
Plan is symptom management, rule out pneumonia, test for COVID. Vitals are stable. Anticipate discharge home. Plan is symptom management, rule out pneumonia, recovered from COVID (tested positive 3wks ago). Vitals are stable. Anticipate discharge home.

## 2022-01-29 NOTE — ED PROVIDER NOTE - PATIENT PORTAL LINK FT
You can access the FollowMyHealth Patient Portal offered by [facility] by registering at the following website: [email or web address]. By joining [employer]’s FollowMyHealth portal, you will also be able to view your health information using other applications (apps) compatible with our system.

## 2022-02-01 ENCOUNTER — EMERGENCY (EMERGENCY)
Facility: HOSPITAL | Age: 72
LOS: 1 days | Discharge: LEFT BEFORE DISCHARGE | End: 2022-02-01
Admitting: EMERGENCY MEDICINE
Payer: MEDICARE

## 2022-02-01 VITALS
OXYGEN SATURATION: 98 % | HEART RATE: 90 BPM | SYSTOLIC BLOOD PRESSURE: 116 MMHG | DIASTOLIC BLOOD PRESSURE: 69 MMHG | RESPIRATION RATE: 18 BRPM | TEMPERATURE: 98 F

## 2022-02-01 DIAGNOSIS — R05.9 COUGH, UNSPECIFIED: ICD-10-CM

## 2022-02-01 DIAGNOSIS — J44.9 CHRONIC OBSTRUCTIVE PULMONARY DISEASE, UNSPECIFIED: ICD-10-CM

## 2022-02-01 DIAGNOSIS — F41.9 ANXIETY DISORDER, UNSPECIFIED: ICD-10-CM

## 2022-02-01 DIAGNOSIS — R06.02 SHORTNESS OF BREATH: ICD-10-CM

## 2022-02-01 DIAGNOSIS — F17.200 NICOTINE DEPENDENCE, UNSPECIFIED, UNCOMPLICATED: ICD-10-CM

## 2022-02-01 DIAGNOSIS — I10 ESSENTIAL (PRIMARY) HYPERTENSION: ICD-10-CM

## 2022-02-01 DIAGNOSIS — I25.10 ATHEROSCLEROTIC HEART DISEASE OF NATIVE CORONARY ARTERY WITHOUT ANGINA PECTORIS: ICD-10-CM

## 2022-02-01 DIAGNOSIS — Z88.6 ALLERGY STATUS TO ANALGESIC AGENT: ICD-10-CM

## 2022-02-01 PROCEDURE — 99284 EMERGENCY DEPT VISIT MOD MDM: CPT
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
## 2022-02-01 NOTE — ED PROVIDER NOTE - NS ED ROS FT
GENERAL: No fever, no chills  EYES: no change in vision  HEENT: no trouble swallowing, no trouble speaking  CARDIAC: no chest pain, no palpitations  PULMONARY: +cough, SOB  GI: no abdominal pain, no nausea, no vomiting, no diarrhea, no constipation  : no dysuria, no frequency, no change in appearance, no odor of urine  SKIN: no rashes  NEURO: no headache, no weakness  MSK: no joint pain
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
## 2022-02-01 NOTE — ED PROVIDER NOTE - ATTENDING CONTRIBUTION TO CARE
70y/o M well known to the ED, w/ h/o COPD, CAD, HTN, A-fib p/w feeling anxious. Chronic cough, well known to ED for frequent visits for same. Jose Martind

## 2022-02-05 ENCOUNTER — EMERGENCY (EMERGENCY)
Facility: HOSPITAL | Age: 72
LOS: 1 days | Discharge: LEFT BEFORE DISCHARGE | End: 2022-02-05
Attending: EMERGENCY MEDICINE | Admitting: EMERGENCY MEDICINE
Payer: MEDICARE

## 2022-02-05 VITALS
HEIGHT: 67 IN | WEIGHT: 149.91 LBS | TEMPERATURE: 98 F | RESPIRATION RATE: 18 BRPM | HEART RATE: 86 BPM | OXYGEN SATURATION: 97 % | SYSTOLIC BLOOD PRESSURE: 106 MMHG | DIASTOLIC BLOOD PRESSURE: 74 MMHG

## 2022-02-05 DIAGNOSIS — I48.91 UNSPECIFIED ATRIAL FIBRILLATION: ICD-10-CM

## 2022-02-05 DIAGNOSIS — R10.9 UNSPECIFIED ABDOMINAL PAIN: ICD-10-CM

## 2022-02-05 DIAGNOSIS — Z53.21 PROCEDURE AND TREATMENT NOT CARRIED OUT DUE TO PATIENT LEAVING PRIOR TO BEING SEEN BY HEALTH CARE PROVIDER: ICD-10-CM

## 2022-02-05 DIAGNOSIS — J43.9 EMPHYSEMA, UNSPECIFIED: ICD-10-CM

## 2022-02-05 DIAGNOSIS — U07.1 COVID-19: ICD-10-CM

## 2022-02-05 DIAGNOSIS — E78.5 HYPERLIPIDEMIA, UNSPECIFIED: ICD-10-CM

## 2022-02-05 DIAGNOSIS — R19.7 DIARRHEA, UNSPECIFIED: ICD-10-CM

## 2022-02-05 DIAGNOSIS — Z79.82 LONG TERM (CURRENT) USE OF ASPIRIN: ICD-10-CM

## 2022-02-05 DIAGNOSIS — I25.10 ATHEROSCLEROTIC HEART DISEASE OF NATIVE CORONARY ARTERY WITHOUT ANGINA PECTORIS: ICD-10-CM

## 2022-02-05 DIAGNOSIS — Z95.1 PRESENCE OF AORTOCORONARY BYPASS GRAFT: ICD-10-CM

## 2022-02-05 DIAGNOSIS — I10 ESSENTIAL (PRIMARY) HYPERTENSION: ICD-10-CM

## 2022-02-05 DIAGNOSIS — Z95.1 PRESENCE OF AORTOCORONARY BYPASS GRAFT: Chronic | ICD-10-CM

## 2022-02-05 DIAGNOSIS — Z95.2 PRESENCE OF PROSTHETIC HEART VALVE: Chronic | ICD-10-CM

## 2022-02-05 DIAGNOSIS — Z88.6 ALLERGY STATUS TO ANALGESIC AGENT: ICD-10-CM

## 2022-02-05 DIAGNOSIS — R50.9 FEVER, UNSPECIFIED: ICD-10-CM

## 2022-02-05 PROCEDURE — 99283 EMERGENCY DEPT VISIT LOW MDM: CPT

## 2022-02-05 NOTE — ED PROVIDER NOTE - NS ED ROS FT
Constitutional:  No fever, No chills, No night sweats  Eyes:  No visual changes, No discharge, No redness  ENMT:  No epistaxis, no nasal congestion, no throat pain, no difficulty swallowing  CV:  No chest pain, No palpitations, No peripheral edema  Resp:  No cough, No shortness of breath  GI:  No abdominal pain, No vomiting, +diarrhea  MSK:  No neck pain or stiffness, No joint swelling or pain, No back pain  Neuro: no loss of consciousness, no gait abnormality, no headache, no sensory deficits, and no weakness.  Skin:  No abrasions, no lesions, no rashes  Psych:  No known mental health issues

## 2022-02-05 NOTE — ED PROVIDER NOTE - OBJECTIVE STATEMENT
70yo M hx of CAD s/p CABG, hx of HTN, AFib, COPD, presents with watery nonbloody diarrhea x4 days.  States he has been having diarrhea approx 1x/hr for the past 4 days.  No assoc fever, chills, nausea, vomiting.  No abd pain.  No recent abx or travel.  Had COVID 19 last month.  No sick contacts.

## 2022-02-05 NOTE — ED PROVIDER NOTE - CLINICAL SUMMARY MEDICAL DECISION MAKING FREE TEXT BOX
70yo M hx of HTN, AFib, COPD, frequent ED visits, presents with 4d watery nonbloody diarrhea without assoc fever, abd pain, or recent abx use or travel.  On exam afebrile, VSS, comfortable appearing, abd soft NDNT.  No risk factors for CDiff.  Recently recovered from COVID 19, though possible residual effects of viral infection vs. new viral infection.  Will check labs to eval for electrolyte abn, give PO hydration. 72yo M hx of HTN, AFib, COPD, frequent ED visits, presents with 4d watery nonbloody diarrhea without assoc fever, abd pain, or recent abx use or travel.  On exam afebrile, VSS, comfortable appearing, abd soft NDNT.  No risk factors for CDiff.  Recently recovered from COVID 19, though possible residual effects of viral infection vs. new viral infection.  Will check labs to eval for electrolyte abn, give PO hydration.      Pt eloped from ED prior to labs being done.

## 2022-02-14 ENCOUNTER — EMERGENCY (EMERGENCY)
Facility: HOSPITAL | Age: 72
LOS: 1 days | Discharge: LEFT BEFORE DISCHARGE | End: 2022-02-14
Admitting: EMERGENCY MEDICINE
Payer: MEDICARE

## 2022-02-14 VITALS
DIASTOLIC BLOOD PRESSURE: 63 MMHG | HEART RATE: 97 BPM | RESPIRATION RATE: 16 BRPM | SYSTOLIC BLOOD PRESSURE: 112 MMHG | OXYGEN SATURATION: 95 % | HEIGHT: 67 IN | TEMPERATURE: 97 F

## 2022-02-14 DIAGNOSIS — Z95.2 PRESENCE OF PROSTHETIC HEART VALVE: Chronic | ICD-10-CM

## 2022-02-14 DIAGNOSIS — Z95.1 PRESENCE OF AORTOCORONARY BYPASS GRAFT: Chronic | ICD-10-CM

## 2022-02-14 PROCEDURE — L9991: CPT

## 2022-02-14 NOTE — ED ADULT TRIAGE NOTE - CHIEF COMPLAINT QUOTE
"I have had diarrhoea for 2 weeks, seen in this er for same " pt not having diarrhoea today, nil else

## 2022-02-16 ENCOUNTER — EMERGENCY (EMERGENCY)
Facility: HOSPITAL | Age: 72
LOS: 1 days | Discharge: LEFT BEFORE DISCHARGE | End: 2022-02-16
Admitting: EMERGENCY MEDICINE
Payer: MEDICARE

## 2022-02-16 VITALS
OXYGEN SATURATION: 98 % | HEART RATE: 97 BPM | RESPIRATION RATE: 19 BRPM | WEIGHT: 160.06 LBS | TEMPERATURE: 98 F | HEIGHT: 67 IN | SYSTOLIC BLOOD PRESSURE: 108 MMHG | DIASTOLIC BLOOD PRESSURE: 63 MMHG

## 2022-02-16 DIAGNOSIS — L29.9 PRURITUS, UNSPECIFIED: ICD-10-CM

## 2022-02-16 DIAGNOSIS — Z95.2 PRESENCE OF PROSTHETIC HEART VALVE: Chronic | ICD-10-CM

## 2022-02-16 DIAGNOSIS — Z95.1 PRESENCE OF AORTOCORONARY BYPASS GRAFT: Chronic | ICD-10-CM

## 2022-02-16 DIAGNOSIS — Z53.21 PROCEDURE AND TREATMENT NOT CARRIED OUT DUE TO PATIENT LEAVING PRIOR TO BEING SEEN BY HEALTH CARE PROVIDER: ICD-10-CM

## 2022-02-16 PROCEDURE — L9991: CPT

## 2022-02-17 DIAGNOSIS — Z53.21 PROCEDURE AND TREATMENT NOT CARRIED OUT DUE TO PATIENT LEAVING PRIOR TO BEING SEEN BY HEALTH CARE PROVIDER: ICD-10-CM

## 2022-02-18 NOTE — ED PROVIDER NOTE - CONSTITUTIONAL NEGATIVE STATEMENT, MLM
"Physical Therapy Treatment    Patient Name:  Marisol Kerr   MRN:  [id number]    Recommendations:     Discharge Recommendations:  nursing facility, skilled   Discharge Equipment Recommendations: other (see comments) (TBD pending progress)   Barriers to discharge: Decreased caregiver support and patient below functional baseline    Assessment:     Marisol Kerr is a 73 y.o. female admitted with a medical diagnosis of NSTEMI (non-ST elevated myocardial infarction).  She presents with the following impairments/functional limitations:  weakness,impaired endurance,impaired functional mobilty,gait instability,impaired balance,impaired cardiopulmonary response to activity,edema. The patient demonstrates decreased cardiopulmonary endurance. Patient dyspneic with increased work of breathing, tachypneic, accessory muscle use with minimal gait/standing therex. Ambulated 8' x2 with RW and contact guard assist, deferred further gait and exercise due to SOB. On 2L O2, patient's sats dropped to 85%, increased to 92% within 2 min seated rest break. He is not safe to return home due to risk of falls. He would benefit from SNF placement to address the above deficits and maximize their functional mobility.      Rehab Prognosis: Good; patient would benefit from acute skilled PT services to address these deficits and reach maximum level of function.    Recent Surgery: Procedure(s) (LRB):  Left heart cath (Left)      Plan:     During this hospitalization, patient to be seen 4 x/week to address the identified rehab impairments via gait training,therapeutic activities,therapeutic exercises,neuromuscular re-education and progress toward the following goals:    · Plan of Care Expires:  03/14/22    Subjective     Chief Complaint: "You want me to do therapy again? I just did it, I walked to the bathroom and did exercises"    "It's so hard for me to get comfortable in the bed and in the chair", redness noted at sacrum, ordered waffle cushion for " "chair    Patient/Family Comments/goals: "I've always been independent, I want to get better"  Pain/Comfort:  · Pain Rating 1: 0/10      Objective:     Communicated with RN prior to session.  Patient found up in chair with oxygen,peripheral IV,telemetry upon PT entry to room.     General Precautions: Standard, fall   Orthopedic Precautions:N/A   Braces: N/A  Respiratory Status: Nasal cannula, flow 2.5 L/min     Functional Mobility:    Bed Mobility  Deferred, sitting up in chair   Transfers Sit to Stand:  contact guard assist, 2 reps (from bedside chair and BSC) relying heavily on UE support, momentum for transfer   Gait  Gait Distance: 8 x2 ft with RW  Assistance Level: contact guard assist   Description: kyphotic posture, relying heavily on UE for support, ambulating outside DOUG of RW, wide DOUG, decreased esther, decreased step length; tolerance limited by SOB  On 2.5L O2, desat to 85% with gait, within 2 min seated rest break and cues for PLB O2 increased to 93%          AM-PAC 6 CLICK MOBILITY  Turning over in bed (including adjusting bedclothes, sheets and blankets)?: 3  Sitting down on and standing up from a chair with arms (e.g., wheelchair, bedside commode, etc.): 3  Moving from lying on back to sitting on the side of the bed?: 3  Moving to and from a bed to a chair (including a wheelchair)?: 3  Need to walk in hospital room?: 3  Climbing 3-5 steps with a railing?: 2  Basic Mobility Total Score: 17       Therapeutic Activities and Exercises:   Patient educated on role of therapy, goals of session, benefits of out of bed mobility. Patient agreeable to mobilize with therapy.      Standing therex to improve LE strength and activity tolerance:   -Static marching with RW, 10 reps ea, minimal foot clearance, increased lateral weight shift to initiate stepping, poor eccentric lowering    Ambulated to BSC, urinated, assisted with ramses, RN alerted.     Patient educated on PT schedule.  Encouraged patient to " ambulate, sit up in chair 3x/day to prevent deconditioning during hospitalization. Patient verbalized understanding and agreement not to mobilize without RN assist. Patient in agreement with PT POC, SNF.    Patient is safe to ambulate with RN assist x1 person using RW.       Patient left up in chair with all lines intact and call button in reach..    GOALS:   Multidisciplinary Problems     Physical Therapy Goals        Problem: Physical Therapy Goal    Goal Priority Disciplines Outcome Goal Variances Interventions   Physical Therapy Goal     PT, PT/OT Ongoing, Progressing     Description: Goals to be met by:      Patient will increase functional independence with mobility by performin. Supine to sit with Modified Fisher  2. Sit to supine with Modified Fisher  3. Sit to stand transfer with Modified Fisher  4. Gait  x 50 feet with Modified Fisher using LRAD.   5. Ascend/Descend 6 inch curb step with Contact Guard Assistance using LRAD.                     Time Tracking:     PT Received On: 22  PT Start Time: 1143     PT Stop Time: 1213  PT Total Time (min): 30 min     Billable Minutes: Gait Training 15 and Therapeutic Activity 15    Treatment Type: Treatment  PT/PTA: PT     PTA Visit Number: 0     2022   no fever and no chills.

## 2022-02-25 ENCOUNTER — EMERGENCY (EMERGENCY)
Facility: HOSPITAL | Age: 72
LOS: 1 days | Discharge: LEFT BEFORE DISCHARGE | End: 2022-02-25
Admitting: EMERGENCY MEDICINE
Payer: MEDICARE

## 2022-02-25 VITALS
OXYGEN SATURATION: 99 % | SYSTOLIC BLOOD PRESSURE: 123 MMHG | DIASTOLIC BLOOD PRESSURE: 81 MMHG | RESPIRATION RATE: 22 BRPM | HEART RATE: 82 BPM | TEMPERATURE: 98 F

## 2022-02-25 DIAGNOSIS — Z95.2 PRESENCE OF PROSTHETIC HEART VALVE: Chronic | ICD-10-CM

## 2022-02-25 DIAGNOSIS — Z95.1 PRESENCE OF AORTOCORONARY BYPASS GRAFT: Chronic | ICD-10-CM

## 2022-02-25 PROCEDURE — L9991: CPT

## 2022-02-28 DIAGNOSIS — Z53.21 PROCEDURE AND TREATMENT NOT CARRIED OUT DUE TO PATIENT LEAVING PRIOR TO BEING SEEN BY HEALTH CARE PROVIDER: ICD-10-CM

## 2022-03-02 ENCOUNTER — EMERGENCY (EMERGENCY)
Facility: HOSPITAL | Age: 72
LOS: 1 days | Discharge: LEFT BEFORE DISCHARGE | End: 2022-03-02
Admitting: EMERGENCY MEDICINE
Payer: MEDICARE

## 2022-03-02 VITALS
WEIGHT: 132.28 LBS | TEMPERATURE: 98 F | SYSTOLIC BLOOD PRESSURE: 110 MMHG | OXYGEN SATURATION: 98 % | DIASTOLIC BLOOD PRESSURE: 72 MMHG | HEART RATE: 92 BPM | RESPIRATION RATE: 16 BRPM | HEIGHT: 67 IN

## 2022-03-02 DIAGNOSIS — Z95.1 PRESENCE OF AORTOCORONARY BYPASS GRAFT: Chronic | ICD-10-CM

## 2022-03-02 DIAGNOSIS — Z95.2 PRESENCE OF PROSTHETIC HEART VALVE: Chronic | ICD-10-CM

## 2022-03-02 PROCEDURE — L9991: CPT

## 2022-03-02 NOTE — ED ADULT TRIAGE NOTE - CHIEF COMPLAINT QUOTE
patient walk in c/o itching all over body x1 month especially his scalp; no other medical complaints

## 2022-03-02 NOTE — ED PROVIDER NOTE - PATIENT PORTAL LINK FT
You can access the FollowMyHealth Patient Portal offered by [facility] by registering at the following website: [email or web address]. By joining "[employer]’s FollowMyHealth portal, you will also be able to view your health information using other applications (apps) compatible with our system.

## 2022-03-03 DIAGNOSIS — Z53.21 PROCEDURE AND TREATMENT NOT CARRIED OUT DUE TO PATIENT LEAVING PRIOR TO BEING SEEN BY HEALTH CARE PROVIDER: ICD-10-CM

## 2022-03-07 NOTE — ED PROVIDER NOTE - PATIENT PORTAL LINK FT
You can access the FollowMyHealth Patient Portal offered by [facility] by registering at the following website: [email or web address]. By joining [employer]’s FollowMyHealth portal, you will also be able to view your health information using other applications (apps) compatible with our system. Closure 3 Information: This tab is for additional flaps and grafts above and beyond our usual structured repairs.  Please note if you enter information here it will not currently bill and you will need to add the billing information manually.

## 2022-03-09 ENCOUNTER — EMERGENCY (EMERGENCY)
Facility: HOSPITAL | Age: 72
LOS: 1 days | Discharge: LEFT BEFORE DISCHARGE | End: 2022-03-09
Admitting: EMERGENCY MEDICINE
Payer: MEDICARE

## 2022-03-09 VITALS
HEIGHT: 67 IN | OXYGEN SATURATION: 97 % | SYSTOLIC BLOOD PRESSURE: 108 MMHG | DIASTOLIC BLOOD PRESSURE: 72 MMHG | WEIGHT: 149.91 LBS | TEMPERATURE: 98 F | RESPIRATION RATE: 20 BRPM | HEART RATE: 84 BPM

## 2022-03-09 DIAGNOSIS — Z95.2 PRESENCE OF PROSTHETIC HEART VALVE: Chronic | ICD-10-CM

## 2022-03-09 DIAGNOSIS — Z95.1 PRESENCE OF AORTOCORONARY BYPASS GRAFT: Chronic | ICD-10-CM

## 2022-03-09 DIAGNOSIS — Z53.21 PROCEDURE AND TREATMENT NOT CARRIED OUT DUE TO PATIENT LEAVING PRIOR TO BEING SEEN BY HEALTH CARE PROVIDER: ICD-10-CM

## 2022-03-09 PROCEDURE — L9991: CPT

## 2022-03-10 ENCOUNTER — EMERGENCY (EMERGENCY)
Facility: HOSPITAL | Age: 72
LOS: 1 days | Discharge: HOME | End: 2022-03-10
Attending: EMERGENCY MEDICINE | Admitting: EMERGENCY MEDICINE
Payer: MEDICARE

## 2022-03-10 VITALS
SYSTOLIC BLOOD PRESSURE: 105 MMHG | HEIGHT: 67 IN | RESPIRATION RATE: 16 BRPM | TEMPERATURE: 98 F | OXYGEN SATURATION: 98 % | HEART RATE: 87 BPM | DIASTOLIC BLOOD PRESSURE: 62 MMHG | WEIGHT: 149.91 LBS

## 2022-03-10 DIAGNOSIS — Z79.82 LONG TERM (CURRENT) USE OF ASPIRIN: ICD-10-CM

## 2022-03-10 DIAGNOSIS — Z95.2 PRESENCE OF PROSTHETIC HEART VALVE: Chronic | ICD-10-CM

## 2022-03-10 DIAGNOSIS — R05.9 COUGH, UNSPECIFIED: ICD-10-CM

## 2022-03-10 DIAGNOSIS — I10 ESSENTIAL (PRIMARY) HYPERTENSION: ICD-10-CM

## 2022-03-10 DIAGNOSIS — J43.9 EMPHYSEMA, UNSPECIFIED: ICD-10-CM

## 2022-03-10 DIAGNOSIS — E78.5 HYPERLIPIDEMIA, UNSPECIFIED: ICD-10-CM

## 2022-03-10 DIAGNOSIS — I48.91 UNSPECIFIED ATRIAL FIBRILLATION: ICD-10-CM

## 2022-03-10 DIAGNOSIS — Z95.1 PRESENCE OF AORTOCORONARY BYPASS GRAFT: Chronic | ICD-10-CM

## 2022-03-10 DIAGNOSIS — Z88.6 ALLERGY STATUS TO ANALGESIC AGENT: ICD-10-CM

## 2022-03-10 PROCEDURE — 99283 EMERGENCY DEPT VISIT LOW MDM: CPT

## 2022-03-10 NOTE — ED PROVIDER NOTE - CLINICAL SUMMARY MEDICAL DECISION MAKING FREE TEXT BOX
Patient eloped prior to receiving cough suppressant.  Vitals reviewed.  Lungs CTA b/l with normal WOB.  Reviewed previous ED visits and presentation today similar.

## 2022-03-10 NOTE — ED PROVIDER NOTE - CONSTITUTIONAL, MLM
Unkempt - awake, alert, oriented to person, place, time/situation and in no apparent distress. normal...

## 2022-03-10 NOTE — ED PROVIDER NOTE - OBJECTIVE STATEMENT
Frequent ED visits for a myriad of complaints presents today complaining of cough but without SOB and pruritis.  Arrives holding his rescue inhaler.  Reviewed previous ED visits and patient has a history of disruptive behavior.  NO fever or chills.

## 2022-03-10 NOTE — ED PROVIDER NOTE - PATIENT PORTAL LINK FT
You can access the FollowMyHealth Patient Portal offered by [facility] by registering at the following website: [email or web address]. By joining [employer]’s FollowMyHealth portal, you will also be able to view your health information using other applications (apps) compatible with our system.

## 2022-03-14 NOTE — ED PROVIDER NOTE - CHPI ED SYMPTOMS NEG
no fever/no chest pian, no abdominal pain, no N/V/no dysuria/no hematuria/no chills Spironolactone Pregnancy And Lactation Text: This medication can cause feminization of the male fetus and should be avoided during pregnancy. The active metabolite is also found in breast milk.

## 2022-03-15 ENCOUNTER — EMERGENCY (EMERGENCY)
Facility: HOSPITAL | Age: 72
LOS: 1 days | Discharge: LEFT BEFORE DISCHARGE | End: 2022-03-15
Attending: EMERGENCY MEDICINE | Admitting: EMERGENCY MEDICINE
Payer: MEDICARE

## 2022-03-15 VITALS
RESPIRATION RATE: 18 BRPM | HEIGHT: 67 IN | OXYGEN SATURATION: 98 % | DIASTOLIC BLOOD PRESSURE: 59 MMHG | SYSTOLIC BLOOD PRESSURE: 97 MMHG | HEART RATE: 98 BPM | TEMPERATURE: 99 F

## 2022-03-15 DIAGNOSIS — Z95.1 PRESENCE OF AORTOCORONARY BYPASS GRAFT: Chronic | ICD-10-CM

## 2022-03-15 DIAGNOSIS — Z95.2 PRESENCE OF PROSTHETIC HEART VALVE: Chronic | ICD-10-CM

## 2022-03-15 PROCEDURE — L9991: CPT

## 2022-03-15 NOTE — ED PROVIDER NOTE - OBJECTIVE STATEMENT
71 M with cough- LWBS shortly (les than 5 mins) after triage. He comes almost daily and does the same.

## 2022-03-17 DIAGNOSIS — R05.9 COUGH, UNSPECIFIED: ICD-10-CM

## 2022-03-17 DIAGNOSIS — Z88.6 ALLERGY STATUS TO ANALGESIC AGENT: ICD-10-CM

## 2022-03-17 DIAGNOSIS — Z53.21 PROCEDURE AND TREATMENT NOT CARRIED OUT DUE TO PATIENT LEAVING PRIOR TO BEING SEEN BY HEALTH CARE PROVIDER: ICD-10-CM

## 2022-03-17 DIAGNOSIS — I48.91 UNSPECIFIED ATRIAL FIBRILLATION: ICD-10-CM

## 2022-03-17 DIAGNOSIS — R06.02 SHORTNESS OF BREATH: ICD-10-CM

## 2022-03-18 NOTE — ED PROVIDER NOTE - PRINCIPAL DIAGNOSIS
Please inform patient of the following results  Hemoglobin A1c is much better at 8.0.  continue current dose of Lantus and sliding scale of NovoLog.  Kidney and liver function are normal Diarrhea

## 2022-03-23 ENCOUNTER — EMERGENCY (EMERGENCY)
Facility: HOSPITAL | Age: 72
LOS: 1 days | Discharge: LEFT BEFORE DISCHARGE | End: 2022-03-23
Attending: EMERGENCY MEDICINE | Admitting: EMERGENCY MEDICINE
Payer: MEDICARE

## 2022-03-23 VITALS
HEART RATE: 80 BPM | DIASTOLIC BLOOD PRESSURE: 60 MMHG | OXYGEN SATURATION: 96 % | RESPIRATION RATE: 18 BRPM | SYSTOLIC BLOOD PRESSURE: 105 MMHG | HEIGHT: 67 IN | TEMPERATURE: 98 F | WEIGHT: 176.37 LBS

## 2022-03-23 DIAGNOSIS — R05.9 COUGH, UNSPECIFIED: ICD-10-CM

## 2022-03-23 DIAGNOSIS — Z53.29 PROCEDURE AND TREATMENT NOT CARRIED OUT BECAUSE OF PATIENT'S DECISION FOR OTHER REASONS: ICD-10-CM

## 2022-03-23 DIAGNOSIS — Z95.2 PRESENCE OF PROSTHETIC HEART VALVE: Chronic | ICD-10-CM

## 2022-03-23 DIAGNOSIS — F17.210 NICOTINE DEPENDENCE, CIGARETTES, UNCOMPLICATED: ICD-10-CM

## 2022-03-23 DIAGNOSIS — Z95.1 PRESENCE OF AORTOCORONARY BYPASS GRAFT: Chronic | ICD-10-CM

## 2022-03-23 PROBLEM — I48.91 UNSPECIFIED ATRIAL FIBRILLATION: Chronic | Status: ACTIVE | Noted: 2021-03-10

## 2022-03-23 PROCEDURE — L9991: CPT

## 2022-03-23 NOTE — ED ADULT TRIAGE NOTE - CHIEF COMPLAINT QUOTE
patient walk in off street c/o itching all over back and scalp and coughing worse after smoking cigarettes this afternoon; chronic cough

## 2022-03-25 NOTE — ED PROVIDER NOTE - DISCHARGE DATE
Infection appears viral   Recommend symptomatic treatment  Can take ibuprofen or tylenol as needed for pain or fever  Over the counter cough and cold medications to help with symptoms  Use salt water gargles for sore throat and throat lozenges  Cough drops as needed  Wash hands frequently to prevent the spread of infection  If not improving over the next 7-10 days, follow up with PCP  Symptoms may persist for 10-14 days  Will rule out strep/covid/flu  21-Jan-2022

## 2022-04-03 ENCOUNTER — EMERGENCY (EMERGENCY)
Facility: HOSPITAL | Age: 72
LOS: 1 days | Discharge: LEFT BEFORE DISCHARGE | End: 2022-04-03
Admitting: EMERGENCY MEDICINE
Payer: MEDICARE

## 2022-04-03 VITALS
TEMPERATURE: 97 F | WEIGHT: 149.91 LBS | SYSTOLIC BLOOD PRESSURE: 111 MMHG | HEART RATE: 82 BPM | HEIGHT: 67 IN | RESPIRATION RATE: 18 BRPM | OXYGEN SATURATION: 98 % | DIASTOLIC BLOOD PRESSURE: 74 MMHG

## 2022-04-03 DIAGNOSIS — Z95.1 PRESENCE OF AORTOCORONARY BYPASS GRAFT: Chronic | ICD-10-CM

## 2022-04-03 DIAGNOSIS — Z95.2 PRESENCE OF PROSTHETIC HEART VALVE: Chronic | ICD-10-CM

## 2022-04-03 PROCEDURE — L9991: CPT

## 2022-04-04 DIAGNOSIS — Z53.21 PROCEDURE AND TREATMENT NOT CARRIED OUT DUE TO PATIENT LEAVING PRIOR TO BEING SEEN BY HEALTH CARE PROVIDER: ICD-10-CM

## 2022-04-04 DIAGNOSIS — U07.1 COVID-19: ICD-10-CM

## 2022-04-04 DIAGNOSIS — R05.9 COUGH, UNSPECIFIED: ICD-10-CM

## 2022-04-08 ENCOUNTER — EMERGENCY (EMERGENCY)
Facility: HOSPITAL | Age: 72
LOS: 1 days | Discharge: LEFT BEFORE DISCHARGE | End: 2022-04-08
Attending: EMERGENCY MEDICINE | Admitting: EMERGENCY MEDICINE
Payer: MEDICARE

## 2022-04-08 VITALS
DIASTOLIC BLOOD PRESSURE: 49 MMHG | SYSTOLIC BLOOD PRESSURE: 103 MMHG | HEIGHT: 65 IN | RESPIRATION RATE: 18 BRPM | TEMPERATURE: 98 F | HEART RATE: 77 BPM | OXYGEN SATURATION: 99 % | WEIGHT: 149.91 LBS

## 2022-04-08 DIAGNOSIS — Z53.20 PROCEDURE AND TREATMENT NOT CARRIED OUT BECAUSE OF PATIENT'S DECISION FOR UNSPECIFIED REASONS: ICD-10-CM

## 2022-04-08 DIAGNOSIS — Z95.1 PRESENCE OF AORTOCORONARY BYPASS GRAFT: Chronic | ICD-10-CM

## 2022-04-08 DIAGNOSIS — Z95.1 PRESENCE OF AORTOCORONARY BYPASS GRAFT: ICD-10-CM

## 2022-04-08 DIAGNOSIS — I10 ESSENTIAL (PRIMARY) HYPERTENSION: ICD-10-CM

## 2022-04-08 DIAGNOSIS — J44.9 CHRONIC OBSTRUCTIVE PULMONARY DISEASE, UNSPECIFIED: ICD-10-CM

## 2022-04-08 DIAGNOSIS — Z88.6 ALLERGY STATUS TO ANALGESIC AGENT: ICD-10-CM

## 2022-04-08 DIAGNOSIS — R05.9 COUGH, UNSPECIFIED: ICD-10-CM

## 2022-04-08 DIAGNOSIS — I48.91 UNSPECIFIED ATRIAL FIBRILLATION: ICD-10-CM

## 2022-04-08 DIAGNOSIS — I25.10 ATHEROSCLEROTIC HEART DISEASE OF NATIVE CORONARY ARTERY WITHOUT ANGINA PECTORIS: ICD-10-CM

## 2022-04-08 DIAGNOSIS — Z95.2 PRESENCE OF PROSTHETIC HEART VALVE: Chronic | ICD-10-CM

## 2022-04-08 DIAGNOSIS — G47.00 INSOMNIA, UNSPECIFIED: ICD-10-CM

## 2022-04-08 PROCEDURE — L9992: CPT

## 2022-04-08 RX ORDER — ACETAMINOPHEN 500 MG
975 TABLET ORAL ONCE
Refills: 0 | Status: COMPLETED | OUTPATIENT
Start: 2022-04-08 | End: 2022-04-08

## 2022-04-08 RX ADMIN — Medication 975 MILLIGRAM(S): at 03:42

## 2022-04-08 NOTE — ED PROVIDER NOTE - OBJECTIVE STATEMENT
72 y/o M with PMH of CAD s/p CABG, HTN, afib, COPD presenting with difficulty sleeping for last 4-5 days. Patient also reports chronic cough. Has been taking melatonin with minimal relief. No fevers, chills, cp, n/v/d, abd pain, LE swelling.

## 2022-04-08 NOTE — ED ADULT NURSE NOTE - NSIMPLEMENTINTERV_GEN_ALL_ED
Implemented All Universal Safety Interventions:  [place] to call system. Call bell, personal items and telephone within reach. Instruct patient to call for assistance. Room bathroom lighting operational. Non-slip footwear when patient is off stretcher. Physically safe environment: no spills, clutter or unnecessary equipment. Stretcher in lowest position, wheels locked, appropriate side rails in place.

## 2022-04-08 NOTE — ED PROVIDER NOTE - CLINICAL SUMMARY MEDICAL DECISION MAKING FREE TEXT BOX
RIK Martin. PGY-3: 72 y/o M presenting with difficulty sleeping, cough. Will swab for flu/COVID. Tylenol for body aches, likely DC

## 2022-04-08 NOTE — ED PROVIDER NOTE - ATTENDING CONTRIBUTION TO CARE
agree with documentation and management, patient well appearing frequent visits to ed for similar. rule out infectious process, patient subsequently eloped during evaluation.

## 2022-04-21 ENCOUNTER — EMERGENCY (EMERGENCY)
Facility: HOSPITAL | Age: 72
LOS: 1 days | Discharge: LEFT BEFORE DISCHARGE | End: 2022-04-21
Attending: EMERGENCY MEDICINE | Admitting: EMERGENCY MEDICINE
Payer: MEDICARE

## 2022-04-21 VITALS
WEIGHT: 149.91 LBS | HEART RATE: 85 BPM | HEIGHT: 65 IN | DIASTOLIC BLOOD PRESSURE: 67 MMHG | RESPIRATION RATE: 18 BRPM | SYSTOLIC BLOOD PRESSURE: 100 MMHG | OXYGEN SATURATION: 99 % | TEMPERATURE: 99 F

## 2022-04-21 DIAGNOSIS — Z95.2 PRESENCE OF PROSTHETIC HEART VALVE: Chronic | ICD-10-CM

## 2022-04-21 DIAGNOSIS — Z95.1 PRESENCE OF AORTOCORONARY BYPASS GRAFT: Chronic | ICD-10-CM

## 2022-04-21 PROCEDURE — L9991: CPT

## 2022-04-22 DIAGNOSIS — R21 RASH AND OTHER NONSPECIFIC SKIN ERUPTION: ICD-10-CM

## 2022-04-22 DIAGNOSIS — Z53.21 PROCEDURE AND TREATMENT NOT CARRIED OUT DUE TO PATIENT LEAVING PRIOR TO BEING SEEN BY HEALTH CARE PROVIDER: ICD-10-CM

## 2022-04-26 ENCOUNTER — EMERGENCY (EMERGENCY)
Facility: HOSPITAL | Age: 72
LOS: 1 days | Discharge: LEFT BEFORE DISCHARGE | End: 2022-04-26
Attending: EMERGENCY MEDICINE | Admitting: EMERGENCY MEDICINE
Payer: MEDICARE

## 2022-04-26 VITALS
OXYGEN SATURATION: 99 % | RESPIRATION RATE: 18 BRPM | HEART RATE: 92 BPM | TEMPERATURE: 98 F | DIASTOLIC BLOOD PRESSURE: 77 MMHG | SYSTOLIC BLOOD PRESSURE: 144 MMHG

## 2022-04-26 VITALS
RESPIRATION RATE: 20 BRPM | SYSTOLIC BLOOD PRESSURE: 111 MMHG | TEMPERATURE: 98 F | DIASTOLIC BLOOD PRESSURE: 66 MMHG | OXYGEN SATURATION: 96 % | HEART RATE: 74 BPM

## 2022-04-26 DIAGNOSIS — Z53.20 PROCEDURE AND TREATMENT NOT CARRIED OUT BECAUSE OF PATIENT'S DECISION FOR UNSPECIFIED REASONS: ICD-10-CM

## 2022-04-26 DIAGNOSIS — Z88.8 ALLERGY STATUS TO OTHER DRUGS, MEDICAMENTS AND BIOLOGICAL SUBSTANCES: ICD-10-CM

## 2022-04-26 DIAGNOSIS — L29.9 PRURITUS, UNSPECIFIED: ICD-10-CM

## 2022-04-26 DIAGNOSIS — Z59.02 UNSHELTERED HOMELESSNESS: ICD-10-CM

## 2022-04-26 DIAGNOSIS — Z53.21 PROCEDURE AND TREATMENT NOT CARRIED OUT DUE TO PATIENT LEAVING PRIOR TO BEING SEEN BY HEALTH CARE PROVIDER: ICD-10-CM

## 2022-04-26 PROCEDURE — L9991: CPT

## 2022-04-26 RX ORDER — LORATADINE 10 MG/1
10 TABLET ORAL ONCE
Refills: 0 | Status: DISCONTINUED | OUTPATIENT
Start: 2022-04-26 | End: 2022-04-26

## 2022-04-26 SDOH — ECONOMIC STABILITY - HOUSING INSECURITY: UNSHELTERED HOMELESSNESS: Z59.02

## 2022-04-26 NOTE — ED PROVIDER NOTE - ATTENDING CONTRIBUTION TO CARE
patient with diffuse pruritis, with no signs of jaundice, vesicular rash, or lesions, normal vs, patient subsequently eloped while in ed.

## 2022-04-26 NOTE — ED PROVIDER NOTE - CLINICAL SUMMARY MEDICAL DECISION MAKING FREE TEXT BOX
72yo male p/w itching, no signs of anaphylaxis or infection. Possible mild allergic reaction, antihistamine and dc. Patient eloped before treatment when told he wasn't getting a stretcher.

## 2022-04-26 NOTE — ED PROVIDER NOTE - PHYSICAL EXAMINATION
Physical Exam:  Gen: AOx3, itching his torso, able to ambulate without assistance  Head: NCAT  HEENT: EOMI, PEERLA, normal conjunctiva, tongue midline, oral mucosa moist  Lung: no respiratory distress, speaking in full sentences  Abd: soft, NT, ND  MSK: no visible deformities, ROM normal in UE/LE  Skin: Warm, well perfused, no rash  Psych: normal affect, calm

## 2022-04-26 NOTE — ED PROVIDER NOTE - OBJECTIVE STATEMENT
70yo male BIBEMs for 3 days of whole body itching. Did not try taking any medications. Denies fever, new exposures, sick contacts, lesions/rash on skin, jaundice, pain.

## 2022-04-26 NOTE — ED ADULT TRIAGE NOTE - NS ED NURSE BANDS TYPE
Rest   Fluids   Tylenol OTC for pain/fever as needed   Mychart or call central scheduling to schedule Quad testing   Quarantine until results are back   Please follow up with PCP if no improvement or if symptoms worsen
Name band;

## 2022-04-26 NOTE — ED ADULT TRIAGE NOTE - CHIEF COMPLAINT QUOTE
Pt walked in c/o generalized itching. States could no go to sleep. Eloped earlier tonight with the same complaint.

## 2022-04-26 NOTE — ED PROVIDER NOTE - CCCP TRG CHIEF CMPLNT
[id number] Reina Izaguirre for pt to be d/c per cardio since pt refuses a trinh but needs to schedule stress test asap.  notified.  scott rn
itching

## 2022-04-26 NOTE — ED PROVIDER NOTE - NS ED ATTENDING STATEMENT MOD
I have seen and examined this patient and fully participated in the care of this patient as the teaching attending.  The service was shared with the TRAY.  I reviewed and verified the documentation and independently performed the documented:

## 2022-05-08 ENCOUNTER — EMERGENCY (EMERGENCY)
Facility: HOSPITAL | Age: 72
LOS: 1 days | Discharge: LEFT BEFORE DISCHARGE | End: 2022-05-08
Admitting: EMERGENCY MEDICINE
Payer: MEDICARE

## 2022-05-08 VITALS
DIASTOLIC BLOOD PRESSURE: 73 MMHG | HEIGHT: 65 IN | OXYGEN SATURATION: 99 % | RESPIRATION RATE: 16 BRPM | TEMPERATURE: 99 F | SYSTOLIC BLOOD PRESSURE: 120 MMHG | HEART RATE: 87 BPM | WEIGHT: 149.91 LBS

## 2022-05-08 PROCEDURE — 99283 EMERGENCY DEPT VISIT LOW MDM: CPT

## 2022-05-08 NOTE — ED PROVIDER NOTE - PHYSICAL EXAMINATION
Constitutional: Well appearing, awake, alert, oriented to person, place, time/situation and in no apparent distress.  HEENT: Airway patent, NCAT  Resp: no conversational dyspnea, tachypnea, or signs of respiratory distress  Msk: ambulating with normal steady gait, slight ttp to left inferolateral ribs  Neuro: A&Ox3

## 2022-05-08 NOTE — ED ADULT NURSE NOTE - CHIEF COMPLAINT QUOTE
Pt states upper ab pain radiating to left side x 5 days. Pt states some diarrhea. Denies n/v or fevers.

## 2022-05-08 NOTE — ED PROVIDER NOTE - OBJECTIVE STATEMENT
70yo M with frequent ER visits for variety complaints. reports itchy skin, chronic cough, and left lower rib pain. denies nausea, vomiting, fever, chills, dizziness, abd surgery. + chronic nonbloody diarrhea.

## 2022-05-08 NOTE — ED PROVIDER NOTE - CLINICAL SUMMARY MEDICAL DECISION MAKING FREE TEXT BOX
pt presents with itching skin, rib pain, and chronic cough. planned for labs and cxr but pt eloped prior to orders.

## 2022-05-08 NOTE — ED ADULT TRIAGE NOTE - CHIEF COMPLAINT QUOTE
Unknown if ever smoked
Pt states upper ab pain radiating to left side x 5 days. Pt states some diarrhea. Denies n/v or fevers.

## 2022-05-10 ENCOUNTER — EMERGENCY (EMERGENCY)
Facility: HOSPITAL | Age: 72
LOS: 1 days | Discharge: HOME | End: 2022-05-10
Attending: EMERGENCY MEDICINE | Admitting: EMERGENCY MEDICINE
Payer: MEDICARE

## 2022-05-10 VITALS
RESPIRATION RATE: 18 BRPM | OXYGEN SATURATION: 98 % | HEIGHT: 65 IN | TEMPERATURE: 98 F | SYSTOLIC BLOOD PRESSURE: 97 MMHG | DIASTOLIC BLOOD PRESSURE: 61 MMHG | HEART RATE: 72 BPM

## 2022-05-10 DIAGNOSIS — R07.81 PLEURODYNIA: ICD-10-CM

## 2022-05-10 DIAGNOSIS — Z88.6 ALLERGY STATUS TO ANALGESIC AGENT: ICD-10-CM

## 2022-05-10 DIAGNOSIS — R05.3 CHRONIC COUGH: ICD-10-CM

## 2022-05-10 DIAGNOSIS — Z53.21 PROCEDURE AND TREATMENT NOT CARRIED OUT DUE TO PATIENT LEAVING PRIOR TO BEING SEEN BY HEALTH CARE PROVIDER: ICD-10-CM

## 2022-05-10 PROCEDURE — 99284 EMERGENCY DEPT VISIT MOD MDM: CPT

## 2022-05-10 RX ORDER — ACETAMINOPHEN 500 MG
650 TABLET ORAL ONCE
Refills: 0 | Status: COMPLETED | OUTPATIENT
Start: 2022-05-10 | End: 2022-05-10

## 2022-05-10 RX ORDER — ALBUTEROL 90 UG/1
1 AEROSOL, METERED ORAL ONCE
Refills: 0 | Status: COMPLETED | OUTPATIENT
Start: 2022-05-10 | End: 2022-05-10

## 2022-05-10 RX ADMIN — ALBUTEROL 1 PUFF(S): 90 AEROSOL, METERED ORAL at 15:14

## 2022-05-10 RX ADMIN — Medication 650 MILLIGRAM(S): at 15:13

## 2022-05-10 NOTE — ED PROVIDER NOTE - PHYSICAL EXAMINATION
VITAL SIGNS: I have reviewed nursing notes and confirm.  CONSTITUTIONAL: Well-developed; well-nourished; in no acute distress.  SKIN: Skin is warm and dry, no acute rash.  HEAD: Normocephalic; atraumatic.  EYES: Pupils round bilaterally, 3mm; conjunctiva and sclera clear.  CARD: S1, S2 normal; no murmurs, gallops, or rubs. Regular rate and rhythm.  RESP: No wheezes, rales or rhonchi.  ABD: Soft; non-distended; non-tender; no rebound or guarding.  EXT: Normal ROM. No clubbing, cyanosis or edema.  NEURO: Alert, oriented. Grossly unremarkable.   PSYCH: Cooperative, appropriate. Mood and affect consistent with baseline

## 2022-05-10 NOTE — ED ADULT NURSE NOTE - NSIMPLEMENTINTERV_GEN_ALL_ED
Implemented All Universal Safety Interventions:  [place] to call system. Call bell, personal items and telephone within reach. Instruct patient to call for assistance. Room bathroom lighting operational. Non-slip footwear when patient is off stretcher. Physically safe environment: no spills, clutter or unnecessary equipment. Stretcher in lowest position, wheels locked, appropriate side rails in place.

## 2022-05-10 NOTE — ED PROVIDER NOTE - OBJECTIVE STATEMENT
71 M presenting with chronic cough and pain to his side. He has very regular ED visits- usually leaves within minutes before being seen. He ran out of his inhaler.

## 2022-05-10 NOTE — ED PROVIDER NOTE - PATIENT PORTAL LINK FT
You can access the FollowMyHealth Patient Portal offered by [facility] by registering at the following website: [email or web address]. By joining [employer]’s FollowMyHealth portal, you will also be able to view your health information using other applications (apps) compatible with our system.

## 2022-05-11 ENCOUNTER — EMERGENCY (EMERGENCY)
Facility: HOSPITAL | Age: 72
LOS: 1 days | Discharge: LEFT BEFORE DISCHARGE | End: 2022-05-11
Attending: EMERGENCY MEDICINE | Admitting: EMERGENCY MEDICINE
Payer: MEDICARE

## 2022-05-11 VITALS
HEART RATE: 78 BPM | OXYGEN SATURATION: 98 % | TEMPERATURE: 98 F | SYSTOLIC BLOOD PRESSURE: 110 MMHG | HEIGHT: 65 IN | DIASTOLIC BLOOD PRESSURE: 70 MMHG | RESPIRATION RATE: 22 BRPM

## 2022-05-11 DIAGNOSIS — Z53.21 PROCEDURE AND TREATMENT NOT CARRIED OUT DUE TO PATIENT LEAVING PRIOR TO BEING SEEN BY HEALTH CARE PROVIDER: ICD-10-CM

## 2022-05-11 DIAGNOSIS — R10.9 UNSPECIFIED ABDOMINAL PAIN: ICD-10-CM

## 2022-05-11 PROCEDURE — L9991: CPT

## 2022-05-13 ENCOUNTER — EMERGENCY (EMERGENCY)
Facility: HOSPITAL | Age: 72
LOS: 1 days | Discharge: LEFT BEFORE DISCHARGE | End: 2022-05-13
Attending: EMERGENCY MEDICINE | Admitting: EMERGENCY MEDICINE
Payer: MEDICARE

## 2022-05-13 VITALS
TEMPERATURE: 98 F | WEIGHT: 153 LBS | SYSTOLIC BLOOD PRESSURE: 118 MMHG | OXYGEN SATURATION: 98 % | DIASTOLIC BLOOD PRESSURE: 71 MMHG | HEART RATE: 79 BPM | RESPIRATION RATE: 18 BRPM | HEIGHT: 67 IN

## 2022-05-13 DIAGNOSIS — J45.909 UNSPECIFIED ASTHMA, UNCOMPLICATED: ICD-10-CM

## 2022-05-13 DIAGNOSIS — R05.3 CHRONIC COUGH: ICD-10-CM

## 2022-05-13 DIAGNOSIS — Z88.6 ALLERGY STATUS TO ANALGESIC AGENT: ICD-10-CM

## 2022-05-13 DIAGNOSIS — F17.200 NICOTINE DEPENDENCE, UNSPECIFIED, UNCOMPLICATED: ICD-10-CM

## 2022-05-13 PROCEDURE — L9991: CPT

## 2022-05-13 NOTE — ED ADULT TRIAGE NOTE - CHIEF COMPLAINT QUOTE
Pt walks in w/ EMS c/o L flank pain with increased urinary frequency and diarrhea for several weeks. PMH of CABG and A-Fib. Pt does not know daily meds.

## 2022-05-14 NOTE — ED PROVIDER NOTE - CLINICAL SUMMARY MEDICAL DECISION MAKING FREE TEXT BOX
Pt here for flank pain.  Multiple ED visits in the past due to alcohol-related issues.  Pt left prior to my evaluation.

## 2022-05-16 ENCOUNTER — EMERGENCY (EMERGENCY)
Facility: HOSPITAL | Age: 72
LOS: 1 days | Discharge: LEFT BEFORE DISCHARGE | End: 2022-05-16
Attending: EMERGENCY MEDICINE | Admitting: EMERGENCY MEDICINE
Payer: MEDICARE

## 2022-05-16 VITALS
SYSTOLIC BLOOD PRESSURE: 106 MMHG | RESPIRATION RATE: 20 BRPM | TEMPERATURE: 98 F | DIASTOLIC BLOOD PRESSURE: 70 MMHG | OXYGEN SATURATION: 99 % | HEIGHT: 67 IN | HEART RATE: 111 BPM

## 2022-05-16 DIAGNOSIS — R06.02 SHORTNESS OF BREATH: ICD-10-CM

## 2022-05-16 DIAGNOSIS — Z53.21 PROCEDURE AND TREATMENT NOT CARRIED OUT DUE TO PATIENT LEAVING PRIOR TO BEING SEEN BY HEALTH CARE PROVIDER: ICD-10-CM

## 2022-05-16 DIAGNOSIS — R10.9 UNSPECIFIED ABDOMINAL PAIN: ICD-10-CM

## 2022-05-16 DIAGNOSIS — R05.9 COUGH, UNSPECIFIED: ICD-10-CM

## 2022-05-16 DIAGNOSIS — J45.909 UNSPECIFIED ASTHMA, UNCOMPLICATED: ICD-10-CM

## 2022-05-16 DIAGNOSIS — Z88.6 ALLERGY STATUS TO ANALGESIC AGENT: ICD-10-CM

## 2022-05-16 PROCEDURE — 99284 EMERGENCY DEPT VISIT MOD MDM: CPT

## 2022-05-16 NOTE — ED PROVIDER NOTE - OBJECTIVE STATEMENT
72 y/o M w/hx asthma, frequent visits and elopements, p/w cough/SOB. None in the ED. Non-participatory with history taking.

## 2022-05-16 NOTE — ED PROVIDER NOTE - CLINICAL SUMMARY MEDICAL DECISION MAKING FREE TEXT BOX
arrived for SOB, none in ED, declined treatment and did not participate in history taking. d/c home. given return precautions.

## 2022-05-16 NOTE — ED PROVIDER NOTE - NSFOLLOWUPINSTRUCTIONS_ED_ALL_ED_FT
Asthma    Asthma is a condition in which the airways tighten and narrow, making it difficult to breath. Asthma episodes, also called asthma attacks, range from minor to life-threatening. Symptoms include wheezing, coughing, chest tightness, or shortness of breath. The diagnosis of asthma is made by a review of your medical history and a physical exam, but may involve additional testing. Asthma cannot be cured, but medicines and lifestyle changes can help control it. Avoid triggers of asthma which may include animal dander, pollen, mold, smoke, air pollutants, etc.     SEEK IMMEDIATE MEDICAL CARE IF YOU HAVE ANY OF THE FOLLOWING SYMPTOMS: worsening of symptoms, shortness of breath at rest, chest pain, bluish discoloration to lips or fingertips, lightheadedness/dizziness, or fever..

## 2022-05-16 NOTE — ED PROVIDER NOTE - PATIENT PORTAL LINK FT
You can access the FollowMyHealth Patient Portal offered by [facility] by registering at the following website: [email or web address]. By joining [email or web address]’s FollowMyHealth portal, you will also be able to view your health information using other applications (apps) compatible with our system.

## 2022-05-17 ENCOUNTER — EMERGENCY (EMERGENCY)
Facility: HOSPITAL | Age: 72
LOS: 1 days | Discharge: HOME | End: 2022-05-17
Attending: EMERGENCY MEDICINE | Admitting: EMERGENCY MEDICINE
Payer: MEDICARE

## 2022-05-17 VITALS
HEART RATE: 74 BPM | OXYGEN SATURATION: 100 % | RESPIRATION RATE: 18 BRPM | TEMPERATURE: 98 F | DIASTOLIC BLOOD PRESSURE: 66 MMHG | SYSTOLIC BLOOD PRESSURE: 100 MMHG | WEIGHT: 149.91 LBS | HEIGHT: 65 IN

## 2022-05-17 DIAGNOSIS — Z95.2 PRESENCE OF PROSTHETIC HEART VALVE: Chronic | ICD-10-CM

## 2022-05-17 DIAGNOSIS — Z95.1 PRESENCE OF AORTOCORONARY BYPASS GRAFT: Chronic | ICD-10-CM

## 2022-05-17 PROCEDURE — 99284 EMERGENCY DEPT VISIT MOD MDM: CPT

## 2022-05-17 RX ORDER — GUAIFENESIN/DEXTROMETHORPHAN 600MG-30MG
20 TABLET, EXTENDED RELEASE 12 HR ORAL
Qty: 200 | Refills: 0
Start: 2022-05-17 | End: 2022-05-19

## 2022-05-17 RX ORDER — ALBUTEROL 90 UG/1
2 AEROSOL, METERED ORAL
Qty: 1 | Refills: 0
Start: 2022-05-17 | End: 2022-06-15

## 2022-05-17 RX ORDER — ALBUTEROL 90 UG/1
1 AEROSOL, METERED ORAL ONCE
Refills: 0 | Status: COMPLETED | OUTPATIENT
Start: 2022-05-17 | End: 2022-05-17

## 2022-05-17 RX ADMIN — Medication 60 MILLIGRAM(S): at 11:55

## 2022-05-17 RX ADMIN — ALBUTEROL 1 PUFF(S): 90 AEROSOL, METERED ORAL at 11:55

## 2022-05-17 RX ADMIN — Medication 200 MILLIGRAM(S): at 11:55

## 2022-05-17 NOTE — ED PROVIDER NOTE - PATIENT PORTAL LINK FT
You can access the FollowMyHealth Patient Portal offered by [facility] by registering at the following website: [email or web address]. By joining [employer]’s FollowMyHealth portal, you will also be able to view your health information using other applications (apps) compatible with our system.

## 2022-05-17 NOTE — ED ADULT TRIAGE NOTE - NS ED TRIAGE AVPU SCALE
CCU Cardiology Alert-The patient is alert, awake and responds to voice. The patient is oriented to time, place, and person. The triage nurse is able to obtain subjective information.

## 2022-05-17 NOTE — ED PROVIDER NOTE - NSICDXPASTSURGICALHX_GEN_ALL_CORE_FT
PAST SURGICAL HISTORY:  H/O aortic valve replacement     No significant past surgical history     S/P CABG x 3

## 2022-05-17 NOTE — ED PROVIDER NOTE - CLINICAL SUMMARY MEDICAL DECISION MAKING FREE TEXT BOX
Patient presenting with persistent dry cough and generalized itchiness. Will give hycodan, tessalon perle, short course of steroids, and another inhaler.

## 2022-05-17 NOTE — ED PROVIDER NOTE - OBJECTIVE STATEMENT
70 y/o male presenting with persistent cough. He was provided with an inhaler by me during a recent ED visit but was likely seen under a different MRN. He reports that he is still using the inhaler but is not sure how much is left but says the cough is getting worse. No fever or chills. also c/o chronic, generalized itching.

## 2022-05-17 NOTE — ED ADULT NURSE NOTE - OBJECTIVE STATEMENT
Pt is a 71y male complaining of cough x 4 days. Pt denies fever and chills. Pt also complaining of generalized itching to body. +smoker

## 2022-05-17 NOTE — ED PROVIDER NOTE - NSICDXPASTMEDICALHX_GEN_ALL_CORE_FT
PAST MEDICAL HISTORY:  Afib     Afib     Anxiety     Asthma     Emphysema/COPD     HLD (hyperlipidemia)     HTN (hypertension)     Hypertension, unspecified type

## 2022-05-17 NOTE — ED ADULT NURSE NOTE - NSIMPLEMENTINTERV_GEN_ALL_ED
Implemented All Universal Safety Interventions:  [place] to call system. Call bell, personal items and telephone within reach. Instruct patient to call for assistance. Room bathroom lighting operational. Non-slip footwear when patient is off stretcher. Physically safe environment: no spills, clutter or unnecessary equipment. Stretcher in lowest position, wheels locked, appropriate side rails in place.

## 2022-05-19 DIAGNOSIS — R06.02 SHORTNESS OF BREATH: ICD-10-CM

## 2022-05-19 DIAGNOSIS — Z95.1 PRESENCE OF AORTOCORONARY BYPASS GRAFT: ICD-10-CM

## 2022-05-19 DIAGNOSIS — L29.9 PRURITUS, UNSPECIFIED: ICD-10-CM

## 2022-05-19 DIAGNOSIS — J43.9 EMPHYSEMA, UNSPECIFIED: ICD-10-CM

## 2022-05-19 DIAGNOSIS — E78.5 HYPERLIPIDEMIA, UNSPECIFIED: ICD-10-CM

## 2022-05-19 DIAGNOSIS — I10 ESSENTIAL (PRIMARY) HYPERTENSION: ICD-10-CM

## 2022-05-19 DIAGNOSIS — F41.9 ANXIETY DISORDER, UNSPECIFIED: ICD-10-CM

## 2022-05-19 DIAGNOSIS — Z88.6 ALLERGY STATUS TO ANALGESIC AGENT: ICD-10-CM

## 2022-05-19 DIAGNOSIS — R05.8 OTHER SPECIFIED COUGH: ICD-10-CM

## 2022-05-19 DIAGNOSIS — I48.91 UNSPECIFIED ATRIAL FIBRILLATION: ICD-10-CM

## 2022-05-23 ENCOUNTER — EMERGENCY (EMERGENCY)
Facility: HOSPITAL | Age: 72
LOS: 1 days | Discharge: LEFT BEFORE DISCHARGE | End: 2022-05-23
Attending: EMERGENCY MEDICINE | Admitting: EMERGENCY MEDICINE
Payer: MEDICARE

## 2022-05-23 VITALS
WEIGHT: 160.06 LBS | OXYGEN SATURATION: 100 % | SYSTOLIC BLOOD PRESSURE: 122 MMHG | RESPIRATION RATE: 20 BRPM | HEART RATE: 91 BPM | DIASTOLIC BLOOD PRESSURE: 72 MMHG | TEMPERATURE: 98 F

## 2022-05-23 PROCEDURE — 93010 ELECTROCARDIOGRAM REPORT: CPT

## 2022-05-23 PROCEDURE — 99284 EMERGENCY DEPT VISIT MOD MDM: CPT | Mod: 25

## 2022-05-23 RX ORDER — IPRATROPIUM/ALBUTEROL SULFATE 18-103MCG
3 AEROSOL WITH ADAPTER (GRAM) INHALATION
Refills: 0 | Status: DISCONTINUED | OUTPATIENT
Start: 2022-05-23 | End: 2022-05-23

## 2022-05-23 RX ORDER — ACETAMINOPHEN 500 MG
650 TABLET ORAL ONCE
Refills: 0 | Status: DISCONTINUED | OUTPATIENT
Start: 2022-05-23 | End: 2022-05-23

## 2022-05-23 RX ORDER — KETOROLAC TROMETHAMINE 30 MG/ML
15 SYRINGE (ML) INJECTION ONCE
Refills: 0 | Status: DISCONTINUED | OUTPATIENT
Start: 2022-05-23 | End: 2022-05-23

## 2022-05-23 RX ORDER — DEXAMETHASONE 0.5 MG/5ML
10 ELIXIR ORAL ONCE
Refills: 0 | Status: DISCONTINUED | OUTPATIENT
Start: 2022-05-23 | End: 2022-05-23

## 2022-05-23 RX ORDER — SODIUM CHLORIDE 9 MG/ML
1000 INJECTION INTRAMUSCULAR; INTRAVENOUS; SUBCUTANEOUS ONCE
Refills: 0 | Status: DISCONTINUED | OUTPATIENT
Start: 2022-05-23 | End: 2022-05-23

## 2022-05-23 NOTE — ED PROVIDER NOTE - PHYSICAL EXAMINATION
VITAL SIGNS: I have reviewed nursing notes and confirm.  CONSTITUTIONAL: Well-developed; well-nourished; dry cough, no resp distress  SKIN: Skin is warm and dry, no acute rash.  HEAD: Normocephalic; atraumatic.  EYES: Pupils round bilaterally, 3mm; conjunctiva and sclera clear.  ENT: No nasal discharge; airway clear, MMM.  NECK: Supple; non tender.  CARD: Irreg rate and rhythm.  RESP: Scattered exp wheezes, no rales or rhonchi. Good AE  : No CVAT tenderness bilaterally   ABD: Soft; non-distended; + LMQ ttp, no rebound or guarding.  EXT: Normal ROM. No clubbing, cyanosis or edema.  NEURO: Alert, oriented. Grossly unremarkable.   PSYCH: Cooperative, appropriate. Mood and affect consistent with baseline

## 2022-05-23 NOTE — ED PROVIDER NOTE - CLINICAL SUMMARY MEDICAL DECISION MAKING FREE TEXT BOX
Patient presenting with chronic cough- hx COPD. Noted to be in AFib 109. patient eloped form the ED before [place]. Clinically seemed a bit worse that baseline.

## 2022-05-23 NOTE — ED PROVIDER NOTE - OBJECTIVE STATEMENT
Patient presenting with cough x long time. He says that it is worse than usual. + hx of Asthma  and COPD, well know to us. Has another MRN. he also has LMQ pain. he only has CP if he presses on his chest.     + active smoker. Usually leaves shortly after triage.     Eloped from the ED shortly after eval. despite promising to stay for eval this time.

## 2022-05-25 DIAGNOSIS — R05.3 CHRONIC COUGH: ICD-10-CM

## 2022-05-25 DIAGNOSIS — F17.200 NICOTINE DEPENDENCE, UNSPECIFIED, UNCOMPLICATED: ICD-10-CM

## 2022-05-25 DIAGNOSIS — J44.9 CHRONIC OBSTRUCTIVE PULMONARY DISEASE, UNSPECIFIED: ICD-10-CM

## 2022-05-25 DIAGNOSIS — Z53.21 PROCEDURE AND TREATMENT NOT CARRIED OUT DUE TO PATIENT LEAVING PRIOR TO BEING SEEN BY HEALTH CARE PROVIDER: ICD-10-CM

## 2022-05-25 DIAGNOSIS — I48.91 UNSPECIFIED ATRIAL FIBRILLATION: ICD-10-CM

## 2022-05-31 ENCOUNTER — EMERGENCY (EMERGENCY)
Facility: HOSPITAL | Age: 72
LOS: 1 days | Discharge: LEFT BEFORE DISCHARGE | End: 2022-05-31
Admitting: EMERGENCY MEDICINE
Payer: MEDICARE

## 2022-05-31 VITALS
DIASTOLIC BLOOD PRESSURE: 78 MMHG | TEMPERATURE: 98 F | HEART RATE: 74 BPM | RESPIRATION RATE: 18 BRPM | SYSTOLIC BLOOD PRESSURE: 125 MMHG | HEIGHT: 65 IN | OXYGEN SATURATION: 99 %

## 2022-05-31 DIAGNOSIS — Z88.6 ALLERGY STATUS TO ANALGESIC AGENT: ICD-10-CM

## 2022-05-31 DIAGNOSIS — Z95.1 PRESENCE OF AORTOCORONARY BYPASS GRAFT: Chronic | ICD-10-CM

## 2022-05-31 DIAGNOSIS — Z95.2 PRESENCE OF PROSTHETIC HEART VALVE: Chronic | ICD-10-CM

## 2022-05-31 DIAGNOSIS — R06.02 SHORTNESS OF BREATH: ICD-10-CM

## 2022-05-31 DIAGNOSIS — Z79.82 LONG TERM (CURRENT) USE OF ASPIRIN: ICD-10-CM

## 2022-05-31 DIAGNOSIS — I48.91 UNSPECIFIED ATRIAL FIBRILLATION: ICD-10-CM

## 2022-05-31 DIAGNOSIS — F41.9 ANXIETY DISORDER, UNSPECIFIED: ICD-10-CM

## 2022-05-31 DIAGNOSIS — Z53.21 PROCEDURE AND TREATMENT NOT CARRIED OUT DUE TO PATIENT LEAVING PRIOR TO BEING SEEN BY HEALTH CARE PROVIDER: ICD-10-CM

## 2022-05-31 PROCEDURE — L9991: CPT

## 2022-05-31 NOTE — ED ADULT NURSE NOTE - ISOLATION TYPE:
None Double O-Z Plasty Text: The defect edges were debeveled with a #15 scalpel blade.  Given the location of the defect, shape of the defect and the proximity to free margins a Double O-Z plasty (double transposition flap) was deemed most appropriate.  Using a sterile surgical marker, the appropriate transposition flaps were drawn incorporating the defect and placing the expected incisions within the relaxed skin tension lines where possible. The area thus outlined was incised deep to adipose tissue with a #15 scalpel blade.  The skin margins were undermined to an appropriate distance in all directions utilizing iris scissors.  Hemostasis was achieved with electrocautery.  The flaps were then transposed into place, one clockwise and the other counterclockwise, and anchored with interrupted buried subcutaneous sutures.

## 2022-05-31 NOTE — ED ADULT NURSE NOTE - OBJECTIVE STATEMENT
Pt w/ PMH of asthma and COPD frequently seen in this ED for similar complaint presents c/o cough.  Pt denies fever/chills or CP.  Pt is able to speak in complete sentences.  No respiratory distress noted.

## 2022-06-03 ENCOUNTER — EMERGENCY (EMERGENCY)
Facility: HOSPITAL | Age: 72
LOS: 1 days | Discharge: LEFT BEFORE DISCHARGE | End: 2022-06-03
Attending: EMERGENCY MEDICINE | Admitting: EMERGENCY MEDICINE
Payer: MEDICARE

## 2022-06-03 VITALS
SYSTOLIC BLOOD PRESSURE: 122 MMHG | OXYGEN SATURATION: 98 % | TEMPERATURE: 98 F | HEART RATE: 71 BPM | RESPIRATION RATE: 16 BRPM | DIASTOLIC BLOOD PRESSURE: 74 MMHG | HEIGHT: 65 IN

## 2022-06-03 DIAGNOSIS — L29.9 PRURITUS, UNSPECIFIED: ICD-10-CM

## 2022-06-03 DIAGNOSIS — Z53.21 PROCEDURE AND TREATMENT NOT CARRIED OUT DUE TO PATIENT LEAVING PRIOR TO BEING SEEN BY HEALTH CARE PROVIDER: ICD-10-CM

## 2022-06-03 DIAGNOSIS — Z95.1 PRESENCE OF AORTOCORONARY BYPASS GRAFT: Chronic | ICD-10-CM

## 2022-06-03 DIAGNOSIS — Z88.6 ALLERGY STATUS TO ANALGESIC AGENT: ICD-10-CM

## 2022-06-03 DIAGNOSIS — Z95.2 PRESENCE OF PROSTHETIC HEART VALVE: Chronic | ICD-10-CM

## 2022-06-03 PROCEDURE — L9991: CPT

## 2022-06-03 NOTE — ED ADULT NURSE NOTE - NS ED NURSE RECORD ANOTHER HT AND WT
----- Message from Inder Elliott MD sent at 11/27/2018  5:11 PM CST -----  Please remind pt she needs to come in for repeat lab. Yes

## 2022-06-06 NOTE — ED PROVIDER NOTE - INCLUDE COVID-19 DISCHARGE INSTRUCTIONS
[facility]  Initial Discharge Assessment       Primary Care Provider: Dax Antunez MD    Admission Diagnosis: Hyponatremia [E87.1]  Hyponatremia [E87.1]    Admission Date: 6/5/2022  Expected Discharge Date:     Discharge Barriers Identified: (P) None    Payor: BLUE CROSS BLUE SHIELD / Plan: [facility] FEDERAL BASIC / Product Type: PPO /     Extended Emergency Contact Information  Primary Emergency Contact: Chani Parrish  Address: [address]           [place] [address] [facility]  Home Phone: [phone]  Mobile Phone: [phone]  Relation: Spouse    Discharge Plan A: (P) Home with family  Discharge Plan B: (P) Home with family      [facility] - [place], [place] - [address]  [address]  [place] [address]  Phone: [phone] Fax: [phone]      Initial Assessment (most recent)       Adult Discharge Assessment - 06/06/22 1207          Discharge Assessment    Assessment Type Discharge Planning Assessment (P)      Confirmed/corrected address, phone number and insurance Yes (P)      Confirmed Demographics Correct on Facesheet (P)      Source of Information patient (P)    Assessment completed at bedside    Lives With spouse (P)      Facility Arrived From: Home (P)      Do you expect to return to your current living situation? Yes (P)      Do you have help at home or someone to help you manage your care at home? Yes (P)      Who are your caregiver(s) and their phone number(s)? Chani Parrish (Spouse)   [phone] (Mobile) (P)      Prior to hospitilization cognitive status: Alert/Oriented (P)      Current cognitive status: Alert/Oriented (P)      Walking or Climbing Stairs Difficulty none (P)      Dressing/Bathing Difficulty none (P)      Equipment Currently Used at Home none (P)      Readmission within 30 days? No (P)      Patient currently being followed by outpatient case management? No (P)      Do you currently have service(s) that help you manage your care at home? No (P)       Do you take prescription medications? Yes (P)      Do you have prescription coverage? Yes (P)      Coverage BCBS (P)      Do you have any problems affording any of your prescribed medications? No (P)      Is the patient taking medications as prescribed? yes (P)      Who is going to help you get home at discharge? Chani Parrish (Spouse)   [phone] (Mobile) (P)      How do you get to doctors appointments? car, drives self (P)      Are you on dialysis? No (P)      Do you take coumadin? No (P)      Discharge Plan A Home with family (P)      Discharge Plan B Home with family (P)      DME Needed Upon Discharge  none (P)      Discharge Plan discussed with: Patient (P)      Discharge Barriers Identified None (P)         Relationship/Environment    Name(s) of Who Lives With Patient Chani Parrish (Spouse)   [phone] (Mobile) (P)                                  <-------- Click here to INCLUDE CoVID-19 Discharge Instructions

## 2022-06-08 NOTE — ED ADULT TRIAGE NOTE - NSWEIGHTCALCTOOLDRUG_GEN_A_CORE
"Clinic Care Coordination Contact    Follow Up Progress Note      Assessment:   Patient states she is now eating 2 meals/day and is changing her late night snacking on potato chips to eating Wheat Thins.  Patient states she is trying to increase her water intake. She smokes 1/2 pack of cigarettes/day.  Her weight is stable at 140 lbs.    Patient states she has switched Podiatrists to a non- [facility] that is still covered under her insurance.  Patient states she is very happy with her new Podiatrists (Dr. Tracey-patient doesn't remember last name) and is happy with her new treatment option of \"injections\" which are helping \"greatly\".  Per patient, she wasn't happy with the \"hoops\" she had to jump through for the foot procedure she was to have.    Care Gaps:    Health Maintenance Due   Topic Date Due     ADVANCE CARE PLANNING  Never done     Pneumococcal Vaccine: Pediatrics (0 to 5 Years) and At-Risk Patients (6 to 64 Years) (2 - PCV) 06/15/2013     PREVENTIVE CARE VISIT  01/07/2014     DEXA  05/30/2015     DTAP/TDAP/TD IMMUNIZATION (2 - Td or Tdap) 01/26/2022     COVID-19 Vaccine (4 - Booster for Pfizer series) 04/28/2022     Care Gaps Last addressed on 6/7/2022    Goals addressed this encounter:    Goals Addressed                    This Visit's Progress       1. Healthy Eating (pt-stated)   70%      Goal Statement: I would like assistance and support in maintaining my health and wellness to eat healthy and have a written guide for healthy meal planning and shopping.      Date Goal set: 1/14/2021  Barriers: Patient does not cook or goes grocery shopping; her  did all the cooking  Strengths: Strong advocate for self; strong family support  Date to Achieve By: 7/14/2021 //  Modified, edited 11/14/2022  Patient expressed understanding of goal: Yes  Action steps to achieve this goal:  1. I will follow through with the Registered Dietician's recommendations/suggestions for meal planning  2. I will go to the " "grocery store and buy protein items that do not need to cooked (yogurt, Ensure/Boost, peanut butter, vegetables in small bulk, protein bars, salad mixes...etc)  3. I will use my new Ninja that I recently purchased and read the cook book it came with  4. I will remember to eat by utilizing a \"Paper\" chart on my fridge which is a visual reminder to eat  5. I will review daily the written daily meal planning calendar that will be placed on my fridge  6. I will contact my care team with questions, concerns, support needs   7. I will use the clinic as a resource, and I understand I can contact my clinic with 24/7 after hours services available   8. Care Coordinator will remain available as needed    Annual Assessment Due: 1/2023             2. Smoking Cessation (pt-stated)   30%      Goal Statement: I would like assistance and support in maintaining my health and wellness to quit smoking.   Date Goal set: 1/14/2022  Barriers: Smoker for years; Patient quit cold turkey on 1/9/2022; relapse February 2022  Strengths: Strong advocate for self, motivated, engaged, scared after January 2022 hospitalization  Date to Achieve By: 7/14/2022  // Modified, edited 11/14/2022  Patient expressed understanding of goal: Yes  Action steps to achieve this goal:  1. I will continue to \"keep busy\" and not think or talk about smoking  2. I will continue to on quitting cold turkey  3. I will apply a Nicotine patch if I can control the urge  4. I will  place a Nicorette gum to the inside of my cheek every hour until the urge passes  5. I will contact my care team with questions, concerns, support needs   6. Care Coordinator will remain available as needed              Intervention/Education provided during outreach:   RNCC called and spoke with patient; introduced self, discussed role of Care Coordination and explained reason for call    Plan:   -Patient will contact the care team with questions, concerns, support needs   -Patient will use the " clinic as a resource and understands (s)he can contact the [facility] with 24/7 after hours services available  -Care Coordinator will remain available as needed  -RNCC will follow up in 1 month for follow up appointments/recommendations and goal progression          used

## 2022-06-09 ENCOUNTER — EMERGENCY (EMERGENCY)
Facility: HOSPITAL | Age: 72
LOS: 1 days | Discharge: LEFT BEFORE DISCHARGE | End: 2022-06-09
Admitting: EMERGENCY MEDICINE
Payer: MEDICARE

## 2022-06-09 VITALS
DIASTOLIC BLOOD PRESSURE: 65 MMHG | HEIGHT: 65 IN | OXYGEN SATURATION: 100 % | HEART RATE: 96 BPM | RESPIRATION RATE: 18 BRPM | SYSTOLIC BLOOD PRESSURE: 98 MMHG | TEMPERATURE: 98 F

## 2022-06-09 VITALS
RESPIRATION RATE: 17 BRPM | DIASTOLIC BLOOD PRESSURE: 71 MMHG | TEMPERATURE: 98 F | HEART RATE: 95 BPM | OXYGEN SATURATION: 97 % | HEIGHT: 65 IN | SYSTOLIC BLOOD PRESSURE: 109 MMHG

## 2022-06-09 DIAGNOSIS — Z95.1 PRESENCE OF AORTOCORONARY BYPASS GRAFT: Chronic | ICD-10-CM

## 2022-06-09 DIAGNOSIS — Z95.2 PRESENCE OF PROSTHETIC HEART VALVE: Chronic | ICD-10-CM

## 2022-06-09 DIAGNOSIS — S20.219A CONTUSION OF UNSPECIFIED FRONT WALL OF THORAX, INITIAL ENCOUNTER: ICD-10-CM

## 2022-06-09 DIAGNOSIS — R07.89 OTHER CHEST PAIN: ICD-10-CM
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
## 2022-06-09 DIAGNOSIS — Z53.29 PROCEDURE AND TREATMENT NOT CARRIED OUT BECAUSE OF PATIENT'S DECISION FOR OTHER REASONS: ICD-10-CM

## 2022-06-09 DIAGNOSIS — I10 ESSENTIAL (PRIMARY) HYPERTENSION: ICD-10-CM

## 2022-06-09 DIAGNOSIS — R06.02 SHORTNESS OF BREATH: ICD-10-CM

## 2022-06-09 DIAGNOSIS — F41.9 ANXIETY DISORDER, UNSPECIFIED: ICD-10-CM

## 2022-06-09 DIAGNOSIS — E78.5 HYPERLIPIDEMIA, UNSPECIFIED: ICD-10-CM

## 2022-06-09 DIAGNOSIS — J43.9 EMPHYSEMA, UNSPECIFIED: ICD-10-CM

## 2022-06-09 DIAGNOSIS — Z95.2 PRESENCE OF PROSTHETIC HEART VALVE: ICD-10-CM

## 2022-06-09 DIAGNOSIS — S20.20XA CONTUSION OF THORAX, UNSPECIFIED, INITIAL ENCOUNTER: ICD-10-CM

## 2022-06-09 DIAGNOSIS — Z88.6 ALLERGY STATUS TO ANALGESIC AGENT: ICD-10-CM

## 2022-06-09 DIAGNOSIS — I48.91 UNSPECIFIED ATRIAL FIBRILLATION: ICD-10-CM

## 2022-06-09 DIAGNOSIS — Z53.21 PROCEDURE AND TREATMENT NOT CARRIED OUT DUE TO PATIENT LEAVING PRIOR TO BEING SEEN BY HEALTH CARE PROVIDER: ICD-10-CM

## 2022-06-09 LAB
ALBUMIN SERPL ELPH-MCNC: 4 G/DL — SIGNIFICANT CHANGE UP (ref 3.4–5)
ALP SERPL-CCNC: 71 U/L — SIGNIFICANT CHANGE UP (ref 40–120)
ALT FLD-CCNC: 45 U/L — HIGH (ref 12–42)
ANION GAP SERPL CALC-SCNC: 6 MMOL/L — LOW (ref 9–16)
AST SERPL-CCNC: 38 U/L — HIGH (ref 15–37)
BASOPHILS # BLD AUTO: 0.03 K/UL — SIGNIFICANT CHANGE UP (ref 0–0.2)
BASOPHILS NFR BLD AUTO: 0.3 % — SIGNIFICANT CHANGE UP (ref 0–2)
BILIRUB SERPL-MCNC: 1 MG/DL — SIGNIFICANT CHANGE UP (ref 0.2–1.2)
BUN SERPL-MCNC: 28 MG/DL — HIGH (ref 7–23)
CALCIUM SERPL-MCNC: 9.3 MG/DL — SIGNIFICANT CHANGE UP (ref 8.5–10.5)
CHLORIDE SERPL-SCNC: 100 MMOL/L — SIGNIFICANT CHANGE UP (ref 96–108)
CO2 SERPL-SCNC: 30 MMOL/L — SIGNIFICANT CHANGE UP (ref 22–31)
CREAT SERPL-MCNC: 1.15 MG/DL — SIGNIFICANT CHANGE UP (ref 0.5–1.3)
D DIMER BLD IA.RAPID-MCNC: 209 NG/ML DDU — SIGNIFICANT CHANGE UP
EGFR: 68 ML/MIN/1.73M2 — SIGNIFICANT CHANGE UP
EOSINOPHIL # BLD AUTO: 0.1 K/UL — SIGNIFICANT CHANGE UP (ref 0–0.5)
EOSINOPHIL NFR BLD AUTO: 1 % — SIGNIFICANT CHANGE UP (ref 0–6)
GLUCOSE SERPL-MCNC: 72 MG/DL — SIGNIFICANT CHANGE UP (ref 70–99)
HCT VFR BLD CALC: 49.3 % — SIGNIFICANT CHANGE UP (ref 39–50)
HGB BLD-MCNC: 16.2 G/DL — SIGNIFICANT CHANGE UP (ref 13–17)
IMM GRANULOCYTES NFR BLD AUTO: 0.5 % — SIGNIFICANT CHANGE UP (ref 0–1.5)
LIDOCAIN IGE QN: 200 U/L — SIGNIFICANT CHANGE UP (ref 73–393)
LYMPHOCYTES # BLD AUTO: 1.35 K/UL — SIGNIFICANT CHANGE UP (ref 1–3.3)
LYMPHOCYTES # BLD AUTO: 13.3 % — SIGNIFICANT CHANGE UP (ref 13–44)
MAGNESIUM SERPL-MCNC: 2.2 MG/DL — SIGNIFICANT CHANGE UP (ref 1.6–2.6)
MCHC RBC-ENTMCNC: 31.2 PG — SIGNIFICANT CHANGE UP (ref 27–34)
MCHC RBC-ENTMCNC: 32.9 GM/DL — SIGNIFICANT CHANGE UP (ref 32–36)
MCV RBC AUTO: 95 FL — SIGNIFICANT CHANGE UP (ref 80–100)
MONOCYTES # BLD AUTO: 0.5 K/UL — SIGNIFICANT CHANGE UP (ref 0–0.9)
MONOCYTES NFR BLD AUTO: 4.9 % — SIGNIFICANT CHANGE UP (ref 2–14)
NEUTROPHILS # BLD AUTO: 8.15 K/UL — HIGH (ref 1.8–7.4)
NEUTROPHILS NFR BLD AUTO: 80 % — HIGH (ref 43–77)
NRBC # BLD: 0 /100 WBCS — SIGNIFICANT CHANGE UP (ref 0–0)
NT-PROBNP SERPL-SCNC: 2622 PG/ML — HIGH
PLATELET # BLD AUTO: 180 K/UL — SIGNIFICANT CHANGE UP (ref 150–400)
POTASSIUM SERPL-MCNC: 5 MMOL/L — SIGNIFICANT CHANGE UP (ref 3.5–5.3)
POTASSIUM SERPL-SCNC: 5 MMOL/L — SIGNIFICANT CHANGE UP (ref 3.5–5.3)
PROT SERPL-MCNC: 7.9 G/DL — SIGNIFICANT CHANGE UP (ref 6.4–8.2)
RBC # BLD: 5.19 M/UL — SIGNIFICANT CHANGE UP (ref 4.2–5.8)
RBC # FLD: 12.4 % — SIGNIFICANT CHANGE UP (ref 10.3–14.5)
SODIUM SERPL-SCNC: 136 MMOL/L — SIGNIFICANT CHANGE UP (ref 132–145)
TROPONIN I, HIGH SENSITIVITY RESULT: 7.5 NG/L — SIGNIFICANT CHANGE UP
TROPONIN I, HIGH SENSITIVITY RESULT: 8.7 NG/L — SIGNIFICANT CHANGE UP
WBC # BLD: 10.18 K/UL — SIGNIFICANT CHANGE UP (ref 3.8–10.5)
WBC # FLD AUTO: 10.18 K/UL — SIGNIFICANT CHANGE UP (ref 3.8–10.5)

## 2022-06-09 PROCEDURE — 76604 US EXAM CHEST: CPT | Mod: 26

## 2022-06-09 PROCEDURE — 99284 EMERGENCY DEPT VISIT MOD MDM: CPT | Mod: 25

## 2022-06-09 PROCEDURE — 99285 EMERGENCY DEPT VISIT HI MDM: CPT

## 2022-06-09 PROCEDURE — 71046 X-RAY EXAM CHEST 2 VIEWS: CPT | Mod: 26

## 2022-06-09 PROCEDURE — 93010 ELECTROCARDIOGRAM REPORT: CPT | Mod: 59

## 2022-06-09 RX ORDER — LIDOCAINE 4 G/100G
1 CREAM TOPICAL ONCE
Refills: 0 | Status: COMPLETED | OUTPATIENT
Start: 2022-06-09 | End: 2022-06-09

## 2022-06-09 NOTE — ED PROVIDER NOTE - CLINICAL SUMMARY MEDICAL DECISION MAKING FREE TEXT BOX
from 6/9 11:02AM NOTE:  Date: 09-Jun-2022 18:16.     Progress: Patient returns to the ED after eloping earlier today. He states he was home and the pain around his hematoma returned again. He further denies exertional pain, SOB, sweating or nausea. On his exam, ttp is noted only at his hematoma. EKG remains in a fib, similar to earlier and shows no ischemic signs. Will send second trop since patient left before that could be done and will give lidocaine patch and heat pack for pain relief. Will reassess.     Date: 09-Jun-2022 19:10.     Progress: Patient has once again eloped from the ED.   Prior to leaving, patient did allow EKG and repeat trop [which is negative]  He was also given a heat pack and ordered for lidocaine patch but did not stay for the latter.  Pt was not observed leaving but cannot be found in the ED.

## 2022-06-09 NOTE — ED PROVIDER NOTE - CLINICAL SUMMARY MEDICAL DECISION MAKING FREE TEXT BOX
70 yo M, pmhx a fib, HTN, HLD, COPD, anxiety, presenting to the ED w/ painful mass in the middle of his chest. 72 yo M, pmhx a fib, HTN, HLD, COPD, CAD s/p MI, anxiety, presenting to the ED w/ painful mass in the middle of his chest. Patient found to have hematoma w/ overlying ecchymosis on the chest wall; this is the region patient reports chest pain. Pain is mild at time of exam and he denies exertional symptoms. The appearance of the lesion indicates trauma however patient adamantly denies trauma that he can remember. Will obtain CXR and US of the area to further characterize. Will also r/o ACS w/ cardiac enzymes x 2 d/t CAD history. EKG shows a fib w/ normal rate. Dispo pending medical work-up.

## 2022-06-09 NOTE — ED ADULT NURSE NOTE - NSPATIENTFLAG_GEN_A_ER
Const:  Alert and interactive, playful, no acute distress  HEENT: Normocephalic, atraumatic; TMs WNL; Moist mucosa; Oropharynx clear; Neck supple  Lymph: No significant lymphadenopathy  CV: Heart regular, normal S1/2, no murmurs; Extremities WWPx4  Pulm: Lungs clear to auscultation bilaterally  GI: Abdomen non-distended; No organomegaly, no tenderness, no masses  : Trace posterior rectal fissure; testicles descended B/L normal lie, no TTP; (+) Circumcised  Skin: No rash noted  Neuro: Alert; Normal tone; coordination appropriate for age
Purple DH (Discharge Huddle; Vulnerable Patient)

## 2022-06-09 NOTE — ED PROVIDER NOTE - OBJECTIVE STATEMENT
70 yo M, pmhx a fib, HTN, HLD, COPD, anxiety, presenting to the ED w/ painful mass in the middle of his chest. PATIENT RETURNED AFTER ELOPEMENT [EARLIER TODAY] AND HAS THE SAME COMPLAINT. PLEASE SEE EARLIER NOTE.

## 2022-06-09 NOTE — ED PROVIDER NOTE - PROGRESS NOTE DETAILS
Patient not found in the ED.   US confirms hematoma - patient left prior to being told these results.  He was not seen leaving the ED and is an elopement. Patient returns to the ED after eloping earlier today. He states he was home and the pain around his hematoma returned again. He further denies exertional pain, SOB, sweating or nausea. On his exam, ttp is noted only at his hematoma. EKG remains in a fib, similar to earlier and shows no ischemic signs. Will send second trop since patient left before that could be done and will give lidocaine patch and heat pack for pain relief. Will reassess. Patient has once again eloped from the ED.   Prior to leaving, patient did allow EKG and repeat trop [which is negative]  He was also given a heat pack and ordered for lidocaine patch but did not stay for the latter.  Pt was not observed leaving but cannot be found in the ED.

## 2022-06-09 NOTE — ED ADULT NURSE NOTE - NS ED NURSE ELOPE COMMENTS
wolfgang steele made aware. last seen with steady gait. breathing was even andunlabored. check er. could not find.

## 2022-06-09 NOTE — ED PROVIDER NOTE - OBJECTIVE STATEMENT
72 yo M, pmhx a fib, HTN, HLD, COPD, anxiety, presenting to the ED w/ painful mass in the middle of his chest. Pt states he awoke w/ it 4 days ago but cannot recall any direct trauma to the area. Pt reports there is bruising in the area as well.  Pt chronically suffers from SOB d/t his COPD and patient is still smoking. He states this is not worsened from his baseline and he is not currently SOB.  Denies falls, injury to area, abdominal pain, fevers, chills, or cough.

## 2022-06-09 NOTE — ED ADULT NURSE NOTE - BREATHING, MLM
Spontaneous, unlabored and symmetrical Perilesional Excision Additional Text (Leave Blank If You Do Not Want): The margin was drawn around the clinically apparent lesion. Incisions were then made along these lines to the appropriate tissue plane and the lesion was extirpated.

## 2022-06-09 NOTE — ED PROVIDER NOTE - CARDIAC, MLM
Irregularly irregular.  Heart sounds S1, S2.  No murmurs, rubs or gallops. 3x2cm mildly tender mass in the middle of chest [mid-distal end of sternum] w/ overlying ecchymosis, no erythema or fluctuance

## 2022-06-10 ENCOUNTER — EMERGENCY (EMERGENCY)
Facility: HOSPITAL | Age: 72
LOS: 1 days | Discharge: LEFT BEFORE DISCHARGE | End: 2022-06-10
Attending: EMERGENCY MEDICINE | Admitting: EMERGENCY MEDICINE
Payer: MEDICARE

## 2022-06-10 VITALS — HEIGHT: 65 IN

## 2022-06-10 DIAGNOSIS — Z95.1 PRESENCE OF AORTOCORONARY BYPASS GRAFT: Chronic | ICD-10-CM

## 2022-06-10 DIAGNOSIS — Z53.21 PROCEDURE AND TREATMENT NOT CARRIED OUT DUE TO PATIENT LEAVING PRIOR TO BEING SEEN BY HEALTH CARE PROVIDER: ICD-10-CM

## 2022-06-10 DIAGNOSIS — M25.519 PAIN IN UNSPECIFIED SHOULDER: ICD-10-CM

## 2022-06-10 DIAGNOSIS — Z95.2 PRESENCE OF PROSTHETIC HEART VALVE: Chronic | ICD-10-CM

## 2022-06-10 PROCEDURE — L9991: CPT

## 2022-06-10 NOTE — ED ADULT TRIAGE NOTE - DIRECT TO ROOM CARE INITIATED:
[General Appearance - Well Developed] : well developed [Abdomen Soft] : soft [General Appearance - In No Acute Distress] : no acute distress [General Appearance - Well Nourished] : well nourished [Abdomen Tenderness] : non-tender [Costovertebral Angle Tenderness] : no ~M costovertebral angle tenderness [Oriented To Time, Place, And Person] : oriented to person, place, and time [] : no respiratory distress [No Focal Deficits] : no focal deficits [FreeTextEntry1] : slow ambulation  10-Lennox-2022 18:33

## 2022-06-10 NOTE — ED ADULT NURSE NOTE - PRO INTERPRETER NEED 2
English
[FreeTextEntry1] : Mr. GODINEZ  was told significance of findings, options, risks and benefits were explained.  Informed consent for laparoscopic/possible open  cholecystectomy  and potential risks, benefits and alternatives (surgical options were discussed including non-surgical options or the option of no surgery) to the planned surgery were discussed in depth.  All surgical options were discussed including non-surgical treatments.  He wishes to proceed with surgery.  We will plan for surgery on at the next available date, pending any required insurance pre-certification or pre-approval. He agrees to obtain any necessary pre-operative evaluations and testing prior to surgery. Patient instructed to maintain a fat-free diet, and to seek immediate medical attention with any acute change or worsening of symptoms, including but not limited to abdominal pain, fever, chills, nausea, vomiting, or yellowing of the skin. \par Patient advised to seek immediate medical attention with any acute change in symptoms or with the development of any new or worsening symptoms.  Patient's questions and concerns addressed to patient's satisfaction, and patient verbalized an understanding of the information discussed.

## 2022-06-10 NOTE — ED ADULT TRIAGE NOTE - CHIEF COMPLAINT QUOTE
pt frequently seen in this ED and leaves prior to exam. Usually walks in lives one block away, today called 911 from [address] states "Well I needed a ride and was planning to come anyway" c/o shoulder pain

## 2022-06-10 NOTE — ED ADULT NURSE NOTE - CHIEF COMPLAINT QUOTE
Anh Guevara is a 26 year old female is here with CC:  Pain with urination, burning, urge to urine but can't go - going on for 2 days.    Medications: medications verified and updated    Refills needed today?  NO    Fasting today?  No          denies Latex allergy or sensitivity    Tobacco history: verified    Health Maintenance Due   Topic Date Due   • Depression Screening  04/25/2019     Patient is up to date, no discussion needed.    Advance Directives:  not discussed    Patient would like communication of their results via:        Cell Phone:   Telephone Information:   Mobile [phone]     Okay to leave a message containing results? Yes                     pt frequently seen in this ED and leaves prior to exam. Usually walks in lives one block away, today called 911 from [address] states "Well I needed a ride and was planning to come anyway" c/o shoulder pain

## 2022-06-12 ENCOUNTER — EMERGENCY (EMERGENCY)
Facility: HOSPITAL | Age: 72
LOS: 1 days | Discharge: LEFT BEFORE DISCHARGE | End: 2022-06-12
Attending: EMERGENCY MEDICINE | Admitting: EMERGENCY MEDICINE
Payer: MEDICARE

## 2022-06-12 VITALS
RESPIRATION RATE: 20 BRPM | SYSTOLIC BLOOD PRESSURE: 117 MMHG | OXYGEN SATURATION: 98 % | DIASTOLIC BLOOD PRESSURE: 74 MMHG | WEIGHT: 149.91 LBS | HEART RATE: 89 BPM | HEIGHT: 65 IN | TEMPERATURE: 98 F

## 2022-06-12 DIAGNOSIS — Z88.6 ALLERGY STATUS TO ANALGESIC AGENT: ICD-10-CM

## 2022-06-12 DIAGNOSIS — Z79.82 LONG TERM (CURRENT) USE OF ASPIRIN: ICD-10-CM

## 2022-06-12 DIAGNOSIS — I48.91 UNSPECIFIED ATRIAL FIBRILLATION: ICD-10-CM

## 2022-06-12 DIAGNOSIS — Z95.1 PRESENCE OF AORTOCORONARY BYPASS GRAFT: Chronic | ICD-10-CM

## 2022-06-12 DIAGNOSIS — R07.89 OTHER CHEST PAIN: ICD-10-CM

## 2022-06-12 DIAGNOSIS — Z53.21 PROCEDURE AND TREATMENT NOT CARRIED OUT DUE TO PATIENT LEAVING PRIOR TO BEING SEEN BY HEALTH CARE PROVIDER: ICD-10-CM

## 2022-06-12 DIAGNOSIS — Z95.2 PRESENCE OF PROSTHETIC HEART VALVE: Chronic | ICD-10-CM

## 2022-06-12 PROCEDURE — L9991: CPT

## 2022-06-12 NOTE — ED PROVIDER NOTE - OBJECTIVE STATEMENT
Patient presenting with chest pain- had neg ED [facility] a few days ago. Has chest wall hematoma. LWBS shortly after arriving which is typical for him. EKG AFib, no ischemic changes

## 2022-06-13 ENCOUNTER — EMERGENCY (EMERGENCY)
Facility: HOSPITAL | Age: 72
LOS: 1 days | Discharge: LEFT BEFORE DISCHARGE | End: 2022-06-13
Admitting: EMERGENCY MEDICINE
Payer: MEDICARE

## 2022-06-13 VITALS
DIASTOLIC BLOOD PRESSURE: 67 MMHG | RESPIRATION RATE: 98 BRPM | HEART RATE: 102 BPM | TEMPERATURE: 98 F | HEIGHT: 65 IN | OXYGEN SATURATION: 98 % | WEIGHT: 149.91 LBS | SYSTOLIC BLOOD PRESSURE: 103 MMHG

## 2022-06-13 DIAGNOSIS — Z95.1 PRESENCE OF AORTOCORONARY BYPASS GRAFT: Chronic | ICD-10-CM

## 2022-06-13 DIAGNOSIS — Z95.2 PRESENCE OF PROSTHETIC HEART VALVE: Chronic | ICD-10-CM

## 2022-06-13 PROCEDURE — L9991: CPT

## 2022-06-13 NOTE — ED ADULT TRIAGE NOTE - CHIEF COMPLAINT QUOTE
c/o chest discomfort. on assessment lump in center of chest noted. pt states it has been there for 5 days and he is scared.

## 2022-06-14 NOTE — ED PROVIDER NOTE - CPE EDP [facility] NORM
normal... Niacinamide Counseling: I recommended taking niacin or niacinamide, also know as vitamin B3, twice daily. Recent evidence suggests that taking vitamin B3 (500 mg twice daily) can reduce the risk of actinic keratoses and non-melanoma skin cancers. Side effects of vitamin B3 include flushing and headache.

## 2022-06-15 DIAGNOSIS — Z53.21 PROCEDURE AND TREATMENT NOT CARRIED OUT DUE TO PATIENT LEAVING PRIOR TO BEING SEEN BY HEALTH CARE PROVIDER: ICD-10-CM

## 2022-06-15 DIAGNOSIS — R07.89 OTHER CHEST PAIN: ICD-10-CM

## 2022-06-19 ENCOUNTER — EMERGENCY (EMERGENCY)
Facility: HOSPITAL | Age: 72
LOS: 1 days | Discharge: LEFT BEFORE DISCHARGE | End: 2022-06-19
Admitting: EMERGENCY MEDICINE
Payer: MEDICARE

## 2022-06-19 VITALS
HEIGHT: 65 IN | DIASTOLIC BLOOD PRESSURE: 70 MMHG | HEART RATE: 100 BPM | RESPIRATION RATE: 15 BRPM | OXYGEN SATURATION: 99 % | TEMPERATURE: 99 F | SYSTOLIC BLOOD PRESSURE: 119 MMHG

## 2022-06-19 DIAGNOSIS — Z95.2 PRESENCE OF PROSTHETIC HEART VALVE: Chronic | ICD-10-CM

## 2022-06-19 DIAGNOSIS — Z95.1 PRESENCE OF AORTOCORONARY BYPASS GRAFT: Chronic | ICD-10-CM

## 2022-06-19 PROCEDURE — L9991: CPT

## 2022-06-22 DIAGNOSIS — L29.9 PRURITUS, UNSPECIFIED: ICD-10-CM

## 2022-06-22 DIAGNOSIS — R05.3 CHRONIC COUGH: ICD-10-CM

## 2022-06-22 DIAGNOSIS — Z53.21 PROCEDURE AND TREATMENT NOT CARRIED OUT DUE TO PATIENT LEAVING PRIOR TO BEING SEEN BY HEALTH CARE PROVIDER: ICD-10-CM

## 2022-06-22 NOTE — ED PROVIDER NOTE - HIV OFFER
ICU End of Shift Summary. See flowsheets for vital signs and detailed assessment.    Changes this shift: Pt transitioned to in hospital hospice care, Ventilator settings chaged by RT to PCV+ assist w/ pressure control of 60 on 40% FiO2, RR 22. IV fentanyl drip started and running at 300 mcg/kg/min and 1x bolus given, Dilaudid 3 mg given, Robinul given as family concerned for comfort, guppie breathing, over breathing the vent and secretions. Will be starting ativan drip at 2 mg/hr when medication available.      Plan:  Starting at 2000 plan hourly to walk down RR and FiO2 per MD order. Continue to make Pt comfortable.    Goal Outcome Evaluation:    Plan of Care Reviewed With: spouse, family, father, mother, parent, sibling, significant other     Outcome Evaluation: Moved to in hospital hospice care       Previously Declined (within the last year)

## 2022-06-29 NOTE — ED PROVIDER NOTE - IV ALTEPLASE EXCL ABS HIDDEN
Incoming fax/ Extended Leave/ Return to Work Accomodations form that needs to be completed received from [facility]. Fax placed in provider's mailbox   show

## 2022-07-05 NOTE — ED ADULT TRIAGE NOTE - BMI (KG/M2)
24.2
Quality 130: Documentation Of Current Medications In The Medical Record: Current Medications Documented
Detail Level: Detailed
Quality 111:Pneumonia Vaccination Status For Older Adults: Pneumococcal Vaccination not Administered or Previously Received, Reason not Otherwise Specified
Quality 226: Preventive Care And Screening: Tobacco Use: Screening And Cessation Intervention: Patient screened for tobacco use and is an ex/non-smoker
Quality 131: Pain Assessment And Follow-Up: Pain assessment NOT documented as being performed, documentation the patient is not eligible for a pain assessment using a standardized tool
Quality 410: Psoriasis Clinical Response To Oral Systemic Or Biologic Mediations: Psoriasis Assessment Tool Documented, Met Specified Benchmark
Quality 110: Preventive Care And Screening: Influenza Immunization: Influenza Immunization not Administered because Patient Refused.
Quality 337: Tuberculosis Prevention For Psoriasis And Psoriatic Arthritis Patients On A Biological Immune Response Modifier: Patient has a documented negative annual TB screening.

## 2022-07-06 ENCOUNTER — EMERGENCY (EMERGENCY)
Facility: HOSPITAL | Age: 72
LOS: 1 days | Discharge: LEFT BEFORE DISCHARGE | End: 2022-07-06
Attending: EMERGENCY MEDICINE | Admitting: EMERGENCY MEDICINE

## 2022-07-06 VITALS
HEART RATE: 71 BPM | OXYGEN SATURATION: 99 % | TEMPERATURE: 98 F | WEIGHT: 149.91 LBS | HEIGHT: 65 IN | DIASTOLIC BLOOD PRESSURE: 67 MMHG | RESPIRATION RATE: 18 BRPM | SYSTOLIC BLOOD PRESSURE: 102 MMHG

## 2022-07-06 VITALS — OXYGEN SATURATION: 98 % | RESPIRATION RATE: 18 BRPM | HEART RATE: 66 BPM

## 2022-07-06 DIAGNOSIS — Z95.1 PRESENCE OF AORTOCORONARY BYPASS GRAFT: Chronic | ICD-10-CM

## 2022-07-06 DIAGNOSIS — Z95.2 PRESENCE OF PROSTHETIC HEART VALVE: Chronic | ICD-10-CM

## 2022-07-06 PROCEDURE — L9991: CPT

## 2022-07-08 DIAGNOSIS — R07.9 CHEST PAIN, UNSPECIFIED: ICD-10-CM

## 2022-07-08 DIAGNOSIS — Z53.21 PROCEDURE AND TREATMENT NOT CARRIED OUT DUE TO PATIENT LEAVING PRIOR TO BEING SEEN BY HEALTH CARE PROVIDER: ICD-10-CM

## 2022-07-11 ENCOUNTER — EMERGENCY (EMERGENCY)
Facility: HOSPITAL | Age: 72
LOS: 1 days | Discharge: LEFT BEFORE DISCHARGE | End: 2022-07-11
Admitting: EMERGENCY MEDICINE

## 2022-07-11 ENCOUNTER — EMERGENCY (EMERGENCY)
Facility: HOSPITAL | Age: 72
LOS: 1 days | Discharge: LEFT BEFORE DISCHARGE | End: 2022-07-11
Attending: EMERGENCY MEDICINE | Admitting: EMERGENCY MEDICINE

## 2022-07-11 VITALS
HEART RATE: 96 BPM | RESPIRATION RATE: 18 BRPM | HEIGHT: 65 IN | TEMPERATURE: 98 F | WEIGHT: 149.91 LBS | OXYGEN SATURATION: 99 % | DIASTOLIC BLOOD PRESSURE: 75 MMHG | SYSTOLIC BLOOD PRESSURE: 111 MMHG

## 2022-07-11 VITALS
TEMPERATURE: 98 F | SYSTOLIC BLOOD PRESSURE: 142 MMHG | OXYGEN SATURATION: 98 % | HEART RATE: 80 BPM | DIASTOLIC BLOOD PRESSURE: 81 MMHG | RESPIRATION RATE: 18 BRPM | HEIGHT: 65 IN | WEIGHT: 145.06 LBS

## 2022-07-11 DIAGNOSIS — Z95.2 PRESENCE OF PROSTHETIC HEART VALVE: Chronic | ICD-10-CM

## 2022-07-11 DIAGNOSIS — Z95.1 PRESENCE OF AORTOCORONARY BYPASS GRAFT: Chronic | ICD-10-CM

## 2022-07-11 PROCEDURE — L9991: CPT

## 2022-07-11 PROCEDURE — 93010 ELECTROCARDIOGRAM REPORT: CPT

## 2022-07-11 PROCEDURE — 99284 EMERGENCY DEPT VISIT MOD MDM: CPT

## 2022-07-11 RX ORDER — ACETAMINOPHEN 500 MG
650 TABLET ORAL ONCE
Refills: 0 | Status: DISCONTINUED | OUTPATIENT
Start: 2022-07-11 | End: 2022-07-15

## 2022-07-11 RX ORDER — DEXAMETHASONE 0.5 MG/5ML
6 ELIXIR ORAL ONCE
Refills: 0 | Status: DISCONTINUED | OUTPATIENT
Start: 2022-07-11 | End: 2022-07-15

## 2022-07-11 RX ORDER — IPRATROPIUM/ALBUTEROL SULFATE 18-103MCG
3 AEROSOL WITH ADAPTER (GRAM) INHALATION ONCE
Refills: 0 | Status: DISCONTINUED | OUTPATIENT
Start: 2022-07-11 | End: 2022-07-15

## 2022-07-11 NOTE — ED PROVIDER NOTE - OBJECTIVE STATEMENT
70 yo male pt, hx of CAD, CHF, COPD, active smoker, well known to our ER. Presents for cough, dry and sob for several days. some chest wall pain with coughing. no fever, no abd pain, no HA, no neck pain.

## 2022-07-11 NOTE — ED ADULT TRIAGE NOTE - CHIEF COMPLAINT QUOTE
Pt BIBA from residence with c/o cough. Pt was seen in this department earlier today with same complaint.

## 2022-07-11 NOTE — ED ADULT TRIAGE NOTE - WEIGHT IN LBS
Assessment/Plan


Assessment/Plan


Assessment/Plan (Daily)


Chest x-ray showing minimal cardiomegaly without any acute infiltrates.


Ventilator setting; AC of 12, tidal volume 500, PEEP of 5, 60% FiO2.


Patient is on phenylephrine drip 300 mics per minute, Levophed 10 mics per 


minute, dopamine 50 mics per kilogram per minute,





Assessment recommendations;





1.  Patient initially admitted for DKA with metabolic acidosis as well as acute 


on chronic renal insufficiency there was marked overall clinical improvement 


however patient had sudden decompensation and required CPR x3.


2.  Severe metabolic acidosis.


3.  History of diabetes.


4.  History of baseline renal insufficiency.


5.  Severe encephalopathy.  Likely related to cardiac arrest.


6.  History of hypertension.





Administration of sodium bicarbonate.  Continue sodium bicarbonate drip.  Obtain


ABG in 2 hours.  Further recommendations once ABG is performed.  Prognosis 


appears poor.


35 minutes of critical care time was spent evaluating the patient.





Consultation Date/Type/Reason


Admit Date/Time


Jan 25, 2019 at 13:18


Initial Consult Date





Type of Consult


Pulmonary/critical care


Requesting Provider:  LAURIE HARE MD


Date/Time of Note


DATE: 1/27/19 


TIME: 10:09





24 HR Interval Summary


Free Text/Dictation


Patient's condition has taken a turn for the worse.  Patient had cardiac arrest 


required CPR 3 times.  The patient is now orally intubated.  Remains severely 


hypotensive on multiple pressor agents.


General exam; elderly male, emaciated, orally intubated, unresponsive.  


Currently no distress.





Exam/Review of Systems


Exam


Vitals





Vital Signs


  Date      Temp  Pulse  Resp  B/P (MAP)   Pulse Ox  O2          O2 Flow    FiO2


Time                                                 Delivery    Rate


   1/27/19                                                                    50


     09:00


   1/27/19          116    13      105/76


     08:01                           (86)


   1/27/19                                      100


     06:10


   1/27/19  97.8                                     High Flow


     04:01


   1/26/19                                                             8.0


     23:00








Intake and Output





1/26/19 1/26/19 1/27/19





1515:00


23:00


07:00





IntakeIntake Total


920 ml


1100 ml


700 ml





OutputOutput Total


50 ml





BalanceBalance


920 ml


1100 ml


650 ml











Exam


HEENT exam; supple neck, no JVD.  No lymphadenopathy.  Midline trachea.  No 


thyromegaly.  Orally intubated.  Patient is edentulous.  Pupils are midsize and 


very sluggish to react.


Chest exam; diminished but clear breath sounds.  S1-S2 audible, no murmurs.  


Regular rhythm.


Abdomen exam; soft, scaphoid.  No organomegaly.  Bowel sounds are absent.


Extremity exam; no peripheral edema.


CNS exam; patient is unresponsive.





Results


Result Diagram:  


1/27/19 0430                                                                    


           1/27/19 0710





Results 24hrs





Laboratory Tests


Test
             1/26/19
12:23    1/26/19
16:38  1/26/19
16:40    1/26/19
21:15


Bedside Glucose           188               88                              79


Hemoglobin                                              12.6  L


Hematocrit                                              38.8  L


Prothrombin Time                                       39.0  #H


Prothrombin Time                                          3.0


Ratio


INR               
              
                      4.01  
  



International


Normalized
Ratio


Activated         
              
                     35.8  H
  



Partial
Thrombop


last Time


Test
             1/26/19
23:26    1/27/19
01:11  1/27/19
01:27    1/27/19
04:30


Random Cortisol          67.4


Bedside Glucose                  > 595  *H                105


White Blood                                                                9.8


Count


Red Blood Count                                                          3.99  L


Hemoglobin                                                               12.6  L


Hematocrit                                                               38.8  L


Mean Corpuscular                                                          97.2


Volume


Mean Corpuscular                                                          31.6


Hemoglobin


Mean Corpuscular  
              
                
                      32.5  



Hemoglobin
Alexus


nt


Red Cell                                                                 17.4  H


Distribution


Width


Platelet Count                                                            100  L


Mean Platelet                                                            12.4  H


Volume


Immature                                                                1.300  H


Granulocytes %


Neutrophils %                                                             74.2


Lymphocytes %                                                             18.3


Monocytes %                                                                6.0


Eosinophils %                                                              0.0


Basophils %                                                                0.2


Nucleated Red                                                            13.9  H


Blood Cells %


Immature                                                                0.130  H


Granulocytes #


Neutrophils #                                                              7.3


Lymphocytes #                                                              1.8


Monocytes #                                                                0.6


Eosinophils #                                                              0.0


Basophils #                                                                0.0


Nucleated Red                                                             1.4  H


Blood Cells #


Sodium Level                                                               142


Potassium Level                                                            4.8


Chloride Level                                                             106


Carbon Dioxide                                                             20  L


Level


Anion Gap                                                                  16  H


Blood Urea                                                                 73  H


Nitrogen


Creatinine                                                               1.71  H


Est Glomerular    
              
                
                



Filtrat


Rate
mL/min


Glucose Level                                                              111


Calcium Level                                                              8.5


Test
             1/27/19
04:52    1/27/19
05:00  1/27/19
07:10    1/27/19
08:00


Bedside Glucose           113


Blood Gas         
              Blood arterial
  
              Blood arterial



Specimen Source



Arterial Blood    
              1/27/2019
5:45:  
              1/27/2019
8:28:


Date Drawn
                                38 AM                           51 AM


Arterial Blood    
                     7.438  
  
                   7.102  *L



pH


(Temp
corrected)


Arterial Blood    
                     13.5  L
  
                     46.7  H



pCO2


(Temp
correct)


Arterial Blood    
                     76.9  L
  
                    312.7  H



pO2


(Temp
corrected)


Arterial Blood                           8.9  *L                         14.2  L


HCO3


Arterial Blood                          -12.0  L                        -14.9  L


Base Excess


Arterial Blood    
                     94.8  L
  
                      99.1  



Oxygen
Saturatio


n


Alex Test                       ACCEPTAB                        ACCEPTAB


Arterial Blood    
              Left Radial  
   
              Right Radial  



Gas


Puncture
Site


Arterial          
                       0.1  
  
                       0.3  



Blood
Carboxyhem


oglobin


Arterial Blood                             0.2                             0.4


Methemoglobin


Blood Gas A-a O2                        157.0  H                        353.6  H


Differential


Oxyhemoglobin                             94.5                            98.4


Percent


Blood Gas                                 37.0                            37.0


Temperature


Blood Gas Actual  
                        29  
  
                        18  



Respiration
Rate


Blood Gas                        HFNC                            VENT - AC


Modality


FiO2                                      35.0                           100.0


Blood Gas         
              MINERVA DAVISON  
       
              L MELANIA DAVISON  



Critical Value


Read
Back


Blood Gas                        MA                              DT


Notified Whom


Blood Gas         
              1/27/2019
5:54:  
              1/27/2019
8:55:


Notified Time
                             23 AM                           30 AM


Sodium Level                                              140


Potassium Level                                           5.0


Chloride Level                                            101


Carbon Dioxide                                            16  L


Level


Anion Gap                                                23  #H


Blood Urea                                                65  H


Nitrogen


Creatinine                                              1.80  H


Est Glomerular    
              
                  
            



Filtrat


Rate
mL/min


Glucose Level                                          455  #*H


Calcium Level                                            7.5  L


Troponin I                                            0.189  *H


Blood Gas                                                                 12.0


Respiration Rate


Blood Gas Tidal                                                          500.0


Volume


Blood Gas Low                                                              5.0


PEEP Setting


Test
             1/27/19
08:43  
                
              



Bedside Glucose           110








Medications


Medication





Current Medications


IV Flush (NS 3 ml) 3 ml PER PROTOCOL IV ;  Start 1/23/19 at 22:30


Ondansetron HCl (Zofran Tab) 4 mg Q6H  PRN PO nausea;  Start 1/23/19 at 22:30


Acetaminophen (Tylenol Tab) 650 mg Q6H  PRN PO fever Last administered on 


1/24/19at 14:52; Admin Dose 650 MG;  Start 1/23/19 at 22:30


Docusate Sodium (Colace) 100 mg Q12H  PRN PO CONSTIPATION;  Start 1/23/19 at 


22:30


Magnesium Hydroxide (Milk Of Mag) 30 ml DAILY  PRN PO indigestion;  Start 


1/23/19 at 22:30


Famotidine (Pepcid) 20 mg Q24H PO  Last administered on 1/26/19at 22:14; Admin 


Dose 20 MG;  Start 1/23/19 at 23:00


Aspirin (Halfprin) 81 mg DAILY PO  Last administered on 1/26/19at 08:35; Admin 


Dose 81 MG;  Start 1/24/19 at 09:00


Fluticasone/ Vilanterol (Breo Ellipta 100-25 Mcg Inh) 1 inh DAILY INH  Last 


administered on 1/25/19at 08:19; Admin Dose 1 INH;  Start 1/24/19 at 09:00


Metoprolol Succinate (Toprol Xl) 200 mg BID PO  Last administered on 1/26/19at 


08:35; Admin Dose 200 MG;  Start 1/23/19 at 23:00;  Status Hold


Tiotropium Bromide (Spiriva) 1 inh DAILY INH  Last administered on 1/24/19at 


13:44; Admin Dose 1 INH;  Start 1/24/19 at 09:00


Digoxin (Digoxin) 0.125 mg DAILY@13 PO  Last administered on 1/24/19at 13:42; 


Admin Dose 0.125 MG;  Start 1/24/19 at 13:00;  Status Hold


Dextrose 1,000 ml @  100 mls/hr Q10H IV  Last administered on 1/27/19at 04:13; 


Admin Dose 100 MLS/HR;  Start 1/25/19 at 10:30


Miscellaneous Information 1 ea NOTE XX ;  Start 1/25/19 at 20:30


Glucose (Glutose) 15 gm Q15M  PRN PO DECREASED GLUCOSE;  Start 1/25/19 at 20:30


Glucose (Glutose) 22.5 gm Q15M  PRN PO DECREASED GLUCOSE;  Start 1/25/19 at 20:3


0


Dextrose (D50w Syringe) 25 ml Q15M  PRN IV DECREASED GLUCOSE;  Start 1/25/19 at 


20:30


Dextrose (D50w Syringe) 50 ml Q15M  PRN IV DECREASED GLUCOSE;  Start 1/25/19 at 


20:30


Glucagon (Glucagen) 1 mg Q15M  PRN IM DECREASED GLUCOSE;  Start 1/25/19 at 20:30


Glucose (Glutose) 15 gm Q15M  PRN BUCCAL DECREASED GLUCOSE;  Start 1/25/19 at 


20:30


Metoprolol Tartrate (Lopressor) 5 mg Q6 IV  Last administered on 1/26/19at 


06:34; Admin Dose 5 MG;  Start 1/26/19 at 00:00;  Status Hold


Diagnostic Test (Pha) (Accu-Chek) 1 ea Q4 XX  Last administered on 1/27/19at 


09:30; Admin Dose 1 EA;  Start 1/26/19 at 09:00


Sodium Bicarbonate 100 meq/Dextrose 1,000 ml @  100 mls/hr Q10H IV  Last 


administered on 1/27/19at 08:24; Admin Dose 100 MLS/HR;  Start 1/27/19 at 08:00


Epinephrine 4 mg/ Dextrose 250 ml @  3.75 mls/hr TITRATE IV  Last administered 


on 1/27/19at 08:25; Admin Dose 37.5 MLS/HR;  Start 1/27/19 at 08:00


Eye Lubricant (Akwa Oint) 1 applic Q6 BOTH EYES  Last administered on 1/27/19at 


09:46; Admin Dose 1 APPLIC;  Start 1/27/19 at 08:30


Eye Lubricant (Artificial Tears Oph) 2 drop QID BOTH EYES  Last administered on 


1/27/19at 09:46; Admin Dose 2 DROP;  Start 1/27/19 at 09:00


Norepinephrine 32 mg/Dextrose 250 ml @  0.47 mls/hr TITRATE IV  Last 


administered on 1/27/19at 09:47; Admin Dose 14.06 MLS/HR;  Start 1/27/19 at 


10:00


Phenylephrine HCl 80 mg/Dextrose 250 ml @  18.75 mls/ hr TITRATE IV  Last ad


ministered on 1/27/19at 09:48; Admin Dose 56.25 MLS/HR;  Start 1/27/19 at 09:00


Dopamine HCl 1600 mg/Dextrose 250 ml @  1.13 mls/hr TITRATE IV  Last 


administered on 1/27/19at 09:50; Admin Dose 11.25 MLS/HR;  Start 1/27/19 at 


10:00


Lorazepam (Ativan) 1 mg Q4H  PRN IV SEIZURES Last administered on 1/27/19at 


09:45; Admin Dose 1 MG;  Start 1/27/19 at 09:30











SHIRA HOLT                    Jan 27, 2019 10:13 145

## 2022-07-11 NOTE — ED PROVIDER NOTE - CLINICAL SUMMARY MEDICAL DECISION MAKING FREE TEXT BOX
Pt worked up for cough, chest pain and sob. As pt was getting his work up he decided to leave the ED without notice. Pt eloped. Pt well known to our ER and elopes often or gets vitals and leaves without being seen within minutes of arriving

## 2022-07-14 DIAGNOSIS — Z53.21 PROCEDURE AND TREATMENT NOT CARRIED OUT DUE TO PATIENT LEAVING PRIOR TO BEING SEEN BY HEALTH CARE PROVIDER: ICD-10-CM

## 2022-07-14 DIAGNOSIS — Z53.29 PROCEDURE AND TREATMENT NOT CARRIED OUT BECAUSE OF PATIENT'S DECISION FOR OTHER REASONS: ICD-10-CM

## 2022-07-14 DIAGNOSIS — R06.02 SHORTNESS OF BREATH: ICD-10-CM

## 2022-07-14 DIAGNOSIS — J44.9 CHRONIC OBSTRUCTIVE PULMONARY DISEASE, UNSPECIFIED: ICD-10-CM

## 2022-07-14 DIAGNOSIS — R05.9 COUGH, UNSPECIFIED: ICD-10-CM

## 2022-07-14 DIAGNOSIS — Z79.82 LONG TERM (CURRENT) USE OF ASPIRIN: ICD-10-CM

## 2022-07-14 DIAGNOSIS — I48.91 UNSPECIFIED ATRIAL FIBRILLATION: ICD-10-CM

## 2022-07-14 DIAGNOSIS — I50.9 HEART FAILURE, UNSPECIFIED: ICD-10-CM

## 2022-07-14 DIAGNOSIS — Z88.6 ALLERGY STATUS TO ANALGESIC AGENT: ICD-10-CM

## 2022-07-14 DIAGNOSIS — R07.89 OTHER CHEST PAIN: ICD-10-CM

## 2022-07-14 DIAGNOSIS — I25.10 ATHEROSCLEROTIC HEART DISEASE OF NATIVE CORONARY ARTERY WITHOUT ANGINA PECTORIS: ICD-10-CM

## 2022-07-25 ENCOUNTER — EMERGENCY (EMERGENCY)
Facility: HOSPITAL | Age: 72
LOS: 1 days | Discharge: LEFT BEFORE DISCHARGE | End: 2022-07-25
Attending: EMERGENCY MEDICINE | Admitting: EMERGENCY MEDICINE

## 2022-07-25 VITALS
DIASTOLIC BLOOD PRESSURE: 63 MMHG | HEART RATE: 89 BPM | HEIGHT: 65 IN | RESPIRATION RATE: 20 BRPM | TEMPERATURE: 98 F | OXYGEN SATURATION: 98 % | SYSTOLIC BLOOD PRESSURE: 108 MMHG | WEIGHT: 143.3 LBS

## 2022-07-25 DIAGNOSIS — Z95.2 PRESENCE OF PROSTHETIC HEART VALVE: Chronic | ICD-10-CM

## 2022-07-25 DIAGNOSIS — Z95.1 PRESENCE OF AORTOCORONARY BYPASS GRAFT: Chronic | ICD-10-CM

## 2022-07-25 PROCEDURE — L9991: CPT

## 2022-07-27 DIAGNOSIS — R05.9 COUGH, UNSPECIFIED: ICD-10-CM

## 2022-07-27 DIAGNOSIS — Z53.21 PROCEDURE AND TREATMENT NOT CARRIED OUT DUE TO PATIENT LEAVING PRIOR TO BEING SEEN BY HEALTH CARE PROVIDER: ICD-10-CM

## 2022-07-29 VITALS
DIASTOLIC BLOOD PRESSURE: 78 MMHG | TEMPERATURE: 98 F | HEART RATE: 98 BPM | OXYGEN SATURATION: 98 % | SYSTOLIC BLOOD PRESSURE: 120 MMHG | RESPIRATION RATE: 18 BRPM

## 2022-07-29 LAB
ALBUMIN SERPL ELPH-MCNC: 3.8 G/DL — SIGNIFICANT CHANGE UP (ref 3.3–5)
ALP SERPL-CCNC: 59 U/L — SIGNIFICANT CHANGE UP (ref 40–120)
ALT FLD-CCNC: 35 U/L — SIGNIFICANT CHANGE UP (ref 10–45)
ANION GAP SERPL CALC-SCNC: 7 MMOL/L — SIGNIFICANT CHANGE UP (ref 5–17)
APTT BLD: 37.2 SEC — HIGH (ref 27.5–35.5)
AST SERPL-CCNC: 24 U/L — SIGNIFICANT CHANGE UP (ref 10–40)
BASE EXCESS BLDV CALC-SCNC: 5.1 MMOL/L — HIGH (ref -2–3)
BASOPHILS # BLD AUTO: 0 K/UL — SIGNIFICANT CHANGE UP (ref 0–0.2)
BASOPHILS NFR BLD AUTO: 0 % — SIGNIFICANT CHANGE UP (ref 0–2)
BILIRUB SERPL-MCNC: 0.5 MG/DL — SIGNIFICANT CHANGE UP (ref 0.2–1.2)
BLD GP AB SCN SERPL QL: NEGATIVE — SIGNIFICANT CHANGE UP
BUN SERPL-MCNC: 27 MG/DL — HIGH (ref 7–23)
CA-I SERPL-SCNC: 1.21 MMOL/L — SIGNIFICANT CHANGE UP (ref 1.15–1.33)
CALCIUM SERPL-MCNC: 8.9 MG/DL — SIGNIFICANT CHANGE UP (ref 8.4–10.5)
CHLORIDE SERPL-SCNC: 100 MMOL/L — SIGNIFICANT CHANGE UP (ref 96–108)
CO2 BLDV-SCNC: 33.9 MMOL/L — HIGH (ref 22–26)
CO2 SERPL-SCNC: 28 MMOL/L — SIGNIFICANT CHANGE UP (ref 22–31)
CREAT SERPL-MCNC: 1.18 MG/DL — SIGNIFICANT CHANGE UP (ref 0.5–1.3)
EGFR: 66 ML/MIN/1.73M2 — SIGNIFICANT CHANGE UP
EOSINOPHIL # BLD AUTO: 0 K/UL — SIGNIFICANT CHANGE UP (ref 0–0.5)
EOSINOPHIL NFR BLD AUTO: 0 % — SIGNIFICANT CHANGE UP (ref 0–6)
GAS PNL BLDV: 134 MMOL/L — LOW (ref 136–145)
GAS PNL BLDV: SIGNIFICANT CHANGE UP
GAS PNL BLDV: SIGNIFICANT CHANGE UP
GLUCOSE SERPL-MCNC: 119 MG/DL — HIGH (ref 70–99)
HCO3 BLDV-SCNC: 32 MMOL/L — HIGH (ref 22–29)
HCT VFR BLD CALC: 37.6 % — LOW (ref 39–50)
HGB BLD-MCNC: 12.2 G/DL — LOW (ref 13–17)
IMM GRANULOCYTES NFR BLD AUTO: 0.6 % — SIGNIFICANT CHANGE UP (ref 0–1.5)
INR BLD: 2.04 — HIGH (ref 0.88–1.16)
LACTATE SERPL-SCNC: 1.2 MMOL/L — SIGNIFICANT CHANGE UP (ref 0.5–2)
LYMPHOCYTES # BLD AUTO: 1.18 K/UL — SIGNIFICANT CHANGE UP (ref 1–3.3)
LYMPHOCYTES # BLD AUTO: 11 % — LOW (ref 13–44)
MAGNESIUM SERPL-MCNC: 2.1 MG/DL — SIGNIFICANT CHANGE UP (ref 1.6–2.6)
MCHC RBC-ENTMCNC: 30.7 PG — SIGNIFICANT CHANGE UP (ref 27–34)
MCHC RBC-ENTMCNC: 32.4 GM/DL — SIGNIFICANT CHANGE UP (ref 32–36)
MCV RBC AUTO: 94.7 FL — SIGNIFICANT CHANGE UP (ref 80–100)
MONOCYTES # BLD AUTO: 0.85 K/UL — SIGNIFICANT CHANGE UP (ref 0–0.9)
MONOCYTES NFR BLD AUTO: 7.9 % — SIGNIFICANT CHANGE UP (ref 2–14)
NEUTROPHILS # BLD AUTO: 8.61 K/UL — HIGH (ref 1.8–7.4)
NEUTROPHILS NFR BLD AUTO: 80.5 % — HIGH (ref 43–77)
NRBC # BLD: 0 /100 WBCS — SIGNIFICANT CHANGE UP (ref 0–0)
PCO2 BLDV: 57 MMHG — HIGH (ref 42–55)
PH BLDV: 7.36 — SIGNIFICANT CHANGE UP (ref 7.32–7.43)
PHOSPHATE SERPL-MCNC: 3.8 MG/DL — SIGNIFICANT CHANGE UP (ref 2.5–4.5)
PLATELET # BLD AUTO: 179 K/UL — SIGNIFICANT CHANGE UP (ref 150–400)
PO2 BLDV: <33 MMHG — LOW (ref 25–45)
POTASSIUM BLDV-SCNC: 4.2 MMOL/L — SIGNIFICANT CHANGE UP (ref 3.5–5.1)
POTASSIUM SERPL-MCNC: 4.5 MMOL/L — SIGNIFICANT CHANGE UP (ref 3.5–5.3)
POTASSIUM SERPL-SCNC: 4.5 MMOL/L — SIGNIFICANT CHANGE UP (ref 3.5–5.3)
PROT SERPL-MCNC: 6 G/DL — SIGNIFICANT CHANGE UP (ref 6–8.3)
PROTHROM AB SERPL-ACNC: 24.4 SEC — HIGH (ref 10.5–13.4)
RBC # BLD: 3.97 M/UL — LOW (ref 4.2–5.8)
RBC # FLD: 13.3 % — SIGNIFICANT CHANGE UP (ref 10.3–14.5)
RH IG SCN BLD-IMP: POSITIVE — SIGNIFICANT CHANGE UP
SAO2 % BLDV: 25.1 % — LOW (ref 67–88)
SARS-COV-2 RNA SPEC QL NAA+PROBE: NEGATIVE — SIGNIFICANT CHANGE UP
SODIUM SERPL-SCNC: 135 MMOL/L — SIGNIFICANT CHANGE UP (ref 135–145)
TROPONIN T SERPL-MCNC: 0.01 NG/ML — SIGNIFICANT CHANGE UP (ref 0–0.01)
WBC # BLD: 10.7 K/UL — HIGH (ref 3.8–10.5)
WBC # FLD AUTO: 10.7 K/UL — HIGH (ref 3.8–10.5)

## 2022-07-29 PROCEDURE — 74174 CTA ABD&PLVS W/CONTRAST: CPT | Mod: 26,MA

## 2022-07-29 PROCEDURE — 93010 ELECTROCARDIOGRAM REPORT: CPT

## 2022-07-29 PROCEDURE — 99291 CRITICAL CARE FIRST HOUR: CPT

## 2022-07-29 PROCEDURE — 71275 CT ANGIOGRAPHY CHEST: CPT | Mod: 26,MA

## 2022-07-29 PROCEDURE — 71045 X-RAY EXAM CHEST 1 VIEW: CPT | Mod: 26

## 2022-07-29 RX ORDER — PROTHROMBIN COMPLEX CONCENTRATE (HUMAN) 25.5; 16.5; 24; 22; 22; 26 [IU]/ML; [IU]/ML; [IU]/ML; [IU]/ML; [IU]/ML; [IU]/ML
1500 POWDER, FOR SOLUTION INTRAVENOUS ONCE
Refills: 0 | Status: COMPLETED | OUTPATIENT
Start: 2022-07-29 | End: 2022-07-29

## 2022-07-29 RX ORDER — DESMOPRESSIN ACETATE 0.1 MG/1
24 TABLET ORAL ONCE
Refills: 0 | Status: COMPLETED | OUTPATIENT
Start: 2022-07-29 | End: 2022-07-29

## 2022-07-29 RX ADMIN — PROTHROMBIN COMPLEX CONCENTRATE (HUMAN) 400 INTERNATIONAL UNIT(S): 25.5; 16.5; 24; 22; 22; 26 POWDER, FOR SOLUTION INTRAVENOUS at 23:48

## 2022-07-29 NOTE — ED PROVIDER NOTE - PHYSICAL EXAMINATION
CONSTITUTIONAL: In pain, moderate distress.   HEAD: Normocephalic; atraumatic  EYES: PERRL; EOM intact   ENMT: External appears normal  NECK: Supple; non-tender  CARD: Irregular rhythm; no murmurs, rubs, or gallops. Chest wall lateral incision over sternum with sutures in place, underlying hematoma, tenderness to palpation, R thoracic small surgical laceration with sutures in place.   RESP: Normal chest excursion with respiration; breath sounds clear and equal bilaterally  ABD: Soft, non-distended; non-tender  EXT: Normal ROM in all four extremities; non-tender to palpation  SKIN: Warm, dry, no rash  NEURO:  No focal neurological deficiencies. CONSTITUTIONAL: In pain, moderate distress.   HEAD: Normocephalic; atraumatic  EYES: PERRL; EOM intact   ENMT: External appears normal  NECK: Supple; non-tender  CARD: Irregular rhythm; no murmurs, rubs, or gallops. Chest wall lateral incision over sternum with sutures in place, large underlying hematoma, tenderness to palpation, R anterior thoracic small surgical laceration with sutures in place.   RESP: Normal chest excursion with respiration; breath sounds clear and equal bilaterally  ABD: Soft, non-distended; non-tender  EXT: Normal ROM in all four extremities; non-tender to palpation  SKIN: Warm, dry, no rash  NEURO:  No focal neurological deficiencies.

## 2022-07-29 NOTE — ED PROVIDER NOTE - PROGRESS NOTE DETAILS
Alternative chart found, pt is a 70 y/o M pf with PMhx of CAD, CHF, COPD, and is an active smoker. No recent procedures found, pt most recently seen here for cough on 7/11. Surgery consult placed for this pt. Pt has increasing redness superior to hematoma, but remains HD stable. Per radiology resident, hematoma does not appear to have any intra-thoracic involvement. INR is 2, pt denies taking any meds, however will order empiric Kcentra. Alternative chart found, pt has alternate name, CONSUELO, MRN [id number], last seen in [facility] on 7/25/22 for cough , no other documentation since then. Pt is a 72 y/o M pf with PMhx of CAD, CHF, COPD, and is an active smoker. No recent procedures found. Surgery consult placed for this pt. Pt has increasing redness superior to hematoma, but remains HD stable. Per radiology resident, hematoma does not appear to have any intra-thoracic involvement. INR is 2, pt denies taking any meds, however will order empiric Kcentra. Pt states he takes aspirin, DDAVP ordered. Pt became extremely agitated, wanted to leave in middle of infusion; he tried pulling out line. Not fully oriented to time and place, pt given 5mg Haldol IM and 2mg Ativan IV, 1 to 1 ordered. CT surgery at bedside, spoke to radiology, and confirm there is no intra-thoracic involvement of hematoma; they recommend general surgery consult. Pt became extremely agitated, wanted to leave in middle of infusion; he tried pulling out line. Not fully oriented to time and place, pt given 5mg Haldol IM and 2mg Ativan IV, 1 to 1 ordered.  CT surgery at bedside, spoke to radiology, and confirm there is no intra-thoracic involvement of hematoma; they recommend general surgery consult. On re-exam, hematoma slightly enlarged. INR is 2, pt denies taking any meds, however will order empiric Kcentra. Pt states he takes aspirin, DDAVP ordered.  Ace bandage placed around pts chest for pressure on hematoma.

## 2022-07-29 NOTE — ED PROVIDER NOTE - CLINICAL SUMMARY MEDICAL DECISION MAKING FREE TEXT BOX
72 y/o M pt had unknown recent surgical procedure likely performed on him, now has hematoma underlying surgical incision. Will CT chest, pt HD stable in ED, EKG noted to be Afib with peaked Twaves. 70 y/o M pt had unknown recent surgical procedure likely performed on him, now has hematoma underlying surgical incision. Will CTA chest and aorta to r/o intra-thoracic involvement in hematoma. Pt HD stable in ED.  Trend CBC  Due to expansion of hematoma and pt stated asa use, unknown what other meds but INR is 2, will reverse with K-centra and ddavp.  CT surgery consulted, gen surg consulted

## 2022-07-29 NOTE — ED PROVIDER NOTE - OBJECTIVE STATEMENT
70 y/o M pt with reported PMHx of CVA and MI presents to ED c/o chest pain and pre-sternal swelling at site of recent incision and sutures. Pt had recent medical procedure done, but he doesn't know what procedure was done , what facility it was performed at, or who did the procedure. He thought it was a doctor, but it was not. Pt appears intoxicated, but states he did not drink recently. 70 y/o M pt with reported PMHx of CVA and MI presents to ED c/o pre-sternal pain and swelling at site of recent surgical incision and sutures. Pt had recent medical procedure done, but he doesn't know what procedure was done, what facility it was performed at, or who did the procedure. Pt appears intoxicated, but states he did not drink recently.

## 2022-07-29 NOTE — ED ADULT NURSE NOTE - NSIMPLEMENTINTERV_GEN_ALL_ED
Implemented All Fall with Harm Risk Interventions:  [place] to call system. Call bell, personal items and telephone within reach. Instruct patient to call for assistance. Room bathroom lighting operational. Non-slip footwear when patient is off stretcher. Physically safe environment: no spills, clutter or unnecessary equipment. Stretcher in lowest position, wheels locked, appropriate side rails in place. Provide visual cue, wrist band, yellow gown, etc. Monitor gait and stability. Monitor for mental status changes and reorient to person, place, and time. Review medications for side effects contributing to fall risk. Reinforce activity limits and safety measures with patient and family. Provide visual clues: red socks.

## 2022-07-29 NOTE — ED ADULT NURSE NOTE - OBJECTIVE STATEMENT
Pt presents to ED C/O midsternal chest pain clenching chest. Upon assessment pt has surgical wound with sutures to mid chest with raised bruising, and bleeding, with redness to neck and shoulders. Pt alert, speaking full sentences with slurred speech. Moved to RESUS room. Pt brittnee historian states, " This stupid doctor told me he was a doctor" while holding up card for [employer]. Pt Ukrainian/ English speaking, hospital  services used. Denies alcohol/ illicit drug use. Hx of stroke and MI several years ago as per pt. Pt states, " I take aspirin and a bunch of medications I'm not sure what they are". MD Santiago and ED team at bedside. Pt presents to ED C/O midsternal chest pain clenching chest. Upon assessment pt has surgical wound with sutures to mid chest with raised bruising, and bleeding, with redness to neck and shoulders. Pt alert, speaking full sentences with slurred speech. Moved to RESUS room. Pt brittnee historian states, " This stupid doctor told me he was a doctor" while holding up card for [employer]. Pt Mongolian/ English speaking, hospital  services used. Denies falls/ new injury to chest. Denies alcohol/ illicit drug use. Hx of stroke and MI several years ago as per pt. Pt states, " I take aspirin and a bunch of medications I'm not sure what they are". MD Santiago and ED team at bedside.

## 2022-07-29 NOTE — ED PROVIDER NOTE - NS ED ROS FT
CONSTITUTIONAL: No fever, no chills, no fatigue  EYES: No eye redness, no visual changes  ENT: No ear pain, no sore throat  CARDIOVASCULAR: +Chest pain, chest swelling.  no palpitations  RESPIRATORY: No cough, no SOB  GI: No abdominal pain, no nausea, no vomiting, no constipation, no diarrhea  GENITOURINARY: No dysuria, no frequency, no hematuria  MUSCULOSKELETAL: No backpain, no joint pain, no myalgias  SKIN: No rash, no peripheral edema  NEURO: No headache, no confusion    ALL OTHER SYSTEMS NEGATIVE.

## 2022-07-30 ENCOUNTER — INPATIENT (INPATIENT)
Facility: HOSPITAL | Age: 72
LOS: 0 days | Discharge: LEFT BEFORE DISCHARGE | DRG: 920 | End: 2022-07-30
Attending: SURGERY | Admitting: SURGERY
Payer: COMMERCIAL

## 2022-07-30 VITALS
TEMPERATURE: 97 F | DIASTOLIC BLOOD PRESSURE: 59 MMHG | SYSTOLIC BLOOD PRESSURE: 109 MMHG | HEART RATE: 66 BPM | OXYGEN SATURATION: 96 % | RESPIRATION RATE: 18 BRPM

## 2022-07-30 DIAGNOSIS — Z95.1 PRESENCE OF AORTOCORONARY BYPASS GRAFT: Chronic | ICD-10-CM

## 2022-07-30 LAB
BASOPHILS # BLD AUTO: 0.01 K/UL — SIGNIFICANT CHANGE UP (ref 0–0.2)
BASOPHILS NFR BLD AUTO: 0.1 % — SIGNIFICANT CHANGE UP (ref 0–2)
EOSINOPHIL # BLD AUTO: 0 K/UL — SIGNIFICANT CHANGE UP (ref 0–0.5)
EOSINOPHIL NFR BLD AUTO: 0 % — SIGNIFICANT CHANGE UP (ref 0–6)
HCT VFR BLD CALC: 38 % — LOW (ref 39–50)
HGB BLD-MCNC: 12.1 G/DL — LOW (ref 13–17)
IMM GRANULOCYTES NFR BLD AUTO: 0.4 % — SIGNIFICANT CHANGE UP (ref 0–1.5)
LYMPHOCYTES # BLD AUTO: 1.44 K/UL — SIGNIFICANT CHANGE UP (ref 1–3.3)
LYMPHOCYTES # BLD AUTO: 13.8 % — SIGNIFICANT CHANGE UP (ref 13–44)
MCHC RBC-ENTMCNC: 30.7 PG — SIGNIFICANT CHANGE UP (ref 27–34)
MCHC RBC-ENTMCNC: 31.8 GM/DL — LOW (ref 32–36)
MCV RBC AUTO: 96.4 FL — SIGNIFICANT CHANGE UP (ref 80–100)
MONOCYTES # BLD AUTO: 0.9 K/UL — SIGNIFICANT CHANGE UP (ref 0–0.9)
MONOCYTES NFR BLD AUTO: 8.7 % — SIGNIFICANT CHANGE UP (ref 2–14)
NEUTROPHILS # BLD AUTO: 8.01 K/UL — HIGH (ref 1.8–7.4)
NEUTROPHILS NFR BLD AUTO: 77 % — SIGNIFICANT CHANGE UP (ref 43–77)
NRBC # BLD: 0 /100 WBCS — SIGNIFICANT CHANGE UP (ref 0–0)
PLATELET # BLD AUTO: 172 K/UL — SIGNIFICANT CHANGE UP (ref 150–400)
RBC # BLD: 3.94 M/UL — LOW (ref 4.2–5.8)
RBC # FLD: 13.5 % — SIGNIFICANT CHANGE UP (ref 10.3–14.5)
WBC # BLD: 10.6 K/UL — HIGH (ref 3.8–10.5)
WBC # FLD AUTO: 10.6 K/UL — HIGH (ref 3.8–10.5)

## 2022-07-30 RX ORDER — SODIUM CHLORIDE 9 MG/ML
1000 INJECTION, SOLUTION INTRAVENOUS
Refills: 0 | Status: DISCONTINUED | OUTPATIENT
Start: 2022-07-30 | End: 2022-07-30

## 2022-07-30 RX ORDER — ONDANSETRON 8 MG/1
4 TABLET, FILM COATED ORAL EVERY 6 HOURS
Refills: 0 | Status: DISCONTINUED | OUTPATIENT
Start: 2022-07-30 | End: 2022-07-30

## 2022-07-30 RX ORDER — INSULIN HUMAN 100 [IU]/ML
10 INJECTION, SOLUTION SUBCUTANEOUS ONCE
Refills: 0 | Status: DISCONTINUED | OUTPATIENT
Start: 2022-07-30 | End: 2022-07-30

## 2022-07-30 RX ORDER — HALOPERIDOL DECANOATE 100 MG/ML
5 INJECTION INTRAMUSCULAR ONCE
Refills: 0 | Status: COMPLETED | OUTPATIENT
Start: 2022-07-30 | End: 2022-07-30

## 2022-07-30 RX ORDER — POTASSIUM CHLORIDE 20 MEQ
10 PACKET (EA) ORAL ONCE
Refills: 0 | Status: DISCONTINUED | OUTPATIENT
Start: 2022-07-30 | End: 2022-07-30

## 2022-07-30 RX ORDER — VANCOMYCIN HCL 1 G
1000 VIAL (EA) INTRAVENOUS ONCE
Refills: 0 | Status: COMPLETED | OUTPATIENT
Start: 2022-07-30 | End: 2022-07-30

## 2022-07-30 RX ADMIN — DESMOPRESSIN ACETATE 224 MICROGRAM(S): 0.1 TABLET ORAL at 00:01

## 2022-07-30 RX ADMIN — HALOPERIDOL DECANOATE 5 MILLIGRAM(S): 100 INJECTION INTRAMUSCULAR at 00:26

## 2022-07-30 RX ADMIN — SODIUM CHLORIDE 1000 MILLILITER(S): 9 INJECTION, SOLUTION INTRAVENOUS at 03:21

## 2022-07-30 RX ADMIN — SODIUM CHLORIDE 150 MILLILITER(S): 9 INJECTION, SOLUTION INTRAVENOUS at 03:23

## 2022-07-30 RX ADMIN — Medication 2 MILLIGRAM(S): at 00:26

## 2022-07-30 RX ADMIN — Medication 250 MILLIGRAM(S): at 03:21

## 2022-07-30 RX ADMIN — SODIUM CHLORIDE 1000 MILLILITER(S): 9 INJECTION, SOLUTION INTRAVENOUS at 01:20

## 2022-07-30 NOTE — H&P ADULT - NSHPPHYSICALEXAM_GEN_ALL_CORE
Vital Signs Last 24 Hrs  T(C): 36.4 (29 Jul 2022 21:33), Max: 36.4 (29 Jul 2022 21:33)  T(F): 97.5 (29 Jul 2022 21:33), Max: 97.5 (29 Jul 2022 21:33)  HR: 87 (30 Jul 2022 00:27) (60 - 98)  BP: 151/71 (30 Jul 2022 00:27) (120/78 - 151/71)  BP(mean): --  RR: 20 (30 Jul 2022 00:27) (17 - 20)  SpO2: 100% (30 Jul 2022 00:27) (98% - 100%)    Parameters below as of 30 Jul 2022 00:27  Patient On (Oxygen Delivery Method): mask, nonrebreather  O2 Flow (L/min): 15    General: AAOx2, agitated  Cardio: S1,S2, No MRG, RRR  Pulm: Lungs bilaterally clear to auscultation  Chest: healing midline transverse sternal incision with blood oozing from incision and underlying flutuant mass approximately 5cm in diameter. Mass appears ecchymotic with extension of extension of ecchymosis approximately 1cm beyond the mass circumferentially.  Abdomen: soft, NTND, no rebound, no guarding  Extremities: WWP, peripheral pulses appreciated

## 2022-07-30 NOTE — H&P ADULT - HISTORY OF PRESENT ILLNESS
70 yo undomiciled M pt with PMH of CAD (s/p CABG 4/2018),s/p AVR, afib on Xarelto, HTN, HLD, COPD, and Psx of presumed suprasternal mass excision earlier this week presents to ED from urgent care with complaint of chest pain and c/f chest wall hematoma. On presentation, pt extremely agitated and disorganized and unable to provide information about medical history. Per pt's ex-wife, pt had a recent chest wall mass excision recently but is unable to provide further detail. Pt currently c/o chest pain over large suprasternal hematoma but is a poor historian. Additional history unable to be obtained at the current time.     In the ED, pt afebrile, nontachycardic, normotensive, and satting well on RA. Physical exam notable for healing midline transverse sternal incision with blood oozing from incision and underlying fluctuant mass approximately 5cm in diameter. Mass appears ecchymotic with extension of extension of ecchymosis approximately 1cm beyond the mass circumferentially.     Labs significant for WBC 10.7, hb 12.2, elevated PT/INR and PTT, and lactate of 1.2. CTA Chest w/w/o IV contrast prelim read significant for soft tissue swelling superficial to the sternotomy with soft tissue gas suspicious for cellulitis and possible abscess.

## 2022-07-30 NOTE — PROGRESS NOTE ADULT - SUBJECTIVE AND OBJECTIVE BOX
SUBJECTIVE:   patient was seen and examined on am rounds. patient mildly agitated, but not triggering CIWA. No new complaints. Patient considering leaving during am rounds, but was convinced to stay at time. -n-v-cp-sob.    Vital Signs Last 24 Hrs  T(C): 36.1 (30 Jul 2022 10:08), Max: 36.1 (30 Jul 2022 10:08)  T(F): 96.9 (30 Jul 2022 10:08), Max: 96.9 (30 Jul 2022 10:08)  HR: 66 (30 Jul 2022 10:08) (55 - 87)  BP: 109/59 (30 Jul 2022 10:08) (106/55 - 151/71)  BP(mean): --  RR: 18 (30 Jul 2022 10:08) (16 - 20)  SpO2: 96% (30 Jul 2022 10:08) (96% - 100%)    Parameters below as of 30 Jul 2022 10:08  Patient On (Oxygen Delivery Method): room air        I&O's Summary    30 Jul 2022 07:01  -  30 Jul 2022 22:45  --------------------------------------------------------  IN: 200 mL / OUT: 0 mL / NET: 200 mL        Physical Exam:  General Appearance: Appears well, NAD, uncooperative demeanor.  Pulmonary: Nonlabored breathing, no respiratory distress  Cardiovascular: NSR  Abdomen: Soft, nondisteded, nontender. +mass on anterior chest wall in bloody bandage.  Extremities: WWP, SCD's in place     LABS:                        12.1   10.60 )-----------( 172      ( 30 Jul 2022 00:52 )             38.0     07-29    135  |  100  |  27<H>  ----------------------------<  119<H>  4.5   |  28  |  1.18    Ca    8.9      29 Jul 2022 22:39  Phos  3.8     07-29  Mg     2.1     07-29    TPro  6.0  /  Alb  3.8  /  TBili  0.5  /  DBili  x   /  AST  24  /  ALT  35  /  AlkPhos  59  07-29    PT/INR - ( 29 Jul 2022 22:39 )   PT: 24.4 sec;   INR: 2.04          PTT - ( 29 Jul 2022 22:39 )  PTT:37.2 sec

## 2022-07-30 NOTE — PROGRESS NOTE ADULT - ASSESSMENT
72 yo undomiciled M pt with PMH of CAD (s/p CABG 4/2018),s/p AVR, afib on Xarelto, HTN, HLD, COPD, and Psx of presumed suprasternal mass excision earlier this week presents to ED from urgent care with complaint of chest pain and c/f chest wall hematoma. Pt afebrile, HDS, with negative cardiac work up. Physical exam notable for healing midline transverse sternal incision with blood oozing from incision and underlying fluctuant mass approximately 5cm in diameter. Mass appears ecchymotic with extension of extension of ecchymosis approximately 1cm beyond the mass circumferentially. Seen by CT surgery who determined chest wall pathology did not extend into the thorax. CTA Chest w/w/o IV contrast prelim read significant for soft tissue swelling superficial to the sternotomy with soft tissue gas suspicious for cellulitis vs infected hematoma.     Plan:  OR today  NPO/IVF  Pain/nausea control PRN  CIWA  Trend Hb q6hrs  SCDs/Holding SQH  OOBA/IS  AM labs

## 2022-07-30 NOTE — CONSULT NOTE ADULT - ASSESSMENT
Assesment:  71y Male with PMH of CVA, MI & cardiac surgery (unknown date/surgery type/hospital) who presented to the ED c/o chest pain and sternal swelling at site of recent incision and sutures. Patient states he was recently at an urgent care where the sutures were placed & he was advised to go to the ER. He endorses chest pain, worse upon palpation to the hematoma. Unable to obtain further ROS as patient is a poor historian and unable to provide additional information.         Plan:  Problem 1: Soft tissue hematoma at distal sternum   - CTA reviewed and there is no evidence of arterial or venous involvement   - No cardiothoracic surgical intervention  - Case discussed with Dr. Bowers   - Recommend general surgery consult for management of soft tissue hematoma       Problem 2: Chest pain   - 2/2 chest wall hematoma  - Troponin 0.01  - Management per ER team       Problem 3: History of CVA   - Unknown baseline neurological status   - Unknown medication regimen  - Contact made with patient's ex-wife   - Management per ER team     Problem 4: Sinus tachycardia   - 2/2 agitation   - Obtain ECG  - Management per ER team       I have reviewed clinical labs tests and reports, radiology tests and reports, as well as old patient medical records, and discussed with the referring physician.

## 2022-07-30 NOTE — H&P ADULT - NSHPLABSRESULTS_GEN_ALL_CORE
LABS:                        12.1   10.60 )-----------( 172      ( 30 Jul 2022 00:52 )             38.0     07-29    135  |  100  |  27<H>  ----------------------------<  119<H>  4.5   |  28  |  1.18    Ca    8.9      29 Jul 2022 22:39  Phos  3.8     07-29  Mg     2.1     07-29    TPro  6.0  /  Alb  3.8  /  TBili  0.5  /  DBili  x   /  AST  24  /  ALT  35  /  AlkPhos  59  07-29    PT/INR - ( 29 Jul 2022 22:39 )   PT: 24.4 sec;   INR: 2.04          PTT - ( 29 Jul 2022 22:39 )  PTT:37.2 sec

## 2022-07-30 NOTE — H&P ADULT - NSICDXPASTMEDICALHX_GEN_ALL_CORE_FT
PAST MEDICAL HISTORY:  Afib     CAD (coronary artery disease)     Cerebrovascular accident (CVA)     Chronic obstructive pulmonary disease, unspecified COPD type     HLD (hyperlipidemia)     HTN (hypertension)     Myocardial infarction

## 2022-07-30 NOTE — CONSULT NOTE ADULT - SUBJECTIVE AND OBJECTIVE BOX
Requesting Physician: Dr. Santiago    HISTORY OF PRESENT ILLNESS:  71y Male with PMH of CVA, MI & cardiac surgery (unknown date/surgery type/hospital) who presented to the ED c/o chest pain and sternal swelling at site of recent incision and sutures. Patient states he was recently at an urgent care where the sutures were placed & he was advised to go to the ER. He endorses chest pain, worse upon palpation to the hematoma. Unable to obtain further ROS as patient is a poor historian and unable to provide additional information.     PAST MEDICAL & SURGICAL HISTORY:  CVA, MI, cardiac surgery       MEDICATIONS  (STANDING):    MEDICATIONS  (PRN):      Allergies    No Known Allergies    Intolerances        SOCIAL HISTORY: Unknown. Ex-wife contacted.     FAMILY HISTORY: Unknown      Review of Systems:  Unable to obtain: patient is a poor historian.   Endorses chest pain & a chest wound for an unknown period of time.                                                                                                                                                                                                                                                           Vital Signs Last 24 Hrs  T(C): 36.4 (29 Jul 2022 21:33), Max: 36.4 (29 Jul 2022 21:33)  T(F): 97.5 (29 Jul 2022 21:33), Max: 97.5 (29 Jul 2022 21:33)  HR: 74 (30 Jul 2022 00:00) (60 - 98)  BP: 139/82 (30 Jul 2022 00:00) (120/78 - 140/79)  BP(mean): --  RR: 18 (30 Jul 2022 00:00) (17 - 18)  SpO2: 100% (30 Jul 2022 00:00) (98% - 100%)    Parameters below as of 30 Jul 2022 00:00  Patient On (Oxygen Delivery Method): room air        Physical Exam:  CONSTITUTIONAL: Agitated.   NEURO: AAOx3, poor historian, forgetful. No focal deficits.                       EYES: EOMI b/l, PEERLA b/l. Exhibits appropriate visual acuity.   ENMT: Hearing in tact.  MMM.  No palpable neck masses or hoarseness.   CV: RRR, S1/S2, no m/r/g.   RESPIRATORY:  Tachypnea.  CTA b/l, no w/r/r.   GI: ND, NBS, non-TTP.  : No sidhu.    MUSCULOSKELETAL No obvious malformations.  Non-TTP.  Exhibits normal ROM in all extremities.   SKIN / BREAST:  Chest hematoma with nylon sutures with bloody drainage & scabbing. Right upper chest with 1 nylon suture.                                                           LABS:                        12.2   10.70 )-----------( 179      ( 29 Jul 2022 22:39 )             37.6     07-29    135  |  100  |  27<H>  ----------------------------<  119<H>  4.5   |  28  |  1.18    Ca    8.9      29 Jul 2022 22:39  Phos  3.8     07-29  Mg     2.1     07-29    TPro  6.0  /  Alb  3.8  /  TBili  0.5  /  DBili  x   /  AST  24  /  ALT  35  /  AlkPhos  59  07-29    PT/INR - ( 29 Jul 2022 22:39 )   PT: 24.4 sec;   INR: 2.04          PTT - ( 29 Jul 2022 22:39 )  PTT:37.2 sec    CARDIAC MARKERS ( 29 Jul 2022 22:39 )  x     / 0.01 ng/mL / x     / x     / x              RADIOLOGY & ADDITIONAL STUDIES:  < from: CT Angio Chest Aorta w/wo IV Cont (07.29.22 @ 23:12) >  IMPRESSION:  1. No evidence of acute aortic process.  2. Dilated ascending aorta measuring up to 4.2 x 4.0 cm.  3. There is soft tissue swelling superficial to the sternotomy with soft   tissue  gas suspicious forcellulitis and possible abscess.  4. Mild wall thickening of distal descending colon, sigmoid colon, and   rectum.  Findings may represent mild colitis/proctitis.    < end of copied text >

## 2022-07-30 NOTE — CHART NOTE - NSCHARTNOTEFT_GEN_A_CORE
AMA note    Patient was seen at his own request. The patient was seen at ~10:00am and again at 11:00am.     At the first visit, the patient consistently stated he wanted to leave the hospital. The patient was informed of his diagnosis, treatment, and various options, and was told that he would be leaving against medical advice. The patient was informed about the benefits of treatment and the risks including infection and death. The patient had the opportunity for clarification and have all questions answered. Understanding was demonstrated via teachback. The patient agreed to defer decision to leave.    At the second visit, the patient confirmed his desire to leave. Again, the risks were restated including infection and death. The patient understood the risks. The patient said he would present to the ED if he felt fevers, pain, or other malaise. The patient was provided clean dressings. The patient signed AMA documents.

## 2022-07-30 NOTE — ED ADULT NURSE REASSESSMENT NOTE - NS ED NURSE REASSESS COMMENT FT1
[phone] phone number for ex wife
Pt. became extremely agitated, verbally abusive, and aggressive when told to remain in the bed to finish his medication. Pt. requesting to use bathroom, when handed urinal, he threw it and yelled "Get ,e out of here I am going home" and began attempting to rip out IVs. MDs and RNs @ bedside attempted to keep pt.  IV lines intact, keep pt.,  safely in bed, and explain / educate him on the importance of remaining for duration of care and the acuity of his condition. Pt. noncompliant and aggressive, MD Santiago verbalized stat orders of IM Haldol 5mg and Ativan IV 2 mg. pt. tolerated well. Pt. safety maintained throughout. VS continuously UTD and being monitored. 1:1 put into place for safety by MD Santiago. Repeat ECG  in progress. will continue to assess.

## 2022-07-30 NOTE — H&P ADULT - ASSESSMENT
70 yo undomiciled M pt with PMH of CAD (s/p CABG 4/2018),s/p AVR, afib on Xarelto, HTN, HLD, COPD, and Psx of presumed suprasternal mass excision earlier this week presents to ED from urgent care with complaint of chest pain and c/f chest wall hematoma. Pt afebrile, HDS, with negative cardiac work up. Physical exam notable for healing midline transverse sternal incision with blood oozing from incision and underlying fluctuant mass approximately 5cm in diameter. Mass appears ecchymotic with extension of extension of ecchymosis approximately 1cm beyond the mass circumferentially. Seen by CT surgery who determined chest wall pathology did not extend into the thorax. CTA Chest w/w/o IV contrast prelim read significant for soft tissue swelling superficial to the sternotomy with soft tissue gas suspicious for cellulitis vs infected hematoma.     Plan:  Admit to Telemetry, Team 4, Dr. Bain  NPO/IVF  Pain/nausea control PRN  CIWA  Trend Hb q6hrs  SCDs/Holding SQH  OOBA/IS  AM labs

## 2022-07-30 NOTE — PATIENT PROFILE ADULT - FALL HARM RISK - HARM RISK INTERVENTIONS
Assistance with ambulation/Assistance OOB with selected safe patient handling equipment/Communicate Risk of Fall with Harm to all staff/Monitor for mental status changes/Monitor gait and stability/Reinforce activity limits and safety measures with patient and family/Tailored Fall Risk Interventions/Toileting schedule using arm’s reach rule for commode and bathroom/Use of alarms - bed, chair and/or voice tab/Visual Cue: Yellow wristband and red socks/Bed in lowest position, wheels locked, appropriate side rails in place/Call bell, personal items and telephone in reach/Instruct patient to call for assistance before getting out of bed or chair/Non-slip footwear when patient is out of bed/[place] to call system/Physically safe environment - no spills, clutter or unnecessary equipment/Purposeful Proactive Rounding/Room/bathroom lighting operational, light cord in reach

## 2022-08-02 DIAGNOSIS — L76.32 POSTPROCEDURAL HEMATOMA OF SKIN AND SUBCUTANEOUS TISSUE FOLLOWING OTHER PROCEDURE: ICD-10-CM

## 2022-08-02 DIAGNOSIS — F17.210 NICOTINE DEPENDENCE, CIGARETTES, UNCOMPLICATED: ICD-10-CM

## 2022-08-02 DIAGNOSIS — I50.9 HEART FAILURE, UNSPECIFIED: ICD-10-CM

## 2022-08-02 DIAGNOSIS — I48.91 UNSPECIFIED ATRIAL FIBRILLATION: ICD-10-CM

## 2022-08-02 DIAGNOSIS — I11.0 HYPERTENSIVE HEART DISEASE WITH HEART FAILURE: ICD-10-CM

## 2022-08-02 DIAGNOSIS — Z95.1 PRESENCE OF AORTOCORONARY BYPASS GRAFT: ICD-10-CM

## 2022-08-02 DIAGNOSIS — Z95.2 PRESENCE OF PROSTHETIC HEART VALVE: ICD-10-CM

## 2022-08-02 DIAGNOSIS — Z86.73 PERSONAL HISTORY OF TRANSIENT ISCHEMIC ATTACK (TIA), AND CEREBRAL INFARCTION WITHOUT RESIDUAL DEFICITS: ICD-10-CM

## 2022-08-02 DIAGNOSIS — R07.9 CHEST PAIN, UNSPECIFIED: ICD-10-CM

## 2022-08-02 DIAGNOSIS — E78.5 HYPERLIPIDEMIA, UNSPECIFIED: ICD-10-CM
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
## 2022-08-02 DIAGNOSIS — I25.10 ATHEROSCLEROTIC HEART DISEASE OF NATIVE CORONARY ARTERY WITHOUT ANGINA PECTORIS: ICD-10-CM

## 2022-08-02 DIAGNOSIS — I25.2 OLD MYOCARDIAL INFARCTION: ICD-10-CM

## 2022-08-02 DIAGNOSIS — Z59.00 HOMELESSNESS UNSPECIFIED: ICD-10-CM

## 2022-08-02 DIAGNOSIS — Z79.01 LONG TERM (CURRENT) USE OF ANTICOAGULANTS: ICD-10-CM
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
## 2022-08-02 DIAGNOSIS — J44.9 CHRONIC OBSTRUCTIVE PULMONARY DISEASE, UNSPECIFIED: ICD-10-CM

## 2022-08-02 DIAGNOSIS — R00.0 TACHYCARDIA, UNSPECIFIED: ICD-10-CM

## 2022-08-02 DIAGNOSIS — L03.313 CELLULITIS OF CHEST WALL: ICD-10-CM

## 2022-08-02 SDOH — ECONOMIC STABILITY - HOUSING INSECURITY: HOMELESSNESS UNSPECIFIED: Z59.00

## 2022-08-10 NOTE — ED ADULT NURSE NOTE - NS ED NURSE LEVEL OF CONSCIOUSNESS ORIENTATION
Ammon Preston informed of result(s) via phone, no further questions.    Patient stated he was woken up with pain, still having headaches and migraines. Please advise.     Oriented - self; Oriented - place; Oriented - time

## 2022-08-14 ENCOUNTER — EMERGENCY (EMERGENCY)
Facility: HOSPITAL | Age: 72
LOS: 1 days | Discharge: HOME | End: 2022-08-14
Attending: EMERGENCY MEDICINE | Admitting: EMERGENCY MEDICINE

## 2022-08-14 VITALS
DIASTOLIC BLOOD PRESSURE: 70 MMHG | SYSTOLIC BLOOD PRESSURE: 121 MMHG | OXYGEN SATURATION: 97 % | HEIGHT: 65 IN | RESPIRATION RATE: 18 BRPM | HEART RATE: 74 BPM | TEMPERATURE: 98 F | WEIGHT: 149.91 LBS

## 2022-08-14 DIAGNOSIS — Z95.1 PRESENCE OF AORTOCORONARY BYPASS GRAFT: Chronic | ICD-10-CM

## 2022-08-14 DIAGNOSIS — Z95.2 PRESENCE OF PROSTHETIC HEART VALVE: Chronic | ICD-10-CM

## 2022-08-14 PROCEDURE — 99282 EMERGENCY DEPT VISIT SF MDM: CPT

## 2022-08-14 NOTE — ED PROVIDER NOTE - PATIENT PORTAL LINK FT
You can access the FollowMyHealth Patient Portal offered by [facility] by registering at the following website: [email or web address]. By joining [employer]’s FollowMyHealth portal, you will also be able to view your health information using other applications (apps) compatible with our system.

## 2022-08-14 NOTE — ED ADULT NURSE NOTE - OBJECTIVE STATEMENT
Pt aox3 with steady gait/ Pt walked in c/o abscess to chest. States has been ongoing for a few weeks. Abscess is open with slough noted to wound site. Afebrile in triage. about the size of a half dollar

## 2022-08-14 NOTE — ED PROVIDER NOTE - CLINICAL SUMMARY MEDICAL DECISION MAKING FREE TEXT BOX
Patient presenting with self drained chest wall abscess, looks like it is healing. Pt left before d/c papers per usual.

## 2022-08-14 NOTE — ED PROVIDER NOTE - PHYSICAL EXAMINATION
CONSTITUTIONAL: Well-appearing; well-nourished; in no apparent distress.   HEAD: Normocephalic; atraumatic.   SKIN: ~ 1.5 cm crater like crusted over and granulating lesion to chest wall, no pus, drainage or cellulitis.   Resp breathing comfortably with no distress  PSYCHOLOGICAL: The patient’s mood and manner are appropriate.

## 2022-08-14 NOTE — ED ADULT TRIAGE NOTE - CHIEF COMPLAINT QUOTE
Pt walked in c/o abscess to chest. States has been ongoing for a few weeks. Abscess is open with slough noted to wound site. Afebrile in triage.

## 2022-08-17 DIAGNOSIS — Z95.4 PRESENCE OF OTHER HEART-VALVE REPLACEMENT: ICD-10-CM

## 2022-08-17 DIAGNOSIS — J43.9 EMPHYSEMA, UNSPECIFIED: ICD-10-CM

## 2022-08-17 DIAGNOSIS — I48.91 UNSPECIFIED ATRIAL FIBRILLATION: ICD-10-CM

## 2022-08-17 DIAGNOSIS — Z88.6 ALLERGY STATUS TO ANALGESIC AGENT: ICD-10-CM

## 2022-08-17 DIAGNOSIS — F17.200 NICOTINE DEPENDENCE, UNSPECIFIED, UNCOMPLICATED: ICD-10-CM

## 2022-08-17 DIAGNOSIS — Z95.1 PRESENCE OF AORTOCORONARY BYPASS GRAFT: ICD-10-CM

## 2022-08-17 DIAGNOSIS — E78.5 HYPERLIPIDEMIA, UNSPECIFIED: ICD-10-CM

## 2022-08-17 DIAGNOSIS — F41.9 ANXIETY DISORDER, UNSPECIFIED: ICD-10-CM

## 2022-08-17 DIAGNOSIS — L02.213 CUTANEOUS ABSCESS OF CHEST WALL: ICD-10-CM

## 2022-08-17 DIAGNOSIS — I10 ESSENTIAL (PRIMARY) HYPERTENSION: ICD-10-CM

## 2022-08-17 DIAGNOSIS — R06.02 SHORTNESS OF BREATH: ICD-10-CM

## 2022-08-26 ENCOUNTER — EMERGENCY (EMERGENCY)
Facility: HOSPITAL | Age: 72
LOS: 1 days | Discharge: LEFT BEFORE DISCHARGE | End: 2022-08-26
Admitting: EMERGENCY MEDICINE

## 2022-08-26 VITALS
SYSTOLIC BLOOD PRESSURE: 128 MMHG | HEART RATE: 89 BPM | TEMPERATURE: 98 F | RESPIRATION RATE: 18 BRPM | OXYGEN SATURATION: 99 % | HEIGHT: 65 IN | WEIGHT: 145.06 LBS | DIASTOLIC BLOOD PRESSURE: 78 MMHG

## 2022-08-26 DIAGNOSIS — Z95.1 PRESENCE OF AORTOCORONARY BYPASS GRAFT: Chronic | ICD-10-CM

## 2022-08-26 PROCEDURE — L9991: CPT

## 2022-08-26 NOTE — ED ADULT NURSE NOTE - HIV OFFER
Previously Declined (within the last year) Otezla Counseling: The side effects of Otezla were discussed with the patient, including but not limited to worsening or new depression, weight loss, diarrhea, nausea, upper respiratory tract infection, and headache. Patient instructed to call the office should any adverse effect occur.  The patient verbalized understanding of the proper use and possible adverse effects of Otezla.  All the patient's questions and concerns were addressed.

## 2022-08-26 NOTE — ED ADULT TRIAGE NOTE - CHIEF COMPLAINT QUOTE
Pt came in c/o of chest pain/sob x 2 months. Pt also c/o of wound to the chest "I don't know what its from". VSS

## 2022-08-29 DIAGNOSIS — R06.02 SHORTNESS OF BREATH: ICD-10-CM

## 2022-08-29 DIAGNOSIS — Z53.21 PROCEDURE AND TREATMENT NOT CARRIED OUT DUE TO PATIENT LEAVING PRIOR TO BEING SEEN BY HEALTH CARE PROVIDER: ICD-10-CM

## 2022-08-29 DIAGNOSIS — R07.89 OTHER CHEST PAIN: ICD-10-CM

## 2022-08-30 ENCOUNTER — EMERGENCY (EMERGENCY)
Facility: HOSPITAL | Age: 72
LOS: 1 days | Discharge: LEFT BEFORE DISCHARGE | End: 2022-08-30
Admitting: EMERGENCY MEDICINE

## 2022-08-30 VITALS
WEIGHT: 154.32 LBS | HEART RATE: 105 BPM | HEIGHT: 65 IN | TEMPERATURE: 98 F | OXYGEN SATURATION: 97 % | SYSTOLIC BLOOD PRESSURE: 125 MMHG | RESPIRATION RATE: 15 BRPM | DIASTOLIC BLOOD PRESSURE: 64 MMHG

## 2022-08-30 DIAGNOSIS — Z95.1 PRESENCE OF AORTOCORONARY BYPASS GRAFT: Chronic | ICD-10-CM

## 2022-08-30 PROCEDURE — 99284 EMERGENCY DEPT VISIT MOD MDM: CPT

## 2022-08-30 PROCEDURE — 93010 ELECTROCARDIOGRAM REPORT: CPT

## 2022-08-30 NOTE — ED PROVIDER NOTE - CONSTITUTIONAL DISTRESS
Moaning and holding chest, however minutes later breathing comfortably and walking out of ED stating he's leaving Moaning and pointing to chest upon initial eval, however minutes later (without intervention) pt is breathing comfortably and walking out of ED stating he's leaving

## 2022-08-30 NOTE — ED PROVIDER NOTE - SKIN WOUND DESCRIPTION
Chronic-appearing wound to anterior chest wall with pink granulation tissue. No active drainage/bleeding.

## 2022-08-30 NOTE — ED ADULT TRIAGE NOTE - AS HEIGHT TYPE
Signed Prescriptions:                        Disp   Refills    oxyCODONE (ROXICODONE) 5 MG IR tablet      45 tab*0        Sig: Take 1-2 tablets (5-10 mg) by mouth every 4 hours as           needed for pain or other (Moderate to Severe) .           Max of 3/day, but also max of 45 tabs/month.  30           day supply. Ok to dispense on/after 5/20/17 and           start on 5/22/17  Authorizing Provider: SHUKRI WAYNE MD  [facility]     actual/stated

## 2022-08-30 NOTE — ED PROVIDER NOTE - CLINICAL SUMMARY MEDICAL DECISION MAKING FREE TEXT BOX
70 yo M, PMHx CAD (s/p CABG 4/2018), s/p AVR, afib on Xarelto, HTN, HLD, COPD, and PSx of suprasternal mass excision, here c/o SOB onset shortly PTA.  VSS 72 yo M, PMHx CAD (s/p CABG 4/2018), s/p AVR, afib on Xarelto, HTN, HLD, COPD, and PSx of suprasternal mass excision, here c/o SOB onset shortly PTA.  VSS, speaking full sentences clearly.    Planned on R/O ACS and to tx for COPD exacerbation (labs were in the process of being ordered but had not yet been submitted) however minutes after initial evaluation, pt ambulating out of ED stating he is leaving.  Attempted to discuss with pt regarding staying in the ED for workup/management however pt is adamant about leaving.  Pt was A&Ox4 throughout visit, had capacity.  Per other members of staff, pt has frequent similar ED visits involving elopement.  Pt found to have eloped from ED, left with steady gait.

## 2022-08-30 NOTE — ED PROVIDER NOTE - OBJECTIVE STATEMENT
70 yo M, PMHx CAD (s/p CABG 4/2018), s/p AVR, afib on Xarelto, HTN, HLD, COPD, and PSx of presumed suprasternal mass excision    here for SOB onset shortly PTA while walking    Also c/o CP x 1 week 70 yo M, PMHx CAD (s/p CABG 4/2018), s/p AVR, afib on Xarelto, HTN, HLD, COPD, and PSx of suprasternal mass excision, here c/o SOB onset shortly PTA. Reports onset while walking a few blocks, assoc. w/ cough, states feels similar to previous COPD exacerbations. Also c/o CP x 1 week, unable to describe further, not answering related questions. Endorsing wound to chest and stating he is on abx, unsure what. 70 yo M, PMHx CAD (s/p CABG 4/2018), s/p AVR, afib on Xarelto, HTN, HLD, COPD, and PSx of suprasternal mass excision, here c/o SOB onset shortly PTA. Reports onset while walking a few blocks, assoc. w/ cough, states feels similar to previous COPD exacerbations. Also c/o CP x 1 week, unable to describe further, unwilling to related questions. Endorsing chronic wound to chest and stating he is on abx, unsure what.    Denies fall, LOC, vomiting, fevers, other complaints.

## 2022-09-02 DIAGNOSIS — Z95.1 PRESENCE OF AORTOCORONARY BYPASS GRAFT: ICD-10-CM

## 2022-09-02 DIAGNOSIS — Z53.29 PROCEDURE AND TREATMENT NOT CARRIED OUT BECAUSE OF PATIENT'S DECISION FOR OTHER REASONS: ICD-10-CM

## 2022-09-02 DIAGNOSIS — I10 ESSENTIAL (PRIMARY) HYPERTENSION: ICD-10-CM

## 2022-09-02 DIAGNOSIS — Z86.73 PERSONAL HISTORY OF TRANSIENT ISCHEMIC ATTACK (TIA), AND CEREBRAL INFARCTION WITHOUT RESIDUAL DEFICITS: ICD-10-CM

## 2022-09-02 DIAGNOSIS — R07.9 CHEST PAIN, UNSPECIFIED: ICD-10-CM

## 2022-09-02 DIAGNOSIS — E78.5 HYPERLIPIDEMIA, UNSPECIFIED: ICD-10-CM

## 2022-09-02 DIAGNOSIS — Z79.01 LONG TERM (CURRENT) USE OF ANTICOAGULANTS: ICD-10-CM

## 2022-09-02 DIAGNOSIS — I25.2 OLD MYOCARDIAL INFARCTION: ICD-10-CM

## 2022-09-02 DIAGNOSIS — I25.10 ATHEROSCLEROTIC HEART DISEASE OF NATIVE CORONARY ARTERY WITHOUT ANGINA PECTORIS: ICD-10-CM

## 2022-09-02 DIAGNOSIS — J44.9 CHRONIC OBSTRUCTIVE PULMONARY DISEASE, UNSPECIFIED: ICD-10-CM

## 2022-09-02 DIAGNOSIS — I48.91 UNSPECIFIED ATRIAL FIBRILLATION: ICD-10-CM

## 2022-09-02 DIAGNOSIS — R06.02 SHORTNESS OF BREATH: ICD-10-CM

## 2022-09-04 ENCOUNTER — EMERGENCY (EMERGENCY)
Facility: HOSPITAL | Age: 72
LOS: 1 days | Discharge: LEFT BEFORE DISCHARGE | End: 2022-09-04
Admitting: EMERGENCY MEDICINE

## 2022-09-04 VITALS
TEMPERATURE: 98 F | HEIGHT: 65 IN | SYSTOLIC BLOOD PRESSURE: 108 MMHG | DIASTOLIC BLOOD PRESSURE: 69 MMHG | OXYGEN SATURATION: 99 % | HEART RATE: 55 BPM | RESPIRATION RATE: 18 BRPM | WEIGHT: 149.91 LBS

## 2022-09-04 DIAGNOSIS — Z95.1 PRESENCE OF AORTOCORONARY BYPASS GRAFT: Chronic | ICD-10-CM

## 2022-09-04 DIAGNOSIS — Z95.2 PRESENCE OF PROSTHETIC HEART VALVE: Chronic | ICD-10-CM

## 2022-09-04 PROCEDURE — 99283 EMERGENCY DEPT VISIT LOW MDM: CPT

## 2022-09-04 RX ORDER — ACETAMINOPHEN 500 MG
975 TABLET ORAL ONCE
Refills: 0 | Status: COMPLETED | OUTPATIENT
Start: 2022-09-04 | End: 2022-09-04

## 2022-09-04 NOTE — ED PROVIDER NOTE - OBJECTIVE STATEMENT
72yo M presents with c/o cough, chills, fever, and diarrhea for 3 days. vaccinated against covid. no sob, cp, n/v, abd pain. minimally cooperative with hx.

## 2022-09-04 NOTE — ED ADULT NURSE NOTE - ED STAT RN HANDOFF DETAILS
received pt from night shift RN. pt awaiting provider evaluation. will continue to monitor. pt appears comfortable, in nad.

## 2022-09-04 NOTE — ED PROVIDER NOTE - RESPIRATORY [-], MLM
FYI: INR is 1.7 with goal of 2.0 to 3.0. Patient was hospitalized at [facility] for UTI with sepsis. He is taking Bactrim through 4/5 AM.  Warfarin 4 mg Mon, Tues with INR recheck in 2 days, Wed, 4/4/18.  Ronak Sparks nurse Kellen informed.  
no wheezing/no shortness of breath

## 2022-09-04 NOTE — ED PROVIDER NOTE - PHYSICAL EXAMINATION
Constitutional: Well appearing, awake, alert, oriented to person, place, time/situation and in no apparent distress.  HEENT: Airway patent, NCAT  Resp: breath sounds clear bilat without wheezing or rhonchi  abd: soft, nontender, nondistended  Msk: ambulating with normal steady gait  Neuro: A&Ox3

## 2022-09-04 NOTE — ED PROVIDER NOTE - PROGRESS NOTE DETAILS
pt decided he didn't want to be tested or have tylenol and walked out. well known for this behavior.

## 2022-09-04 NOTE — ED PROVIDER NOTE - CLINICAL SUMMARY MEDICAL DECISION MAKING FREE TEXT BOX
pt presents with c/o cough, diarrhea, chills. lungs clear, abd soft, nontender. pt eloped after eval before meds or covid testing.

## 2022-09-05 ENCOUNTER — EMERGENCY (EMERGENCY)
Facility: HOSPITAL | Age: 72
LOS: 1 days | Discharge: LEFT BEFORE DISCHARGE | End: 2022-09-05
Admitting: EMERGENCY MEDICINE

## 2022-09-05 VITALS
SYSTOLIC BLOOD PRESSURE: 119 MMHG | DIASTOLIC BLOOD PRESSURE: 78 MMHG | HEIGHT: 65 IN | WEIGHT: 149.91 LBS | RESPIRATION RATE: 16 BRPM | HEART RATE: 85 BPM | OXYGEN SATURATION: 98 % | TEMPERATURE: 98 F

## 2022-09-05 DIAGNOSIS — Z95.1 PRESENCE OF AORTOCORONARY BYPASS GRAFT: Chronic | ICD-10-CM

## 2022-09-05 DIAGNOSIS — Z95.2 PRESENCE OF PROSTHETIC HEART VALVE: Chronic | ICD-10-CM

## 2022-09-05 PROCEDURE — L9991: CPT

## 2022-09-05 NOTE — ED ADULT TRIAGE NOTE - CHIEF COMPLAINT QUOTE
pt states cough x 3 wks, on and off. Denies fevers or chest pain. Walking with steady gait. Breathing is even and unlabored.

## 2022-09-07 DIAGNOSIS — Z88.6 ALLERGY STATUS TO ANALGESIC AGENT: ICD-10-CM

## 2022-09-07 DIAGNOSIS — B34.9 VIRAL INFECTION, UNSPECIFIED: ICD-10-CM

## 2022-09-07 DIAGNOSIS — Z79.02 LONG TERM (CURRENT) USE OF ANTITHROMBOTICS/ANTIPLATELETS: ICD-10-CM

## 2022-09-07 DIAGNOSIS — J44.9 CHRONIC OBSTRUCTIVE PULMONARY DISEASE, UNSPECIFIED: ICD-10-CM

## 2022-09-07 DIAGNOSIS — R05.9 COUGH, UNSPECIFIED: ICD-10-CM

## 2022-09-07 DIAGNOSIS — F41.9 ANXIETY DISORDER, UNSPECIFIED: ICD-10-CM

## 2022-09-07 DIAGNOSIS — I48.92 UNSPECIFIED ATRIAL FLUTTER: ICD-10-CM

## 2022-09-07 DIAGNOSIS — Z95.1 PRESENCE OF AORTOCORONARY BYPASS GRAFT: ICD-10-CM

## 2022-09-07 DIAGNOSIS — Z95.2 PRESENCE OF PROSTHETIC HEART VALVE: ICD-10-CM

## 2022-09-07 DIAGNOSIS — I10 ESSENTIAL (PRIMARY) HYPERTENSION: ICD-10-CM

## 2022-09-07 DIAGNOSIS — E78.5 HYPERLIPIDEMIA, UNSPECIFIED: ICD-10-CM

## 2022-09-07 DIAGNOSIS — Z53.29 PROCEDURE AND TREATMENT NOT CARRIED OUT BECAUSE OF PATIENT'S DECISION FOR OTHER REASONS: ICD-10-CM

## 2022-09-08 DIAGNOSIS — Z53.21 PROCEDURE AND TREATMENT NOT CARRIED OUT DUE TO PATIENT LEAVING PRIOR TO BEING SEEN BY HEALTH CARE PROVIDER: ICD-10-CM

## 2022-09-08 DIAGNOSIS — R05.9 COUGH, UNSPECIFIED: ICD-10-CM

## 2022-09-10 ENCOUNTER — EMERGENCY (EMERGENCY)
Facility: HOSPITAL | Age: 72
LOS: 1 days | Discharge: HOME | End: 2022-09-10
Attending: EMERGENCY MEDICINE | Admitting: EMERGENCY MEDICINE

## 2022-09-10 VITALS
HEIGHT: 65 IN | RESPIRATION RATE: 16 BRPM | DIASTOLIC BLOOD PRESSURE: 77 MMHG | OXYGEN SATURATION: 97 % | HEART RATE: 98 BPM | WEIGHT: 145.06 LBS | TEMPERATURE: 97 F | SYSTOLIC BLOOD PRESSURE: 130 MMHG

## 2022-09-10 VITALS — HEART RATE: 82 BPM

## 2022-09-10 DIAGNOSIS — Z95.2 PRESENCE OF PROSTHETIC HEART VALVE: Chronic | ICD-10-CM

## 2022-09-10 DIAGNOSIS — Z95.1 PRESENCE OF AORTOCORONARY BYPASS GRAFT: Chronic | ICD-10-CM

## 2022-09-10 LAB
ALBUMIN SERPL ELPH-MCNC: 3.4 G/DL — SIGNIFICANT CHANGE UP (ref 3.4–5)
ALP SERPL-CCNC: 67 U/L — SIGNIFICANT CHANGE UP (ref 40–120)
ALT FLD-CCNC: 28 U/L — SIGNIFICANT CHANGE UP (ref 12–42)
ANION GAP SERPL CALC-SCNC: 2 MMOL/L — LOW (ref 9–16)
APTT BLD: 32.2 SEC — SIGNIFICANT CHANGE UP (ref 27.5–35.5)
AST SERPL-CCNC: 22 U/L — SIGNIFICANT CHANGE UP (ref 15–37)
BILIRUB SERPL-MCNC: 0.6 MG/DL — SIGNIFICANT CHANGE UP (ref 0.2–1.2)
BUN SERPL-MCNC: 27 MG/DL — HIGH (ref 7–23)
CALCIUM SERPL-MCNC: 8.7 MG/DL — SIGNIFICANT CHANGE UP (ref 8.5–10.5)
CHLORIDE SERPL-SCNC: 104 MMOL/L — SIGNIFICANT CHANGE UP (ref 96–108)
CK MB BLD-MCNC: 1.61 % — SIGNIFICANT CHANGE UP
CK MB CFR SERPL CALC: 0.9 NG/ML — SIGNIFICANT CHANGE UP (ref 0.5–3.6)
CK SERPL-CCNC: 56 U/L — SIGNIFICANT CHANGE UP (ref 39–308)
CO2 SERPL-SCNC: 31 MMOL/L — SIGNIFICANT CHANGE UP (ref 22–31)
CREAT SERPL-MCNC: 1.68 MG/DL — HIGH (ref 0.5–1.3)
D DIMER BLD IA.RAPID-MCNC: 247 NG/ML DDU — HIGH
EGFR: 43 ML/MIN/1.73M2 — LOW
ETHANOL SERPL-MCNC: <3 MG/DL — SIGNIFICANT CHANGE UP
GLUCOSE SERPL-MCNC: 99 MG/DL — SIGNIFICANT CHANGE UP (ref 70–99)
HCT VFR BLD CALC: 44.6 % — SIGNIFICANT CHANGE UP (ref 39–50)
HGB BLD-MCNC: 14.3 G/DL — SIGNIFICANT CHANGE UP (ref 13–17)
INR BLD: 1.13 — SIGNIFICANT CHANGE UP (ref 0.88–1.16)
MCHC RBC-ENTMCNC: 30.6 PG — SIGNIFICANT CHANGE UP (ref 27–34)
MCHC RBC-ENTMCNC: 32.1 GM/DL — SIGNIFICANT CHANGE UP (ref 32–36)
MCV RBC AUTO: 95.5 FL — SIGNIFICANT CHANGE UP (ref 80–100)
NRBC # BLD: 0 /100 WBCS — SIGNIFICANT CHANGE UP (ref 0–0)
NT-PROBNP SERPL-SCNC: 2292 PG/ML — HIGH
PLATELET # BLD AUTO: 221 K/UL — SIGNIFICANT CHANGE UP (ref 150–400)
POTASSIUM SERPL-MCNC: 4.8 MMOL/L — SIGNIFICANT CHANGE UP (ref 3.5–5.3)
POTASSIUM SERPL-SCNC: 4.8 MMOL/L — SIGNIFICANT CHANGE UP (ref 3.5–5.3)
PROT SERPL-MCNC: 6.7 G/DL — SIGNIFICANT CHANGE UP (ref 6.4–8.2)
PROTHROM AB SERPL-ACNC: 13.2 SEC — SIGNIFICANT CHANGE UP (ref 10.5–13.4)
RBC # BLD: 4.67 M/UL — SIGNIFICANT CHANGE UP (ref 4.2–5.8)
RBC # FLD: 13.2 % — SIGNIFICANT CHANGE UP (ref 10.3–14.5)
SODIUM SERPL-SCNC: 137 MMOL/L — SIGNIFICANT CHANGE UP (ref 132–145)
TROPONIN I, HIGH SENSITIVITY RESULT: 7 NG/L — SIGNIFICANT CHANGE UP
WBC # BLD: 7.59 K/UL — SIGNIFICANT CHANGE UP (ref 3.8–10.5)
WBC # FLD AUTO: 7.59 K/UL — SIGNIFICANT CHANGE UP (ref 3.8–10.5)

## 2022-09-10 PROCEDURE — 93010 ELECTROCARDIOGRAM REPORT: CPT

## 2022-09-10 PROCEDURE — 71046 X-RAY EXAM CHEST 2 VIEWS: CPT | Mod: 26

## 2022-09-10 PROCEDURE — 99285 EMERGENCY DEPT VISIT HI MDM: CPT

## 2022-09-10 RX ORDER — OXYCODONE AND ACETAMINOPHEN 5; 325 MG/1; MG/1
1 TABLET ORAL ONCE
Refills: 0 | Status: DISCONTINUED | OUTPATIENT
Start: 2022-09-10 | End: 2022-09-10

## 2022-09-10 RX ADMIN — OXYCODONE AND ACETAMINOPHEN 1 TABLET(S): 5; 325 TABLET ORAL at 12:29

## 2022-09-10 NOTE — ED ADULT NURSE NOTE - OBJECTIVE STATEMENT
Pt presents to ED BIBA complaining of CP. Pt evaluated by MD Tamayo in triage due to pts known nature to elope. Pt has hx of afib. VSS.

## 2022-09-10 NOTE — ED PROVIDER NOTE - OBJECTIVE STATEMENT
72 y/o M with PMH of A-fib, hypertension, asthma, COPD, and hyperlipidemia presents to the ED for CP. Pt is well known to this ED 2/2 frequent visits for a myriad of complaints. Today, Pt presents to the ED for CP with a noticeable lesion overlying the sternum. Pt endorses the CP is limited to the area of the lesion. He denies fevers, chills, coughs, shortness of breath, or N/V.

## 2022-09-10 NOTE — ED PROVIDER NOTE - CLINICAL SUMMARY MEDICAL DECISION MAKING FREE TEXT BOX
72 y/o M with Hx of COPD, asthma, A-fib, and hyperlipidemia presenting with CP that is localized to a lesion over his chest. Plan for EKG, routine labs, Chest XR, and pain medications.

## 2022-09-10 NOTE — ED PROVIDER NOTE - REFUSAL OF SERVICE, MDM
Patient did not want to stay for labs and further evaluation, patient signed AMA form and left the dept, IV removed, risks of missing life threatening condition discussed with patient and he accepts this

## 2022-09-10 NOTE — ED PROVIDER NOTE - PATIENT PORTAL LINK FT
You can access the FollowMyHealth Patient Portal offered by [facility] by registering at the following website: [email or web address]. By joining [employer]’s FollowMyHealth portal, you will also be able to view your health information using other applications (apps) compatible with our system.

## 2022-09-12 DIAGNOSIS — I48.91 UNSPECIFIED ATRIAL FIBRILLATION: ICD-10-CM

## 2022-09-12 DIAGNOSIS — Z79.01 LONG TERM (CURRENT) USE OF ANTICOAGULANTS: ICD-10-CM

## 2022-09-12 DIAGNOSIS — Z95.1 PRESENCE OF AORTOCORONARY BYPASS GRAFT: ICD-10-CM

## 2022-09-12 DIAGNOSIS — F17.200 NICOTINE DEPENDENCE, UNSPECIFIED, UNCOMPLICATED: ICD-10-CM

## 2022-09-12 DIAGNOSIS — Z95.4 PRESENCE OF OTHER HEART-VALVE REPLACEMENT: ICD-10-CM

## 2022-09-12 DIAGNOSIS — J43.9 EMPHYSEMA, UNSPECIFIED: ICD-10-CM

## 2022-09-12 DIAGNOSIS — Z79.82 LONG TERM (CURRENT) USE OF ASPIRIN: ICD-10-CM

## 2022-09-12 DIAGNOSIS — R06.02 SHORTNESS OF BREATH: ICD-10-CM

## 2022-09-12 DIAGNOSIS — E78.5 HYPERLIPIDEMIA, UNSPECIFIED: ICD-10-CM

## 2022-09-12 DIAGNOSIS — R07.89 OTHER CHEST PAIN: ICD-10-CM

## 2022-09-12 DIAGNOSIS — Z88.2 ALLERGY STATUS TO SULFONAMIDES: ICD-10-CM

## 2022-09-12 DIAGNOSIS — I10 ESSENTIAL (PRIMARY) HYPERTENSION: ICD-10-CM

## 2022-09-13 ENCOUNTER — EMERGENCY (EMERGENCY)
Facility: HOSPITAL | Age: 72
LOS: 1 days | Discharge: LEFT BEFORE DISCHARGE | End: 2022-09-13
Attending: EMERGENCY MEDICINE | Admitting: EMERGENCY MEDICINE

## 2022-09-13 VITALS
HEIGHT: 67 IN | OXYGEN SATURATION: 97 % | HEART RATE: 83 BPM | SYSTOLIC BLOOD PRESSURE: 113 MMHG | TEMPERATURE: 98 F | RESPIRATION RATE: 18 BRPM | WEIGHT: 149.91 LBS | DIASTOLIC BLOOD PRESSURE: 79 MMHG

## 2022-09-13 DIAGNOSIS — Z95.1 PRESENCE OF AORTOCORONARY BYPASS GRAFT: Chronic | ICD-10-CM

## 2022-09-13 PROCEDURE — L9991: CPT

## 2022-09-13 NOTE — ED ADULT NURSE NOTE - CHIEF COMPLAINT QUOTE
Pt walked into ER c/o chest pain x 2 weeks, pt has wound with drainage noted to chest. Unable to provide details, states it is from months ago

## 2022-09-13 NOTE — ED ADULT TRIAGE NOTE - CHIEF COMPLAINT QUOTE
Next Appt 04/15/2022  Last Appt 10/18/2021    Refill request for    Disp Refills Start End    amLODIPine (NORVASC) 5 MG tablet 30 tablet 10 3/31/2021     Sig - Route: TAKE 1 TABLET BY MOUTH DAILY.  - Oral        Refilled per standing med protocol.     Pt walked into ER c/o chest pain x 2 weeks, pt has wound with drainage noted to chest. Unable to provide details, states it is from months ago

## 2022-09-15 ENCOUNTER — EMERGENCY (EMERGENCY)
Facility: HOSPITAL | Age: 72
LOS: 1 days | Discharge: LEFT BEFORE DISCHARGE | End: 2022-09-15
Attending: EMERGENCY MEDICINE | Admitting: EMERGENCY MEDICINE

## 2022-09-15 VITALS
HEIGHT: 65 IN | WEIGHT: 149.91 LBS | OXYGEN SATURATION: 99 % | TEMPERATURE: 98 F | HEART RATE: 83 BPM | RESPIRATION RATE: 16 BRPM | SYSTOLIC BLOOD PRESSURE: 119 MMHG | DIASTOLIC BLOOD PRESSURE: 73 MMHG

## 2022-09-15 DIAGNOSIS — Z95.2 PRESENCE OF PROSTHETIC HEART VALVE: Chronic | ICD-10-CM

## 2022-09-15 DIAGNOSIS — Z95.1 PRESENCE OF AORTOCORONARY BYPASS GRAFT: Chronic | ICD-10-CM

## 2022-09-15 PROCEDURE — 99283 EMERGENCY DEPT VISIT LOW MDM: CPT

## 2022-09-15 NOTE — ED PROVIDER NOTE - OBJECTIVE STATEMENT
Anterior chest wall lesion, chest CT ordered to see if this goes to his sternal wires and pt eloped.

## 2022-09-16 DIAGNOSIS — R07.9 CHEST PAIN, UNSPECIFIED: ICD-10-CM

## 2022-09-16 DIAGNOSIS — Z53.21 PROCEDURE AND TREATMENT NOT CARRIED OUT DUE TO PATIENT LEAVING PRIOR TO BEING SEEN BY HEALTH CARE PROVIDER: ICD-10-CM

## 2022-09-19 DIAGNOSIS — J44.9 CHRONIC OBSTRUCTIVE PULMONARY DISEASE, UNSPECIFIED: ICD-10-CM

## 2022-09-19 DIAGNOSIS — R07.89 OTHER CHEST PAIN: ICD-10-CM

## 2022-09-19 DIAGNOSIS — I25.10 ATHEROSCLEROTIC HEART DISEASE OF NATIVE CORONARY ARTERY WITHOUT ANGINA PECTORIS: ICD-10-CM

## 2022-09-19 DIAGNOSIS — I10 ESSENTIAL (PRIMARY) HYPERTENSION: ICD-10-CM

## 2022-09-19 DIAGNOSIS — Z86.73 PERSONAL HISTORY OF TRANSIENT ISCHEMIC ATTACK (TIA), AND CEREBRAL INFARCTION WITHOUT RESIDUAL DEFICITS: ICD-10-CM

## 2022-09-19 DIAGNOSIS — E78.5 HYPERLIPIDEMIA, UNSPECIFIED: ICD-10-CM

## 2022-09-19 DIAGNOSIS — Z95.1 PRESENCE OF AORTOCORONARY BYPASS GRAFT: ICD-10-CM

## 2022-09-19 DIAGNOSIS — I25.2 OLD MYOCARDIAL INFARCTION: ICD-10-CM

## 2022-09-19 DIAGNOSIS — I48.91 UNSPECIFIED ATRIAL FIBRILLATION: ICD-10-CM

## 2022-09-19 DIAGNOSIS — Z53.29 PROCEDURE AND TREATMENT NOT CARRIED OUT BECAUSE OF PATIENT'S DECISION FOR OTHER REASONS: ICD-10-CM

## 2022-09-20 ENCOUNTER — EMERGENCY (EMERGENCY)
Facility: HOSPITAL | Age: 72
LOS: 1 days | Discharge: LEFT BEFORE DISCHARGE | End: 2022-09-20
Admitting: EMERGENCY MEDICINE

## 2022-09-20 VITALS
RESPIRATION RATE: 19 BRPM | SYSTOLIC BLOOD PRESSURE: 121 MMHG | TEMPERATURE: 98 F | HEART RATE: 88 BPM | HEIGHT: 65 IN | OXYGEN SATURATION: 100 % | WEIGHT: 132.28 LBS | DIASTOLIC BLOOD PRESSURE: 73 MMHG

## 2022-09-20 DIAGNOSIS — Z95.1 PRESENCE OF AORTOCORONARY BYPASS GRAFT: Chronic | ICD-10-CM

## 2022-09-20 DIAGNOSIS — Z95.2 PRESENCE OF PROSTHETIC HEART VALVE: Chronic | ICD-10-CM

## 2022-09-20 PROCEDURE — L9991: CPT

## 2022-09-20 NOTE — ED ADULT NURSE NOTE - NS ED NURSE RECORD ANOTHER HT AND WT
Yes Pt is a 35 y/o female c/o redness and warmth of c section surgical site. S/p 2 wks after giving pregnancy, was her first birth. States she had low grade fever at home, afebrile upon assessment. Denies pus drainage from site. Site appears red and swollen. Denies pmh. Denies CP, SOB, N/V/D, numbness, tingling, cough, chills, dizziness, weakness, headache. A&Ox3. Breathing unlabored and spontaneous. Abdomen soft, nontender, nondistended. Full ROM and strength of extremities. Safety and comfort measures provided, call bell provided and bed in lowest position.

## 2022-09-22 DIAGNOSIS — R05.9 COUGH, UNSPECIFIED: ICD-10-CM

## 2022-09-22 DIAGNOSIS — Z53.21 PROCEDURE AND TREATMENT NOT CARRIED OUT DUE TO PATIENT LEAVING PRIOR TO BEING SEEN BY HEALTH CARE PROVIDER: ICD-10-CM

## 2022-09-24 ENCOUNTER — EMERGENCY (EMERGENCY)
Facility: HOSPITAL | Age: 72
LOS: 1 days | Discharge: LEFT BEFORE DISCHARGE | End: 2022-09-24
Attending: EMERGENCY MEDICINE | Admitting: EMERGENCY MEDICINE

## 2022-09-24 VITALS
OXYGEN SATURATION: 100 % | RESPIRATION RATE: 18 BRPM | HEART RATE: 97 BPM | TEMPERATURE: 98 F | WEIGHT: 134.92 LBS | DIASTOLIC BLOOD PRESSURE: 88 MMHG | SYSTOLIC BLOOD PRESSURE: 130 MMHG | HEIGHT: 65 IN

## 2022-09-24 DIAGNOSIS — Z95.2 PRESENCE OF PROSTHETIC HEART VALVE: Chronic | ICD-10-CM

## 2022-09-24 DIAGNOSIS — Z95.1 PRESENCE OF AORTOCORONARY BYPASS GRAFT: Chronic | ICD-10-CM

## 2022-09-24 PROCEDURE — 99284 EMERGENCY DEPT VISIT MOD MDM: CPT

## 2022-09-24 RX ORDER — AZITHROMYCIN 500 MG/1
500 TABLET, FILM COATED ORAL ONCE
Refills: 0 | Status: COMPLETED | OUTPATIENT
Start: 2022-09-24 | End: 2022-09-24

## 2022-09-24 RX ORDER — IPRATROPIUM/ALBUTEROL SULFATE 18-103MCG
3 AEROSOL WITH ADAPTER (GRAM) INHALATION ONCE
Refills: 0 | Status: COMPLETED | OUTPATIENT
Start: 2022-09-24 | End: 2022-09-24

## 2022-09-24 RX ORDER — ALBUTEROL 90 UG/1
1 AEROSOL, METERED ORAL ONCE
Refills: 0 | Status: COMPLETED | OUTPATIENT
Start: 2022-09-24 | End: 2022-09-24

## 2022-09-24 RX ORDER — CEFTRIAXONE 500 MG/1
1000 INJECTION, POWDER, FOR SOLUTION INTRAMUSCULAR; INTRAVENOUS ONCE
Refills: 0 | Status: COMPLETED | OUTPATIENT
Start: 2022-09-24 | End: 2022-09-24

## 2022-09-24 RX ADMIN — ALBUTEROL 1 PUFF(S): 90 AEROSOL, METERED ORAL at 18:44

## 2022-09-24 NOTE — ED ADULT NURSE NOTE - OBJECTIVE STATEMENT
Pt aox3 with steady gait. Pt states dry cough x wks. denies sob or dizziness or fevers. chronic wound to chest

## 2022-09-24 NOTE — ED PROVIDER NOTE - OBJECTIVE STATEMENT
72 y/o M with PMH of A-fib, hypertension, asthma, COPD, and hyperlipidemia presents to the ED for CP/dyspnea. Pt is well known to this ED 2/2 frequent visits for a myriad of complaints. Today, Pt presents to the ED for CP/dyspnea, cough w phlegm.

## 2022-09-27 DIAGNOSIS — F41.9 ANXIETY DISORDER, UNSPECIFIED: ICD-10-CM

## 2022-09-27 DIAGNOSIS — I48.91 UNSPECIFIED ATRIAL FIBRILLATION: ICD-10-CM

## 2022-09-27 DIAGNOSIS — I10 ESSENTIAL (PRIMARY) HYPERTENSION: ICD-10-CM

## 2022-09-27 DIAGNOSIS — E78.5 HYPERLIPIDEMIA, UNSPECIFIED: ICD-10-CM

## 2022-09-27 DIAGNOSIS — R07.89 OTHER CHEST PAIN: ICD-10-CM

## 2022-09-27 DIAGNOSIS — R06.00 DYSPNEA, UNSPECIFIED: ICD-10-CM

## 2022-09-27 DIAGNOSIS — J43.9 EMPHYSEMA, UNSPECIFIED: ICD-10-CM

## 2022-09-27 DIAGNOSIS — Z95.4 PRESENCE OF OTHER HEART-VALVE REPLACEMENT: ICD-10-CM

## 2022-09-27 DIAGNOSIS — R05.9 COUGH, UNSPECIFIED: ICD-10-CM

## 2022-09-27 DIAGNOSIS — R06.02 SHORTNESS OF BREATH: ICD-10-CM

## 2022-09-27 DIAGNOSIS — Z79.82 LONG TERM (CURRENT) USE OF ASPIRIN: ICD-10-CM

## 2022-09-27 DIAGNOSIS — Z95.1 PRESENCE OF AORTOCORONARY BYPASS GRAFT: ICD-10-CM

## 2022-09-27 DIAGNOSIS — Z53.29 PROCEDURE AND TREATMENT NOT CARRIED OUT BECAUSE OF PATIENT'S DECISION FOR OTHER REASONS: ICD-10-CM

## 2022-09-27 DIAGNOSIS — Z88.6 ALLERGY STATUS TO ANALGESIC AGENT: ICD-10-CM

## 2022-09-29 ENCOUNTER — EMERGENCY (EMERGENCY)
Facility: HOSPITAL | Age: 72
LOS: 1 days | Discharge: LEFT BEFORE DISCHARGE | End: 2022-09-29
Attending: EMERGENCY MEDICINE | Admitting: EMERGENCY MEDICINE

## 2022-09-29 VITALS
WEIGHT: 145.06 LBS | HEIGHT: 65 IN | HEART RATE: 93 BPM | OXYGEN SATURATION: 99 % | DIASTOLIC BLOOD PRESSURE: 80 MMHG | TEMPERATURE: 98 F | RESPIRATION RATE: 18 BRPM | SYSTOLIC BLOOD PRESSURE: 154 MMHG

## 2022-09-29 DIAGNOSIS — Z95.1 PRESENCE OF AORTOCORONARY BYPASS GRAFT: Chronic | ICD-10-CM

## 2022-09-29 DIAGNOSIS — Z95.2 PRESENCE OF PROSTHETIC HEART VALVE: Chronic | ICD-10-CM

## 2022-09-29 PROCEDURE — L9991: CPT

## 2022-10-02 ENCOUNTER — EMERGENCY (EMERGENCY)
Facility: HOSPITAL | Age: 72
LOS: 1 days | Discharge: LEFT BEFORE DISCHARGE | End: 2022-10-02
Attending: EMERGENCY MEDICINE | Admitting: EMERGENCY MEDICINE

## 2022-10-02 VITALS
HEART RATE: 87 BPM | SYSTOLIC BLOOD PRESSURE: 121 MMHG | WEIGHT: 149.91 LBS | RESPIRATION RATE: 16 BRPM | TEMPERATURE: 98 F | HEIGHT: 65 IN | OXYGEN SATURATION: 95 % | DIASTOLIC BLOOD PRESSURE: 67 MMHG

## 2022-10-02 DIAGNOSIS — Z95.1 PRESENCE OF AORTOCORONARY BYPASS GRAFT: Chronic | ICD-10-CM

## 2022-10-02 DIAGNOSIS — R05.9 COUGH, UNSPECIFIED: ICD-10-CM

## 2022-10-02 DIAGNOSIS — Z95.2 PRESENCE OF PROSTHETIC HEART VALVE: Chronic | ICD-10-CM

## 2022-10-02 DIAGNOSIS — Z53.21 PROCEDURE AND TREATMENT NOT CARRIED OUT DUE TO PATIENT LEAVING PRIOR TO BEING SEEN BY HEALTH CARE PROVIDER: ICD-10-CM

## 2022-10-02 LAB
ALBUMIN SERPL ELPH-MCNC: 3.3 G/DL — LOW (ref 3.4–5)
ALP SERPL-CCNC: 69 U/L — SIGNIFICANT CHANGE UP (ref 40–120)
ALT FLD-CCNC: 23 U/L — SIGNIFICANT CHANGE UP (ref 12–42)
ANION GAP SERPL CALC-SCNC: 4 MMOL/L — LOW (ref 9–16)
APTT BLD: 32.5 SEC — SIGNIFICANT CHANGE UP (ref 27.5–35.5)
AST SERPL-CCNC: 22 U/L — SIGNIFICANT CHANGE UP (ref 15–37)
BILIRUB SERPL-MCNC: 0.6 MG/DL — SIGNIFICANT CHANGE UP (ref 0.2–1.2)
BUN SERPL-MCNC: 16 MG/DL — SIGNIFICANT CHANGE UP (ref 7–23)
CALCIUM SERPL-MCNC: 8.5 MG/DL — SIGNIFICANT CHANGE UP (ref 8.5–10.5)
CHLORIDE SERPL-SCNC: 103 MMOL/L — SIGNIFICANT CHANGE UP (ref 96–108)
CK MB BLD-MCNC: 1.67 % — SIGNIFICANT CHANGE UP
CK MB CFR SERPL CALC: 1 NG/ML — SIGNIFICANT CHANGE UP (ref 0.5–3.6)
CK SERPL-CCNC: 60 U/L — SIGNIFICANT CHANGE UP (ref 39–308)
CO2 SERPL-SCNC: 32 MMOL/L — HIGH (ref 22–31)
CREAT SERPL-MCNC: 1.04 MG/DL — SIGNIFICANT CHANGE UP (ref 0.5–1.3)
EGFR: 76 ML/MIN/1.73M2 — SIGNIFICANT CHANGE UP
GLUCOSE SERPL-MCNC: 74 MG/DL — SIGNIFICANT CHANGE UP (ref 70–99)
HCT VFR BLD CALC: 43.1 % — SIGNIFICANT CHANGE UP (ref 39–50)
HGB BLD-MCNC: 14 G/DL — SIGNIFICANT CHANGE UP (ref 13–17)
INR BLD: 1.1 — SIGNIFICANT CHANGE UP (ref 0.88–1.16)
MCHC RBC-ENTMCNC: 29.9 PG — SIGNIFICANT CHANGE UP (ref 27–34)
MCHC RBC-ENTMCNC: 32.5 GM/DL — SIGNIFICANT CHANGE UP (ref 32–36)
MCV RBC AUTO: 92.1 FL — SIGNIFICANT CHANGE UP (ref 80–100)
NRBC # BLD: 0 /100 WBCS — SIGNIFICANT CHANGE UP (ref 0–0)
PLATELET # BLD AUTO: 201 K/UL — SIGNIFICANT CHANGE UP (ref 150–400)
POTASSIUM SERPL-MCNC: 3.8 MMOL/L — SIGNIFICANT CHANGE UP (ref 3.5–5.3)
POTASSIUM SERPL-SCNC: 3.8 MMOL/L — SIGNIFICANT CHANGE UP (ref 3.5–5.3)
PROT SERPL-MCNC: 6.9 G/DL — SIGNIFICANT CHANGE UP (ref 6.4–8.2)
PROTHROM AB SERPL-ACNC: 12.9 SEC — SIGNIFICANT CHANGE UP (ref 10.5–13.4)
RBC # BLD: 4.68 M/UL — SIGNIFICANT CHANGE UP (ref 4.2–5.8)
RBC # FLD: 12.6 % — SIGNIFICANT CHANGE UP (ref 10.3–14.5)
SODIUM SERPL-SCNC: 139 MMOL/L — SIGNIFICANT CHANGE UP (ref 132–145)
TROPONIN I, HIGH SENSITIVITY RESULT: 8.8 NG/L — SIGNIFICANT CHANGE UP
WBC # BLD: 7.36 K/UL — SIGNIFICANT CHANGE UP (ref 3.8–10.5)
WBC # FLD AUTO: 7.36 K/UL — SIGNIFICANT CHANGE UP (ref 3.8–10.5)

## 2022-10-02 PROCEDURE — 99285 EMERGENCY DEPT VISIT HI MDM: CPT

## 2022-10-02 NOTE — ED PROVIDER NOTE - CLINICAL SUMMARY MEDICAL DECISION MAKING FREE TEXT BOX
70 y/o Male with PMHx of A-fib, COPD, asthma, HTN, and HLD presenting with chest pain and a lesion over his sternum. Will obtain EKG, blood-work, and reassess. 72 y/o Male with PMHx of A-fib, COPD, asthma, HTN, and HLD presenting with chest pain and a lesion over his sternum. Will obtain EKG, blood-work, and reassess.    Patient eloped from ED

## 2022-10-02 NOTE — ED PROVIDER NOTE - OBJECTIVE STATEMENT
72 y/o Male with PMHx of A-fib, HTN, asthma, COPD, and hyperlipidemia presents with a lesion on his sternum. Patient has had multiple ED visits. Patient states that he is experiencing chest pain. Denies fever/chills, SOB, and N/V.

## 2022-10-03 DIAGNOSIS — I48.91 UNSPECIFIED ATRIAL FIBRILLATION: ICD-10-CM

## 2022-10-03 DIAGNOSIS — F41.9 ANXIETY DISORDER, UNSPECIFIED: ICD-10-CM

## 2022-10-03 DIAGNOSIS — I10 ESSENTIAL (PRIMARY) HYPERTENSION: ICD-10-CM

## 2022-10-03 DIAGNOSIS — Z95.4 PRESENCE OF OTHER HEART-VALVE REPLACEMENT: ICD-10-CM

## 2022-10-03 DIAGNOSIS — Z95.1 PRESENCE OF AORTOCORONARY BYPASS GRAFT: ICD-10-CM

## 2022-10-03 DIAGNOSIS — E78.5 HYPERLIPIDEMIA, UNSPECIFIED: ICD-10-CM

## 2022-10-03 DIAGNOSIS — J43.9 EMPHYSEMA, UNSPECIFIED: ICD-10-CM

## 2022-10-03 DIAGNOSIS — R07.89 OTHER CHEST PAIN: ICD-10-CM

## 2022-10-03 DIAGNOSIS — Z53.29 PROCEDURE AND TREATMENT NOT CARRIED OUT BECAUSE OF PATIENT'S DECISION FOR OTHER REASONS: ICD-10-CM

## 2022-10-03 DIAGNOSIS — R06.02 SHORTNESS OF BREATH: ICD-10-CM

## 2022-10-03 DIAGNOSIS — Z88.6 ALLERGY STATUS TO ANALGESIC AGENT: ICD-10-CM

## 2022-10-04 ENCOUNTER — EMERGENCY (EMERGENCY)
Facility: HOSPITAL | Age: 72
LOS: 1 days | Discharge: LEFT BEFORE DISCHARGE | End: 2022-10-04
Admitting: EMERGENCY MEDICINE

## 2022-10-04 VITALS
RESPIRATION RATE: 20 BRPM | HEIGHT: 65 IN | OXYGEN SATURATION: 100 % | TEMPERATURE: 98 F | SYSTOLIC BLOOD PRESSURE: 142 MMHG | HEART RATE: 103 BPM | DIASTOLIC BLOOD PRESSURE: 97 MMHG

## 2022-10-04 VITALS — HEART RATE: 92 BPM

## 2022-10-04 DIAGNOSIS — Z95.1 PRESENCE OF AORTOCORONARY BYPASS GRAFT: Chronic | ICD-10-CM

## 2022-10-04 DIAGNOSIS — Z95.2 PRESENCE OF PROSTHETIC HEART VALVE: Chronic | ICD-10-CM

## 2022-10-04 LAB
ALBUMIN SERPL ELPH-MCNC: 3.2 G/DL — LOW (ref 3.4–5)
ALP SERPL-CCNC: 91 U/L — SIGNIFICANT CHANGE UP (ref 40–120)
ALT FLD-CCNC: 34 U/L — SIGNIFICANT CHANGE UP (ref 12–42)
ANION GAP SERPL CALC-SCNC: 2 MMOL/L — LOW (ref 9–16)
AST SERPL-CCNC: 31 U/L — SIGNIFICANT CHANGE UP (ref 15–37)
BASOPHILS # BLD AUTO: 0.03 K/UL — SIGNIFICANT CHANGE UP (ref 0–0.2)
BASOPHILS NFR BLD AUTO: 0.5 % — SIGNIFICANT CHANGE UP (ref 0–2)
BILIRUB SERPL-MCNC: 0.5 MG/DL — SIGNIFICANT CHANGE UP (ref 0.2–1.2)
BUN SERPL-MCNC: 14 MG/DL — SIGNIFICANT CHANGE UP (ref 7–23)
CALCIUM SERPL-MCNC: 8.7 MG/DL — SIGNIFICANT CHANGE UP (ref 8.5–10.5)
CHLORIDE SERPL-SCNC: 102 MMOL/L — SIGNIFICANT CHANGE UP (ref 96–108)
CO2 SERPL-SCNC: 34 MMOL/L — HIGH (ref 22–31)
CREAT SERPL-MCNC: 1.03 MG/DL — SIGNIFICANT CHANGE UP (ref 0.5–1.3)
D DIMER BLD IA.RAPID-MCNC: 332 NG/ML DDU — HIGH
EGFR: 77 ML/MIN/1.73M2 — SIGNIFICANT CHANGE UP
EOSINOPHIL # BLD AUTO: 0.16 K/UL — SIGNIFICANT CHANGE UP (ref 0–0.5)
EOSINOPHIL NFR BLD AUTO: 2.5 % — SIGNIFICANT CHANGE UP (ref 0–6)
FLUAV H1 2009 PAND RNA SPEC QL NAA+PROBE: SIGNIFICANT CHANGE UP
FLUAV H1 RNA SPEC QL NAA+PROBE: SIGNIFICANT CHANGE UP
FLUAV H3 RNA SPEC QL NAA+PROBE: SIGNIFICANT CHANGE UP
FLUAV SUBTYP SPEC NAA+PROBE: SIGNIFICANT CHANGE UP
FLUBV RNA SPEC QL NAA+PROBE: SIGNIFICANT CHANGE UP
GLUCOSE SERPL-MCNC: 88 MG/DL — SIGNIFICANT CHANGE UP (ref 70–99)
HCT VFR BLD CALC: 42.1 % — SIGNIFICANT CHANGE UP (ref 39–50)
HGB BLD-MCNC: 13.6 G/DL — SIGNIFICANT CHANGE UP (ref 13–17)
IMM GRANULOCYTES NFR BLD AUTO: 0.2 % — SIGNIFICANT CHANGE UP (ref 0–0.9)
LYMPHOCYTES # BLD AUTO: 1.55 K/UL — SIGNIFICANT CHANGE UP (ref 1–3.3)
LYMPHOCYTES # BLD AUTO: 23.9 % — SIGNIFICANT CHANGE UP (ref 13–44)
MAGNESIUM SERPL-MCNC: 2 MG/DL — SIGNIFICANT CHANGE UP (ref 1.6–2.6)
MCHC RBC-ENTMCNC: 30.2 PG — SIGNIFICANT CHANGE UP (ref 27–34)
MCHC RBC-ENTMCNC: 32.3 GM/DL — SIGNIFICANT CHANGE UP (ref 32–36)
MCV RBC AUTO: 93.6 FL — SIGNIFICANT CHANGE UP (ref 80–100)
MONOCYTES # BLD AUTO: 0.56 K/UL — SIGNIFICANT CHANGE UP (ref 0–0.9)
MONOCYTES NFR BLD AUTO: 8.6 % — SIGNIFICANT CHANGE UP (ref 2–14)
NEUTROPHILS # BLD AUTO: 4.18 K/UL — SIGNIFICANT CHANGE UP (ref 1.8–7.4)
NEUTROPHILS NFR BLD AUTO: 64.3 % — SIGNIFICANT CHANGE UP (ref 43–77)
NRBC # BLD: 0 /100 WBCS — SIGNIFICANT CHANGE UP (ref 0–0)
NT-PROBNP SERPL-SCNC: 2190 PG/ML — HIGH
PLATELET # BLD AUTO: 203 K/UL — SIGNIFICANT CHANGE UP (ref 150–400)
POTASSIUM SERPL-MCNC: 4.3 MMOL/L — SIGNIFICANT CHANGE UP (ref 3.5–5.3)
POTASSIUM SERPL-SCNC: 4.3 MMOL/L — SIGNIFICANT CHANGE UP (ref 3.5–5.3)
PROT SERPL-MCNC: 6.8 G/DL — SIGNIFICANT CHANGE UP (ref 6.4–8.2)
RAPID RVP RESULT: SIGNIFICANT CHANGE UP
RBC # BLD: 4.5 M/UL — SIGNIFICANT CHANGE UP (ref 4.2–5.8)
RBC # FLD: 12.5 % — SIGNIFICANT CHANGE UP (ref 10.3–14.5)
SARS-COV-2 RNA SPEC QL NAA+PROBE: SIGNIFICANT CHANGE UP
SODIUM SERPL-SCNC: 138 MMOL/L — SIGNIFICANT CHANGE UP (ref 132–145)
TROPONIN I, HIGH SENSITIVITY RESULT: 8.5 NG/L — SIGNIFICANT CHANGE UP
WBC # BLD: 6.49 K/UL — SIGNIFICANT CHANGE UP (ref 3.8–10.5)
WBC # FLD AUTO: 6.49 K/UL — SIGNIFICANT CHANGE UP (ref 3.8–10.5)

## 2022-10-04 PROCEDURE — 99284 EMERGENCY DEPT VISIT MOD MDM: CPT

## 2022-10-04 PROCEDURE — 71046 X-RAY EXAM CHEST 2 VIEWS: CPT | Mod: 26

## 2022-10-04 RX ORDER — ALBUTEROL 90 UG/1
2 AEROSOL, METERED ORAL ONCE
Refills: 0 | Status: COMPLETED | OUTPATIENT
Start: 2022-10-04 | End: 2022-10-04

## 2022-10-04 RX ADMIN — ALBUTEROL 2 PUFF(S): 90 AEROSOL, METERED ORAL at 14:13

## 2022-10-04 NOTE — ED PROVIDER NOTE - OBJECTIVE STATEMENT
72 yo M, pmhx A-fib, HTN, asthma, COPD, and hyperlipidemia, presenting to the ED c/o acute worsening SOB for the past day. He reports walking in the street, when patient experienced an episode of SOB. He states it was preceded by a coughing fit. The cough is productive w/ clear sputum. Pt is a chronic smoker, 3-4 cigs/day. He denies any active chest pain at this time but patient has been seen in the ED for chest pain multiple times in the past. Denies fevers, chills, back pain, abd pain, or urinary symptoms.

## 2022-10-04 NOTE — ED PROVIDER NOTE - PROGRESS NOTE DETAILS
Patient observed walking out of the ED; elopement. Patient observed walking out of the ED after blood draw but prior to discussing any results; coleman.

## 2022-10-04 NOTE — ED ADULT NURSE REASSESSMENT NOTE - NS ED NURSE REASSESS COMMENT FT1
Pt refusing to stay for further work up and results, alerted staff that he was leaving. CHASITY Weathers aware. Pt eloped without signing AMA form, did agree to stay for inhaler and took it to go.

## 2022-10-04 NOTE — ED PROVIDER NOTE - CLINICAL SUMMARY MEDICAL DECISION MAKING FREE TEXT BOX
72 yo M, pmhx A-fib, HTN, asthma, COPD, and hyperlipidemia, presenting to the ED c/o acute worsening SOB for the past day. Patient found to have stable vitals in triage and is otherwise well appearing. EKG shows a fib w/ rate of 92. Will trial albuterol pump for symptoms. Will send for CXR and medical labs as well. Dispo pending clinical improvement.

## 2022-10-04 NOTE — ED ADULT NURSE NOTE - OBJECTIVE STATEMENT
Pt with SOB and CP; productive cough. Pt minimally cooperative with assessment and stating he wants to go. Pt agreeable to staying at [facility] after redirection from this RN.

## 2022-10-06 ENCOUNTER — EMERGENCY (EMERGENCY)
Facility: HOSPITAL | Age: 72
LOS: 1 days | Discharge: LEFT BEFORE DISCHARGE | End: 2022-10-06
Attending: EMERGENCY MEDICINE | Admitting: EMERGENCY MEDICINE

## 2022-10-06 VITALS
SYSTOLIC BLOOD PRESSURE: 122 MMHG | TEMPERATURE: 97 F | HEART RATE: 106 BPM | DIASTOLIC BLOOD PRESSURE: 77 MMHG | RESPIRATION RATE: 18 BRPM | OXYGEN SATURATION: 100 % | WEIGHT: 149.91 LBS | HEIGHT: 65 IN

## 2022-10-06 DIAGNOSIS — Z95.2 PRESENCE OF PROSTHETIC HEART VALVE: Chronic | ICD-10-CM

## 2022-10-06 DIAGNOSIS — R06.02 SHORTNESS OF BREATH: ICD-10-CM

## 2022-10-06 DIAGNOSIS — Z95.1 PRESENCE OF AORTOCORONARY BYPASS GRAFT: ICD-10-CM

## 2022-10-06 DIAGNOSIS — Z95.1 PRESENCE OF AORTOCORONARY BYPASS GRAFT: Chronic | ICD-10-CM

## 2022-10-06 DIAGNOSIS — I10 ESSENTIAL (PRIMARY) HYPERTENSION: ICD-10-CM

## 2022-10-06 DIAGNOSIS — F17.210 NICOTINE DEPENDENCE, CIGARETTES, UNCOMPLICATED: ICD-10-CM

## 2022-10-06 DIAGNOSIS — Z53.29 PROCEDURE AND TREATMENT NOT CARRIED OUT BECAUSE OF PATIENT'S DECISION FOR OTHER REASONS: ICD-10-CM

## 2022-10-06 DIAGNOSIS — R05.9 COUGH, UNSPECIFIED: ICD-10-CM

## 2022-10-06 DIAGNOSIS — J44.9 CHRONIC OBSTRUCTIVE PULMONARY DISEASE, UNSPECIFIED: ICD-10-CM

## 2022-10-06 DIAGNOSIS — I48.91 UNSPECIFIED ATRIAL FIBRILLATION: ICD-10-CM

## 2022-10-06 DIAGNOSIS — E78.5 HYPERLIPIDEMIA, UNSPECIFIED: ICD-10-CM

## 2022-10-06 DIAGNOSIS — Z20.822 CONTACT WITH AND (SUSPECTED) EXPOSURE TO COVID-19: ICD-10-CM

## 2022-10-06 DIAGNOSIS — Z79.82 LONG TERM (CURRENT) USE OF ASPIRIN: ICD-10-CM

## 2022-10-06 DIAGNOSIS — Z88.6 ALLERGY STATUS TO ANALGESIC AGENT: ICD-10-CM

## 2022-10-06 PROCEDURE — 93010 ELECTROCARDIOGRAM REPORT: CPT

## 2022-10-06 PROCEDURE — 99284 EMERGENCY DEPT VISIT MOD MDM: CPT

## 2022-10-06 NOTE — ED PROVIDER NOTE - CARE PLAN
Detail Level: Detailed Detail Level: Simple Detail Level: Zone 1 Principal Discharge DX:	Shortness of breath

## 2022-10-06 NOTE — ED ADULT NURSE NOTE - NSIMPLEMENTINTERV_GEN_ALL_ED
Implemented All Fall Risk Interventions:  [place] to call system. Call bell, personal items and telephone within reach. Instruct patient to call for assistance. Room bathroom lighting operational. Non-slip footwear when patient is off stretcher. Physically safe environment: no spills, clutter or unnecessary equipment. Stretcher in lowest position, wheels locked, appropriate side rails in place. Provide visual cue, wrist band, yellow gown, etc. Monitor gait and stability. Monitor for mental status changes and reorient to person, place, and time. Review medications for side effects contributing to fall risk. Reinforce activity limits and safety measures with patient and family.

## 2022-10-06 NOTE — ED PROVIDER NOTE - PHYSICAL EXAMINATION
GENERAL: well appearing, no acute distress   HEAD: atraumatic   EYES: EOMI   ENT: moist oral mucosa   CARDIAC: Irregularly irregular  RESPIRATORY: no increased work of breathing   MUSCULOSKELETAL: no deformity   NEUROLOGICAL: alert, spontaneous movement of extremities   SKIN: no visible rash  PSYCHIATRIC: cooperative

## 2022-10-06 NOTE — ED PROVIDER NOTE - CLINICAL SUMMARY MEDICAL DECISION MAKING FREE TEXT BOX
Elderly male with frequent visits to ED for chest pain and shortness of breath presents with similar  EKG A. fib rate 106, left anterior fascicular block, no ischemic changes  After EKG was performed but prior to labs being taken patient eloped from ED

## 2022-10-06 NOTE — ED ADULT NURSE REASSESSMENT NOTE - NS ED NURSE REASSESS COMMENT FT1
MD Sidhu put in orders for workup iv, blood work; went to go get blood work and patient was not seen by RN; was not in assigned chair A; asked charge EFRAIN Coates if patient left the ED and she did not know; patient not in any other assigned chair or bathroom in ED; made MD sidhu aware; patient eloped ED as of 1747

## 2022-10-06 NOTE — ED ADULT NURSE NOTE - BEFAST BALANCE
Pt arrives with c/o L leg weakness that has been intermittent for the last 3 days. Pt states that she was sent here from  for evaluation for stroke. Hx of lupus.   
No

## 2022-10-06 NOTE — ED PROVIDER NOTE - OBJECTIVE STATEMENT
71-year-old male past medical history of A. fib, COPD, anxiety, frequent visits to ED, presents with chest pain and shortness of breath times a few days

## 2022-10-06 NOTE — ED ADULT TRIAGE NOTE - CHIEF COMPLAINT QUOTE
Pt walked in c/o of chest pain and sob x 4 days. pt also reports LUQ pain and diarrhea x 10 days. Denies n/V

## 2022-10-07 ENCOUNTER — EMERGENCY (EMERGENCY)
Facility: HOSPITAL | Age: 72
LOS: 1 days | Discharge: LEFT BEFORE DISCHARGE | End: 2022-10-07
Attending: EMERGENCY MEDICINE | Admitting: EMERGENCY MEDICINE

## 2022-10-07 VITALS
OXYGEN SATURATION: 96 % | HEIGHT: 65 IN | RESPIRATION RATE: 18 BRPM | TEMPERATURE: 98 F | DIASTOLIC BLOOD PRESSURE: 88 MMHG | SYSTOLIC BLOOD PRESSURE: 133 MMHG | HEART RATE: 98 BPM | WEIGHT: 149.91 LBS

## 2022-10-07 DIAGNOSIS — Z95.2 PRESENCE OF PROSTHETIC HEART VALVE: Chronic | ICD-10-CM

## 2022-10-07 DIAGNOSIS — Z95.1 PRESENCE OF AORTOCORONARY BYPASS GRAFT: Chronic | ICD-10-CM

## 2022-10-07 PROCEDURE — L9992: CPT

## 2022-10-07 NOTE — ED ADULT TRIAGE NOTE - WEIGHT METHOD
stated [facility] Counseling: I discussed with the patient the risks of risankizumab-rzaa including but not limited to immunosuppression, and serious infections.  The patient understands that monitoring is required including a PPD at baseline and must alert us or the primary physician if symptoms of infection or other concerning signs are noted.

## 2022-10-09 DIAGNOSIS — Z88.6 ALLERGY STATUS TO ANALGESIC AGENT: ICD-10-CM

## 2022-10-09 DIAGNOSIS — F41.9 ANXIETY DISORDER, UNSPECIFIED: ICD-10-CM

## 2022-10-09 DIAGNOSIS — Z79.82 LONG TERM (CURRENT) USE OF ASPIRIN: ICD-10-CM

## 2022-10-09 DIAGNOSIS — R07.9 CHEST PAIN, UNSPECIFIED: ICD-10-CM

## 2022-10-09 DIAGNOSIS — R06.02 SHORTNESS OF BREATH: ICD-10-CM

## 2022-10-09 DIAGNOSIS — I48.91 UNSPECIFIED ATRIAL FIBRILLATION: ICD-10-CM

## 2022-10-09 DIAGNOSIS — Z53.21 PROCEDURE AND TREATMENT NOT CARRIED OUT DUE TO PATIENT LEAVING PRIOR TO BEING SEEN BY HEALTH CARE PROVIDER: ICD-10-CM

## 2022-10-09 DIAGNOSIS — Z53.29 PROCEDURE AND TREATMENT NOT CARRIED OUT BECAUSE OF PATIENT'S DECISION FOR OTHER REASONS: ICD-10-CM

## 2022-10-09 DIAGNOSIS — R05.9 COUGH, UNSPECIFIED: ICD-10-CM

## 2022-10-09 DIAGNOSIS — J44.9 CHRONIC OBSTRUCTIVE PULMONARY DISEASE, UNSPECIFIED: ICD-10-CM

## 2022-10-09 DIAGNOSIS — I44.4 LEFT ANTERIOR FASCICULAR BLOCK: ICD-10-CM

## 2022-10-14 ENCOUNTER — EMERGENCY (EMERGENCY)
Facility: HOSPITAL | Age: 72
LOS: 1 days | Discharge: LEFT BEFORE DISCHARGE | End: 2022-10-14
Attending: EMERGENCY MEDICINE | Admitting: EMERGENCY MEDICINE

## 2022-10-14 VITALS
SYSTOLIC BLOOD PRESSURE: 112 MMHG | HEART RATE: 83 BPM | HEIGHT: 65 IN | OXYGEN SATURATION: 100 % | WEIGHT: 139.99 LBS | TEMPERATURE: 98 F | RESPIRATION RATE: 18 BRPM | DIASTOLIC BLOOD PRESSURE: 72 MMHG

## 2022-10-14 DIAGNOSIS — Z95.1 PRESENCE OF AORTOCORONARY BYPASS GRAFT: Chronic | ICD-10-CM

## 2022-10-14 DIAGNOSIS — Z95.2 PRESENCE OF PROSTHETIC HEART VALVE: Chronic | ICD-10-CM

## 2022-10-14 PROCEDURE — 99284 EMERGENCY DEPT VISIT MOD MDM: CPT

## 2022-10-14 PROCEDURE — 93010 ELECTROCARDIOGRAM REPORT: CPT

## 2022-10-14 RX ORDER — ACETAMINOPHEN 500 MG
975 TABLET ORAL ONCE
Refills: 0 | Status: COMPLETED | OUTPATIENT
Start: 2022-10-14 | End: 2022-10-14

## 2022-10-14 RX ORDER — IPRATROPIUM/ALBUTEROL SULFATE 18-103MCG
3 AEROSOL WITH ADAPTER (GRAM) INHALATION
Refills: 0 | Status: DISCONTINUED | OUTPATIENT
Start: 2022-10-14 | End: 2022-10-14

## 2022-10-14 RX ADMIN — Medication 3 MILLILITER(S): at 16:30

## 2022-10-14 RX ADMIN — Medication 60 MILLIGRAM(S): at 16:29

## 2022-10-14 RX ADMIN — Medication 975 MILLIGRAM(S): at 16:29

## 2022-10-14 NOTE — ED PROVIDER NOTE - CLINICAL SUMMARY MEDICAL DECISION MAKING FREE TEXT BOX
Ms Herndon is a 64 yo female with advanced NSCLC and Stage 4 breast cancer presenting with worsening persistent nausea and severe nociceptive/neuropathic pain due to lymph node enlargement, intracranial, cervical spinal involvement with spinal cord impingement and and plexopathy.     Recs:  -cont methadone 5 mg po bid (EKG ok- Qtc 405 QRS 62)  -oxy IR po 15 mg q 3 hours prn  -cont nortriptyline 25 mg qhs  -olanzapine 5 mg ODT qhs for nausea  -agree with dex  -planing for operative intervention in AM for palliative purposes  -post operatively would continue above meds; if parenteral meds needed consider hydromorphone 1 mg IV q 2 hours prn; may consider PCA depending on needs starting 0.2 mg q 6 minutes prn bolus (methadone will continue to serve as long acting and can be given via NG/OG)  -will cont to follow post operatively        Follow up GOC conversations and explore options and pair with expected outcomes.  Pt has been preparing her children for her expected death and has stated that optimizing symptom management is her priority.      Interested in XRT or chemotx if it is felt that it will result in improved symptom mgmt only.  Extension of life is not primary goal, but rather quality of the time remaining.        Discussed with RN and onc team.      Thank you for the opportunity to care for this patient and family.      Please call with questions.      Christiano Swartz MD  Palliative Medicine   [facility]  [phone] (cell)   Patient presenting with cough and chest wall pain. EKG wnl. Patient eloped before nebs and CXR per usual.

## 2022-10-14 NOTE — ED ADULT NURSE NOTE - NS ED NURSE ELOPE COMMENTS
patient not seen or assesed by RN; was inheritated patient from EFRAIN Mireles for his break at 1705; patient was already missing at time of patient handover

## 2022-10-14 NOTE — ED PROVIDER NOTE - OBJECTIVE STATEMENT
71 M with hx of AF and COPD- heavy smoker. Has very frequent ED visits today has the same cough and L lateral chest wall pain that he always has and also wants a breathing treatment. No SOB (besides baseline SOB). No orthopnea. He also says that he needs a hand held inhaler. He is well know to us. Usually leaves within 5 mins of triage. This time agreed to stay for nebs and an eval then eloped.

## 2022-10-14 NOTE — ED PROVIDER NOTE - PHYSICAL EXAMINATION
VITAL SIGNS: I have reviewed nursing notes and confirm.  CONSTITUTIONAL: Well-developed; well-nourished; in no acute distress.  SKIN: Skin is warm and dry, no acute rash. Healing chest wall lesion.   HEAD: Normocephalic; atraumatic.  EYES: PERRL, EOM intact; conjunctiva and sclera clear.  ENT: No nasal discharge; airway clear.  NECK: Supple; non tender.  CARD: S1, S2 normal; no murmurs, gallops, or rubs. Regular rate and rhythm.  RESP: No wheezes, rales or rhonchi.  ABD: Normal bowel sounds; soft; non-distended; non-tender  MSK: Normal ROM. No clubbing, cyanosis or edema.  NEURO: Alert, oriented. Grossly unremarkable.  PSYCH: Cooperative, appropriate.

## 2022-10-14 NOTE — ED PROVIDER NOTE - HIV OFFER
· History of restless  · Continue gabapentin 300 mg at HS Previously Declined (within the last year)

## 2022-10-15 ENCOUNTER — EMERGENCY (EMERGENCY)
Facility: HOSPITAL | Age: 72
LOS: 1 days | Discharge: LEFT BEFORE DISCHARGE | End: 2022-10-15
Attending: EMERGENCY MEDICINE | Admitting: EMERGENCY MEDICINE

## 2022-10-15 VITALS — HEART RATE: 79 BPM

## 2022-10-15 VITALS
HEIGHT: 65 IN | SYSTOLIC BLOOD PRESSURE: 114 MMHG | OXYGEN SATURATION: 100 % | DIASTOLIC BLOOD PRESSURE: 72 MMHG | RESPIRATION RATE: 16 BRPM | HEART RATE: 94 BPM | TEMPERATURE: 98 F

## 2022-10-15 DIAGNOSIS — Z95.1 PRESENCE OF AORTOCORONARY BYPASS GRAFT: Chronic | ICD-10-CM

## 2022-10-15 DIAGNOSIS — Z95.2 PRESENCE OF PROSTHETIC HEART VALVE: Chronic | ICD-10-CM

## 2022-10-15 LAB
ALBUMIN SERPL ELPH-MCNC: 3.2 G/DL — LOW (ref 3.4–5)
ALP SERPL-CCNC: 78 U/L — SIGNIFICANT CHANGE UP (ref 40–120)
ALT FLD-CCNC: 32 U/L — SIGNIFICANT CHANGE UP (ref 12–42)
ANION GAP SERPL CALC-SCNC: 3 MMOL/L — LOW (ref 9–16)
APTT BLD: 40.7 SEC — HIGH (ref 27.5–35.5)
AST SERPL-CCNC: 25 U/L — SIGNIFICANT CHANGE UP (ref 15–37)
BASOPHILS # BLD AUTO: 0.03 K/UL — SIGNIFICANT CHANGE UP (ref 0–0.2)
BASOPHILS NFR BLD AUTO: 0.2 % — SIGNIFICANT CHANGE UP (ref 0–2)
BILIRUB SERPL-MCNC: 0.4 MG/DL — SIGNIFICANT CHANGE UP (ref 0.2–1.2)
BUN SERPL-MCNC: 30 MG/DL — HIGH (ref 7–23)
CALCIUM SERPL-MCNC: 8.6 MG/DL — SIGNIFICANT CHANGE UP (ref 8.5–10.5)
CHLORIDE SERPL-SCNC: 106 MMOL/L — SIGNIFICANT CHANGE UP (ref 96–108)
CK SERPL-CCNC: 57 U/L — SIGNIFICANT CHANGE UP (ref 39–308)
CO2 SERPL-SCNC: 32 MMOL/L — HIGH (ref 22–31)
CREAT SERPL-MCNC: 1.17 MG/DL — SIGNIFICANT CHANGE UP (ref 0.5–1.3)
EGFR: 66 ML/MIN/1.73M2 — SIGNIFICANT CHANGE UP
EOSINOPHIL # BLD AUTO: 0.07 K/UL — SIGNIFICANT CHANGE UP (ref 0–0.5)
EOSINOPHIL NFR BLD AUTO: 0.5 % — SIGNIFICANT CHANGE UP (ref 0–6)
GLUCOSE SERPL-MCNC: 120 MG/DL — HIGH (ref 70–99)
HCT VFR BLD CALC: 41.4 % — SIGNIFICANT CHANGE UP (ref 39–50)
HGB BLD-MCNC: 13.3 G/DL — SIGNIFICANT CHANGE UP (ref 13–17)
HIV 1 & 2 AB SERPL IA.RAPID: SIGNIFICANT CHANGE UP
IMM GRANULOCYTES NFR BLD AUTO: 0.3 % — SIGNIFICANT CHANGE UP (ref 0–0.9)
INR BLD: 2.14 — HIGH (ref 0.88–1.16)
LYMPHOCYTES # BLD AUTO: 1.76 K/UL — SIGNIFICANT CHANGE UP (ref 1–3.3)
LYMPHOCYTES # BLD AUTO: 13.3 % — SIGNIFICANT CHANGE UP (ref 13–44)
MCHC RBC-ENTMCNC: 29.4 PG — SIGNIFICANT CHANGE UP (ref 27–34)
MCHC RBC-ENTMCNC: 32.1 GM/DL — SIGNIFICANT CHANGE UP (ref 32–36)
MCV RBC AUTO: 91.6 FL — SIGNIFICANT CHANGE UP (ref 80–100)
MONOCYTES # BLD AUTO: 0.83 K/UL — SIGNIFICANT CHANGE UP (ref 0–0.9)
MONOCYTES NFR BLD AUTO: 6.3 % — SIGNIFICANT CHANGE UP (ref 2–14)
NEUTROPHILS # BLD AUTO: 10.48 K/UL — HIGH (ref 1.8–7.4)
NEUTROPHILS NFR BLD AUTO: 79.4 % — HIGH (ref 43–77)
NRBC # BLD: 0 /100 WBCS — SIGNIFICANT CHANGE UP (ref 0–0)
PLATELET # BLD AUTO: 236 K/UL — SIGNIFICANT CHANGE UP (ref 150–400)
POTASSIUM SERPL-MCNC: 4.3 MMOL/L — SIGNIFICANT CHANGE UP (ref 3.5–5.3)
POTASSIUM SERPL-SCNC: 4.3 MMOL/L — SIGNIFICANT CHANGE UP (ref 3.5–5.3)
PROT SERPL-MCNC: 6.6 G/DL — SIGNIFICANT CHANGE UP (ref 6.4–8.2)
PROTHROM AB SERPL-ACNC: 25 SEC — HIGH (ref 10.5–13.4)
RBC # BLD: 4.52 M/UL — SIGNIFICANT CHANGE UP (ref 4.2–5.8)
RBC # FLD: 12 % — SIGNIFICANT CHANGE UP (ref 10.3–14.5)
SODIUM SERPL-SCNC: 141 MMOL/L — SIGNIFICANT CHANGE UP (ref 132–145)
TROPONIN I, HIGH SENSITIVITY RESULT: 10.2 NG/L — SIGNIFICANT CHANGE UP
WBC # BLD: 13.21 K/UL — HIGH (ref 3.8–10.5)
WBC # FLD AUTO: 13.21 K/UL — HIGH (ref 3.8–10.5)

## 2022-10-15 PROCEDURE — 99284 EMERGENCY DEPT VISIT MOD MDM: CPT

## 2022-10-15 NOTE — ED PROVIDER NOTE - CLINICAL SUMMARY MEDICAL DECISION MAKING FREE TEXT BOX
Patient arrived to ED states he does not feel well, he is holding a blister pack of Paxlovid medication.  No chest pain at this time and no cough or shortness of breath.  No abd pain, no rash.    Patient eloped from ED prior to xray and completion of evaluation and treatment.

## 2022-10-15 NOTE — ED ADULT TRIAGE NOTE - CHIEF COMPLAINT QUOTE
"I don't feel good" pt states that paxlovid isn't "working" and his covid symptoms are worsening (unable to tell me his symptoms)

## 2022-10-15 NOTE — ED ADULT NURSE NOTE - OBJECTIVE STATEMENT
Pt walked in c/o "feeling unwell", "my doctor said I have pneumonia". Pt has rx of paxlovid in hand. Pt states he is not covid +.

## 2022-10-17 DIAGNOSIS — I10 ESSENTIAL (PRIMARY) HYPERTENSION: ICD-10-CM

## 2022-10-17 DIAGNOSIS — F41.9 ANXIETY DISORDER, UNSPECIFIED: ICD-10-CM

## 2022-10-17 DIAGNOSIS — R53.81 OTHER MALAISE: ICD-10-CM

## 2022-10-17 DIAGNOSIS — Z53.29 PROCEDURE AND TREATMENT NOT CARRIED OUT BECAUSE OF PATIENT'S DECISION FOR OTHER REASONS: ICD-10-CM

## 2022-10-17 DIAGNOSIS — Z95.1 PRESENCE OF AORTOCORONARY BYPASS GRAFT: ICD-10-CM

## 2022-10-17 DIAGNOSIS — Z79.82 LONG TERM (CURRENT) USE OF ASPIRIN: ICD-10-CM

## 2022-10-17 DIAGNOSIS — R06.02 SHORTNESS OF BREATH: ICD-10-CM

## 2022-10-17 DIAGNOSIS — Z88.6 ALLERGY STATUS TO ANALGESIC AGENT: ICD-10-CM

## 2022-10-17 DIAGNOSIS — E78.5 HYPERLIPIDEMIA, UNSPECIFIED: ICD-10-CM

## 2022-10-17 DIAGNOSIS — R53.1 WEAKNESS: ICD-10-CM

## 2022-10-17 DIAGNOSIS — I48.91 UNSPECIFIED ATRIAL FIBRILLATION: ICD-10-CM

## 2022-10-17 DIAGNOSIS — R05.1 ACUTE COUGH: ICD-10-CM

## 2022-10-17 DIAGNOSIS — J43.9 EMPHYSEMA, UNSPECIFIED: ICD-10-CM

## 2022-10-17 DIAGNOSIS — Z95.4 PRESENCE OF OTHER HEART-VALVE REPLACEMENT: ICD-10-CM

## 2022-10-17 DIAGNOSIS — R07.89 OTHER CHEST PAIN: ICD-10-CM

## 2022-10-19 ENCOUNTER — EMERGENCY (EMERGENCY)
Facility: HOSPITAL | Age: 72
LOS: 1 days | Discharge: LEFT BEFORE DISCHARGE | End: 2022-10-19
Attending: EMERGENCY MEDICINE | Admitting: EMERGENCY MEDICINE

## 2022-10-19 VITALS
OXYGEN SATURATION: 100 % | DIASTOLIC BLOOD PRESSURE: 82 MMHG | SYSTOLIC BLOOD PRESSURE: 128 MMHG | RESPIRATION RATE: 18 BRPM | HEIGHT: 65 IN | HEART RATE: 97 BPM | WEIGHT: 145.06 LBS | TEMPERATURE: 97 F

## 2022-10-19 DIAGNOSIS — Z95.1 PRESENCE OF AORTOCORONARY BYPASS GRAFT: Chronic | ICD-10-CM

## 2022-10-19 DIAGNOSIS — Z95.2 PRESENCE OF PROSTHETIC HEART VALVE: Chronic | ICD-10-CM

## 2022-10-19 PROCEDURE — 99284 EMERGENCY DEPT VISIT MOD MDM: CPT

## 2022-10-19 RX ORDER — DEXAMETHASONE 0.5 MG/5ML
10 ELIXIR ORAL ONCE
Refills: 0 | Status: COMPLETED | OUTPATIENT
Start: 2022-10-19 | End: 2022-10-19

## 2022-10-19 RX ORDER — ALBUTEROL 90 UG/1
2 AEROSOL, METERED ORAL ONCE
Refills: 0 | Status: COMPLETED | OUTPATIENT
Start: 2022-10-19 | End: 2022-10-19

## 2022-10-19 RX ADMIN — Medication 10 MILLIGRAM(S): at 13:54

## 2022-10-19 RX ADMIN — ALBUTEROL 2 PUFF(S): 90 AEROSOL, METERED ORAL at 13:53

## 2022-10-19 NOTE — ED PROVIDER NOTE - PROGRESS NOTE DETAILS
Denies patient took p.o. Decadron as well as use the inhaler.  He then eloped which is his usual pattern. He did take the inhaler with him this time, which was good.

## 2022-10-19 NOTE — ED PROVIDER NOTE - RESPIRATORY, MLM
Good air entry bilaterally. +Scattered expiratory wheezes, no crackles. +Mild tachypnea. Speaking in full sentences, does not appear in distress.

## 2022-10-19 NOTE — ED PROVIDER NOTE - CLINICAL SUMMARY MEDICAL DECISION MAKING FREE TEXT BOX
Patient with known A-fib and COPD, active cigarette smoker, presents with shortness of breath and refused EKG. Will treat with albuterol. If patient stays or would like to stay and have additional treatment will escalate to nebulizer and labs if symptoms do not improve with handheld inhaler.

## 2022-10-19 NOTE — ED PROVIDER NOTE - OBJECTIVE STATEMENT
72 y/o M with PMHx of AF and COPD- heavy smoker, well known to this ED, and returns for the same complaint as his visit 5 days ago of chest pain and cough. Patient usually leaves AMA before he can receive any treatment. 72 y/o M with PMHx of Atrial Fibrillation, COPD, and current cigarette smoker, well known to this ED, and here today complaining of shortness of breath. He denies chest pain to me at this time. Denies fevers. He looks tachypneic today. I saw him for this same complaint the other day and he promised he would stay for evaluation but then he left. He again promised he would stay, but refused an EKG today. He has been seen multiple times for the same complaint and last time he was here he was requesting an inhaler as his ran out but left before he received one. He usually leaves AMA before he can receive any treatment.

## 2022-10-20 ENCOUNTER — EMERGENCY (EMERGENCY)
Facility: HOSPITAL | Age: 72
LOS: 1 days | Discharge: LEFT BEFORE DISCHARGE | End: 2022-10-20
Attending: EMERGENCY MEDICINE | Admitting: EMERGENCY MEDICINE

## 2022-10-20 VITALS
OXYGEN SATURATION: 113 % | SYSTOLIC BLOOD PRESSURE: 121 MMHG | TEMPERATURE: 98 F | WEIGHT: 139.99 LBS | HEIGHT: 65 IN | RESPIRATION RATE: 18 BRPM | HEART RATE: 89 BPM | DIASTOLIC BLOOD PRESSURE: 69 MMHG

## 2022-10-20 DIAGNOSIS — Z95.1 PRESENCE OF AORTOCORONARY BYPASS GRAFT: Chronic | ICD-10-CM

## 2022-10-20 DIAGNOSIS — Z95.2 PRESENCE OF PROSTHETIC HEART VALVE: Chronic | ICD-10-CM

## 2022-10-20 PROCEDURE — 93010 ELECTROCARDIOGRAM REPORT: CPT

## 2022-10-20 PROCEDURE — 99284 EMERGENCY DEPT VISIT MOD MDM: CPT

## 2022-10-20 RX ORDER — IPRATROPIUM/ALBUTEROL SULFATE 18-103MCG
3 AEROSOL WITH ADAPTER (GRAM) INHALATION ONCE
Refills: 0 | Status: DISCONTINUED | OUTPATIENT
Start: 2022-10-20 | End: 2022-10-20

## 2022-10-20 RX ORDER — MAGNESIUM SULFATE 500 MG/ML
2 VIAL (ML) INJECTION ONCE
Refills: 0 | Status: DISCONTINUED | OUTPATIENT
Start: 2022-10-20 | End: 2022-10-20

## 2022-10-20 RX ORDER — ASPIRIN/CALCIUM CARB/MAGNESIUM 324 MG
324 TABLET ORAL ONCE
Refills: 0 | Status: DISCONTINUED | OUTPATIENT
Start: 2022-10-20 | End: 2022-10-20

## 2022-10-20 NOTE — ED PROVIDER NOTE - OBJECTIVE STATEMENT
71 year old male c/o SOB and chest pain X unknown amount of days. Patient reported that he was diagnosed with COVID a few weeks ago and can't give time frame and stated that he went to see his PMD today and was told to go to the ED because of his chest pain. Patient's referral papers state patient tested positive for COVID 71 year old male c/o SOB and chest pain X unknown amount of days. Patient reported that he was diagnosed with COVID a few weeks ago and can't give time frame and stated that he went to see his PMD today and was told to go to the ED because of his chest pain. Patient's referral papers state patient tested positive for COVID 10/15/22. Patient is extremely poor historian.

## 2022-10-20 NOTE — ED PROVIDER NOTE - CONSTITUTIONAL, MLM
normal... Well appearing, awake, alert, oriented to person, place, time/situation, anxious appearing, disheveled

## 2022-10-21 DIAGNOSIS — R06.02 SHORTNESS OF BREATH: ICD-10-CM

## 2022-10-21 DIAGNOSIS — J44.1 CHRONIC OBSTRUCTIVE PULMONARY DISEASE WITH (ACUTE) EXACERBATION: ICD-10-CM

## 2022-10-21 DIAGNOSIS — Z79.82 LONG TERM (CURRENT) USE OF ASPIRIN: ICD-10-CM

## 2022-10-21 DIAGNOSIS — Z95.2 PRESENCE OF PROSTHETIC HEART VALVE: ICD-10-CM

## 2022-10-21 DIAGNOSIS — F17.210 NICOTINE DEPENDENCE, CIGARETTES, UNCOMPLICATED: ICD-10-CM

## 2022-10-21 DIAGNOSIS — I48.91 UNSPECIFIED ATRIAL FIBRILLATION: ICD-10-CM

## 2022-10-21 DIAGNOSIS — Z53.29 PROCEDURE AND TREATMENT NOT CARRIED OUT BECAUSE OF PATIENT'S DECISION FOR OTHER REASONS: ICD-10-CM

## 2022-10-21 DIAGNOSIS — E78.5 HYPERLIPIDEMIA, UNSPECIFIED: ICD-10-CM

## 2022-10-21 DIAGNOSIS — F41.9 ANXIETY DISORDER, UNSPECIFIED: ICD-10-CM

## 2022-10-21 DIAGNOSIS — Z95.1 PRESENCE OF AORTOCORONARY BYPASS GRAFT: ICD-10-CM

## 2022-10-21 DIAGNOSIS — Z88.6 ALLERGY STATUS TO ANALGESIC AGENT: ICD-10-CM

## 2022-10-21 DIAGNOSIS — I10 ESSENTIAL (PRIMARY) HYPERTENSION: ICD-10-CM

## 2022-10-21 LAB
CULTURE RESULTS: SIGNIFICANT CHANGE UP
SPECIMEN SOURCE: SIGNIFICANT CHANGE UP

## 2022-10-22 DIAGNOSIS — J44.9 CHRONIC OBSTRUCTIVE PULMONARY DISEASE, UNSPECIFIED: ICD-10-CM

## 2022-10-22 DIAGNOSIS — Z79.82 LONG TERM (CURRENT) USE OF ASPIRIN: ICD-10-CM

## 2022-10-22 DIAGNOSIS — Z95.1 PRESENCE OF AORTOCORONARY BYPASS GRAFT: ICD-10-CM

## 2022-10-22 DIAGNOSIS — R07.89 OTHER CHEST PAIN: ICD-10-CM

## 2022-10-22 DIAGNOSIS — Z88.6 ALLERGY STATUS TO ANALGESIC AGENT: ICD-10-CM

## 2022-10-22 DIAGNOSIS — I10 ESSENTIAL (PRIMARY) HYPERTENSION: ICD-10-CM

## 2022-10-22 DIAGNOSIS — I48.91 UNSPECIFIED ATRIAL FIBRILLATION: ICD-10-CM

## 2022-10-22 DIAGNOSIS — E78.5 HYPERLIPIDEMIA, UNSPECIFIED: ICD-10-CM

## 2022-10-22 DIAGNOSIS — Z53.29 PROCEDURE AND TREATMENT NOT CARRIED OUT BECAUSE OF PATIENT'S DECISION FOR OTHER REASONS: ICD-10-CM

## 2022-10-22 DIAGNOSIS — R06.02 SHORTNESS OF BREATH: ICD-10-CM

## 2022-10-22 DIAGNOSIS — F41.9 ANXIETY DISORDER, UNSPECIFIED: ICD-10-CM

## 2022-10-22 DIAGNOSIS — Z95.2 PRESENCE OF PROSTHETIC HEART VALVE: ICD-10-CM

## 2022-10-23 ENCOUNTER — EMERGENCY (EMERGENCY)
Facility: HOSPITAL | Age: 72
LOS: 1 days | Discharge: LEFT BEFORE DISCHARGE | End: 2022-10-23
Attending: EMERGENCY MEDICINE | Admitting: EMERGENCY MEDICINE

## 2022-10-23 VITALS
WEIGHT: 139.99 LBS | OXYGEN SATURATION: 100 % | SYSTOLIC BLOOD PRESSURE: 131 MMHG | RESPIRATION RATE: 22 BRPM | TEMPERATURE: 98 F | HEART RATE: 112 BPM | DIASTOLIC BLOOD PRESSURE: 85 MMHG | HEIGHT: 65 IN

## 2022-10-23 DIAGNOSIS — Z95.2 PRESENCE OF PROSTHETIC HEART VALVE: Chronic | ICD-10-CM

## 2022-10-23 DIAGNOSIS — Z95.1 PRESENCE OF AORTOCORONARY BYPASS GRAFT: Chronic | ICD-10-CM

## 2022-10-23 PROCEDURE — L9992: CPT

## 2022-10-23 NOTE — ED ADULT TRIAGE NOTE - CHIEF COMPLAINT QUOTE
Pt walked in stating he tested positive for covid19 2 weeks ago. Has been having a really bad cough, SOB, and chest pain. Unable to sleep at night.   Pt is an active cigarette smoker.

## 2022-10-25 DIAGNOSIS — R07.9 CHEST PAIN, UNSPECIFIED: ICD-10-CM

## 2022-10-25 DIAGNOSIS — Z53.21 PROCEDURE AND TREATMENT NOT CARRIED OUT DUE TO PATIENT LEAVING PRIOR TO BEING SEEN BY HEALTH CARE PROVIDER: ICD-10-CM

## 2022-11-02 ENCOUNTER — EMERGENCY (EMERGENCY)
Facility: HOSPITAL | Age: 72
LOS: 1 days | Discharge: LEFT BEFORE DISCHARGE | End: 2022-11-02
Admitting: EMERGENCY MEDICINE

## 2022-11-02 VITALS
RESPIRATION RATE: 20 BRPM | DIASTOLIC BLOOD PRESSURE: 73 MMHG | HEIGHT: 65 IN | WEIGHT: 139.99 LBS | TEMPERATURE: 98 F | SYSTOLIC BLOOD PRESSURE: 107 MMHG | HEART RATE: 83 BPM | OXYGEN SATURATION: 100 %

## 2022-11-02 DIAGNOSIS — Z95.1 PRESENCE OF AORTOCORONARY BYPASS GRAFT: Chronic | ICD-10-CM

## 2022-11-02 DIAGNOSIS — Z95.2 PRESENCE OF PROSTHETIC HEART VALVE: Chronic | ICD-10-CM

## 2022-11-02 PROCEDURE — L9991: CPT

## 2022-11-04 DIAGNOSIS — Z53.21 PROCEDURE AND TREATMENT NOT CARRIED OUT DUE TO PATIENT LEAVING PRIOR TO BEING SEEN BY HEALTH CARE PROVIDER: ICD-10-CM

## 2022-11-04 DIAGNOSIS — R05.9 COUGH, UNSPECIFIED: ICD-10-CM

## 2022-11-08 NOTE — ED PROVIDER NOTE - NSTIMEPROVIDERCAREINITIATE_GEN_ER
[facility] [place], [address]  [address] [address]  [phone]      Morales Garcia :1986 MRN:[id number]    2022 Time Session Began: 7:00am Time Session Ended: 8:00am    This visit was performed via live interactive two-way Video visit with patient's verbal consent.   Clinician Location:Home.  Patient Location: Home.  Verified patient identity:  [x] Yes    Session Type:Therapy 53+ minutes (67874)    Others Present:  None    Intervention: CPT    Suicide/Homicide/Violence Ideation: No    If Yes, explain: Denied SI/HI    Current Outpatient Medications   Medication Sig   • amphetamine-dextroamphetamine (Adderall XR) 30 MG 24 hr capsule Take 1 capsule by mouth daily. Do not start before 2022.   • amphetamine-dextroamphetamine (ADDERALL) 5 MG tablet Take 1 tablet by mouth daily.     No current facility-administered medications for this visit.       Change in Medication(s) Reported: N/A  If Yes, explain: N/A    Patient/Family Education Provided: N/A  Patient/Family Displays Understanding: N/A    If No, explain: N/A    Chief complaint in patient's own words: \"I blame myself for what I went through.\"    Progress Note containing chief complaint and symptoms/problems related to the complaint:    (Data/Action/Response/Plan)    D:  Patient has been feeling \"content\" and emotionally \"even\" this past week. Noted some issues with attention at work; has been task oriented. Has increased social interactions this past week; noted some apprehension.    A:  Session of CPT. Patient completed his practice assignments related to safety and trust on the challenging beliefs worksheet. Examples from these worksheets were reviewed prior to offering cognitive restructuring and to fine tune completion of the worksheets. Trust related stuck points were specifically targeted.  Trust start worksheet was reviewed. Patient encouraged to read  materials related to Trust and  Power/Control for next session.    R: Patient connected on time and without difficulty. Casually dressed in a sweater; appropriate for the weather; good grooming.  Appropriate eye contact. Mood was identified as contentand a full range of affect was observed. Was prepared for session and homework was completed.  Demonstrated good understanding of the material discussed; insight and judgment were good. Thoughts were coherent and goal directed. Speech and tone within normal limits.    P: Continue CPT    Need for Community Resources Assessed: N/A    Resources Needed: N/A    If Yes, what resources: N/A    Primary Diagnosis: PTSD (post-traumatic stress disorder)  (primary encounter diagnosis)  Major depressive disorder, recurrent episode, mild (CMS/HCC)  Generalized anxiety disorder    Treatment Plan: Unchanged    Discharge Plan: N/A    Next Appointment: 11/15/2022 Dominga Mendez PSYD   10-Sep-2021 18:11

## 2022-11-09 NOTE — ED ADULT NURSE NOTE - CCCP TRG CHIEF CMPLNT
Patient called and left message on VM stating he would be unavailable to check INR on Monday 12/5 (as he is currently scheduled), but would be available after 12/9. Returned call and spoke with patient. Scheduled INR check for Monday 12/12/22.     Electronically signed by Sindhu Gould on 11/9/22 at 9:53 AM EST
medical evaluation

## 2022-11-09 NOTE — ED PROVIDER NOTE - NS ED ROS FT
Chest wall abscess- self drained    No SOB Cartilage Graft Text: Because of the laxity of the alar rim resulting from loss of fibrofatty support, and to preserve form and function of the nose, a cartilage graft was planned.  An incision was made into the conchal bowl and a cutaneous flap was elevated. The conchal bowl cartilage was excised and after hemostasis was obtained, the cutaneous flap was replaced and sutured down.  The cartilage graft was then carved to fit the defect.  Two pockets were made medially and laterally in the alar rim, and the cartilage strut was sutured into place with Vicryl suture.

## 2022-11-11 ENCOUNTER — EMERGENCY (EMERGENCY)
Facility: HOSPITAL | Age: 72
LOS: 1 days | Discharge: LEFT BEFORE DISCHARGE | End: 2022-11-11
Admitting: EMERGENCY MEDICINE

## 2022-11-11 VITALS
DIASTOLIC BLOOD PRESSURE: 75 MMHG | HEIGHT: 65 IN | OXYGEN SATURATION: 94 % | HEART RATE: 82 BPM | SYSTOLIC BLOOD PRESSURE: 129 MMHG | RESPIRATION RATE: 20 BRPM

## 2022-11-11 DIAGNOSIS — Z95.1 PRESENCE OF AORTOCORONARY BYPASS GRAFT: Chronic | ICD-10-CM

## 2022-11-11 DIAGNOSIS — Z95.2 PRESENCE OF PROSTHETIC HEART VALVE: Chronic | ICD-10-CM

## 2022-11-11 PROCEDURE — L9992: CPT

## 2022-11-14 DIAGNOSIS — L29.9 PRURITUS, UNSPECIFIED: ICD-10-CM

## 2022-11-14 DIAGNOSIS — Z53.21 PROCEDURE AND TREATMENT NOT CARRIED OUT DUE TO PATIENT LEAVING PRIOR TO BEING SEEN BY HEALTH CARE PROVIDER: ICD-10-CM

## 2022-11-15 NOTE — ED ADULT NURSE NOTE - ISOLATION TYPE:
[id number] [place] [id number]  Phone: [phone]  Fax: [address], DO        August 29, 2019     Patient: Loretta Rico   YOB: 1948   Date of Visit: 8/29/2019       To Whom It May Concern: It is my medical opinion that Jelena Parrish requires a disability parking placard for the following reasons:  She cannot walk without assistance from another person or the use of an assistance device (cane, crutch, prosthetic device, wheelchair, etc.). Duration of need: permanent    If you have any questions or concerns, please don't hesitate to call.     Sincerely,        Tash Egan, DO Detail Level: Generalized Detail Level: Simple Detail Level: Detailed None

## 2022-11-29 NOTE — ED PROVIDER NOTE - QRS
MEDICAL ELIGIBILITY FORM    Name: Genesis Wu YOB: 2008     Genesis is Medically eligible for all sports without restriction    Recommendations: N/A    I have examined the student named on this form and completed the preparticipation physical evaluation. The athlete does not have apparent clinical contraindications to practice and can participate in the sport(s) as outlined on this form. A copy of the physical examination findings are on record in my office and can be made available to the school at the request of the parents. If conditions arise after the athlete has been cleared for participation, the physician may rescind the medical eligibility until the problem is resolved and the potential consequences are completely explained to the athlete (and parents or guardians).    Name of health care professional (print or type): Jones Souza MD Date: 11/29/2022     Address: [address] [address]  Dept: [phone]     Signature of health care professional:Electronically signed-Jones Souza MD 06/19/23 ______________________________________      SHARED EMERGENCY INFORMATION    Allergies   Allergen Reactions   • Maple   (Food Or Environmental) Other (See Comments)       Current Outpatient Medications   Medication Instructions   • Calcium Carb-Cholecalciferol (CALCIUM 1000 + D PO) Oral   • fluticasone (FLONASE) 50 MCG/ACT nasal spray No dose, route, or frequency recorded.   • loratadine (CLARITIN REDITABS) 10 MG dissolvable tablet Oral   • Multiple Vitamins-Minerals (vitamin - therapeutic multivitamins w/minerals) tablet Oral   • pimecrolimus (ELIDEL) 1 % cream Apply thin layer to aa bid   • polyethylene glycol (MIRALAX) 17 GM/SCOOP powder Oral   • triamcinolone (ARISTOCORT) 0.1 % cream Apply thin layer to aa bid       Other  94 ms information:_____________________________________________________________________  _____________________________________________________________________________________  _____________________________________________________________________________________    Emergency contacts:___________________________________________________________________  _____________________________________________________________________________________  _____________________________________________________________________________________     © 2019 [place] [employer], American Academy of Pediatrics, American College of Sport Medicine, American Medical Society for Sports Medicine, American Orthopaedics Society for Sport Medicine, and American Osteopathic Academy of Sports Medicine.  Permission is granted to reprint for noncommercial, educational purposes with acknowledgement.        PHYSICAL EXAMINATION FORM     Name: Genesis Wu YOB: 2008   PHYSICIAN REMINDERS  1. Consider additional questions on more-sensitive issues.  Do you feel stressed out or under a lot of pressure?  Do you feel sad, hopeless, depressed or anxious?  Do you feel safe at your home or residence?  During the past 30 days, did you use chewing tobacco, snuff or dip?  Do you drink alcohol or user any other drugs?  Have you even taken anabolic steroids or used any other performance-enhancing supplement?  Have you even take any supplements to help you gain or lose weight or improve your performance?  Do you wear a seat belt, use a helmet, and use condoms?  2.  Consider reviewing questions on cardiovascular symptoms (Q4-Q13 of History Form).    EXAMINATION     Vitals: /65 (BP Location: RUE - Right upper extremity, Patient Position: Sitting)   Pulse 87   Temp 98 °F (36.7 °C) (Temporal)   Ht 5' 3.78\" (1.62 m)   Wt 63 kg (139 lb)   LMP 10/04/2022 (Approximate)   BMI 24.02 kg/m²   BSA 1.67 m²    Vision: R 20/               L 20/                       Corrected  Y         N     MEDICAL NORMAL ABNORMAL FINDINGS   Appearance  Marfan stigmata (kyphoscoliosis, high-arched palate, pectus excavatum, arachnodactyly, arm span > height, hyperlaxity, myopia, MVP, aortic insufficiency) Yes    Eyes, ears, nose, throat  Pupils equal  Hearing Yes    Lymph nodes Yes    Heart*  Murmurs (auscultation standing, auscultation supine, +/- Valsalva maneuver) Yes    Lungs Yes    Abdomen Yes    Skin  Herpes simplex virus (HSV), lesions suggestive of methicillin-resistant Staphylococcus aureus MRSA, or tinea corporis Yes    MUSCULOSKELETAL NORMAL ABNORMAL FINDINGS   Neck Yes    Back Yes    Shoulder and arm Yes    Elbow and forearm Yes    Wrist, hand, and fingers Yes    Hip and thigh Yes    Knee Yes    Leg and ankle Yes    Foot and toes Yes    Functional  Double-leg squat test, single-leg squat test, and box drop or step drop test Yes    * Consider electrocardiography ECG, echocardiogram, referral to cardiology for abnormal cardiac history or examination finding, or a combination of those.  Name of health care professional (print or type): Jones Souza MD  Date: 6/19/2023  Address: [address] [address]  Dept: [phone]   Signature of health care professional:Electronically signed-Jones Souza MD 06/19/23   © 2019 American Academy of Family Physicians, American Academy of Pediatrics, American College of Sport Medicine, American Medical Society for Sports Medicine, American Orthopaedics Society for Sport Medicine, and American Osteopathic Academy of Sports Medicine.  Permission is granted to reprint for noncommercial, educational purposes with acknowledgement.         HISTORY FORM  Note: Complete and sign this form (with your parents if younger than 18) before your appointment.  Name: Genesis Wu YOB: 2008     Date of examination: 11/29/2022  Sport(s):_________________________________________   Sex assigned at birth: (F, M, or other): female How do you identify your gender?(F, M, or other):_________     List past and current medical conditions:____________________________________________________________________  _______________________________________________________________________________________________________________    Have you ever had surgery? If yes, list all past surgical procedures: ______________________________________________  _______________________________________________________________________________________________________________    Medicines and supplements: List all current prescriptions, over-the-counter medicines, and supplements (herbal and nutritional):   ______________________________________________________________________________________________________________  ______________________________________________________________________________________________________________    Do you have any allergies? If yes, please list all your allergies (ie, medicines, pollens, food, stinging insects).   ______________________________________________________________________________________________________________  ______________________________________________________________________________________________________________       Patient Health Questionnaire Version 4 (PHQ-4)  Over the last 2 weeks, how often have you been bothered by any of the following problems? ([place] response.)   Not at all Several days Over half the days Nearly every day   Feeling nervous, anxious, or on edge 0 1 2 3   Not being able to stop or control worrying 0 1 2 3   Little interest or pleasure in doing things 0 1 2 3   Feeling down, depressed, or hopeless 0 1 2 3   (A sum of =3 is considered positive on either subscale [questions 1 and 2, or questions 3 and 4] for screening purposes.)     GENERAL QUESTIONS  (Explain “Yes” answers at the end of this form.  [place]  questions if you don’t know the answer.)     Yes     No   1. Do you have any concerns that you would like to discuss with your provider?       2. Has a provider ever denied or restricted your participation in sports for any reason?       3. Do you have any ongoing medical issues or recent illness?       HEART HEALTH QUESTIONS ABOUT YOU Yes No   4. Have you ever passed out or nearly passed out during or after exercise?       5. Have you ever had discomfort, pain, tightness, or pressure in your chest during exercise?       6. Does your heart ever race, flutter in your chest, or skip beats (irregular beats) during exercise?       7. Has a doctor ever told you that you have any heart problems?       8. Has a doctor ever requested a test for your heart? For example, electrocardiography (ECG) or echocardiography.      HEART HEALTH QUESTIONS ABOUT YOU (CONTINUED ) Yes No   9. Do you get light-headed or feel shorter of breath than your friends during exercise?       10. Have you ever had a seizure?       HEART HEALTH QUESTIONS ABOUT YOUR FAMILY Yes No   11. Has any family member or relative  of heart problems or had an unexpected or unexplained sudden death before age 35 years (including drowning or unexplained car crash)?       12. Does anyone in your family have a genetic heart problem such as hypertrophic cardiomyopathy (HCM), Marfan syndrome, arrhythmogenic right ventricular cardiomyopathy (ARVC), long QT syndrome (LQTS), short QT syndrome (SQTS), Brugada syndrome, or catecholaminergic polymorphic ventricular tachycardia (CPVT)?       13. Has anyone in your family had a pacemaker or an implanted defibrillator before age 35?                Name: Genesis Wu YOB: 2008     BONE AND JOINT QUESTIONS Yes No   14. Have you ever had a stress fracture or an injury to a bone, muscle, ligament, joint, or tendon that caused you to miss a practice or game?       15. Do you have a bone, muscle, ligament, or  joint injury that bothers you?       MEDICAL QUESTIONS Yes No   16. Do you cough, wheeze, or have difficulty breathing during or after exercise?       17. Are you missing a kidney, an eye, a testicle (males), your spleen, or any other organ?       18. Do you have groin or testicle pain or a painful bulge or hernia in the groin area?       19. Do you have any recurring skin rashes or rashes that come and go, including herpes or methicillin-resistant Staphylococcus aureus  (MRSA)?       20. Have you had a concussion or head injury that caused confusion, a prolonged headache, or memory problems?       21. Have you ever had numbness, had tingling, had weakness in your arms or legs, or been unable to move your arms or legs after being hit or falling?       22. Have you ever become ill while exercising in the heat?       23. Do you or does someone in your family have sickle cell trait or disease?       24. Have you ever had or do you have any problems with your eyes or vision?        MEDICAL QUESTIONS (CONTINUED ) Yes No   25. Do you worry about your weight?         26. Are you trying to or has anyone recommended that you gain or lose weight?       27. Are you on a special diet or do you avoid certain types of foods or food groups?       28. Have you ever had an eating disorder?       FEMALES ONLY     29. Have you ever had a menstrual period?       30. How old were you when you had your first menstrual period?       31. When was your most recent menstrual period?       32. How many periods have you had in the past 12 months?         Explain \"Yes\" answers  here.  ______________________________________________    ______________________________________________    ______________________________________________    ______________________________________________    ______________________________________________    ______________________________________________    ______________________________________________    ______________________________________________     I hereby state that, to the best of my knowledge, my answers to the questions on this form are complete and correct.    Signature of athlete: ____________________________________________________________________________________    Signature of parent or guardian: ___________________________________________________________________________  Date: ________________________________________________  _____________________________________________________________________________________________________  © 2019 American Academy [employer] Family Physicians, American Academy of Pediatrics, American College of Sports Medicine, American Medical Society for Sports Medicine, American Orthopaedic Society for Sports Medicine, and American Osteopathic Academy of Sports Medicine. Permission is granted to reprint for noncommercial, educational purposes with acknowledgment.

## 2022-12-13 ENCOUNTER — EMERGENCY (EMERGENCY)
Facility: HOSPITAL | Age: 72
LOS: 1 days | Discharge: HOME | End: 2022-12-13
Attending: EMERGENCY MEDICINE | Admitting: EMERGENCY MEDICINE

## 2022-12-13 VITALS
RESPIRATION RATE: 18 BRPM | SYSTOLIC BLOOD PRESSURE: 123 MMHG | HEART RATE: 90 BPM | HEIGHT: 65 IN | TEMPERATURE: 98 F | WEIGHT: 149.91 LBS | OXYGEN SATURATION: 100 % | DIASTOLIC BLOOD PRESSURE: 85 MMHG

## 2022-12-13 DIAGNOSIS — Z95.1 PRESENCE OF AORTOCORONARY BYPASS GRAFT: Chronic | ICD-10-CM

## 2022-12-13 DIAGNOSIS — Z95.2 PRESENCE OF PROSTHETIC HEART VALVE: Chronic | ICD-10-CM

## 2022-12-13 PROBLEM — J45.909 UNSPECIFIED ASTHMA, UNCOMPLICATED: Chronic | Status: ACTIVE | Noted: 2022-05-10

## 2022-12-13 PROBLEM — Z87.09 PERSONAL HISTORY OF OTHER DISEASES OF THE RESPIRATORY SYSTEM: Chronic | Status: ACTIVE | Noted: 2022-05-23

## 2022-12-13 PROCEDURE — 99284 EMERGENCY DEPT VISIT MOD MDM: CPT

## 2022-12-13 PROCEDURE — 93010 ELECTROCARDIOGRAM REPORT: CPT

## 2022-12-13 RX ORDER — ALBUTEROL 90 UG/1
2 AEROSOL, METERED ORAL ONCE
Refills: 0 | Status: COMPLETED | OUTPATIENT
Start: 2022-12-13 | End: 2022-12-13

## 2022-12-13 RX ADMIN — ALBUTEROL 2 PUFF(S): 90 AEROSOL, METERED ORAL at 13:55

## 2022-12-13 NOTE — ED PROVIDER NOTE - CLINICAL SUMMARY MEDICAL DECISION MAKING FREE TEXT BOX
71 y/o M presenting with coughs and chest discomfort 2/2 coughing. Pt is an active smoker and he recently ran out of his inhaler medications. EKG shows A-fib which is consistent with baseline. Symptoms were treated with hand held inhaler. Pt discharged as per his request.

## 2022-12-13 NOTE — ED PROVIDER NOTE - PATIENT PORTAL LINK FT
You can access the FollowMyHealth Patient Portal offered by [facility] by registering at the following website: [email or web address]. By joining [employer]’s FollowMyHealth portal, you will also be able to view your health information using other applications (apps) compatible with our system.

## 2022-12-13 NOTE — ED PROVIDER NOTE - NSICDXPASTSURGICALHX_GEN_ALL_CORE_FT
PAST SURGICAL HISTORY:  H/O aortic valve replacement     No significant past surgical history     No significant past surgical history     S/P CABG (coronary artery bypass graft)     S/P CABG x 3

## 2022-12-13 NOTE — ED PROVIDER NOTE - NSICDXPASTMEDICALHX_GEN_ALL_CORE_FT
PAST MEDICAL HISTORY:  Afib     Afib     Afib     Anxiety     Asthma     Asthma     CAD (coronary artery disease)     Cerebrovascular accident (CVA)     Chronic obstructive pulmonary disease, unspecified COPD type     Emphysema/COPD     History of COPD     HLD (hyperlipidemia)     HLD (hyperlipidemia)     HTN (hypertension)     HTN (hypertension)     Hypertension, unspecified type     Myocardial infarction

## 2022-12-13 NOTE — ED PROVIDER NOTE - OBJECTIVE STATEMENT
71 y/o M with PMH of A-fib, asthma, COPD, hypertension, and hyperlipidemia presents to the ED for coughs. Pt reports he ran out of his inhaler. Pt also endorses chest discomfort 2/2 coughs. He denies shortness of breath, fevers or chills. Pt is well known to this ED 2/2 frequent visits for asthma / COPD exacerbation.

## 2022-12-17 DIAGNOSIS — I25.2 OLD MYOCARDIAL INFARCTION: ICD-10-CM

## 2022-12-17 DIAGNOSIS — I48.91 UNSPECIFIED ATRIAL FIBRILLATION: ICD-10-CM

## 2022-12-17 DIAGNOSIS — F17.200 NICOTINE DEPENDENCE, UNSPECIFIED, UNCOMPLICATED: ICD-10-CM

## 2022-12-17 DIAGNOSIS — E78.5 HYPERLIPIDEMIA, UNSPECIFIED: ICD-10-CM

## 2022-12-17 DIAGNOSIS — Z95.1 PRESENCE OF AORTOCORONARY BYPASS GRAFT: ICD-10-CM

## 2022-12-17 DIAGNOSIS — I45.10 UNSPECIFIED RIGHT BUNDLE-BRANCH BLOCK: ICD-10-CM

## 2022-12-17 DIAGNOSIS — Z79.82 LONG TERM (CURRENT) USE OF ASPIRIN: ICD-10-CM

## 2022-12-17 DIAGNOSIS — Z88.6 ALLERGY STATUS TO ANALGESIC AGENT: ICD-10-CM

## 2022-12-17 DIAGNOSIS — R05.9 COUGH, UNSPECIFIED: ICD-10-CM

## 2022-12-17 DIAGNOSIS — J44.9 CHRONIC OBSTRUCTIVE PULMONARY DISEASE, UNSPECIFIED: ICD-10-CM

## 2022-12-17 DIAGNOSIS — Z95.2 PRESENCE OF PROSTHETIC HEART VALVE: ICD-10-CM

## 2022-12-17 DIAGNOSIS — I10 ESSENTIAL (PRIMARY) HYPERTENSION: ICD-10-CM

## 2022-12-19 NOTE — ED ADULT TRIAGE NOTE - NS ED TRIAGE AVPU SCALE
[] : Resident
Alert-The patient is alert, awake and responds to voice. The patient is oriented to time, place, and person. The triage nurse is able to obtain subjective information.

## 2023-01-01 NOTE — ED PROVIDER NOTE - IV ALTEPASE ADMIN HIDDEN
Attempted to contact parent to confirm 2023 appt with Dr. Dia; no answer. Message left advising of appt date and time and request for return call to clinic to confirm or reschedule appt.    
show

## 2023-01-07 ENCOUNTER — EMERGENCY (EMERGENCY)
Facility: HOSPITAL | Age: 73
LOS: 1 days | Discharge: LEFT BEFORE DISCHARGE | End: 2023-01-07
Attending: EMERGENCY MEDICINE | Admitting: EMERGENCY MEDICINE
Payer: MEDICARE

## 2023-01-07 VITALS
HEIGHT: 65 IN | OXYGEN SATURATION: 98 % | WEIGHT: 149.91 LBS | TEMPERATURE: 98 F | DIASTOLIC BLOOD PRESSURE: 78 MMHG | SYSTOLIC BLOOD PRESSURE: 112 MMHG | RESPIRATION RATE: 16 BRPM | HEART RATE: 88 BPM

## 2023-01-07 DIAGNOSIS — Z95.1 PRESENCE OF AORTOCORONARY BYPASS GRAFT: Chronic | ICD-10-CM

## 2023-01-07 DIAGNOSIS — Z95.2 PRESENCE OF PROSTHETIC HEART VALVE: Chronic | ICD-10-CM

## 2023-01-07 PROBLEM — I63.9 CEREBRAL INFARCTION, UNSPECIFIED: Chronic | Status: ACTIVE | Noted: 2022-07-30

## 2023-01-07 PROBLEM — J44.9 CHRONIC OBSTRUCTIVE PULMONARY DISEASE, UNSPECIFIED: Chronic | Status: ACTIVE | Noted: 2022-07-30

## 2023-01-07 PROBLEM — E78.5 HYPERLIPIDEMIA, UNSPECIFIED: Chronic | Status: ACTIVE | Noted: 2022-07-30

## 2023-01-07 PROBLEM — I48.91 UNSPECIFIED ATRIAL FIBRILLATION: Chronic | Status: ACTIVE | Noted: 2022-07-30

## 2023-01-07 PROBLEM — I25.10 ATHEROSCLEROTIC HEART DISEASE OF NATIVE CORONARY ARTERY WITHOUT ANGINA PECTORIS: Chronic | Status: ACTIVE | Noted: 2022-07-30

## 2023-01-07 PROBLEM — I10 ESSENTIAL (PRIMARY) HYPERTENSION: Chronic | Status: ACTIVE | Noted: 2022-07-30

## 2023-01-07 PROBLEM — I21.9 ACUTE MYOCARDIAL INFARCTION, UNSPECIFIED: Chronic | Status: ACTIVE | Noted: 2022-07-30

## 2023-01-07 PROCEDURE — L9991: CPT

## 2023-01-07 NOTE — ED ADULT NURSE NOTE - CODE STROKE ACTIVE YN
"Patient ID: Senait Falcon is a 47 y.o. female.    Chief Complaint: one year f/u after core biopsy    Collaborating provider is Dr. Alphonse MD    HPI: Patient presents for f/u after core biopsy 2/2021 2/9/2021 pt had mammo that revealed left breast calcifications and 4 mm mass upper inner quad.     2/18/2021- stereotactic core biopsy -   Left breast posterior, biopsy:       - Fibroadenoma       - Benign breast parenchyma with fibroadenomatoid change, fibrocystic   changes and apocrine metaplasia       - Calcification: present in fibroadenoma       - No atypia or carcinoma seen     "Left breast mass central", biopsy:       - Benign breast parenchyma with intraductal apocrine papillary   proliferation, fibroadenomatoid changes, usual ductal hyperplasia and focal   ductal ectasia       - No atypia or carcinoma seen     Pt comes in today for one year f/u after a core biopsy that was performed 2/2021    History of Left breast core biopsy [facility] performed on 8/14/15.     Left breast nodule, multiple needle core biopsies- focal adenosis, fibrosis and duct ectasia. Early papillomatosis with columnar cell change. No evidence of atypia or malignancy.     Pt had large renal stones bilaterally and has been through lithotripsy and stenting over the past month.  Feeling better.    Review of Systems   Constitutional: Negative.  Negative for appetite change and unexpected weight change.   HENT: Negative.    Eyes: Negative.  Negative for visual disturbance.   Respiratory: Negative.  Negative for cough and shortness of breath.    Cardiovascular: Negative.  Negative for chest pain.   Gastrointestinal: Negative.  Negative for abdominal pain and diarrhea.        No reflux   Endocrine: Negative.    Genitourinary: Negative.  Negative for frequency and hematuria.   Musculoskeletal: Negative.  Negative for back pain.   Skin: Negative.  Negative for rash.   Allergic/Immunologic: Negative.    Neurological: Negative.  Negative for " headaches.   Hematological: Negative.  Negative for adenopathy.   Psychiatric/Behavioral: Negative.  The patient is not nervous/anxious.    Breast: denies any breast pain or nipple discharge. No palpable mass- has keloid scar right mid breast since a child. Small keloid formation at core sites medial left breast. Has had these injected by derm with steroids and they have decreased in prominence and itching.      Current Outpatient Medications   Medication Sig Dispense Refill    ketoconazole (NIZORAL) 2 % cream Apply topically 2 (two) times daily. 30 g 0    naproxen sodium (ANAPROX) 550 MG tablet Take one tablet by mouth twice daily with meals for 5 days with each menses.  Start taking 2 days prior to each period. 90 tablet 0    simvastatin (ZOCOR) 20 MG tablet Take 1 tablet (20 mg total) by mouth every evening. 90 tablet 3     No current facility-administered medications for this visit.       Allergies   Allergen Reactions    Bleach (Sodium Hypochlorite) Other (See Comments)     Skin peels and coughing up blood    Asa [Aspirin]        Past Medical History:   Diagnosis Date    Diabetes mellitus, type 2     Kidney stones     PCOS (polycystic ovarian syndrome) 2011       Past Surgical History:   Procedure Laterality Date    BREAST BIOPSY Left     benign    CYSTOSCOPY      NEPHROSTOMY Right 12/06/2019    TUBAL LIGATION      URETERAL STENT PLACEMENT         Family History   Problem Relation Age of Onset    Stroke Father     Hyperlipidemia Mother     Diabetes Maternal Grandmother     Diabetes Paternal Grandmother     Asthma Paternal Grandmother        Social History     Socioeconomic History    Marital status: Single    Number of children: 3   Occupational History     Employer: [employer]   Tobacco Use    Smoking status: Never Smoker    Smokeless tobacco: Never Used   Substance and Sexual Activity    Alcohol use: No    Drug use: No    Sexual activity: Yes     Partners: Male      Social Determinants of Health     Financial Resource Strain: Low Risk     Difficulty of Paying Living Expenses: Not very hard   Food Insecurity: No Food Insecurity    Worried About Running Out of Food in the Last Year: Never true    Ran Out of Food in the Last Year: Never true   Transportation Needs: No Transportation Needs    Lack of Transportation (Medical): No    Lack of Transportation (Non-Medical): No   Physical Activity: Sufficiently Active    Days of Exercise per Week: 5 days    Minutes of Exercise per Session: 60 min   Stress: No Stress Concern Present    Feeling of Stress : Only a little   Social Connections: Unknown    Frequency of Communication with Friends and Family: More than three times a week    Frequency of Social Gatherings with Friends and Family: Once a week    Active Member of Clubs or Organizations: No    Attends Club or Organization Meetings: Never    Marital Status:        There were no vitals filed for this visit.    Physical Exam  Constitutional:       Appearance: Normal appearance. She is well-developed and well-nourished.   HENT:      Head: Normocephalic and atraumatic.      Right Ear: External ear normal.      Left Ear: External ear normal.      Mouth/Throat:      Pharynx: No oropharyngeal exudate.   Eyes:      General: No scleral icterus.        Right eye: No discharge.         Left eye: No discharge.      Extraocular Movements: EOM normal.      Conjunctiva/sclera: Conjunctivae normal.      Pupils: Pupils are equal, round, and reactive to light.   Neck:      Thyroid: No thyromegaly.   Cardiovascular:      Rate and Rhythm: Normal rate and regular rhythm.      Heart sounds: Normal heart sounds.   Pulmonary:      Effort: Pulmonary effort is normal.      Breath sounds: Normal breath sounds.   Chest:   Breasts:      Right: No inverted nipple, mass, nipple discharge, skin change, tenderness, axillary adenopathy or supraclavicular adenopathy.      Left: No inverted  nipple, mass, nipple discharge, skin change, tenderness, axillary adenopathy or supraclavicular adenopathy.       Abdominal:      General: Bowel sounds are normal. There is no distension.      Palpations: Abdomen is soft.   Musculoskeletal:         General: No edema. Normal range of motion.      Right shoulder: No crepitus. Normal strength.      Cervical back: Normal range of motion and neck supple.   Lymphadenopathy:      Head:      Right side of head: No submental, submandibular, tonsillar, preauricular, posterior auricular or occipital adenopathy.      Left side of head: No submental, submandibular, tonsillar, preauricular, posterior auricular or occipital adenopathy.      Cervical: No cervical adenopathy.      Right cervical: No superficial or posterior cervical adenopathy.     Left cervical: No superficial or posterior cervical adenopathy.      Upper Body:   No axillary adenopathy present.     Right upper body: No supraclavicular or axillary adenopathy.      Left upper body: No supraclavicular or axillary adenopathy.   Skin:     General: Skin is warm and dry.      Coloration: Skin is not pale.      Findings: No erythema or rash.   Neurological:      General: No focal deficit present.      Mental Status: She is alert and oriented to person, place, and time.      Deep Tendon Reflexes: Reflexes are normal and symmetric.   Psychiatric:         Mood and Affect: Mood and affect normal.         Behavior: Behavior normal.         Thought Content: Thought content normal.         Judgment: Judgment normal.         2021- left breast diagnostic mammo - wnl- no abnormality    Bilateral Mammogram today    M2  Menarche at 11 y/o  First full term preg at 20 y/o  LMP: 12/10/19  No hormone use and no radiation to chest wall or neck    FH:Maternal GM breast cancer- 70's      Assessment & Plan:  1. Abnormal screening mammogram left breast- solid nodule upper inner quadrant and calcifications upper outer quadrant   Benign  biopsies X 2- left diagnostic mammo - one year f/u after core biopsy reveals  2. History of complex nodule 10 oclock- benign biopsy 8/2015  3. Bilateral areas of nodularity. Softer and less prominent glandular tissue now.  4. Bilateral mammo today   5. BSE monthly- call for any changes  6. Negative exam today           No

## 2023-01-09 DIAGNOSIS — Z53.21 PROCEDURE AND TREATMENT NOT CARRIED OUT DUE TO PATIENT LEAVING PRIOR TO BEING SEEN BY HEALTH CARE PROVIDER: ICD-10-CM

## 2023-01-09 NOTE — ED ADULT TRIAGE NOTE - BP NONINVASIVE SYSTOLIC (MM HG)
Vital Signs: I have reviewed the initial vital signs.  Constitutional: NAD, well-nourished, appears stated age, no acute distress.  HEENT: Airway patent, moist MM, no erythema/swelling/deformity of oral structures. EOMI, PERRLA.  CV: regular rate, regular rhythm, well-perfused extremities, 2+ b/l DP and radial pulses equal.  Lungs: BCTA, no increased WOB.  ABD: NT, ND, no guarding or rebound, no pulsatile mass, no hernias.   MSK: Neck supple, nontender, nl ROM, no stepoff. Chest nontender. Back nontender . Ext nontender, nl rom, no deformity.   INTEG: Skin warm, dry, no rash.  NEURO: A&Ox3, normal strength, nl sensation throughout, normal speech.   PSYCH: Calm, cooperative, normal affect and interaction. 154

## 2023-01-17 ENCOUNTER — EMERGENCY (EMERGENCY)
Facility: HOSPITAL | Age: 73
LOS: 1 days | Discharge: LEFT BEFORE DISCHARGE | End: 2023-01-17
Attending: EMERGENCY MEDICINE | Admitting: EMERGENCY MEDICINE
Payer: MEDICARE

## 2023-01-17 VITALS
OXYGEN SATURATION: 100 % | DIASTOLIC BLOOD PRESSURE: 76 MMHG | HEART RATE: 77 BPM | SYSTOLIC BLOOD PRESSURE: 116 MMHG | HEIGHT: 65 IN | RESPIRATION RATE: 19 BRPM | TEMPERATURE: 99 F | WEIGHT: 149.91 LBS

## 2023-01-17 DIAGNOSIS — Z95.1 PRESENCE OF AORTOCORONARY BYPASS GRAFT: Chronic | ICD-10-CM

## 2023-01-17 DIAGNOSIS — Z95.2 PRESENCE OF PROSTHETIC HEART VALVE: Chronic | ICD-10-CM

## 2023-01-17 LAB
FLUAV AG NPH QL: SIGNIFICANT CHANGE UP
FLUBV AG NPH QL: SIGNIFICANT CHANGE UP
RSV RNA NPH QL NAA+NON-PROBE: SIGNIFICANT CHANGE UP
SARS-COV-2 RNA SPEC QL NAA+PROBE: SIGNIFICANT CHANGE UP

## 2023-01-17 PROCEDURE — 99284 EMERGENCY DEPT VISIT MOD MDM: CPT

## 2023-01-17 RX ORDER — IPRATROPIUM/ALBUTEROL SULFATE 18-103MCG
3 AEROSOL WITH ADAPTER (GRAM) INHALATION
Refills: 0 | Status: COMPLETED | OUTPATIENT
Start: 2023-01-17 | End: 2023-01-17

## 2023-01-17 RX ADMIN — Medication 3 MILLILITER(S): at 19:37

## 2023-01-17 RX ADMIN — Medication 200 MILLIGRAM(S): at 19:38

## 2023-01-17 RX ADMIN — Medication 3 MILLILITER(S): at 19:39

## 2023-01-17 RX ADMIN — Medication 3 MILLILITER(S): at 19:38

## 2023-01-17 NOTE — ED PROVIDER NOTE - PHYSICAL EXAMINATION
VITAL SIGNS: I have reviewed nursing notes and confirm.   CONST: Well-developed; well-nourished; No apparent distress.  ENT:  + runny nose; airway clear.  HEAD: Normocephalic; atraumatic.  EYES: Sclera clear. Pupils round and symmetrical bilaterally.  CARD: S1, S2 normal; no murmurs, gallops, or rubs. Regular rate and rhythm.  RESP: Faint exp wheezes, d3c AE b/l. No rales or rhonchi. Breathing comfortably; speaking in full sentences.   MSK: No acute deformities noted to extremities. No tenderness to cervical/thoracic/lumbar spine to palpation.  NEURO: Alert, oriented. Speech is fluent and appropriate. Moving all extremities appropriately. No gross motor or sensory abnormalities.   SKIN: Skin is normal temperature; no diaphoresis; no pallor. No rash, no bites or sergio  PSYCH: Cooperative. Appropriate mood, language, and behavior. consistent with baseline.

## 2023-01-17 NOTE — ED PROVIDER NOTE - CLINICAL SUMMARY MEDICAL DECISION MAKING FREE TEXT BOX
Elderly patient with afebrile and heavy cigarette smoking medication noncompliance and repeated ED visits with development is presenting with cough and mild viral syndrome symptoms.  His vital signs are stable.  He did have some wheezing on lung exam and agreed to stay for duo nebs.  Estela Feliz also prescribed.  We will send viral swab.

## 2023-01-17 NOTE — ED ADULT TRIAGE NOTE - CHIEF COMPLAINT QUOTE
Pt walk in complaining of cough "for a few weeks" worsening at night Pt walk in complaining of cough "for a few weeks" worsening at night. Promises triage RN he will stay for treatment.

## 2023-01-17 NOTE — ED PROVIDER NOTE - OBJECTIVE STATEMENT
72-year-old man well-known to us from very frequent ED visits is presenting with cough for the past 2 to 3 days as well as some generalized itching.  He denies any bug bites and says that he is not sure if he is coming down with something.  Typically leaves within minutes of triage.  He agreed to stay for breathing treatments today which she has in the past and then still left.  He does have a dry cough.

## 2023-01-17 NOTE — ED PROVIDER NOTE - CARE PLAN
Principal Discharge DX:	COPD exacerbation  Secondary Diagnosis:	Cough  Secondary Diagnosis:	Itching   1

## 2023-01-17 NOTE — ED ADULT NURSE NOTE - CHIEF COMPLAINT QUOTE
Pt walk in complaining of cough "for a few weeks" worsening at night. Promises triage RN he will stay for treatment.

## 2023-01-17 NOTE — ED PROVIDER NOTE - PROGRESS NOTE DETAILS
pt signed out for clinical improvement. I was informed by nurse that pt eloped. Pt known to do this in the past.

## 2023-01-19 DIAGNOSIS — Z53.29 PROCEDURE AND TREATMENT NOT CARRIED OUT BECAUSE OF PATIENT'S DECISION FOR OTHER REASONS: ICD-10-CM

## 2023-01-19 DIAGNOSIS — I48.91 UNSPECIFIED ATRIAL FIBRILLATION: ICD-10-CM

## 2023-01-19 DIAGNOSIS — Z88.6 ALLERGY STATUS TO ANALGESIC AGENT: ICD-10-CM

## 2023-01-19 DIAGNOSIS — Z95.2 PRESENCE OF PROSTHETIC HEART VALVE: ICD-10-CM

## 2023-01-19 DIAGNOSIS — Z95.1 PRESENCE OF AORTOCORONARY BYPASS GRAFT: ICD-10-CM

## 2023-01-19 DIAGNOSIS — E78.5 HYPERLIPIDEMIA, UNSPECIFIED: ICD-10-CM

## 2023-01-19 DIAGNOSIS — F17.200 NICOTINE DEPENDENCE, UNSPECIFIED, UNCOMPLICATED: ICD-10-CM

## 2023-01-19 DIAGNOSIS — Z20.822 CONTACT WITH AND (SUSPECTED) EXPOSURE TO COVID-19: ICD-10-CM

## 2023-01-19 DIAGNOSIS — I25.2 OLD MYOCARDIAL INFARCTION: ICD-10-CM

## 2023-01-19 DIAGNOSIS — I25.10 ATHEROSCLEROTIC HEART DISEASE OF NATIVE CORONARY ARTERY WITHOUT ANGINA PECTORIS: ICD-10-CM

## 2023-01-19 DIAGNOSIS — F41.9 ANXIETY DISORDER, UNSPECIFIED: ICD-10-CM

## 2023-01-19 DIAGNOSIS — L29.9 PRURITUS, UNSPECIFIED: ICD-10-CM

## 2023-01-19 DIAGNOSIS — R05.9 COUGH, UNSPECIFIED: ICD-10-CM

## 2023-01-19 DIAGNOSIS — Z86.73 PERSONAL HISTORY OF TRANSIENT ISCHEMIC ATTACK (TIA), AND CEREBRAL INFARCTION WITHOUT RESIDUAL DEFICITS: ICD-10-CM

## 2023-01-19 DIAGNOSIS — Z79.82 LONG TERM (CURRENT) USE OF ASPIRIN: ICD-10-CM

## 2023-01-19 DIAGNOSIS — J44.1 CHRONIC OBSTRUCTIVE PULMONARY DISEASE WITH (ACUTE) EXACERBATION: ICD-10-CM

## 2023-01-19 DIAGNOSIS — I10 ESSENTIAL (PRIMARY) HYPERTENSION: ICD-10-CM

## 2023-01-24 ENCOUNTER — EMERGENCY (EMERGENCY)
Facility: HOSPITAL | Age: 73
LOS: 1 days | Discharge: LEFT BEFORE DISCHARGE | End: 2023-01-24
Attending: EMERGENCY MEDICINE | Admitting: EMERGENCY MEDICINE
Payer: MEDICARE

## 2023-01-24 VITALS
HEART RATE: 98 BPM | DIASTOLIC BLOOD PRESSURE: 85 MMHG | HEIGHT: 65 IN | OXYGEN SATURATION: 98 % | SYSTOLIC BLOOD PRESSURE: 122 MMHG | WEIGHT: 149.91 LBS | TEMPERATURE: 98 F | RESPIRATION RATE: 16 BRPM

## 2023-01-24 DIAGNOSIS — Z95.2 PRESENCE OF PROSTHETIC HEART VALVE: Chronic | ICD-10-CM

## 2023-01-24 DIAGNOSIS — Z95.1 PRESENCE OF AORTOCORONARY BYPASS GRAFT: Chronic | ICD-10-CM

## 2023-01-24 PROCEDURE — L9992: CPT

## 2023-01-24 NOTE — ED ADULT TRIAGE NOTE - CHIEF COMPLAINT QUOTE
Pt aox3 with steady gait on arrival with EMS. Pt states trip fall yesterday, increased left leg pain today. Denies hs or loc.

## 2023-01-24 NOTE — ED ADULT TRIAGE NOTE - IDEAL BODY WEIGHT(KG)
62 Consent 2/Introductory Paragraph: Mohs surgery was explained to the patient and consent was obtained. The risks, benefits and alternatives to therapy were discussed in detail. Specifically, the risks of infection, scarring, bleeding, prolonged wound healing, incomplete removal, allergy to anesthesia, nerve injury and recurrence were addressed. Prior to the procedure, the treatment site was clearly identified and confirmed by the patient. All components of Universal Protocol/PAUSE Rule completed.

## 2023-01-26 DIAGNOSIS — M79.605 PAIN IN LEFT LEG: ICD-10-CM

## 2023-01-26 DIAGNOSIS — Z53.21 PROCEDURE AND TREATMENT NOT CARRIED OUT DUE TO PATIENT LEAVING PRIOR TO BEING SEEN BY HEALTH CARE PROVIDER: ICD-10-CM

## 2023-01-27 ENCOUNTER — EMERGENCY (EMERGENCY)
Facility: HOSPITAL | Age: 73
LOS: 1 days | Discharge: LEFT BEFORE DISCHARGE | End: 2023-01-27
Attending: EMERGENCY MEDICINE | Admitting: EMERGENCY MEDICINE
Payer: MEDICARE

## 2023-01-27 VITALS
OXYGEN SATURATION: 99 % | WEIGHT: 139.99 LBS | SYSTOLIC BLOOD PRESSURE: 128 MMHG | DIASTOLIC BLOOD PRESSURE: 68 MMHG | TEMPERATURE: 98 F | HEIGHT: 65 IN | HEART RATE: 83 BPM | RESPIRATION RATE: 18 BRPM

## 2023-01-27 DIAGNOSIS — Z95.1 PRESENCE OF AORTOCORONARY BYPASS GRAFT: Chronic | ICD-10-CM

## 2023-01-27 DIAGNOSIS — Z95.2 PRESENCE OF PROSTHETIC HEART VALVE: Chronic | ICD-10-CM

## 2023-01-27 PROCEDURE — 99284 EMERGENCY DEPT VISIT MOD MDM: CPT

## 2023-01-27 NOTE — ED PROVIDER NOTE - CLINICAL SUMMARY MEDICAL DECISION MAKING FREE TEXT BOX
72-year-old male patient with multiple medical problems well-known to the emergency department presents with complaints of chest tightness and cough for 2 weeks.  Also reports that 5 days ago he was crossing the street when his legs gave out on him and he fell to the ground; denies loss of consciousness or head strike; this was recorded as syncope by the triage nurse.  EKG showing atrial fibrillation with a controlled rate and no ischemic changes.  Will assess with swab to check for COVID.

## 2023-01-27 NOTE — ED PROVIDER NOTE - OBJECTIVE STATEMENT
72-year-old male patient history of atrial fibrillation presents for evaluation after he has been having 1 month of intermittent chest pain and had an episode where his legs gave out on him 5 days prior.  Patient stating that he is having some chest tightness associated with a cough for the past 2 weeks.  It is dry and not associated with shortness of breath or fevers.

## 2023-01-27 NOTE — ED ADULT TRIAGE NOTE - CHIEF COMPLAINT QUOTE
Pt walked in c/o of syncope x Monday and chest pain x 1 month. Pt reports taking daily medications but does not know the names or what they are for. VSS

## 2023-01-27 NOTE — ED ADULT NURSE NOTE - NSSEPSISSUSPECTED_ED_A_ED
Patient Education   Home  Back  SP  Salt water or listerine gargle 3x/day  Ibuprofen or tylenol as needed for pain  Serous Otitis Media Without Infection (Infant/Toddler)  The middle ear is the space behind the eardrum. It is normally filled with air. It is connected to the nasal passage by a short narrow tube called the eustachian tube. This tube allows normal fluids to drain out of the middle ear. It also helps equalize pressure in the ear. The tubes are shorter and more horizontal in young children, so they more easily become blocked. As a result, fluid and pressure build up in the middle ear. The fluid is not infected. This condition is called serous otitis media. It may be preceded by a respiratory infection or occur during allergy season.  Symptoms of serous otitis media include hearing loss and a feeling of pressure in the ear. If the child can talk, he or she may talk more loudly than usual.  Home Care:  Medications: Your doctor may prescribe medication as an oral liquid or as ear drops. Follow the doctor’s instructions for using these medications.  To Apply Eardrops:   1. If the ear drop medication is refrigerated, put the bottle in warm water before using. Cold drops in the ear are uncomfortable.  2. Have your child lie down on a flat surface. Gently hold the head to one side to expose the affected ear.  3. Straighten the ear canal: Pull the earlobe down and back.  4. Keep the dropper ½ inch above the ear canal to avoid contamination. Apply the drops against the side of the ear canal.  5. Have your child stay lying down for 2 to 3 minutes. This gives time for the medication to enter the ear canal. Gently massage the outer ear near the opening.  6. Wipe away excess medication from the outer ear with a clean cotton ball.  General Care:   · Keep the ear dry. Have your child wear a shower cap when bathing. If the ear gets wet, gently dry it with a soft towel or cloth. Never insert your finger or another  object (such as a cotton swab) into your child’s ear.  · Avoid smoking near your child. Smoking has been shown to increase the incidence of ear infections in children.  · Monitor your child’s ear for signs of infection (see below).  Follow Up  as advised by the doctor or our staff.  Get Prompt Medical Attention  if any of the following occur:  · Fever greater than 100.4°F (38°C)  · Signs of ear pain, such as fussiness, increased crying, pulling at the ear, or shaking the head  · Other signs of infection, such as foul-smelling drainage coming from the ear  © 8285-3393 [address], [place] [address]. All rights reserved. This information is not intended as a substitute for professional medical care. Always follow your healthcare professional's instructions.          No

## 2023-01-30 NOTE — ED PROVIDER NOTE - CONSTITUTIONAL NEGATIVE STATEMENT, MLM
Spoke with pt and rescheduled appt as requested. Pt verbalized understanding.   no fever and no chills.

## 2023-01-31 DIAGNOSIS — J43.9 EMPHYSEMA, UNSPECIFIED: ICD-10-CM

## 2023-01-31 DIAGNOSIS — R07.89 OTHER CHEST PAIN: ICD-10-CM

## 2023-01-31 DIAGNOSIS — Z86.73 PERSONAL HISTORY OF TRANSIENT ISCHEMIC ATTACK (TIA), AND CEREBRAL INFARCTION WITHOUT RESIDUAL DEFICITS: ICD-10-CM

## 2023-01-31 DIAGNOSIS — L29.9 PRURITUS, UNSPECIFIED: ICD-10-CM

## 2023-01-31 DIAGNOSIS — Z53.29 PROCEDURE AND TREATMENT NOT CARRIED OUT BECAUSE OF PATIENT'S DECISION FOR OTHER REASONS: ICD-10-CM

## 2023-01-31 DIAGNOSIS — R05.9 COUGH, UNSPECIFIED: ICD-10-CM

## 2023-01-31 DIAGNOSIS — F17.200 NICOTINE DEPENDENCE, UNSPECIFIED, UNCOMPLICATED: ICD-10-CM

## 2023-01-31 DIAGNOSIS — Z95.4 PRESENCE OF OTHER HEART-VALVE REPLACEMENT: ICD-10-CM

## 2023-01-31 DIAGNOSIS — Z88.6 ALLERGY STATUS TO ANALGESIC AGENT: ICD-10-CM

## 2023-01-31 DIAGNOSIS — Z95.1 PRESENCE OF AORTOCORONARY BYPASS GRAFT: ICD-10-CM

## 2023-01-31 DIAGNOSIS — Z79.82 LONG TERM (CURRENT) USE OF ASPIRIN: ICD-10-CM

## 2023-01-31 DIAGNOSIS — I10 ESSENTIAL (PRIMARY) HYPERTENSION: ICD-10-CM

## 2023-01-31 DIAGNOSIS — I25.2 OLD MYOCARDIAL INFARCTION: ICD-10-CM

## 2023-01-31 DIAGNOSIS — I48.91 UNSPECIFIED ATRIAL FIBRILLATION: ICD-10-CM

## 2023-01-31 DIAGNOSIS — F41.9 ANXIETY DISORDER, UNSPECIFIED: ICD-10-CM

## 2023-01-31 DIAGNOSIS — J45.909 UNSPECIFIED ASTHMA, UNCOMPLICATED: ICD-10-CM

## 2023-01-31 DIAGNOSIS — E78.5 HYPERLIPIDEMIA, UNSPECIFIED: ICD-10-CM

## 2023-01-31 DIAGNOSIS — I25.10 ATHEROSCLEROTIC HEART DISEASE OF NATIVE CORONARY ARTERY WITHOUT ANGINA PECTORIS: ICD-10-CM

## 2023-02-06 ENCOUNTER — EMERGENCY (EMERGENCY)
Facility: HOSPITAL | Age: 73
LOS: 1 days | Discharge: LEFT BEFORE DISCHARGE | End: 2023-02-06
Admitting: EMERGENCY MEDICINE
Payer: MEDICARE

## 2023-02-06 VITALS
SYSTOLIC BLOOD PRESSURE: 119 MMHG | HEART RATE: 73 BPM | OXYGEN SATURATION: 98 % | RESPIRATION RATE: 18 BRPM | TEMPERATURE: 97 F | WEIGHT: 139.99 LBS | DIASTOLIC BLOOD PRESSURE: 75 MMHG | HEIGHT: 65 IN

## 2023-02-06 DIAGNOSIS — Z95.2 PRESENCE OF PROSTHETIC HEART VALVE: Chronic | ICD-10-CM

## 2023-02-06 DIAGNOSIS — Z95.1 PRESENCE OF AORTOCORONARY BYPASS GRAFT: Chronic | ICD-10-CM

## 2023-02-06 PROCEDURE — L9991: CPT

## 2023-02-06 NOTE — ED ADULT TRIAGE NOTE - CHIEF COMPLAINT QUOTE
Pt walks in c/o weakness and shortness of breath, worse with exertion. Pt states he collapsed while attempting to cross the street last week, denies hitting head/LOC

## 2023-02-07 NOTE — ED ADULT NURSE NOTE - CADM POA URETHRAL CATHETER
[email or web address]    Spoke with patient. Pt confirmed colonoscopy on 02/13. Pt agreed to move colonoscopy appt to 1000 and an arrival time at 0900.  Pt informed to  bowel prep from pharmacy today. Instructed to call [facility] with issues picking up bowel prep.    Reviewed bowel prep instructions with patient in detail. Reviewed dietary modification with pt in detail.  Pt would like instructions sent via email.    Pt aware mandatory escort post procedure. Pt will take home medications as scheduled.     Instructed to call [facility] with questions.   
No

## 2023-02-09 DIAGNOSIS — R53.1 WEAKNESS: ICD-10-CM

## 2023-02-09 DIAGNOSIS — Z53.21 PROCEDURE AND TREATMENT NOT CARRIED OUT DUE TO PATIENT LEAVING PRIOR TO BEING SEEN BY HEALTH CARE PROVIDER: ICD-10-CM

## 2023-02-10 ENCOUNTER — EMERGENCY (EMERGENCY)
Facility: HOSPITAL | Age: 73
LOS: 1 days | Discharge: LEFT BEFORE DISCHARGE | End: 2023-02-10
Admitting: EMERGENCY MEDICINE
Payer: MEDICARE

## 2023-02-10 VITALS
SYSTOLIC BLOOD PRESSURE: 136 MMHG | HEART RATE: 69 BPM | TEMPERATURE: 97 F | RESPIRATION RATE: 18 BRPM | DIASTOLIC BLOOD PRESSURE: 88 MMHG | OXYGEN SATURATION: 99 % | WEIGHT: 139.99 LBS | HEIGHT: 65 IN

## 2023-02-10 DIAGNOSIS — Z95.2 PRESENCE OF PROSTHETIC HEART VALVE: Chronic | ICD-10-CM

## 2023-02-10 DIAGNOSIS — R07.89 OTHER CHEST PAIN: ICD-10-CM

## 2023-02-10 DIAGNOSIS — Z95.1 PRESENCE OF AORTOCORONARY BYPASS GRAFT: Chronic | ICD-10-CM

## 2023-02-10 DIAGNOSIS — Z53.21 PROCEDURE AND TREATMENT NOT CARRIED OUT DUE TO PATIENT LEAVING PRIOR TO BEING SEEN BY HEALTH CARE PROVIDER: ICD-10-CM

## 2023-02-10 PROCEDURE — L9991: CPT

## 2023-02-12 ENCOUNTER — EMERGENCY (EMERGENCY)
Facility: HOSPITAL | Age: 73
LOS: 1 days | Discharge: LEFT BEFORE DISCHARGE | End: 2023-02-12
Attending: EMERGENCY MEDICINE | Admitting: EMERGENCY MEDICINE
Payer: MEDICARE

## 2023-02-12 VITALS
OXYGEN SATURATION: 100 % | SYSTOLIC BLOOD PRESSURE: 168 MMHG | RESPIRATION RATE: 18 BRPM | HEART RATE: 85 BPM | DIASTOLIC BLOOD PRESSURE: 72 MMHG | TEMPERATURE: 99 F | HEIGHT: 65 IN

## 2023-02-12 DIAGNOSIS — Z95.2 PRESENCE OF PROSTHETIC HEART VALVE: Chronic | ICD-10-CM

## 2023-02-12 DIAGNOSIS — Z95.1 PRESENCE OF AORTOCORONARY BYPASS GRAFT: Chronic | ICD-10-CM

## 2023-02-12 PROCEDURE — L9992: CPT

## 2023-02-12 NOTE — ED PROVIDER NOTE - NS ED ROS FT
· CONSTITUTIONAL: no fever and no chills.  · CARDIOVASCULAR: +chest tightness  · RESPIRATORY: +cough  · GASTROINTESTINAL: no abdominal pain, no nausea and no vomiting.  · MUSCULOSKELETAL: no musculoskeletal pain  · NEURO: no headache, no weakness  All other systems negative

## 2023-02-12 NOTE — ED PROVIDER NOTE - CLINICAL SUMMARY MEDICAL DECISION MAKING FREE TEXT BOX
72-year-old male well-known to this department with frequent visits for various complaints as well as frequent elopement from the department presenting complaining of 1 month of cough and feeling like he cannot control his bladder.  Lung sounds are normal.  O2 sats are normal.  Concerns for pneumonia versus viral URI versus COPD given his smoking history.  Also concern for possible UTI given his urinary incontinence.  Offered the patient testing including chest x-ray and urinalysis.  He states he is agreeable to this testing.    When nursing approached the patient soon after I evaluated him and before any testing could be started, he had eloped from the emergency department.

## 2023-02-12 NOTE — ED PROVIDER NOTE - OBJECTIVE STATEMENT
72-year-old male well-known to this department with frequent presentations for various complaints presenting today stating that he has had a cough for 1 month and he feels that he cannot control his bladder.  He admits to smoking every day.  She denies any fevers.  His cough is nonproductive.  He also complains of chest tightness.

## 2023-02-12 NOTE — ED ADULT TRIAGE NOTE - AS HEIGHT TYPE
Addended by: EDWIN MORRIS on: 10/13/2021 08:32 AM     Modules accepted: Orders    
Addended by: EDWIN MORRIS on: 10/27/2021 07:07 AM     Modules accepted: Orders    
stated

## 2023-02-14 DIAGNOSIS — Z79.82 LONG TERM (CURRENT) USE OF ASPIRIN: ICD-10-CM

## 2023-02-14 DIAGNOSIS — R07.89 OTHER CHEST PAIN: ICD-10-CM

## 2023-02-14 DIAGNOSIS — I48.91 UNSPECIFIED ATRIAL FIBRILLATION: ICD-10-CM

## 2023-02-14 DIAGNOSIS — Z88.6 ALLERGY STATUS TO ANALGESIC AGENT: ICD-10-CM

## 2023-02-14 DIAGNOSIS — E78.5 HYPERLIPIDEMIA, UNSPECIFIED: ICD-10-CM

## 2023-02-14 DIAGNOSIS — I25.10 ATHEROSCLEROTIC HEART DISEASE OF NATIVE CORONARY ARTERY WITHOUT ANGINA PECTORIS: ICD-10-CM

## 2023-02-14 DIAGNOSIS — Z86.73 PERSONAL HISTORY OF TRANSIENT ISCHEMIC ATTACK (TIA), AND CEREBRAL INFARCTION WITHOUT RESIDUAL DEFICITS: ICD-10-CM

## 2023-02-14 DIAGNOSIS — F41.9 ANXIETY DISORDER, UNSPECIFIED: ICD-10-CM

## 2023-02-14 DIAGNOSIS — R05.9 COUGH, UNSPECIFIED: ICD-10-CM

## 2023-02-14 DIAGNOSIS — I25.2 OLD MYOCARDIAL INFARCTION: ICD-10-CM

## 2023-02-14 DIAGNOSIS — Z53.29 PROCEDURE AND TREATMENT NOT CARRIED OUT BECAUSE OF PATIENT'S DECISION FOR OTHER REASONS: ICD-10-CM

## 2023-02-14 DIAGNOSIS — Z87.09 PERSONAL HISTORY OF OTHER DISEASES OF THE RESPIRATORY SYSTEM: ICD-10-CM

## 2023-02-14 DIAGNOSIS — J44.9 CHRONIC OBSTRUCTIVE PULMONARY DISEASE, UNSPECIFIED: ICD-10-CM

## 2023-02-14 DIAGNOSIS — I10 ESSENTIAL (PRIMARY) HYPERTENSION: ICD-10-CM

## 2023-02-14 DIAGNOSIS — F17.210 NICOTINE DEPENDENCE, CIGARETTES, UNCOMPLICATED: ICD-10-CM

## 2023-02-15 ENCOUNTER — EMERGENCY (EMERGENCY)
Facility: HOSPITAL | Age: 73
LOS: 1 days | Discharge: LEFT BEFORE DISCHARGE | End: 2023-02-15
Admitting: EMERGENCY MEDICINE
Payer: MEDICARE

## 2023-02-15 VITALS
WEIGHT: 160.06 LBS | HEART RATE: 82 BPM | HEIGHT: 65 IN | SYSTOLIC BLOOD PRESSURE: 116 MMHG | RESPIRATION RATE: 18 BRPM | TEMPERATURE: 98 F | DIASTOLIC BLOOD PRESSURE: 75 MMHG | OXYGEN SATURATION: 98 %

## 2023-02-15 DIAGNOSIS — Z95.1 PRESENCE OF AORTOCORONARY BYPASS GRAFT: Chronic | ICD-10-CM

## 2023-02-15 DIAGNOSIS — F17.200 NICOTINE DEPENDENCE, UNSPECIFIED, UNCOMPLICATED: ICD-10-CM

## 2023-02-15 DIAGNOSIS — L29.9 PRURITUS, UNSPECIFIED: ICD-10-CM

## 2023-02-15 DIAGNOSIS — I25.2 OLD MYOCARDIAL INFARCTION: ICD-10-CM

## 2023-02-15 DIAGNOSIS — R05.9 COUGH, UNSPECIFIED: ICD-10-CM

## 2023-02-15 DIAGNOSIS — E78.5 HYPERLIPIDEMIA, UNSPECIFIED: ICD-10-CM

## 2023-02-15 DIAGNOSIS — I25.10 ATHEROSCLEROTIC HEART DISEASE OF NATIVE CORONARY ARTERY WITHOUT ANGINA PECTORIS: ICD-10-CM

## 2023-02-15 DIAGNOSIS — Z79.82 LONG TERM (CURRENT) USE OF ASPIRIN: ICD-10-CM

## 2023-02-15 DIAGNOSIS — Z53.29 PROCEDURE AND TREATMENT NOT CARRIED OUT BECAUSE OF PATIENT'S DECISION FOR OTHER REASONS: ICD-10-CM

## 2023-02-15 DIAGNOSIS — Z88.6 ALLERGY STATUS TO ANALGESIC AGENT: ICD-10-CM

## 2023-02-15 DIAGNOSIS — Z95.5 PRESENCE OF CORONARY ANGIOPLASTY IMPLANT AND GRAFT: ICD-10-CM

## 2023-02-15 DIAGNOSIS — Z95.2 PRESENCE OF PROSTHETIC HEART VALVE: Chronic | ICD-10-CM

## 2023-02-15 DIAGNOSIS — Z87.19 PERSONAL HISTORY OF OTHER DISEASES OF THE DIGESTIVE SYSTEM: ICD-10-CM

## 2023-02-15 DIAGNOSIS — F41.9 ANXIETY DISORDER, UNSPECIFIED: ICD-10-CM

## 2023-02-15 DIAGNOSIS — Z86.73 PERSONAL HISTORY OF TRANSIENT ISCHEMIC ATTACK (TIA), AND CEREBRAL INFARCTION WITHOUT RESIDUAL DEFICITS: ICD-10-CM

## 2023-02-15 DIAGNOSIS — I10 ESSENTIAL (PRIMARY) HYPERTENSION: ICD-10-CM

## 2023-02-15 DIAGNOSIS — I48.91 UNSPECIFIED ATRIAL FIBRILLATION: ICD-10-CM

## 2023-02-15 PROCEDURE — 99283 EMERGENCY DEPT VISIT LOW MDM: CPT

## 2023-02-15 NOTE — ED PROVIDER NOTE - OBJECTIVE STATEMENT
73 y/o M with PMH of A-fib, COPD, and hypertension presents to the ED for coughs. Pt also c/o itchiness "all over." He denies fevers, chills, CP, or shortness of breath. Of note, Pt is well known to this ED for miscellaneous complaints.

## 2023-02-15 NOTE — ED PROVIDER NOTE - CLINICAL SUMMARY MEDICAL DECISION MAKING FREE TEXT BOX
71 y/o M presenting with multiple medical complaints. Pt is well known to this ED 2/2 frequent visits for miscellaneous complaints. On exam, Pt appears well and in no acute distress. Pt left the ED before full evaluation could be done.

## 2023-02-15 NOTE — ED PROVIDER NOTE - NSICDXPASTSURGICALHX_GEN_ALL_CORE_FT
PAST SURGICAL HISTORY:  H/O aortic valve replacement     No significant past surgical history     S/P CABG (coronary artery bypass graft)     S/P CABG x 3

## 2023-03-13 ENCOUNTER — EMERGENCY (EMERGENCY)
Facility: HOSPITAL | Age: 73
LOS: 1 days | Discharge: HOME | End: 2023-03-13
Attending: EMERGENCY MEDICINE | Admitting: EMERGENCY MEDICINE
Payer: MEDICARE

## 2023-03-13 VITALS
OXYGEN SATURATION: 100 % | HEIGHT: 65 IN | DIASTOLIC BLOOD PRESSURE: 83 MMHG | TEMPERATURE: 98 F | SYSTOLIC BLOOD PRESSURE: 133 MMHG | RESPIRATION RATE: 20 BRPM | HEART RATE: 89 BPM

## 2023-03-13 DIAGNOSIS — Z95.2 PRESENCE OF PROSTHETIC HEART VALVE: Chronic | ICD-10-CM

## 2023-03-13 DIAGNOSIS — Z95.1 PRESENCE OF AORTOCORONARY BYPASS GRAFT: Chronic | ICD-10-CM

## 2023-03-13 PROCEDURE — 99285 EMERGENCY DEPT VISIT HI MDM: CPT

## 2023-03-13 NOTE — ED PROVIDER NOTE - NSFOLLOWUPINSTRUCTIONS_ED_ALL_ED_FT
Follow up with your primary care doctor or clinics listed below if you do not have a doctor  [address] [address]  To make an appointment, call [phone]  [facility] [place], operated [facility]  Address: [address] [place] [address]  Appointment Center: [phone] ([phone])

## 2023-03-13 NOTE — ED PROVIDER NOTE - OBJECTIVE STATEMENT
71 y/o M, well known to this ED, with PMHx of A-fib, COPD, and HTN presents to the ED complaining of itchiness all over his body. No rash, fevers, no chills, no chest pain no shortness of breath. Patient is a current day smoker.

## 2023-03-13 NOTE — ED PROVIDER NOTE - CLINICAL SUMMARY MEDICAL DECISION MAKING FREE TEXT BOX
Patient presents for itching all over. Patient eloped prior to labs, x-ray, and completion of evaluation.

## 2023-03-13 NOTE — ED PROVIDER NOTE - PATIENT PORTAL LINK FT
You can access the FollowMyHealth Patient Portal offered by [facility] by registering at the following website: [email or web address]. By joining [employer] [facility]’s FollowMyHealth portal, you will also be able to view your health information using other applications (apps) compatible with our system.

## 2023-03-13 NOTE — ED ADULT NURSE NOTE - OBJECTIVE STATEMENT
Pt eloped/walked out of Ed after being assessed by the provider. PT left before the primary RN's assessment. Dr Tamayo made aware. Pt walked out of Ed after being assessed by the provider. PT left before the primary RN's assessment. Dr Tamayo made aware.

## 2023-03-13 NOTE — ED PROVIDER NOTE - CONSTITUTIONAL, MLM
Disheveled appearing, awake, alert, oriented to person, place, time/situation and in no apparent distress. normal...

## 2023-03-15 DIAGNOSIS — I25.10 ATHEROSCLEROTIC HEART DISEASE OF NATIVE CORONARY ARTERY WITHOUT ANGINA PECTORIS: ICD-10-CM

## 2023-03-15 DIAGNOSIS — L29.9 PRURITUS, UNSPECIFIED: ICD-10-CM

## 2023-03-15 DIAGNOSIS — R05.9 COUGH, UNSPECIFIED: ICD-10-CM

## 2023-03-15 DIAGNOSIS — Z95.2 PRESENCE OF PROSTHETIC HEART VALVE: ICD-10-CM

## 2023-03-15 DIAGNOSIS — I25.2 OLD MYOCARDIAL INFARCTION: ICD-10-CM

## 2023-03-15 DIAGNOSIS — F17.200 NICOTINE DEPENDENCE, UNSPECIFIED, UNCOMPLICATED: ICD-10-CM

## 2023-03-15 DIAGNOSIS — I10 ESSENTIAL (PRIMARY) HYPERTENSION: ICD-10-CM

## 2023-03-15 DIAGNOSIS — E78.5 HYPERLIPIDEMIA, UNSPECIFIED: ICD-10-CM

## 2023-03-15 DIAGNOSIS — Z88.6 ALLERGY STATUS TO ANALGESIC AGENT: ICD-10-CM

## 2023-03-15 DIAGNOSIS — Z86.73 PERSONAL HISTORY OF TRANSIENT ISCHEMIC ATTACK (TIA), AND CEREBRAL INFARCTION WITHOUT RESIDUAL DEFICITS: ICD-10-CM

## 2023-03-15 DIAGNOSIS — Z79.82 LONG TERM (CURRENT) USE OF ASPIRIN: ICD-10-CM

## 2023-03-15 DIAGNOSIS — F41.9 ANXIETY DISORDER, UNSPECIFIED: ICD-10-CM

## 2023-03-15 DIAGNOSIS — J44.9 CHRONIC OBSTRUCTIVE PULMONARY DISEASE, UNSPECIFIED: ICD-10-CM

## 2023-03-15 DIAGNOSIS — Z20.822 CONTACT WITH AND (SUSPECTED) EXPOSURE TO COVID-19: ICD-10-CM

## 2023-03-15 DIAGNOSIS — Z95.1 PRESENCE OF AORTOCORONARY BYPASS GRAFT: ICD-10-CM

## 2023-03-15 DIAGNOSIS — I48.91 UNSPECIFIED ATRIAL FIBRILLATION: ICD-10-CM

## 2023-03-20 ENCOUNTER — EMERGENCY (EMERGENCY)
Facility: HOSPITAL | Age: 73
LOS: 1 days | Discharge: LEFT BEFORE DISCHARGE | End: 2023-03-20
Admitting: EMERGENCY MEDICINE
Payer: MEDICARE

## 2023-03-20 VITALS
DIASTOLIC BLOOD PRESSURE: 74 MMHG | TEMPERATURE: 98 F | WEIGHT: 139.99 LBS | RESPIRATION RATE: 18 BRPM | OXYGEN SATURATION: 95 % | HEART RATE: 106 BPM | SYSTOLIC BLOOD PRESSURE: 117 MMHG | HEIGHT: 65 IN

## 2023-03-20 DIAGNOSIS — Z95.2 PRESENCE OF PROSTHETIC HEART VALVE: Chronic | ICD-10-CM

## 2023-03-20 DIAGNOSIS — Z53.21 PROCEDURE AND TREATMENT NOT CARRIED OUT DUE TO PATIENT LEAVING PRIOR TO BEING SEEN BY HEALTH CARE PROVIDER: ICD-10-CM

## 2023-03-20 DIAGNOSIS — Z95.1 PRESENCE OF AORTOCORONARY BYPASS GRAFT: Chronic | ICD-10-CM

## 2023-03-20 DIAGNOSIS — R05.9 COUGH, UNSPECIFIED: ICD-10-CM

## 2023-03-20 PROCEDURE — L9991: CPT

## 2023-03-30 ENCOUNTER — EMERGENCY (EMERGENCY)
Facility: HOSPITAL | Age: 73
LOS: 1 days | Discharge: LEFT BEFORE DISCHARGE | End: 2023-03-30
Attending: EMERGENCY MEDICINE | Admitting: EMERGENCY MEDICINE
Payer: MEDICARE

## 2023-03-30 VITALS
HEIGHT: 65 IN | HEART RATE: 82 BPM | RESPIRATION RATE: 20 BRPM | TEMPERATURE: 98 F | SYSTOLIC BLOOD PRESSURE: 116 MMHG | WEIGHT: 139.99 LBS | DIASTOLIC BLOOD PRESSURE: 76 MMHG | OXYGEN SATURATION: 96 %

## 2023-03-30 DIAGNOSIS — Z88.6 ALLERGY STATUS TO ANALGESIC AGENT: ICD-10-CM

## 2023-03-30 DIAGNOSIS — R10.9 UNSPECIFIED ABDOMINAL PAIN: ICD-10-CM

## 2023-03-30 DIAGNOSIS — Z95.1 PRESENCE OF AORTOCORONARY BYPASS GRAFT: Chronic | ICD-10-CM

## 2023-03-30 DIAGNOSIS — Z79.82 LONG TERM (CURRENT) USE OF ASPIRIN: ICD-10-CM

## 2023-03-30 DIAGNOSIS — Z53.21 PROCEDURE AND TREATMENT NOT CARRIED OUT DUE TO PATIENT LEAVING PRIOR TO BEING SEEN BY HEALTH CARE PROVIDER: ICD-10-CM

## 2023-03-30 DIAGNOSIS — Z95.2 PRESENCE OF PROSTHETIC HEART VALVE: Chronic | ICD-10-CM

## 2023-03-30 DIAGNOSIS — Z88.8 ALLERGY STATUS TO OTHER DRUGS, MEDICAMENTS AND BIOLOGICAL SUBSTANCES: ICD-10-CM

## 2023-03-30 PROCEDURE — L9991: CPT

## 2023-03-30 NOTE — ED ADULT TRIAGE NOTE - GLASGOW COMA SCALE: BEST VERBAL RESPONSE, MLM
I have reviewed discharge instructions with the patient. The patient verbalized understanding. Steady gait upon discharge.
(V5) oriented

## 2023-04-03 ENCOUNTER — EMERGENCY (EMERGENCY)
Facility: HOSPITAL | Age: 73
LOS: 1 days | Discharge: LEFT BEFORE DISCHARGE | End: 2023-04-03
Attending: EMERGENCY MEDICINE | Admitting: EMERGENCY MEDICINE
Payer: SELF-PAY

## 2023-04-03 VITALS
WEIGHT: 139.99 LBS | HEART RATE: 87 BPM | RESPIRATION RATE: 19 BRPM | TEMPERATURE: 97 F | SYSTOLIC BLOOD PRESSURE: 127 MMHG | HEIGHT: 65 IN | DIASTOLIC BLOOD PRESSURE: 88 MMHG

## 2023-04-03 DIAGNOSIS — Z95.2 PRESENCE OF PROSTHETIC HEART VALVE: Chronic | ICD-10-CM

## 2023-04-03 DIAGNOSIS — Z95.1 PRESENCE OF AORTOCORONARY BYPASS GRAFT: Chronic | ICD-10-CM

## 2023-04-03 PROCEDURE — L9992: CPT

## 2023-04-03 NOTE — ED ADULT TRIAGE NOTE - HEIGHT IN FEET
5
PAST SURGICAL HISTORY:  Bilateral cataracts     Detached retina     Granuloma, laryngeal     History of tonsillectomy

## 2023-04-03 NOTE — ED ADULT NURSE NOTE - CHIEF COMPLAINT QUOTE
Pt walk in complaining of 10 days of bloody stools. Hx of frequent elopements but promises to stay for treatment.

## 2023-04-05 DIAGNOSIS — K92.1 MELENA: ICD-10-CM

## 2023-04-05 DIAGNOSIS — Z53.21 PROCEDURE AND TREATMENT NOT CARRIED OUT DUE TO PATIENT LEAVING PRIOR TO BEING SEEN BY HEALTH CARE PROVIDER: ICD-10-CM

## 2023-04-13 ENCOUNTER — EMERGENCY (EMERGENCY)
Facility: HOSPITAL | Age: 73
LOS: 1 days | Discharge: LEFT BEFORE DISCHARGE | End: 2023-04-13
Attending: EMERGENCY MEDICINE | Admitting: EMERGENCY MEDICINE
Payer: MEDICARE

## 2023-04-13 VITALS
TEMPERATURE: 98 F | HEART RATE: 80 BPM | DIASTOLIC BLOOD PRESSURE: 75 MMHG | WEIGHT: 139.99 LBS | RESPIRATION RATE: 18 BRPM | SYSTOLIC BLOOD PRESSURE: 119 MMHG | OXYGEN SATURATION: 97 % | HEIGHT: 65 IN

## 2023-04-13 DIAGNOSIS — Z53.29 PROCEDURE AND TREATMENT NOT CARRIED OUT BECAUSE OF PATIENT'S DECISION FOR OTHER REASONS: ICD-10-CM

## 2023-04-13 DIAGNOSIS — R11.0 NAUSEA: ICD-10-CM

## 2023-04-13 DIAGNOSIS — Z95.2 PRESENCE OF PROSTHETIC HEART VALVE: Chronic | ICD-10-CM

## 2023-04-13 DIAGNOSIS — Z95.2 PRESENCE OF PROSTHETIC HEART VALVE: ICD-10-CM

## 2023-04-13 DIAGNOSIS — Z88.6 ALLERGY STATUS TO ANALGESIC AGENT: ICD-10-CM

## 2023-04-13 DIAGNOSIS — F41.9 ANXIETY DISORDER, UNSPECIFIED: ICD-10-CM

## 2023-04-13 DIAGNOSIS — R10.12 LEFT UPPER QUADRANT PAIN: ICD-10-CM

## 2023-04-13 DIAGNOSIS — I25.10 ATHEROSCLEROTIC HEART DISEASE OF NATIVE CORONARY ARTERY WITHOUT ANGINA PECTORIS: ICD-10-CM

## 2023-04-13 DIAGNOSIS — Z86.73 PERSONAL HISTORY OF TRANSIENT ISCHEMIC ATTACK (TIA), AND CEREBRAL INFARCTION WITHOUT RESIDUAL DEFICITS: ICD-10-CM

## 2023-04-13 DIAGNOSIS — Z79.82 LONG TERM (CURRENT) USE OF ASPIRIN: ICD-10-CM

## 2023-04-13 DIAGNOSIS — E78.5 HYPERLIPIDEMIA, UNSPECIFIED: ICD-10-CM

## 2023-04-13 DIAGNOSIS — I10 ESSENTIAL (PRIMARY) HYPERTENSION: ICD-10-CM

## 2023-04-13 DIAGNOSIS — I48.91 UNSPECIFIED ATRIAL FIBRILLATION: ICD-10-CM

## 2023-04-13 DIAGNOSIS — Z95.1 PRESENCE OF AORTOCORONARY BYPASS GRAFT: Chronic | ICD-10-CM

## 2023-04-13 DIAGNOSIS — Z87.09 PERSONAL HISTORY OF OTHER DISEASES OF THE RESPIRATORY SYSTEM: ICD-10-CM

## 2023-04-13 DIAGNOSIS — Z95.1 PRESENCE OF AORTOCORONARY BYPASS GRAFT: ICD-10-CM

## 2023-04-13 PROCEDURE — 99284 EMERGENCY DEPT VISIT MOD MDM: CPT

## 2023-04-13 RX ORDER — SODIUM CHLORIDE 9 MG/ML
1000 INJECTION INTRAMUSCULAR; INTRAVENOUS; SUBCUTANEOUS ONCE
Refills: 0 | Status: DISCONTINUED | OUTPATIENT
Start: 2023-04-13 | End: 2023-04-17

## 2023-04-13 RX ORDER — ONDANSETRON 8 MG/1
4 TABLET, FILM COATED ORAL ONCE
Refills: 0 | Status: DISCONTINUED | OUTPATIENT
Start: 2023-04-13 | End: 2023-04-17

## 2023-04-13 RX ORDER — ACETAMINOPHEN 500 MG
650 TABLET ORAL ONCE
Refills: 0 | Status: DISCONTINUED | OUTPATIENT
Start: 2023-04-13 | End: 2023-04-17

## 2023-04-13 NOTE — ED PROVIDER NOTE - PROGRESS NOTE DETAILS
Patient eloped prior to any testing. Patient eloped prior to any testing. Patient was only seen by physician (me), was never seen by ACP

## 2023-04-13 NOTE — ED PROVIDER NOTE - PHYSICAL EXAMINATION
Gen: Well-developed, well-nourished, NAD, VS as noted by nursing. HEENT: NCAT, mmm   Chest: RRR, nl S1 and S2, no m/r/g. Resp: CTAB, no w/r/r  Abd: nl BS, soft, moderate LUQ tenderness, no g/r. Ext: Warm, dry  Neuro: CN II-XII intact, normal and equal strength, sensation, and reflexes bilaterally, normal gait  Psych: AAOx3

## 2023-04-13 NOTE — ED PROVIDER NOTE - OBJECTIVE STATEMENT
Patient reports 2 days of LUQ pain and nausea. No fever, cp, sob, vomiting, diarrhea, dysuria, urgency, frequency

## 2023-04-17 ENCOUNTER — EMERGENCY (EMERGENCY)
Facility: HOSPITAL | Age: 73
LOS: 1 days | Discharge: LEFT BEFORE DISCHARGE | End: 2023-04-17
Attending: EMERGENCY MEDICINE | Admitting: EMERGENCY MEDICINE
Payer: MEDICARE

## 2023-04-17 VITALS
HEART RATE: 88 BPM | TEMPERATURE: 98 F | DIASTOLIC BLOOD PRESSURE: 74 MMHG | SYSTOLIC BLOOD PRESSURE: 116 MMHG | HEIGHT: 65 IN | WEIGHT: 149.91 LBS | RESPIRATION RATE: 18 BRPM | OXYGEN SATURATION: 99 %

## 2023-04-17 DIAGNOSIS — Z95.2 PRESENCE OF PROSTHETIC HEART VALVE: Chronic | ICD-10-CM

## 2023-04-17 DIAGNOSIS — Z95.1 PRESENCE OF AORTOCORONARY BYPASS GRAFT: Chronic | ICD-10-CM

## 2023-04-17 PROCEDURE — 99284 EMERGENCY DEPT VISIT MOD MDM: CPT

## 2023-04-17 NOTE — ED ADULT TRIAGE NOTE - WEIGHT IN LBS
[FreeTextEntry1] : PST results from 9/7/18 noted, discussed with the patient.\par Patient is medically optimized to the best at this time for the stated intervention.\par Patient is medically cleared.\par \par \par 
149.9

## 2023-04-20 NOTE — ED PROVIDER NOTE - OBJECTIVE STATEMENT
Patient complains of dull pain to chest for about 2 hours, retrosternal, non-radiating. No fever, ha, cough, sob, ap, n/v/d, focal weakness, paresthesias.

## 2023-04-20 NOTE — ED PROVIDER NOTE - CLINICAL SUMMARY MEDICAL DECISION MAKING FREE TEXT BOX
Patient complained of chest pain. I planned for labs, EKG, CXR. However, just after I saw patient, patient was seen by nurse walking out. Patient eloped prior to completing treatment.

## 2023-04-21 DIAGNOSIS — E78.5 HYPERLIPIDEMIA, UNSPECIFIED: ICD-10-CM

## 2023-04-21 DIAGNOSIS — Z95.2 PRESENCE OF PROSTHETIC HEART VALVE: ICD-10-CM

## 2023-04-21 DIAGNOSIS — R07.89 OTHER CHEST PAIN: ICD-10-CM

## 2023-04-21 DIAGNOSIS — Z79.82 LONG TERM (CURRENT) USE OF ASPIRIN: ICD-10-CM

## 2023-04-21 DIAGNOSIS — I25.2 OLD MYOCARDIAL INFARCTION: ICD-10-CM

## 2023-04-21 DIAGNOSIS — I25.10 ATHEROSCLEROTIC HEART DISEASE OF NATIVE CORONARY ARTERY WITHOUT ANGINA PECTORIS: ICD-10-CM

## 2023-04-21 DIAGNOSIS — Z88.1 ALLERGY STATUS TO OTHER ANTIBIOTIC AGENTS STATUS: ICD-10-CM

## 2023-04-21 DIAGNOSIS — Z53.29 PROCEDURE AND TREATMENT NOT CARRIED OUT BECAUSE OF PATIENT'S DECISION FOR OTHER REASONS: ICD-10-CM

## 2023-04-21 DIAGNOSIS — I48.91 UNSPECIFIED ATRIAL FIBRILLATION: ICD-10-CM

## 2023-04-21 DIAGNOSIS — Z95.1 PRESENCE OF AORTOCORONARY BYPASS GRAFT: ICD-10-CM

## 2023-04-21 DIAGNOSIS — Z86.73 PERSONAL HISTORY OF TRANSIENT ISCHEMIC ATTACK (TIA), AND CEREBRAL INFARCTION WITHOUT RESIDUAL DEFICITS: ICD-10-CM

## 2023-04-21 DIAGNOSIS — Z88.6 ALLERGY STATUS TO ANALGESIC AGENT: ICD-10-CM

## 2023-04-21 DIAGNOSIS — I10 ESSENTIAL (PRIMARY) HYPERTENSION: ICD-10-CM

## 2023-04-21 DIAGNOSIS — J44.9 CHRONIC OBSTRUCTIVE PULMONARY DISEASE, UNSPECIFIED: ICD-10-CM

## 2023-04-26 ENCOUNTER — EMERGENCY (EMERGENCY)
Facility: HOSPITAL | Age: 73
LOS: 1 days | Discharge: LEFT BEFORE DISCHARGE | End: 2023-04-26
Attending: EMERGENCY MEDICINE | Admitting: EMERGENCY MEDICINE
Payer: MEDICARE

## 2023-04-26 VITALS
OXYGEN SATURATION: 98 % | HEIGHT: 65 IN | DIASTOLIC BLOOD PRESSURE: 76 MMHG | WEIGHT: 149.91 LBS | HEART RATE: 89 BPM | SYSTOLIC BLOOD PRESSURE: 132 MMHG | RESPIRATION RATE: 18 BRPM | TEMPERATURE: 99 F

## 2023-04-26 DIAGNOSIS — Z95.1 PRESENCE OF AORTOCORONARY BYPASS GRAFT: Chronic | ICD-10-CM

## 2023-04-26 DIAGNOSIS — Z95.2 PRESENCE OF PROSTHETIC HEART VALVE: Chronic | ICD-10-CM

## 2023-04-26 PROCEDURE — 99283 EMERGENCY DEPT VISIT LOW MDM: CPT

## 2023-04-26 NOTE — ED PROVIDER NOTE - OBJECTIVE STATEMENT
Patient states he has generalized itching, no fever or chills, no chest pain or headache or abd pain.

## 2023-04-26 NOTE — ED ADULT NURSE NOTE - NSIMPLEMENTINTERV_GEN_ALL_ED
Implemented All Universal Safety Interventions:  [place] to call system. Call bell, personal items and telephone within reach. Instruct patient to call for assistance. Room bathroom lighting operational. Non-slip footwear when patient is off stretcher. Physically safe environment: no spills, clutter or unnecessary equipment. Stretcher in lowest position, wheels locked, appropriate side rails in place.

## 2023-04-26 NOTE — ED ADULT NURSE NOTE - OBJECTIVE STATEMENT
Patient presents to ED c/o body itching x 1 week. Denies pain. Denies f/c/n/v/d, cp, sob, cough. Patient left before MD evaluated. Patient was in no acute distress, ambulatory with steady gait, spont unlabored breathing on ra.

## 2023-04-27 ENCOUNTER — EMERGENCY (EMERGENCY)
Facility: HOSPITAL | Age: 73
LOS: 1 days | Discharge: LEFT BEFORE DISCHARGE | End: 2023-04-27
Attending: EMERGENCY MEDICINE | Admitting: EMERGENCY MEDICINE
Payer: MEDICARE

## 2023-04-27 VITALS
HEIGHT: 65 IN | HEART RATE: 77 BPM | RESPIRATION RATE: 18 BRPM | TEMPERATURE: 97 F | WEIGHT: 139.99 LBS | OXYGEN SATURATION: 98 % | DIASTOLIC BLOOD PRESSURE: 71 MMHG | SYSTOLIC BLOOD PRESSURE: 107 MMHG

## 2023-04-27 DIAGNOSIS — Z95.2 PRESENCE OF PROSTHETIC HEART VALVE: Chronic | ICD-10-CM

## 2023-04-27 DIAGNOSIS — Z95.1 PRESENCE OF AORTOCORONARY BYPASS GRAFT: Chronic | ICD-10-CM

## 2023-04-27 PROCEDURE — L9991: CPT

## 2023-04-27 NOTE — ED ADULT NURSE NOTE - CHPI ED NUR TIMING2
"Chief Complaint  Hypertension (Had Covid end  of march and is doing PT for neck and shoulder arthritis but she is doing well.) and Hyperlipidemia (6 mo f/u)    Subjective        Tia Snowden presents to [facility]  History of Present Illness  Pleasant 54-year-old female here for hypertension which is well controlled, hyperlipidemia which was previously elevated not on medication.  Working to improve lifestyle.  She did have covid at the end of march with some postnasal drip with some cough that improved.     Neck pain that was hard to turn her neck.  She had an Xray that had some DDD at C5-6.  Moderate arthritis in the neck and shoulder. She is doing face time PT and doing them daily and then has a face time meeting that has helped a lot - she now has great ROM of her neck     Still with care giver stress with caring for her partner. She does see a psychiatrist and has had a lot of side effects of meds.  She got a stomach virus with her last medicine.     Objective   Vital Signs:  /72   Pulse 81   Temp 96.8 °F (36 °C)   Ht 175.3 cm (69\")   Wt 94.8 kg (209 lb)   SpO2 98%   BMI 30.86 kg/m²   Estimated body mass index is 30.86 kg/m² as calculated from the following:    Height as of this encounter: 175.3 cm (69\").    Weight as of this encounter: 94.8 kg (209 lb).             Physical Exam  Vitals and nursing note reviewed.   Constitutional:       General: She is not in acute distress.     Appearance: She is well-developed.   HENT:      Head: Normocephalic.      Nose: Nose normal.   Cardiovascular:      Rate and Rhythm: Normal rate and regular rhythm.      Heart sounds: Normal heart sounds. No murmur heard.  Pulmonary:      Effort: Pulmonary effort is normal. No respiratory distress.      Breath sounds: Normal breath sounds.   Musculoskeletal:         General: Normal range of motion.   Skin:     General: Skin is warm and dry.      Findings: No rash.   Neurological:      " Mental Status: She is alert and oriented to person, place, and time.   Psychiatric:         Behavior: Behavior normal.         Thought Content: Thought content normal.         Judgment: Judgment normal.        Result Review :                   Assessment and Plan   Diagnoses and all orders for this visit:    1. Benign essential HTN (Primary)  -     Comprehensive Metabolic Panel  -     CBC & Differential  -     Lipid Panel  -     TSH Rfx On Abnormal To Free T4  -     losartan (COZAAR) 100 MG tablet; Take 1 tablet by mouth Daily.  Dispense: 90 tablet; Refill: 1  -     Comprehensive Metabolic Panel; Future  -     CBC & Differential; Future    2. Mixed hyperlipidemia  -     Comprehensive Metabolic Panel  -     CBC & Differential  -     Lipid Panel  -     TSH Rfx On Abnormal To Free T4  -     Lipid Panel; Future    3. Caregiver burden    Pleasant 54-year-old female here for hypertension which is well controlled hyperlipidemia which is well controlled, plan to continue losartan 100 mg daily, amlodipine 10 mg daily.  She does see her psychiatrist for depression and anxiety, and ADHD.    Cervical neck pain improving with evaluation with online PT, continue with this.  She is not taking tizanidine currently.    Lots of caretaker stress caring for her partner with MS.         Follow Up   Return in about 6 months (around 10/27/2023), or if symptoms worsen or fail to improve, for Annual Physical with fasting labs prior.  Patient was given instructions and counseling regarding her condition or for health maintenance advice. Please see specific information pulled into the AVS if appropriate.     Rossy Pratt MD         waxing and waning

## 2023-04-28 DIAGNOSIS — I10 ESSENTIAL (PRIMARY) HYPERTENSION: ICD-10-CM

## 2023-04-28 DIAGNOSIS — I25.10 ATHEROSCLEROTIC HEART DISEASE OF NATIVE CORONARY ARTERY WITHOUT ANGINA PECTORIS: ICD-10-CM

## 2023-04-28 DIAGNOSIS — Z95.2 PRESENCE OF PROSTHETIC HEART VALVE: ICD-10-CM

## 2023-04-28 DIAGNOSIS — L29.9 PRURITUS, UNSPECIFIED: ICD-10-CM

## 2023-04-28 DIAGNOSIS — Z79.82 LONG TERM (CURRENT) USE OF ASPIRIN: ICD-10-CM

## 2023-04-28 DIAGNOSIS — F41.9 ANXIETY DISORDER, UNSPECIFIED: ICD-10-CM

## 2023-04-28 DIAGNOSIS — I48.91 UNSPECIFIED ATRIAL FIBRILLATION: ICD-10-CM

## 2023-04-28 DIAGNOSIS — Z88.6 ALLERGY STATUS TO ANALGESIC AGENT: ICD-10-CM

## 2023-04-28 DIAGNOSIS — Z95.1 PRESENCE OF AORTOCORONARY BYPASS GRAFT: ICD-10-CM

## 2023-04-28 DIAGNOSIS — J44.9 CHRONIC OBSTRUCTIVE PULMONARY DISEASE, UNSPECIFIED: ICD-10-CM

## 2023-04-28 DIAGNOSIS — Z53.29 PROCEDURE AND TREATMENT NOT CARRIED OUT BECAUSE OF PATIENT'S DECISION FOR OTHER REASONS: ICD-10-CM

## 2023-04-28 DIAGNOSIS — E78.5 HYPERLIPIDEMIA, UNSPECIFIED: ICD-10-CM

## 2023-04-28 DIAGNOSIS — I25.2 OLD MYOCARDIAL INFARCTION: ICD-10-CM

## 2023-04-28 DIAGNOSIS — Z86.73 PERSONAL HISTORY OF TRANSIENT ISCHEMIC ATTACK (TIA), AND CEREBRAL INFARCTION WITHOUT RESIDUAL DEFICITS: ICD-10-CM

## 2023-04-29 DIAGNOSIS — R10.84 GENERALIZED ABDOMINAL PAIN: ICD-10-CM

## 2023-04-29 DIAGNOSIS — Z53.21 PROCEDURE AND TREATMENT NOT CARRIED OUT DUE TO PATIENT LEAVING PRIOR TO BEING SEEN BY HEALTH CARE PROVIDER: ICD-10-CM

## 2023-05-01 NOTE — ED ADULT TRIAGE NOTE - AS TEMP SITE
No care due was identified.  [facility] Embedded Care Due Messages. Reference number: [id number].   5/01/2023 11:19:33 AM CDT   oral

## 2023-05-16 NOTE — ED ADULT NURSE NOTE - NSFALLRSKHARMRISK_ED_ALL_ED
"5/16/2023       RE: Bigg Montez  [address]  [place] [address]     Dear Colleague,    Thank you for referring your patient, Bigg Montez, to the [facility]. Please see a copy of my visit note below.    Bigg Montez is a 36 year old male who is being evaluated via a billable video visit.      The patient has been notified of following:     \"This video visit will be conducted via a call between you and your physician/provider. We have found that certain health care needs can be provided without the need for an in-person physical exam.  This service lets us provide the care you need with a video conversation.  If a prescription is necessary we can send it directly to your pharmacy.  If lab work is needed we can place an order for that and you can then stop by our lab to have the test done at a later time.    Video visits are billed at different rates depending on your insurance coverage.  Please reach out to your insurance provider with any questions.    If during the course of the call the physician/provider feels a video visit is not appropriate, you will not be charged for this service.\"    Patient has given verbal consent for Video visit? Yes  How would you like to obtain your AVS? MyChart  If you are dropped from the video visit, the video invite should be resent to: Text to cell phone: [phone]  Will anyone else be joining your video visit? No  {If patient encounters technical issues they should call [phone]      Video-Visit Details    Type of service:  Video Visit    Video Start Time: 3:33 PM  Video End Time: 3:52 PM    Originating Location (pt. Location): Home    Distant Location (provider location):  Offsite (providers home) [facility]     Platform used for Video Visit: Cellay    During this virtual visit the patient is located in " "[place], patient verifies this as the location during the entirety of this visit.           Bariatric Nutrition Consultation Note    Reason For Visit: Nutrition reassessment    Bigg Montez is a 36 year old presenting today for return bariatric nutrition consult.  Pt is interested in laparoscopic sleeve gastrectomy with TBD.  Patient is accompanied by self. This is patients 3rd of 3 required RD visits.     Pt referred by Brianda Chan NP on January 25, 2021.  Patient with Co-morbidities of obesity including:  Type II DM no  Renal Failure no  Sleep apnea yes  Hypertension yes   Dyslipidemia no  Joint pain no  Back pain no  GERD no   Prediabetes yes    H/o gout. Pt reports having a blood clot in mid-sept. 2021 1/21/2021    11:14 PM   Support System Reviewed With Patient   Who do you have in your support network that can be available to help you for the first 2 weeks after surgery? My wife   Who can you count on for support throughout your weight loss surgery journey? Kenny ceja       ANTHROPOMETRICS:  Initial weight (12/2018 with PCP): 386 lbs    Most recent weight:  Estimated body mass index is 54.8 kg/m  as calculated from the following:    Height as of 2/27/23: 1.815 m (5' 11.46\").    Weight as of 2/27/23: 180.5 kg (398 lb).     Current weight: 388 lbs (-10 lbs over last 2 months)     Required weight loss goal pre-op: 38 lbs from initial consult weight (goal weight 348 lbs or less before surgery)        1/21/2021    11:14 PM   --   I have tried the following methods to lose weight Watching portions or calories    Exercise    Weight Watchers    Slimfast    Physician directed program           1/21/2021    11:14 PM   Weight Loss Questions Reviewed With Patient   How long have you been overweight? Since puberty       SUPPLEMENT INFORMATION:  Did not discuss today.      NUTRITION HISTORY:  No known food allergies/intolerances  H/o gout    Pt reports today, he started Wegovy. Experiencing some nausea. Not " interested in food as much anymore. Finding he is only eating one meal daily.     Diet Recall:  Breakfast: skip  Lunch: Phu dickens sub  Dinner: skip  Beverages: water, sugar-free energy drink (1 can/day)     Progress Towards Previous Goals:  1.Goal weight for surgery: 348 lbs or less - Improving  2. Restart practicing surgery diet guidelines - chewing thoroughly, not drinking with meals, avoiding caffeine/carbonatino/calorie-containing beverages. - Not met  3. Consume 3 high-protein meals daily. Limit starches (bread, pasta rice) to 1/4 of plate or less. Fill out large section of plate with non-starchy veggies. - Not met  4. Limit/avoid snacking between meals. - Met, continues   5. Gym 3-4 times per week - Improving   6. Call [phone] to update insurance and leave message for Patsy Diallo that you want to review the surgery task list. - Met    ADDITIONAL INFORMATION:  Just started a new job.         1/21/2021    11:14 PM   Dining Out History Reviewed With Patient   How often do you dine out? Nearly every day.   Where do you dine out? (select all that apply) fast food chains    take out   What types of food do you order when you dine out? Protien rice dishes           1/21/2021    11:14 PM   Physical Activity Reviewed With Patient   How often do you exercise? 1 to 2 times per week   What is the duration of your exercise (in minutes)? 60+ Minutes   What types of exercise do you do? gym membership    weightlifting   What keeps you from being more active? Lack of Time         NUTRITION DIAGNOSIS:  Obesity r/t long history of self-monitoring deficit and excessive energy intake aeb BMI >30 kg/m2. - ongoing    INTERVENTION:  Intervention Provided/Education Provided:  - Reviewed weight loss goal for surgery.   - Reviewed bariatric diet guidelines   - Discussed strategies for meeting protein needs  - Encouraged low-fat diet, smaller portions and increased hydration to help manage nausea   - Provided pt with list  of diet handouts and nutrition goals below.         1/21/2021    11:14 PM   Questions Reviewed With Patient   How ready are you to make changes regarding your weight? Number 1 = Not ready at all to make changes up to 10 = very ready. 10   How confident are you that you can change? 1 = Not confident that you will be successful making changes up to 10 = very confident. 10       Expected Engagement: good    GOALS:  1.Goal weight for surgery: 348 lbs or less  2. Practice surgery diet guidelines - chewing thoroughly, not drinking with meals, avoiding caffeine/carbonatino/calorie-containing beverages.   3. Consume 3 servings of protein daily   - Greek yogurt (Oikos Triple Zeros, Two Good, Yoplait 100s), meat/cheese (2 slice deli meat + 1 slice part-skim cheese), turkey meat stick, low-fat cottage cheese, protein shake/bar  4. Portion out half of entree  5. Gym 3-4 times per week    Post-op Diet Handouts:  Diet Guidelines after Weight-loss Surgery  [email or web address]     Your Stage 1 Diet: Clear Liquids  [email or web address]     Your Stage 2 Diet: Low-fat Full Liquids  [email or web address]     Your Stage 3 Diet: Pureed Foods  [email or web address]     Pureed Pleasures  [email or web address]    Your Stage 4 Diet: Soft Foods  [email or web address]    Your Stage 5 Diet: Regular Foods  [email or web address]    Supplements after Sleeve Gastrectomy, Gastric Bypass or Single Anastomosis Duodenal Switch  [email or web address]    Keeping Track of Fluids  [email or web address]      Time spent with patient: 19 mins    Angela Granger RD, LD       no

## 2023-06-08 NOTE — ED ADULT NURSE NOTE - PLAN OF CARE DISCUSSED WITH:
URGENT CARE DEPARTMENT ENCOUNTER      CHIEF COMPLAINT    Chief Complaint   Patient presents with   • Leg       HISTORY OF PRESENT ILLNESS    Ceci Santillan is a pleasant 45 year old female who presents to the [facility] alone for evaluation and treatment of 3 day history left leg discomfort.  Patient states symptoms started in her calf and now are in the posterior outside aspect of her thigh.  Patient is a former smoker and uses intravaginal supplemental estrogen, but has never had a history of clots.  Denies shortness of breath.  Concerned about DVT.    ALLERGIES    ALLERGIES:   Allergen Reactions   • Sulfa Antibiotics      migraine   • Amoxicillin RASH   • Ibuprofen DIZZINESS and GI UPSET   • Ketorolac GI UPSET       CURRENT MEDICATIONS    Current Outpatient Medications   Medication Sig Dispense Refill   • Apixaban Starter Pack 5 MG Tablet Therapy Pack Take 2 tablets by mouth twice a day for 7 days, then 1 tablet by mouth twice a day. 74 tablet 0   • albuterol 108 (90 Base) MCG/ACT inhaler Inhale 2 puffs into the lungs every 4 hours as needed for Shortness of Breath or Wheezing. 1 each 0   • traMADol (ULTRAM) 50 MG tablet Take 100 mg by mouth 2 times daily.     • DULoxetine (CYMBALTA) 30 MG capsule Take 30 mg by mouth daily.     • HYDROcodone-acetaminophen (NORCO) 5-325 MG per tablet TK 1 T PO Q 6 H PRN P  0   • estrogens, conjugated (PREMARIN) vaginal cream Place 0.5 g vaginally.     • pantoprazole (PROTONIX) 40 MG tablet Take 1 tablet by mouth daily. (Patient taking differently: Take 40 mg by mouth daily. Takes PRN) 30 tablet 0   • albuterol 108 (90 Base) MCG/ACT inhaler Inhale 2 puffs into the lungs every 4 hours as needed for Shortness of Breath or Wheezing. 1 Inhaler 0     No current facility-administered medications for this visit.       PAST MEDICAL HISTORY    Past Medical History:   Diagnosis Date   • Allergy    • Anxiety    • Depressive disorder    • DJD (degenerative  joint disease)    • Drug-seeking behavior 2014   • RAD (reactive airway disease)    • Small fiber neuropathy        SURGICAL HISTORY    Past Surgical History:   Procedure Laterality Date   •  section, classic      x2   •  section, low transverse      2    • Colonoscopy  2015    polyps   • Hysterectomy         SOCIAL HISTORY    Social History     Tobacco Use   • Smoking status: Former     Current packs/day: 0.00     Types: Cigarettes     Quit date: 2022     Years since quittin.1   • Smokeless tobacco: Never   Substance Use Topics   • Alcohol use: Not Currently     Alcohol/week: 2.0 standard drinks of alcohol     Types: 2 Cans of beer per week     Comment: 6 pack in a week   • Drug use: No     Types: Marijuana     Comment: Has not used in a year       FAMILY HISTORY    Family History   Problem Relation Age of Onset   • Cancer Mother         cervical   • Seizure Disorder Sister        REVIEW OF SYSTEMS    Constitutional:  Denies unusual weight gain, recent weight change, weakness, fatigue, fever, chills  Eyes:  Denies visual changes, redness, excessive tearing, double vision  HENT:  Denies headache, head injury, hearing loss, tinnitus, vertigo, earaches, infection, discharge, frequent colds, nasal stuffiness, sinus trouble, sore throat, hoarseness, lymphadenopathy   Respiratory:  Denies cough, sputum, hemoptysis, wheezing, asthma, bronchitis, emphysema, pneumonia, tuberculosis.    Cardiovascular:  Denies heart trouble, hypertension, rheumatic fever, murmur, dyspnea, orthopnea, edema, chest pain, palpitations  GI:  Denies abdominal pain, nausea, vomiting, diarrhea, constipation, blood in bowel movement, hemorrhoids, rectal pain, and dyspepsia.   :  Denies dysuria, polyuria, nocturia, hematuria, urgency, incontinence, infections, stones.    Musculoskeletal:  See HPI  Integument:  Denies rashes, lumps, itching, dryness, color changes, hair changes  Neurologic:  Denies fainting, blackouts,  seizures, weakness, numbness, tingling, tremors, memory loss.        PHYSICAL EXAM    Vitals:    06/08/23 1335   BP: (!) 130/95   Pulse: 92   Resp: 16   Temp: 98.2 °F (36.8 °C)   SpO2: 96%         Constitutional:  Well developed, well nourished. No acute distress, non-toxic appearance.   Respiratory:  No respiratory distress. Lungs are clear to auscultation bilaterally. No rales or wheezing appreciated. No stridor.   Musculoskeletal:  Left lower extremity slightly more swollen than right.  Positive Homans sign.  Tender popliteal fossa as well.  No discoloration noted.    Integument:  No obvious rash. No diaphoresis.   Neurologic:  Alert & oriented to conversation. Moves all extremities. Speech is clear.   Psychiatric:  Speech and behavior appropriate.     RADIOLOGY    Venous Doppler left lower extremity  IMPRESSION:   1. No evidence of deep venous thrombosis from the left popliteal vein extending superior to the common femoral vein and greater saphenous vein junction.  2. Acute deep venous thrombosis in the visualized portions of the peroneal veins in the left calf.  These results were discussed with the ordering provider, KARI Davidson, by Dr. Vaughn at 1535hrs.       COURSE & MEDICAL DECISION MAKING    45 year old female who presents with left leg pain   DDX:includes consideration for causes such as but is not limited to; muscle strain versus sprain versus spasm versus DVT versus superficial thrombophlebitis versus Baker cyst.   cellulitis/abscess/necrotizing fasciitis or septic arthritis. gout. arthritis or other musculoskeletal pain.  ischemia or compartment syndrome.  Doubt fracture or dislocation.  ligamentous or tendon or meniscus injury.    H&P consistent with DVT until proven otherwise    IMPRESSION  Diagnosis:  The encounter diagnosis was Acute deep vein thrombosis (DVT) of left peroneal vein (CMD).    PLAN  Orders Placed This Encounter   • SERVICE TO FAMILY PRACTICE   • Apixaban Starter  Pack 5 MG Tablet Therapy Pack   • US VASC LOWER EXTREMITY VENOUS DUPLEX LEFT   1. Patient was extremely anxious regarding diagnosis.  Information given via AVS on discharge instructions.  2. Treatment plan to include conservative supportive measures  3. All questions and concerns addressed.  4. Return precautions discussed.  Follow-up with family practice in the next few days, if sx persist or ED/UC if sx worsen.   5. Patient discharged in stable and improved condition.      Nate Kothari PA-C, [facility]    Supervising physician is Dr. Dias.   Consulting physician for services performed today is .           Patient

## 2023-06-14 NOTE — ED ADULT NURSE NOTE - DRUG PRE-SCREENING (DAST -1)
[de-identified] :  81-year-old female is here today for a followup visit.  She has a history of a right breast solid papillary carcinoma, status post lumpectomy in 01/2012.  She subsequently developed recurrent solid papillary carcinoma, ER/AZ positive and DCIS,  and had right breast lumpectomy on 02/12/2016.  She received partial breast radiotherapy with GUME catheter.  She took adjuvant endocrine therapy with anastrozole for a few months and developed a lot of body aching. She was given an option to try Exemestane but she could not.  She subsequently stopped medication. She was on Duloxetine for Fibromyalgia and stopped subsequently because her symptom resolved. \par \par On 1/2018, she had b/l diagnostic mammo and there was no suspicious finding.  On 7/21/17, she had right breast dx mammo which showed stable post surgical change.\par \par The patient also has history of osteopenia, and she has been taking Caltrate 600 plus D and Fosamax.  \par \par She follows with Dr. Isaac Lo at [facility] for osteoporosis. She stopped Fosamax and switched to Prolia. \par \par Today, she reports feeling well. She does not have nay breast related symptoms. \par  [de-identified] : She can not tolerate AI due to muscular skeletal side effects. She sees an endocrinologist for osteopenia and osteoporosis. She is getting Prolia injection every 6 months.\par Today, she reports feeling well. Sometime, she has pain in her right breast, lumpectomy site, which resolves on its own. \par \par 7/22/19:\par The patient is here for f/u visit. She feels well and has no breast related symptoms. In 1/2019, she had b/l mammo. There was no suspicious finding. She can not tolerate AI due to muscular skeletal side effects. She sees an endocrinologist for osteopenia and osteoporosis. She is getting Prolia injection every 6 months. She tripled down and had right ankle fracture a few months ago.\par \par 8/24/2020:\par The patient is here for f/u visit. 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 She had repeat mammo in 1/2020. There was no suspicious finding. She can not tolerate AI due to muscular skeletal side effects. She sees an endocrinologist for osteopenia and osteoporosis. She is getting Prolia injection every 6 months. She does not have new complains.\par \par 8/30/21\par The patient is here for f/u visit. 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 She offers no breast-related complaints. She had repeat mammo in 1/2021. There was no suspicious finding. \par She continues to get Prolia every 6 months with Dr. Dominguez.  Bone density was done at [facility] last year.\par She saw radiation oncologist, Dr. Nagel for her 5 yr and final follow up visit on 3/10/21.\par She received her 2nd COVID vaccine in 3/3021. She follows up with her PCP regularly.\par She no longer travel to [place] because her  passed away 2 yrs ago.\par \par 6/14/23:\par Adenike is here for f/u visit. 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 She had b/l screening mammo in 2/2023 which showed a mass in the superior right breast. Right breast dx mammo and US confirmed the finding. US guided bx showed RECURRENT INTERMEDIATE NUCLEAR GRADE SOLID PAPILLARY CARCINOMA, FOCALLY INFARCTED AND WITH MUCIN PRODUCTION. On 4/21/23, she had right breast lumpectomy. The pathology showed 1.2 mm invasive moderately differentiated arising adjacent to a nodular focus of recurrent invasive solid papillary carcinoma, 7.0 mm associated with focal mucin production. The right lateral margin, excision showed Invasive solid papillary carcinoma extending to focally involve the new surgical margin. Re-excision of right lateral margin on 5/10/23 was negative for malignancy. The invasive tumor is ER pos 100%, KS pos 95%, Her-2 neg (0% by IHC), Ki67 5%. SLNB was not done. She recovered well from surgery.\par \par \par  Statement Selected

## 2023-06-26 NOTE — ED ADULT TRIAGE NOTE - MEANS OF ARRIVAL
[FreeTextEntry1] : Left X-Ray Examination of the SHOULDER (2 views):  no fractures, subluxations or dislocations. \par X-Ray Examination of the SCAPULA 1 or 2 views shows: no significant abnormalities\par old c spine xrays from april -: straightening consistent with spasm and disc space narrowing. \par \par \par \par - The patient was advised of the diagnosis.  The natural history of the pathology was explained to the patient in layman's terms.  Several different treatment options were discussed and explained including the risks and benefits of both surgical and non-surgical treatments.\par - We will conservative treatment with a course of PT and anti-inflammatory medication.\par - Rx given for Medrol dose pack\par - Discussed the possible side effects of medication along with the timing and frequency for taking.\par - MRI to rule out HNP and other causes of cervical radiculopathy\par - Follow up after mri with spine doctor \par \par \par \par 
ambulatory

## 2023-07-04 NOTE — ED ADULT NURSE NOTE - PAIN RATING/NUMBER SCALE (0-10): REST
PTE ALERTA Y ORIENTADA EN LILY CASANDRA ESFERAS, REFIERE SANGRADO VAGINAL Y DOLOR
PELVICO EL CUAL LE COMENZO HACE APROXIMADAMENTE 1 HORA, LA MISMA INDICA 12
SEMANAS DE EMBARAZO. SE UBICA EN AREA DE OBSERVACION. 0

## 2023-07-22 ENCOUNTER — EMERGENCY (EMERGENCY)
Facility: HOSPITAL | Age: 73
LOS: 1 days | Discharge: LEFT BEFORE DISCHARGE | End: 2023-07-22
Attending: EMERGENCY MEDICINE | Admitting: EMERGENCY MEDICINE
Payer: MEDICARE

## 2023-07-22 VITALS
OXYGEN SATURATION: 97 % | HEIGHT: 65 IN | HEART RATE: 85 BPM | WEIGHT: 139.99 LBS | DIASTOLIC BLOOD PRESSURE: 81 MMHG | RESPIRATION RATE: 20 BRPM | TEMPERATURE: 98 F | SYSTOLIC BLOOD PRESSURE: 121 MMHG

## 2023-07-22 DIAGNOSIS — Z53.29 PROCEDURE AND TREATMENT NOT CARRIED OUT BECAUSE OF PATIENT'S DECISION FOR OTHER REASONS: ICD-10-CM

## 2023-07-22 DIAGNOSIS — I25.10 ATHEROSCLEROTIC HEART DISEASE OF NATIVE CORONARY ARTERY WITHOUT ANGINA PECTORIS: ICD-10-CM

## 2023-07-22 DIAGNOSIS — Z88.6 ALLERGY STATUS TO ANALGESIC AGENT: ICD-10-CM

## 2023-07-22 DIAGNOSIS — R05.9 COUGH, UNSPECIFIED: ICD-10-CM

## 2023-07-22 DIAGNOSIS — J44.9 CHRONIC OBSTRUCTIVE PULMONARY DISEASE, UNSPECIFIED: ICD-10-CM

## 2023-07-22 DIAGNOSIS — Z95.1 PRESENCE OF AORTOCORONARY BYPASS GRAFT: ICD-10-CM

## 2023-07-22 DIAGNOSIS — Z95.2 PRESENCE OF PROSTHETIC HEART VALVE: Chronic | ICD-10-CM

## 2023-07-22 DIAGNOSIS — I10 ESSENTIAL (PRIMARY) HYPERTENSION: ICD-10-CM

## 2023-07-22 DIAGNOSIS — F41.9 ANXIETY DISORDER, UNSPECIFIED: ICD-10-CM

## 2023-07-22 DIAGNOSIS — F17.210 NICOTINE DEPENDENCE, CIGARETTES, UNCOMPLICATED: ICD-10-CM

## 2023-07-22 DIAGNOSIS — Z95.1 PRESENCE OF AORTOCORONARY BYPASS GRAFT: Chronic | ICD-10-CM

## 2023-07-22 DIAGNOSIS — I25.2 OLD MYOCARDIAL INFARCTION: ICD-10-CM

## 2023-07-22 DIAGNOSIS — I48.91 UNSPECIFIED ATRIAL FIBRILLATION: ICD-10-CM

## 2023-07-22 DIAGNOSIS — Z86.73 PERSONAL HISTORY OF TRANSIENT ISCHEMIC ATTACK (TIA), AND CEREBRAL INFARCTION WITHOUT RESIDUAL DEFICITS: ICD-10-CM

## 2023-07-22 DIAGNOSIS — E78.5 HYPERLIPIDEMIA, UNSPECIFIED: ICD-10-CM

## 2023-07-22 PROCEDURE — 99284 EMERGENCY DEPT VISIT MOD MDM: CPT

## 2023-07-22 RX ORDER — ALBUTEROL 90 UG/1
1 AEROSOL, METERED ORAL ONCE
Refills: 0 | Status: DISCONTINUED | OUTPATIENT
Start: 2023-07-22 | End: 2023-07-26

## 2023-07-22 NOTE — ED PROVIDER NOTE - PHYSICAL EXAMINATION
VITAL SIGNS: I have reviewed nursing notes and confirm.  CONST: Well appearing; No apparent distress.  ENT: No nasal discharge; airway clear.  EYES: Sclera clear. Pupils round and symmetrical bilaterally.  RESP: Breathing comfortably; speaking in full sentences.   MSK: No acute deformities noted to extremities. No tenderness to cervical/thoracic/lumbar spine to palpation.  NEURO: Alert, oriented. Speech is fluent and appropriate. Moving all extremities appropriately. No gross motor or sensory abnormalities.  SKIN: Skin is normal temperature; no diaphoresis; no pallor.  PSYCH: Appropriate mood, language, and behavior.

## 2023-07-22 NOTE — ED ADULT TRIAGE NOTE - CHIEF COMPLAINT QUOTE
Pt walked into ER c/o persistent non productive cough for over a week. Pt states his ribs hurt from the cough. Pt denies SSCP, dizziness, NV, SOB, or further associated complaints at triage.

## 2023-07-22 NOTE — ED PROVIDER NOTE - OBJECTIVE STATEMENT
73 y/o M, well known to this ED, presents complaining of cough x1 week. He reports smoking 3 cigarettes today. Denies chest pain or shortness of breath

## 2023-07-22 NOTE — ED PROVIDER NOTE - NSFOLLOWUPINSTRUCTIONS_ED_ALL_ED_FT
Follow up with your primary care doctor or clinics listed below if you do not have a doctor  [address] [address]  To make an appointment, call [phone]  [facility]  Address: [address] [place] [id number]  Appointment Center: [phone] ([phone])

## 2023-07-24 ENCOUNTER — EMERGENCY (EMERGENCY)
Facility: HOSPITAL | Age: 73
LOS: 1 days | Discharge: LEFT BEFORE DISCHARGE | End: 2023-07-24
Admitting: EMERGENCY MEDICINE
Payer: MEDICARE

## 2023-07-24 VITALS
HEIGHT: 65 IN | DIASTOLIC BLOOD PRESSURE: 74 MMHG | RESPIRATION RATE: 16 BRPM | TEMPERATURE: 98 F | SYSTOLIC BLOOD PRESSURE: 134 MMHG | WEIGHT: 139.99 LBS | HEART RATE: 77 BPM | OXYGEN SATURATION: 98 %

## 2023-07-24 DIAGNOSIS — Z53.21 PROCEDURE AND TREATMENT NOT CARRIED OUT DUE TO PATIENT LEAVING PRIOR TO BEING SEEN BY HEALTH CARE PROVIDER: ICD-10-CM

## 2023-07-24 DIAGNOSIS — Z95.1 PRESENCE OF AORTOCORONARY BYPASS GRAFT: Chronic | ICD-10-CM

## 2023-07-24 DIAGNOSIS — Z95.2 PRESENCE OF PROSTHETIC HEART VALVE: Chronic | ICD-10-CM

## 2023-07-24 DIAGNOSIS — L29.9 PRURITUS, UNSPECIFIED: ICD-10-CM

## 2023-07-24 PROCEDURE — L9991: CPT

## 2023-07-25 ENCOUNTER — EMERGENCY (EMERGENCY)
Facility: HOSPITAL | Age: 73
LOS: 1 days | Discharge: LEFT BEFORE DISCHARGE | End: 2023-07-25
Admitting: EMERGENCY MEDICINE
Payer: SELF-PAY

## 2023-07-25 VITALS
SYSTOLIC BLOOD PRESSURE: 123 MMHG | OXYGEN SATURATION: 97 % | RESPIRATION RATE: 18 BRPM | DIASTOLIC BLOOD PRESSURE: 81 MMHG | TEMPERATURE: 98 F | HEIGHT: 65 IN | HEART RATE: 69 BPM

## 2023-07-25 DIAGNOSIS — Z95.1 PRESENCE OF AORTOCORONARY BYPASS GRAFT: Chronic | ICD-10-CM

## 2023-07-25 DIAGNOSIS — Z95.2 PRESENCE OF PROSTHETIC HEART VALVE: Chronic | ICD-10-CM

## 2023-07-25 PROCEDURE — L9991: CPT

## 2023-07-27 DIAGNOSIS — L29.9 PRURITUS, UNSPECIFIED: ICD-10-CM

## 2023-07-27 DIAGNOSIS — Z53.21 PROCEDURE AND TREATMENT NOT CARRIED OUT DUE TO PATIENT LEAVING PRIOR TO BEING SEEN BY HEALTH CARE PROVIDER: ICD-10-CM

## 2023-07-28 ENCOUNTER — EMERGENCY (EMERGENCY)
Facility: HOSPITAL | Age: 73
LOS: 1 days | Discharge: LEFT BEFORE DISCHARGE | End: 2023-07-28
Attending: EMERGENCY MEDICINE | Admitting: EMERGENCY MEDICINE
Payer: MEDICARE

## 2023-07-28 VITALS
RESPIRATION RATE: 15 BRPM | HEART RATE: 77 BPM | SYSTOLIC BLOOD PRESSURE: 128 MMHG | OXYGEN SATURATION: 96 % | HEIGHT: 65 IN | TEMPERATURE: 98 F | DIASTOLIC BLOOD PRESSURE: 74 MMHG

## 2023-07-28 DIAGNOSIS — Z95.1 PRESENCE OF AORTOCORONARY BYPASS GRAFT: Chronic | ICD-10-CM

## 2023-07-28 DIAGNOSIS — Z95.2 PRESENCE OF PROSTHETIC HEART VALVE: Chronic | ICD-10-CM

## 2023-07-28 LAB
ANION GAP SERPL CALC-SCNC: 9 MMOL/L — SIGNIFICANT CHANGE UP (ref 9–16)
BASOPHILS # BLD AUTO: 0.02 K/UL — SIGNIFICANT CHANGE UP (ref 0–0.2)
BASOPHILS NFR BLD AUTO: 0.2 % — SIGNIFICANT CHANGE UP (ref 0–2)
BUN SERPL-MCNC: 30 MG/DL — HIGH (ref 7–23)
CALCIUM SERPL-MCNC: 9.3 MG/DL — SIGNIFICANT CHANGE UP (ref 8.5–10.5)
CHLORIDE SERPL-SCNC: 100 MMOL/L — SIGNIFICANT CHANGE UP (ref 96–108)
CO2 SERPL-SCNC: 27 MMOL/L — SIGNIFICANT CHANGE UP (ref 22–31)
CREAT SERPL-MCNC: 1.23 MG/DL — SIGNIFICANT CHANGE UP (ref 0.5–1.3)
EGFR: 62 ML/MIN/1.73M2 — SIGNIFICANT CHANGE UP
EOSINOPHIL # BLD AUTO: 0.13 K/UL — SIGNIFICANT CHANGE UP (ref 0–0.5)
EOSINOPHIL NFR BLD AUTO: 1.2 % — SIGNIFICANT CHANGE UP (ref 0–6)
FLUAV AG NPH QL: SIGNIFICANT CHANGE UP
FLUBV AG NPH QL: SIGNIFICANT CHANGE UP
GLUCOSE SERPL-MCNC: 95 MG/DL — SIGNIFICANT CHANGE UP (ref 70–99)
HCT VFR BLD CALC: 48.7 % — SIGNIFICANT CHANGE UP (ref 39–50)
HGB BLD-MCNC: 16 G/DL — SIGNIFICANT CHANGE UP (ref 13–17)
IMM GRANULOCYTES NFR BLD AUTO: 0.4 % — SIGNIFICANT CHANGE UP (ref 0–0.9)
LYMPHOCYTES # BLD AUTO: 19.4 % — SIGNIFICANT CHANGE UP (ref 13–44)
LYMPHOCYTES # BLD AUTO: 2.19 K/UL — SIGNIFICANT CHANGE UP (ref 1–3.3)
MCHC RBC-ENTMCNC: 31.3 PG — SIGNIFICANT CHANGE UP (ref 27–34)
MCHC RBC-ENTMCNC: 32.9 GM/DL — SIGNIFICANT CHANGE UP (ref 32–36)
MCV RBC AUTO: 95.1 FL — SIGNIFICANT CHANGE UP (ref 80–100)
MONOCYTES # BLD AUTO: 0.97 K/UL — HIGH (ref 0–0.9)
MONOCYTES NFR BLD AUTO: 8.6 % — SIGNIFICANT CHANGE UP (ref 2–14)
NEUTROPHILS # BLD AUTO: 7.95 K/UL — HIGH (ref 1.8–7.4)
NEUTROPHILS NFR BLD AUTO: 70.2 % — SIGNIFICANT CHANGE UP (ref 43–77)
NRBC # BLD: 0 /100 WBCS — SIGNIFICANT CHANGE UP (ref 0–0)
PLATELET # BLD AUTO: 190 K/UL — SIGNIFICANT CHANGE UP (ref 150–400)
POTASSIUM SERPL-MCNC: 3.9 MMOL/L — SIGNIFICANT CHANGE UP (ref 3.5–5.3)
POTASSIUM SERPL-SCNC: 3.9 MMOL/L — SIGNIFICANT CHANGE UP (ref 3.5–5.3)
RBC # BLD: 5.12 M/UL — SIGNIFICANT CHANGE UP (ref 4.2–5.8)
RBC # FLD: 12.3 % — SIGNIFICANT CHANGE UP (ref 10.3–14.5)
RSV RNA NPH QL NAA+NON-PROBE: SIGNIFICANT CHANGE UP
SARS-COV-2 RNA SPEC QL NAA+PROBE: SIGNIFICANT CHANGE UP
SODIUM SERPL-SCNC: 136 MMOL/L — SIGNIFICANT CHANGE UP (ref 132–145)
WBC # BLD: 11.3 K/UL — HIGH (ref 3.8–10.5)
WBC # FLD AUTO: 11.3 K/UL — HIGH (ref 3.8–10.5)

## 2023-07-28 PROCEDURE — 99284 EMERGENCY DEPT VISIT MOD MDM: CPT

## 2023-07-28 PROCEDURE — 71046 X-RAY EXAM CHEST 2 VIEWS: CPT | Mod: 26

## 2023-07-28 RX ORDER — ALBUTEROL 90 UG/1
2 AEROSOL, METERED ORAL ONCE
Refills: 0 | Status: COMPLETED | OUTPATIENT
Start: 2023-07-28 | End: 2023-07-28

## 2023-07-28 RX ORDER — LORATADINE 10 MG/1
10 TABLET ORAL ONCE
Refills: 0 | Status: COMPLETED | OUTPATIENT
Start: 2023-07-28 | End: 2023-07-28

## 2023-07-28 RX ADMIN — LORATADINE 10 MILLIGRAM(S): 10 TABLET ORAL at 17:56

## 2023-07-28 RX ADMIN — ALBUTEROL 2 PUFF(S): 90 AEROSOL, METERED ORAL at 17:56

## 2023-07-28 NOTE — ED PROVIDER NOTE - PHYSICAL EXAMINATION
Gen: Well-developed, well-nourished, NAD, VS as noted by nursing. HEENT: NCAT, mmm   Chest: RRR, nl S1 and S2, no m/r/g. Resp: Decreased breath sounds b/l. faint wheezing. no rales or rhonchi. No respiratory distress.   Abd: nl BS, soft, nt/nd. Ext: Warm, dry  Neuro: CN II-XII intact, normal and equal strength, sensation, and reflexes bilaterally, normal gait  Psych: AAOx3

## 2023-07-28 NOTE — ED PROVIDER NOTE - OBJECTIVE STATEMENT
Patient reports diffuse itching and productive cough for unknown period of time. No fever, cp, sob, ap, n/v/d.

## 2023-07-30 DIAGNOSIS — F41.9 ANXIETY DISORDER, UNSPECIFIED: ICD-10-CM

## 2023-07-30 DIAGNOSIS — I25.10 ATHEROSCLEROTIC HEART DISEASE OF NATIVE CORONARY ARTERY WITHOUT ANGINA PECTORIS: ICD-10-CM

## 2023-07-30 DIAGNOSIS — I25.2 OLD MYOCARDIAL INFARCTION: ICD-10-CM

## 2023-07-30 DIAGNOSIS — J44.1 CHRONIC OBSTRUCTIVE PULMONARY DISEASE WITH (ACUTE) EXACERBATION: ICD-10-CM

## 2023-07-30 DIAGNOSIS — Z86.73 PERSONAL HISTORY OF TRANSIENT ISCHEMIC ATTACK (TIA), AND CEREBRAL INFARCTION WITHOUT RESIDUAL DEFICITS: ICD-10-CM

## 2023-07-30 DIAGNOSIS — Z95.1 PRESENCE OF AORTOCORONARY BYPASS GRAFT: ICD-10-CM

## 2023-07-30 DIAGNOSIS — Z53.29 PROCEDURE AND TREATMENT NOT CARRIED OUT BECAUSE OF PATIENT'S DECISION FOR OTHER REASONS: ICD-10-CM

## 2023-07-30 DIAGNOSIS — Z79.82 LONG TERM (CURRENT) USE OF ASPIRIN: ICD-10-CM

## 2023-07-30 DIAGNOSIS — R05.8 OTHER SPECIFIED COUGH: ICD-10-CM

## 2023-07-30 DIAGNOSIS — E78.5 HYPERLIPIDEMIA, UNSPECIFIED: ICD-10-CM

## 2023-07-30 DIAGNOSIS — L29.9 PRURITUS, UNSPECIFIED: ICD-10-CM

## 2023-07-30 DIAGNOSIS — Z95.4 PRESENCE OF OTHER HEART-VALVE REPLACEMENT: ICD-10-CM

## 2023-07-30 DIAGNOSIS — I49.1 ATRIAL PREMATURE DEPOLARIZATION: ICD-10-CM

## 2023-07-30 DIAGNOSIS — Z88.6 ALLERGY STATUS TO ANALGESIC AGENT: ICD-10-CM

## 2023-07-30 DIAGNOSIS — I10 ESSENTIAL (PRIMARY) HYPERTENSION: ICD-10-CM

## 2023-07-30 DIAGNOSIS — Z20.822 CONTACT WITH AND (SUSPECTED) EXPOSURE TO COVID-19: ICD-10-CM

## 2023-08-10 ENCOUNTER — EMERGENCY (EMERGENCY)
Facility: HOSPITAL | Age: 73
LOS: 1 days | Discharge: HOME | End: 2023-08-10
Attending: EMERGENCY MEDICINE | Admitting: EMERGENCY MEDICINE
Payer: MEDICARE

## 2023-08-10 VITALS
HEIGHT: 65 IN | SYSTOLIC BLOOD PRESSURE: 113 MMHG | RESPIRATION RATE: 17 BRPM | TEMPERATURE: 98 F | HEART RATE: 63 BPM | DIASTOLIC BLOOD PRESSURE: 71 MMHG | WEIGHT: 139.99 LBS | OXYGEN SATURATION: 98 %

## 2023-08-10 DIAGNOSIS — L29.9 PRURITUS, UNSPECIFIED: ICD-10-CM

## 2023-08-10 DIAGNOSIS — Z95.1 PRESENCE OF AORTOCORONARY BYPASS GRAFT: Chronic | ICD-10-CM

## 2023-08-10 DIAGNOSIS — Z23 ENCOUNTER FOR IMMUNIZATION: ICD-10-CM

## 2023-08-10 DIAGNOSIS — Z79.01 LONG TERM (CURRENT) USE OF ANTICOAGULANTS: ICD-10-CM

## 2023-08-10 DIAGNOSIS — Z79.82 LONG TERM (CURRENT) USE OF ASPIRIN: ICD-10-CM

## 2023-08-10 DIAGNOSIS — Z88.6 ALLERGY STATUS TO ANALGESIC AGENT: ICD-10-CM

## 2023-08-10 DIAGNOSIS — Z95.2 PRESENCE OF PROSTHETIC HEART VALVE: Chronic | ICD-10-CM

## 2023-08-10 PROCEDURE — 99283 EMERGENCY DEPT VISIT LOW MDM: CPT

## 2023-08-10 RX ORDER — TETANUS TOXOID, REDUCED DIPHTHERIA TOXOID AND ACELLULAR PERTUSSIS VACCINE, ADSORBED 5; 2.5; 8; 8; 2.5 [IU]/.5ML; [IU]/.5ML; UG/.5ML; UG/.5ML; UG/.5ML
0.5 SUSPENSION INTRAMUSCULAR ONCE
Refills: 0 | Status: COMPLETED | OUTPATIENT
Start: 2023-08-10 | End: 2023-08-10

## 2023-08-10 RX ORDER — HYDROCORTISONE 1 %
1 OINTMENT (GRAM) TOPICAL ONCE
Refills: 0 | Status: COMPLETED | OUTPATIENT
Start: 2023-08-10 | End: 2023-08-10

## 2023-08-10 RX ORDER — DEXAMETHASONE 0.5 MG/5ML
10 ELIXIR ORAL ONCE
Refills: 0 | Status: COMPLETED | OUTPATIENT
Start: 2023-08-10 | End: 2023-08-10

## 2023-08-10 RX ADMIN — Medication 10 MILLIGRAM(S): at 15:53

## 2023-08-10 RX ADMIN — TETANUS TOXOID, REDUCED DIPHTHERIA TOXOID AND ACELLULAR PERTUSSIS VACCINE, ADSORBED 0.5 MILLILITER(S): 5; 2.5; 8; 8; 2.5 SUSPENSION INTRAMUSCULAR at 15:54

## 2023-08-10 RX ADMIN — Medication 1 APPLICATION(S): at 15:52

## 2023-08-10 NOTE — ED ADULT TRIAGE NOTE - CHIEF COMPLAINT QUOTE
Pt reports he is "itchy all over." Denies taking any meds for this and states it has been going on "for a long time."

## 2023-08-10 NOTE — ED PROVIDER NOTE - PATIENT PORTAL LINK FT
You can access the FollowMyHealth Patient Portal offered by [facility] by registering at the following website: [email or web address]. By joining [facility]’s FollowMyHealth portal, you will also be able to view your health information using other applications (apps) compatible with our system.

## 2023-08-10 NOTE — ED PROVIDER NOTE - PHYSICAL EXAMINATION
Const: NAD, patient scratching his head   Eyes: PERRL, no conjunctival injection  HENT:  Neck supple without meningismus   CV: RRR, Warm, well-perfused extremities  RESP: CTA B/L, no tachypnea   MSK: No gross deformities appreciated  Skin: Warm, dry. No rashes  Neuro: Alert, CNs II-XII grossly intact. Sensation and motor function of extremities grossly intact.

## 2023-08-10 NOTE — ED PROVIDER NOTE - OBJECTIVE STATEMENT
72-year-old male presents emergency department for generalized itching.  Patient had this frequently and has had multiple visits to the emergency department for itching.  No shortness of breath no chest pain no rashes.  Patient has not tried any medications recently.

## 2023-08-10 NOTE — ED PROVIDER NOTE - NSFOLLOWUPINSTRUCTIONS_ED_ALL_ED_FT
Pruritus  Pruritus is an itchy feeling on the skin. One of the most common causes is dry skin, but many different things can cause itching. Most cases of itching do not require medical attention. Sometimes itchy skin can turn into a rash.    Follow these instructions at home:  Skin care     Apply moisturizing lotion to your skin as needed. Lotion that contains petroleum jelly is best.  Take medicines or apply medicated creams only as told by your health care provider. This may include:  Corticosteroid cream.  Anti-itch lotions.  Oral antihistamines.  Apply a cool, wet cloth (cool compress) to the affected areas.  Take baths with one of the following:  Epsom salts. You can get these at your local pharmacy or grocery store. Follow the instructions on the packaging.  Baking soda. Pour a small amount into the bath as told by your health care provider.  Colloidal oatmeal. You can get this at your local pharmacy or grocery store. Follow the instructions on the packaging.  Apply baking soda paste to your skin. To make the paste, stir water into a small amount of baking soda until it reaches a paste-like consistency.  Do not scratch your skin.  Do not take hot showers or baths, which can make itching worse. A cool shower may help with itching as long as you apply moisturizing lotion after the shower.  Do not use scented soaps, detergents, perfumes, and cosmetic products. Instead, use gentle, unscented versions of these items.  General instructions     Avoid wearing tight clothes.  Keep a journal to help find out what is causing your itching. Write down:  What you eat and drink.  What cosmetic products you use.  What soaps or detergents you use.  What you wear, including jewelry.  Use a humidifier. This keeps the air moist, which helps to prevent dry skin.  Be aware of any changes in your itchiness.  Contact a health care provider if:  The itching does not go away after several days.  You are unusually thirsty or urinating more than normal.  Your skin tingles or feels numb.  Your skin or the white parts of your eyes turn yellow (jaundice).  You feel weak.  You have any of the following:  Night sweats.  Tiredness (fatigue).  Weight loss.  Abdominal pain.  Summary  Pruritus is an itchy feeling on the skin. One of the most common causes is dry skin, but many different conditions and factors can cause itching.  Apply moisturizing lotion to your skin as needed. Lotion that contains petroleum jelly is best.  Take medicines or apply medicated creams only as told by your health care provider.  Do not take hot showers or baths. Do not use scented soaps, detergents, perfumes, or cosmetic products.  This information is not intended to replace advice given to you by your health care provider. Make sure you discuss any questions you have with your health care provider.

## 2023-08-21 NOTE — ED ADULT NURSE NOTE - DRUG PRE-SCREENING (DAST -1)
Ice to affected area 20 minutes every hour as needed for comfort. After 48 hours you can apply heat.  200  MG Naproxen as an initial dose. May Repeat 200 to 500 mg every six to 12 hours (maximum dose 1500 mg in 24 hours)Take with food. Tylenol 650 to 1000 mg every four to six hours as needed (not to exceed more than 4000 mg in a 24 hour period). May use interchangeably. Suggest medicating around the clock for the next 24-48 hours.  Follow up with primary provider or orthopedics as needed    Do not drive motor vehicles or operate heavy equipment while taking muscle relaxors.     Statement Selected

## 2023-08-27 ENCOUNTER — EMERGENCY (EMERGENCY)
Facility: HOSPITAL | Age: 73
LOS: 1 days | Discharge: LEFT BEFORE DISCHARGE | End: 2023-08-27
Admitting: EMERGENCY MEDICINE
Payer: MEDICARE

## 2023-08-27 VITALS
OXYGEN SATURATION: 97 % | HEART RATE: 74 BPM | TEMPERATURE: 97 F | RESPIRATION RATE: 18 BRPM | SYSTOLIC BLOOD PRESSURE: 136 MMHG | DIASTOLIC BLOOD PRESSURE: 83 MMHG | HEIGHT: 65 IN

## 2023-08-27 DIAGNOSIS — Z95.1 PRESENCE OF AORTOCORONARY BYPASS GRAFT: Chronic | ICD-10-CM

## 2023-08-27 DIAGNOSIS — Z95.2 PRESENCE OF PROSTHETIC HEART VALVE: Chronic | ICD-10-CM

## 2023-08-27 PROCEDURE — 99284 EMERGENCY DEPT VISIT MOD MDM: CPT

## 2023-08-27 RX ORDER — IPRATROPIUM/ALBUTEROL SULFATE 18-103MCG
3 AEROSOL WITH ADAPTER (GRAM) INHALATION ONCE
Refills: 0 | Status: DISCONTINUED | OUTPATIENT
Start: 2023-08-27 | End: 2023-08-31

## 2023-08-27 NOTE — ED PROVIDER NOTE - OBJECTIVE STATEMENT
73 yo male, frequent ED visitor, smoker, c/o chest pain and sob x few days. also c/o itching. +chronic cough.

## 2023-08-27 NOTE — ED PROVIDER NOTE - PHYSICAL EXAMINATION
CONSTITUTIONAL: Well-appearing; well-nourished; in no apparent distress.   	HEAD: Normocephalic; atraumatic.   	EYES:  conjunctiva and sclera clear  	ENT: normal nose; no rhinorrhea; normal pharynx with no erythema or lesions.   	NECK: Supple; non-tender;   	CARDIOVASCULAR: Normal S1, S2; no murmurs, rubs, or gallops. Regular rate and rhythm.   	RESPIRATORY: Breathing easily; expiratory wheezing throughout. speaking full sentences.   	GI: Soft; non-distended; non-tender.   	EXT: JEFFRIES x 4. normal gait.   	SKIN: Normal for age and race; warm; dry; good turgor; no apparent lesions or rash.   	NEURO: A & O x 3; face symmetric; grossly unremarkable.   PSYCHOLOGICAL: The patient’s mood and manner are appropriate.

## 2023-08-27 NOTE — ED PROVIDER NOTE - CLINICAL SUMMARY MEDICAL DECISION MAKING FREE TEXT BOX
frequent ED visitor, patient here for sob and itching, vss, +expiratory wheezing, no resp distress, ekg, labs, nebs ordered. patient eloped ED.

## 2023-08-29 ENCOUNTER — EMERGENCY (EMERGENCY)
Facility: HOSPITAL | Age: 73
LOS: 1 days | Discharge: LEFT BEFORE DISCHARGE | End: 2023-08-29
Attending: EMERGENCY MEDICINE | Admitting: EMERGENCY MEDICINE
Payer: MEDICARE

## 2023-08-29 VITALS
OXYGEN SATURATION: 97 % | DIASTOLIC BLOOD PRESSURE: 84 MMHG | HEART RATE: 105 BPM | HEIGHT: 65 IN | RESPIRATION RATE: 18 BRPM | TEMPERATURE: 98 F | SYSTOLIC BLOOD PRESSURE: 132 MMHG

## 2023-08-29 VITALS — HEART RATE: 91 BPM

## 2023-08-29 DIAGNOSIS — Z95.2 PRESENCE OF PROSTHETIC HEART VALVE: Chronic | ICD-10-CM

## 2023-08-29 DIAGNOSIS — Z95.1 PRESENCE OF AORTOCORONARY BYPASS GRAFT: Chronic | ICD-10-CM

## 2023-08-29 PROCEDURE — L9992: CPT

## 2023-08-30 DIAGNOSIS — F17.200 NICOTINE DEPENDENCE, UNSPECIFIED, UNCOMPLICATED: ICD-10-CM

## 2023-08-30 DIAGNOSIS — Z79.82 LONG TERM (CURRENT) USE OF ASPIRIN: ICD-10-CM

## 2023-08-30 DIAGNOSIS — Z88.6 ALLERGY STATUS TO ANALGESIC AGENT: ICD-10-CM

## 2023-08-30 DIAGNOSIS — R05.3 CHRONIC COUGH: ICD-10-CM

## 2023-08-30 DIAGNOSIS — R06.2 WHEEZING: ICD-10-CM

## 2023-08-30 DIAGNOSIS — R06.00 DYSPNEA, UNSPECIFIED: ICD-10-CM

## 2023-08-30 DIAGNOSIS — R06.02 SHORTNESS OF BREATH: ICD-10-CM

## 2023-08-30 DIAGNOSIS — R07.9 CHEST PAIN, UNSPECIFIED: ICD-10-CM

## 2023-08-31 NOTE — ED PROVIDER NOTE - TEMPLATE, MLM
Attempted to call the patient to inform her that her referral to pain management has been faxed out to the [address] [place] [address] and Pain however she did not answer and her VM was full.
Abdominal Pain, N/V/D

## 2023-09-01 ENCOUNTER — EMERGENCY (EMERGENCY)
Facility: HOSPITAL | Age: 73
LOS: 1 days | Discharge: LEFT BEFORE DISCHARGE | End: 2023-09-01
Attending: EMERGENCY MEDICINE | Admitting: EMERGENCY MEDICINE
Payer: MEDICARE

## 2023-09-01 VITALS
TEMPERATURE: 98 F | DIASTOLIC BLOOD PRESSURE: 82 MMHG | SYSTOLIC BLOOD PRESSURE: 133 MMHG | HEIGHT: 65 IN | HEART RATE: 74 BPM | WEIGHT: 139.99 LBS | OXYGEN SATURATION: 98 % | RESPIRATION RATE: 18 BRPM

## 2023-09-01 DIAGNOSIS — Z88.6 ALLERGY STATUS TO ANALGESIC AGENT: ICD-10-CM

## 2023-09-01 DIAGNOSIS — R07.9 CHEST PAIN, UNSPECIFIED: ICD-10-CM

## 2023-09-01 DIAGNOSIS — I10 ESSENTIAL (PRIMARY) HYPERTENSION: ICD-10-CM

## 2023-09-01 DIAGNOSIS — Z95.2 PRESENCE OF PROSTHETIC HEART VALVE: ICD-10-CM

## 2023-09-01 DIAGNOSIS — R06.02 SHORTNESS OF BREATH: ICD-10-CM

## 2023-09-01 DIAGNOSIS — E78.5 HYPERLIPIDEMIA, UNSPECIFIED: ICD-10-CM

## 2023-09-01 DIAGNOSIS — Z79.82 LONG TERM (CURRENT) USE OF ASPIRIN: ICD-10-CM

## 2023-09-01 DIAGNOSIS — I48.91 UNSPECIFIED ATRIAL FIBRILLATION: ICD-10-CM

## 2023-09-01 DIAGNOSIS — Z86.73 PERSONAL HISTORY OF TRANSIENT ISCHEMIC ATTACK (TIA), AND CEREBRAL INFARCTION WITHOUT RESIDUAL DEFICITS: ICD-10-CM

## 2023-09-01 DIAGNOSIS — Z79.01 LONG TERM (CURRENT) USE OF ANTICOAGULANTS: ICD-10-CM

## 2023-09-01 DIAGNOSIS — F41.9 ANXIETY DISORDER, UNSPECIFIED: ICD-10-CM

## 2023-09-01 DIAGNOSIS — Z95.1 PRESENCE OF AORTOCORONARY BYPASS GRAFT: ICD-10-CM

## 2023-09-01 DIAGNOSIS — R05.9 COUGH, UNSPECIFIED: ICD-10-CM

## 2023-09-01 DIAGNOSIS — Z95.2 PRESENCE OF PROSTHETIC HEART VALVE: Chronic | ICD-10-CM

## 2023-09-01 DIAGNOSIS — J44.9 CHRONIC OBSTRUCTIVE PULMONARY DISEASE, UNSPECIFIED: ICD-10-CM

## 2023-09-01 DIAGNOSIS — Z95.1 PRESENCE OF AORTOCORONARY BYPASS GRAFT: Chronic | ICD-10-CM

## 2023-09-01 DIAGNOSIS — I25.2 OLD MYOCARDIAL INFARCTION: ICD-10-CM

## 2023-09-01 DIAGNOSIS — Z53.29 PROCEDURE AND TREATMENT NOT CARRIED OUT BECAUSE OF PATIENT'S DECISION FOR OTHER REASONS: ICD-10-CM

## 2023-09-01 DIAGNOSIS — I25.10 ATHEROSCLEROTIC HEART DISEASE OF NATIVE CORONARY ARTERY WITHOUT ANGINA PECTORIS: ICD-10-CM

## 2023-09-01 DIAGNOSIS — Z53.21 PROCEDURE AND TREATMENT NOT CARRIED OUT DUE TO PATIENT LEAVING PRIOR TO BEING SEEN BY HEALTH CARE PROVIDER: ICD-10-CM

## 2023-09-01 PROCEDURE — 99284 EMERGENCY DEPT VISIT MOD MDM: CPT

## 2023-09-01 PROCEDURE — 71046 X-RAY EXAM CHEST 2 VIEWS: CPT | Mod: 26

## 2023-09-01 RX ORDER — IPRATROPIUM/ALBUTEROL SULFATE 18-103MCG
3 AEROSOL WITH ADAPTER (GRAM) INHALATION ONCE
Refills: 0 | Status: DISCONTINUED | OUTPATIENT
Start: 2023-09-01 | End: 2023-09-05

## 2023-09-01 NOTE — ED ADULT TRIAGE NOTE - CHIEF COMPLAINT QUOTE
Pt. walked in c/o cough, SOB, HR "sometimes fast and then low" and feeling itchy. B/l clear lung sounds on ausculation.

## 2023-09-01 NOTE — ED PROVIDER NOTE - CLINICAL SUMMARY MEDICAL DECISION MAKING FREE TEXT BOX
Cough.  No significant [id number] on exam but will offer duoneb to help with cough.  CXR to r/o infiltrates.

## 2023-09-01 NOTE — ED PROVIDER NOTE - OBJECTIVE STATEMENT
Pt is a 73yo M who is well known to ED team for multiple visits 2/2 cough and SOB.  Pt reports heavy cough x today.  Active smoker.  Denies any SOB or wheezing.

## 2023-09-05 ENCOUNTER — EMERGENCY (EMERGENCY)
Facility: HOSPITAL | Age: 73
LOS: 1 days | Discharge: LEFT BEFORE DISCHARGE | End: 2023-09-05
Attending: EMERGENCY MEDICINE | Admitting: EMERGENCY MEDICINE
Payer: MEDICARE

## 2023-09-05 VITALS
HEART RATE: 89 BPM | SYSTOLIC BLOOD PRESSURE: 119 MMHG | HEIGHT: 65 IN | RESPIRATION RATE: 17 BRPM | OXYGEN SATURATION: 98 % | DIASTOLIC BLOOD PRESSURE: 70 MMHG | TEMPERATURE: 98 F

## 2023-09-05 DIAGNOSIS — Z95.1 PRESENCE OF AORTOCORONARY BYPASS GRAFT: Chronic | ICD-10-CM

## 2023-09-05 DIAGNOSIS — Z95.2 PRESENCE OF PROSTHETIC HEART VALVE: Chronic | ICD-10-CM

## 2023-09-05 PROCEDURE — 99283 EMERGENCY DEPT VISIT LOW MDM: CPT

## 2023-09-05 NOTE — ED PROVIDER NOTE - NSFOLLOWUPINSTRUCTIONS_ED_ALL_ED_FT
Follow up with your primary care doctor or clinics listed below if you do not have a doctor  [address] [address]  To make an appointment, call [phone]  [facility]  Address: [address] [place] [id number]  Appointment Center: [phone] ([phone])

## 2023-09-05 NOTE — ED PROVIDER NOTE - OBJECTIVE STATEMENT
Patient states he has shortness of breath for a few weeks.  History of median sternotomy.  Patient eloped from ED.

## 2023-09-07 ENCOUNTER — EMERGENCY (EMERGENCY)
Facility: HOSPITAL | Age: 73
LOS: 1 days | Discharge: LEFT BEFORE DISCHARGE | End: 2023-09-07
Attending: EMERGENCY MEDICINE | Admitting: EMERGENCY MEDICINE
Payer: MEDICARE

## 2023-09-07 VITALS
HEART RATE: 91 BPM | DIASTOLIC BLOOD PRESSURE: 82 MMHG | RESPIRATION RATE: 17 BRPM | TEMPERATURE: 98 F | HEIGHT: 65 IN | SYSTOLIC BLOOD PRESSURE: 119 MMHG | WEIGHT: 149.91 LBS | OXYGEN SATURATION: 99 %

## 2023-09-07 DIAGNOSIS — Z79.01 LONG TERM (CURRENT) USE OF ANTICOAGULANTS: ICD-10-CM

## 2023-09-07 DIAGNOSIS — Z88.6 ALLERGY STATUS TO ANALGESIC AGENT: ICD-10-CM

## 2023-09-07 DIAGNOSIS — R07.9 CHEST PAIN, UNSPECIFIED: ICD-10-CM

## 2023-09-07 DIAGNOSIS — R06.00 DYSPNEA, UNSPECIFIED: ICD-10-CM

## 2023-09-07 DIAGNOSIS — I48.91 UNSPECIFIED ATRIAL FIBRILLATION: ICD-10-CM

## 2023-09-07 DIAGNOSIS — Z53.29 PROCEDURE AND TREATMENT NOT CARRIED OUT BECAUSE OF PATIENT'S DECISION FOR OTHER REASONS: ICD-10-CM

## 2023-09-07 DIAGNOSIS — Z95.2 PRESENCE OF PROSTHETIC HEART VALVE: Chronic | ICD-10-CM

## 2023-09-07 DIAGNOSIS — Z95.1 PRESENCE OF AORTOCORONARY BYPASS GRAFT: Chronic | ICD-10-CM

## 2023-09-07 DIAGNOSIS — Z79.82 LONG TERM (CURRENT) USE OF ASPIRIN: ICD-10-CM

## 2023-09-07 DIAGNOSIS — J44.9 CHRONIC OBSTRUCTIVE PULMONARY DISEASE, UNSPECIFIED: ICD-10-CM

## 2023-09-07 DIAGNOSIS — R06.02 SHORTNESS OF BREATH: ICD-10-CM

## 2023-09-07 LAB
ANION GAP SERPL CALC-SCNC: 4 MMOL/L — LOW (ref 9–16)
APTT BLD: 31.7 SEC — SIGNIFICANT CHANGE UP (ref 24.5–35.6)
BASOPHILS # BLD AUTO: 0.04 K/UL — SIGNIFICANT CHANGE UP (ref 0–0.2)
BASOPHILS NFR BLD AUTO: 0.5 % — SIGNIFICANT CHANGE UP (ref 0–2)
BUN SERPL-MCNC: 30 MG/DL — HIGH (ref 7–23)
CALCIUM SERPL-MCNC: 9 MG/DL — SIGNIFICANT CHANGE UP (ref 8.5–10.5)
CHLORIDE SERPL-SCNC: 104 MMOL/L — SIGNIFICANT CHANGE UP (ref 96–108)
CO2 SERPL-SCNC: 32 MMOL/L — HIGH (ref 22–31)
CREAT SERPL-MCNC: 1.19 MG/DL — SIGNIFICANT CHANGE UP (ref 0.5–1.3)
EGFR: 64 ML/MIN/1.73M2 — SIGNIFICANT CHANGE UP
EOSINOPHIL # BLD AUTO: 0.16 K/UL — SIGNIFICANT CHANGE UP (ref 0–0.5)
EOSINOPHIL NFR BLD AUTO: 1.9 % — SIGNIFICANT CHANGE UP (ref 0–6)
GLUCOSE SERPL-MCNC: 90 MG/DL — SIGNIFICANT CHANGE UP (ref 70–99)
HCT VFR BLD CALC: 46.9 % — SIGNIFICANT CHANGE UP (ref 39–50)
HGB BLD-MCNC: 15.1 G/DL — SIGNIFICANT CHANGE UP (ref 13–17)
IMM GRANULOCYTES NFR BLD AUTO: 0.4 % — SIGNIFICANT CHANGE UP (ref 0–0.9)
INR BLD: 1.05 — SIGNIFICANT CHANGE UP (ref 0.85–1.18)
LACTATE BLDV-MCNC: 0.9 MMOL/L — SIGNIFICANT CHANGE UP (ref 0.5–2)
LYMPHOCYTES # BLD AUTO: 2.08 K/UL — SIGNIFICANT CHANGE UP (ref 1–3.3)
LYMPHOCYTES # BLD AUTO: 25.2 % — SIGNIFICANT CHANGE UP (ref 13–44)
MCHC RBC-ENTMCNC: 30.7 PG — SIGNIFICANT CHANGE UP (ref 27–34)
MCHC RBC-ENTMCNC: 32.2 GM/DL — SIGNIFICANT CHANGE UP (ref 32–36)
MCV RBC AUTO: 95.3 FL — SIGNIFICANT CHANGE UP (ref 80–100)
MONOCYTES # BLD AUTO: 0.85 K/UL — SIGNIFICANT CHANGE UP (ref 0–0.9)
MONOCYTES NFR BLD AUTO: 10.3 % — SIGNIFICANT CHANGE UP (ref 2–14)
NEUTROPHILS # BLD AUTO: 5.11 K/UL — SIGNIFICANT CHANGE UP (ref 1.8–7.4)
NEUTROPHILS NFR BLD AUTO: 61.7 % — SIGNIFICANT CHANGE UP (ref 43–77)
NRBC # BLD: 0 /100 WBCS — SIGNIFICANT CHANGE UP (ref 0–0)
PCO2 BLDV: 45 MMHG — SIGNIFICANT CHANGE UP (ref 42–55)
PH BLDV: 7.46 — HIGH (ref 7.32–7.43)
PLATELET # BLD AUTO: 169 K/UL — SIGNIFICANT CHANGE UP (ref 150–400)
PO2 BLDV: 47 MMHG — HIGH (ref 25–45)
POTASSIUM SERPL-MCNC: 4.3 MMOL/L — SIGNIFICANT CHANGE UP (ref 3.5–5.3)
POTASSIUM SERPL-SCNC: 4.3 MMOL/L — SIGNIFICANT CHANGE UP (ref 3.5–5.3)
PROTHROM AB SERPL-ACNC: 11.9 SEC — SIGNIFICANT CHANGE UP (ref 9.5–13)
RBC # BLD: 4.92 M/UL — SIGNIFICANT CHANGE UP (ref 4.2–5.8)
RBC # FLD: 13.2 % — SIGNIFICANT CHANGE UP (ref 10.3–14.5)
SAO2 % BLDV: 80.7 % — SIGNIFICANT CHANGE UP (ref 67–88)
SARS-COV-2 RNA SPEC QL NAA+PROBE: SIGNIFICANT CHANGE UP
SODIUM SERPL-SCNC: 140 MMOL/L — SIGNIFICANT CHANGE UP (ref 132–145)
TROPONIN I, HIGH SENSITIVITY RESULT: 7.7 NG/L — SIGNIFICANT CHANGE UP
WBC # BLD: 8.27 K/UL — SIGNIFICANT CHANGE UP (ref 3.8–10.5)
WBC # FLD AUTO: 8.27 K/UL — SIGNIFICANT CHANGE UP (ref 3.8–10.5)

## 2023-09-07 PROCEDURE — 71045 X-RAY EXAM CHEST 1 VIEW: CPT | Mod: 26

## 2023-09-07 PROCEDURE — 99285 EMERGENCY DEPT VISIT HI MDM: CPT

## 2023-09-07 RX ORDER — IPRATROPIUM/ALBUTEROL SULFATE 18-103MCG
3 AEROSOL WITH ADAPTER (GRAM) INHALATION ONCE
Refills: 0 | Status: COMPLETED | OUTPATIENT
Start: 2023-09-07 | End: 2023-09-07

## 2023-09-07 RX ORDER — DEXAMETHASONE 0.5 MG/5ML
4 ELIXIR ORAL ONCE
Refills: 0 | Status: COMPLETED | OUTPATIENT
Start: 2023-09-07 | End: 2023-09-07

## 2023-09-07 RX ADMIN — Medication 3 MILLILITER(S): at 18:11

## 2023-09-07 RX ADMIN — Medication 4 MILLIGRAM(S): at 18:10

## 2023-09-07 NOTE — ED ADULT NURSE NOTE - NS ED NURSE LEVEL OF CONSCIOUSNESS ORIENTATION
Oriented - self; Oriented - place; Oriented - time Do Not Resuscitate (DNR)/Medical Orders for Life-Sustaining Treatment (MOLST)

## 2023-09-07 NOTE — ED PROVIDER NOTE - HIV OFFER
Previously Negative (within the last year) Complex Repair And Skin Substitute Graft Text: The defect edges were debeveled with a #15 scalpel blade.  The primary defect was closed partially with a complex linear closure.  Given the location of the remaining defect, shape of the defect and the proximity to free margins a skin substitute graft was deemed most appropriate to repair the remaining defect.  The graft was trimmed to fit the size of the remaining defect.  The graft was then placed in the primary defect, oriented appropriately, and sutured into place.

## 2023-09-07 NOTE — ED PROVIDER NOTE - OBJECTIVE STATEMENT
72-year-old male patient well-known to our emergency room for frequent visits.  Presents today with shortness of breath and episodes of chest pain.  Patient very vague about timing and consistency of symptoms.  Patient agreeable to getting breathing treatments at this time.  After getting breathing treatments and blood work done patient eloped.

## 2023-09-07 NOTE — ED ADULT NURSE NOTE - OBJECTIVE STATEMENT
Pt walked in c/o SOB and chest pain. Pt received lab work, medication as per order, chest XR- walked out prior to results.

## 2023-09-07 NOTE — ED ADULT NURSE NOTE - NSELOPED_ED_ALL_ED
Patient and/or family announced that they are leaving. They were advised to stay, advised to return if worse. Statement Selected

## 2023-09-07 NOTE — ED ADULT TRIAGE NOTE - HEIGHT IN CM
[facility] called in required to her status.     Phone call transferred to clinical team    165.1

## 2023-09-07 NOTE — ED PROVIDER NOTE - CLINICAL SUMMARY MEDICAL DECISION MAKING FREE TEXT BOX
Patient presents for shortness of breath and episodes of chest pain.  Patient has known A-fib and COPD and heart disease but is known to be noncompliant with treatment and medications.  Patient initially agreed to blood work, x-ray, breathing treatments and after breathing treatments patient eloped.

## 2023-09-08 ENCOUNTER — EMERGENCY (EMERGENCY)
Facility: HOSPITAL | Age: 73
LOS: 1 days | Discharge: LEFT BEFORE DISCHARGE | End: 2023-09-08
Admitting: EMERGENCY MEDICINE
Payer: MEDICARE

## 2023-09-08 VITALS
TEMPERATURE: 97 F | HEART RATE: 88 BPM | HEIGHT: 65 IN | SYSTOLIC BLOOD PRESSURE: 125 MMHG | OXYGEN SATURATION: 97 % | DIASTOLIC BLOOD PRESSURE: 85 MMHG | RESPIRATION RATE: 18 BRPM

## 2023-09-08 DIAGNOSIS — Z86.79 PERSONAL HISTORY OF OTHER DISEASES OF THE CIRCULATORY SYSTEM: ICD-10-CM

## 2023-09-08 DIAGNOSIS — R06.02 SHORTNESS OF BREATH: ICD-10-CM

## 2023-09-08 DIAGNOSIS — Z95.1 PRESENCE OF AORTOCORONARY BYPASS GRAFT: Chronic | ICD-10-CM

## 2023-09-08 DIAGNOSIS — J44.9 CHRONIC OBSTRUCTIVE PULMONARY DISEASE, UNSPECIFIED: ICD-10-CM

## 2023-09-08 DIAGNOSIS — I10 ESSENTIAL (PRIMARY) HYPERTENSION: ICD-10-CM

## 2023-09-08 DIAGNOSIS — I21.9 ACUTE MYOCARDIAL INFARCTION, UNSPECIFIED: ICD-10-CM

## 2023-09-08 DIAGNOSIS — F41.9 ANXIETY DISORDER, UNSPECIFIED: ICD-10-CM

## 2023-09-08 DIAGNOSIS — Z53.29 PROCEDURE AND TREATMENT NOT CARRIED OUT BECAUSE OF PATIENT'S DECISION FOR OTHER REASONS: ICD-10-CM

## 2023-09-08 DIAGNOSIS — Z88.6 ALLERGY STATUS TO ANALGESIC AGENT: ICD-10-CM

## 2023-09-08 DIAGNOSIS — Z95.1 PRESENCE OF AORTOCORONARY BYPASS GRAFT: ICD-10-CM

## 2023-09-08 DIAGNOSIS — I48.91 UNSPECIFIED ATRIAL FIBRILLATION: ICD-10-CM

## 2023-09-08 DIAGNOSIS — E78.5 HYPERLIPIDEMIA, UNSPECIFIED: ICD-10-CM

## 2023-09-08 DIAGNOSIS — Z86.73 PERSONAL HISTORY OF TRANSIENT ISCHEMIC ATTACK (TIA), AND CEREBRAL INFARCTION WITHOUT RESIDUAL DEFICITS: ICD-10-CM

## 2023-09-08 DIAGNOSIS — Z95.4 PRESENCE OF OTHER HEART-VALVE REPLACEMENT: ICD-10-CM

## 2023-09-08 DIAGNOSIS — Z79.82 LONG TERM (CURRENT) USE OF ASPIRIN: ICD-10-CM

## 2023-09-08 DIAGNOSIS — Z95.2 PRESENCE OF PROSTHETIC HEART VALVE: Chronic | ICD-10-CM

## 2023-09-08 DIAGNOSIS — Z98.890 OTHER SPECIFIED POSTPROCEDURAL STATES: ICD-10-CM

## 2023-09-08 PROCEDURE — L9992: CPT

## 2023-09-10 ENCOUNTER — EMERGENCY (EMERGENCY)
Facility: HOSPITAL | Age: 73
LOS: 1 days | Discharge: LEFT BEFORE DISCHARGE | End: 2023-09-10
Attending: EMERGENCY MEDICINE | Admitting: EMERGENCY MEDICINE
Payer: MEDICARE

## 2023-09-10 VITALS
SYSTOLIC BLOOD PRESSURE: 131 MMHG | TEMPERATURE: 98 F | HEIGHT: 65 IN | RESPIRATION RATE: 16 BRPM | HEART RATE: 81 BPM | OXYGEN SATURATION: 98 % | WEIGHT: 115.08 LBS | DIASTOLIC BLOOD PRESSURE: 85 MMHG

## 2023-09-10 DIAGNOSIS — Z95.2 PRESENCE OF PROSTHETIC HEART VALVE: Chronic | ICD-10-CM

## 2023-09-10 DIAGNOSIS — Z95.1 PRESENCE OF AORTOCORONARY BYPASS GRAFT: Chronic | ICD-10-CM

## 2023-09-10 LAB
ALBUMIN SERPL ELPH-MCNC: 3.4 G/DL — SIGNIFICANT CHANGE UP (ref 3.4–5)
ALP SERPL-CCNC: 61 U/L — SIGNIFICANT CHANGE UP (ref 40–120)
ALT FLD-CCNC: 21 U/L — SIGNIFICANT CHANGE UP (ref 12–42)
ANION GAP SERPL CALC-SCNC: 2 MMOL/L — LOW (ref 9–16)
AST SERPL-CCNC: 25 U/L — SIGNIFICANT CHANGE UP (ref 15–37)
BASOPHILS # BLD AUTO: 0.03 K/UL — SIGNIFICANT CHANGE UP (ref 0–0.2)
BASOPHILS NFR BLD AUTO: 0.4 % — SIGNIFICANT CHANGE UP (ref 0–2)
BILIRUB SERPL-MCNC: 0.6 MG/DL — SIGNIFICANT CHANGE UP (ref 0.2–1.2)
BUN SERPL-MCNC: 31 MG/DL — HIGH (ref 7–23)
CALCIUM SERPL-MCNC: 8.9 MG/DL — SIGNIFICANT CHANGE UP (ref 8.5–10.5)
CHLORIDE SERPL-SCNC: 103 MMOL/L — SIGNIFICANT CHANGE UP (ref 96–108)
CO2 SERPL-SCNC: 32 MMOL/L — HIGH (ref 22–31)
CREAT SERPL-MCNC: 1.14 MG/DL — SIGNIFICANT CHANGE UP (ref 0.5–1.3)
EGFR: 68 ML/MIN/1.73M2 — SIGNIFICANT CHANGE UP
EOSINOPHIL # BLD AUTO: 0.18 K/UL — SIGNIFICANT CHANGE UP (ref 0–0.5)
EOSINOPHIL NFR BLD AUTO: 2.1 % — SIGNIFICANT CHANGE UP (ref 0–6)
GLUCOSE SERPL-MCNC: 106 MG/DL — HIGH (ref 70–99)
HCT VFR BLD CALC: 45.1 % — SIGNIFICANT CHANGE UP (ref 39–50)
HGB BLD-MCNC: 14.6 G/DL — SIGNIFICANT CHANGE UP (ref 13–17)
IMM GRANULOCYTES NFR BLD AUTO: 0.2 % — SIGNIFICANT CHANGE UP (ref 0–0.9)
LYMPHOCYTES # BLD AUTO: 1.88 K/UL — SIGNIFICANT CHANGE UP (ref 1–3.3)
LYMPHOCYTES # BLD AUTO: 22.3 % — SIGNIFICANT CHANGE UP (ref 13–44)
MCHC RBC-ENTMCNC: 31.1 PG — SIGNIFICANT CHANGE UP (ref 27–34)
MCHC RBC-ENTMCNC: 32.4 GM/DL — SIGNIFICANT CHANGE UP (ref 32–36)
MCV RBC AUTO: 96 FL — SIGNIFICANT CHANGE UP (ref 80–100)
MONOCYTES # BLD AUTO: 0.7 K/UL — SIGNIFICANT CHANGE UP (ref 0–0.9)
MONOCYTES NFR BLD AUTO: 8.3 % — SIGNIFICANT CHANGE UP (ref 2–14)
NEUTROPHILS # BLD AUTO: 5.61 K/UL — SIGNIFICANT CHANGE UP (ref 1.8–7.4)
NEUTROPHILS NFR BLD AUTO: 66.7 % — SIGNIFICANT CHANGE UP (ref 43–77)
NRBC # BLD: 0 /100 WBCS — SIGNIFICANT CHANGE UP (ref 0–0)
PLATELET # BLD AUTO: 160 K/UL — SIGNIFICANT CHANGE UP (ref 150–400)
POTASSIUM SERPL-MCNC: 4.1 MMOL/L — SIGNIFICANT CHANGE UP (ref 3.5–5.3)
POTASSIUM SERPL-SCNC: 4.1 MMOL/L — SIGNIFICANT CHANGE UP (ref 3.5–5.3)
PROT SERPL-MCNC: 6.6 G/DL — SIGNIFICANT CHANGE UP (ref 6.4–8.2)
RBC # BLD: 4.7 M/UL — SIGNIFICANT CHANGE UP (ref 4.2–5.8)
RBC # FLD: 13.2 % — SIGNIFICANT CHANGE UP (ref 10.3–14.5)
SODIUM SERPL-SCNC: 137 MMOL/L — SIGNIFICANT CHANGE UP (ref 132–145)
WBC # BLD: 8.42 K/UL — SIGNIFICANT CHANGE UP (ref 3.8–10.5)
WBC # FLD AUTO: 8.42 K/UL — SIGNIFICANT CHANGE UP (ref 3.8–10.5)

## 2023-09-10 PROCEDURE — 99283 EMERGENCY DEPT VISIT LOW MDM: CPT

## 2023-09-10 RX ORDER — DIPHENHYDRAMINE HCL 50 MG
25 CAPSULE ORAL ONCE
Refills: 0 | Status: COMPLETED | OUTPATIENT
Start: 2023-09-10 | End: 2023-09-10

## 2023-09-11 NOTE — ED PROVIDER NOTE - CARE PROVIDERS DIRECT ADDRESSES
[email or web address]/Family

## 2023-09-12 DIAGNOSIS — R07.9 CHEST PAIN, UNSPECIFIED: ICD-10-CM

## 2023-09-12 DIAGNOSIS — M79.10 MYALGIA, UNSPECIFIED SITE: ICD-10-CM

## 2023-09-12 DIAGNOSIS — Z53.21 PROCEDURE AND TREATMENT NOT CARRIED OUT DUE TO PATIENT LEAVING PRIOR TO BEING SEEN BY HEALTH CARE PROVIDER: ICD-10-CM

## 2023-09-13 ENCOUNTER — EMERGENCY (EMERGENCY)
Facility: HOSPITAL | Age: 73
LOS: 1 days | Discharge: LEFT BEFORE DISCHARGE | End: 2023-09-13
Attending: EMERGENCY MEDICINE | Admitting: EMERGENCY MEDICINE
Payer: MEDICARE

## 2023-09-13 VITALS
HEIGHT: 65 IN | OXYGEN SATURATION: 98 % | DIASTOLIC BLOOD PRESSURE: 71 MMHG | HEART RATE: 82 BPM | TEMPERATURE: 98 F | RESPIRATION RATE: 17 BRPM | SYSTOLIC BLOOD PRESSURE: 108 MMHG

## 2023-09-13 DIAGNOSIS — I10 ESSENTIAL (PRIMARY) HYPERTENSION: ICD-10-CM

## 2023-09-13 DIAGNOSIS — Z86.73 PERSONAL HISTORY OF TRANSIENT ISCHEMIC ATTACK (TIA), AND CEREBRAL INFARCTION WITHOUT RESIDUAL DEFICITS: ICD-10-CM

## 2023-09-13 DIAGNOSIS — Z95.1 PRESENCE OF AORTOCORONARY BYPASS GRAFT: ICD-10-CM

## 2023-09-13 DIAGNOSIS — Z95.2 PRESENCE OF PROSTHETIC HEART VALVE: ICD-10-CM

## 2023-09-13 DIAGNOSIS — L29.9 PRURITUS, UNSPECIFIED: ICD-10-CM

## 2023-09-13 DIAGNOSIS — Z88.6 ALLERGY STATUS TO ANALGESIC AGENT: ICD-10-CM

## 2023-09-13 DIAGNOSIS — F41.9 ANXIETY DISORDER, UNSPECIFIED: ICD-10-CM

## 2023-09-13 DIAGNOSIS — Z95.1 PRESENCE OF AORTOCORONARY BYPASS GRAFT: Chronic | ICD-10-CM

## 2023-09-13 DIAGNOSIS — E78.5 HYPERLIPIDEMIA, UNSPECIFIED: ICD-10-CM

## 2023-09-13 DIAGNOSIS — I48.91 UNSPECIFIED ATRIAL FIBRILLATION: ICD-10-CM

## 2023-09-13 DIAGNOSIS — Z79.82 LONG TERM (CURRENT) USE OF ASPIRIN: ICD-10-CM

## 2023-09-13 DIAGNOSIS — Z86.79 PERSONAL HISTORY OF OTHER DISEASES OF THE CIRCULATORY SYSTEM: ICD-10-CM

## 2023-09-13 DIAGNOSIS — J44.9 CHRONIC OBSTRUCTIVE PULMONARY DISEASE, UNSPECIFIED: ICD-10-CM

## 2023-09-13 DIAGNOSIS — I25.10 ATHEROSCLEROTIC HEART DISEASE OF NATIVE CORONARY ARTERY WITHOUT ANGINA PECTORIS: ICD-10-CM

## 2023-09-13 DIAGNOSIS — Z79.01 LONG TERM (CURRENT) USE OF ANTICOAGULANTS: ICD-10-CM

## 2023-09-13 DIAGNOSIS — Z95.2 PRESENCE OF PROSTHETIC HEART VALVE: Chronic | ICD-10-CM

## 2023-09-13 PROCEDURE — L9991: CPT

## 2023-09-13 NOTE — ED ADULT NURSE NOTE - ED CARDIAC CAPILLARY REFILL
Patient needs to schedule colonoscopy but first needs to get clearance to be off of plavix prior to procedure. Procedure is not yet scheduled.    2 seconds or less

## 2023-09-13 NOTE — ED ADULT NURSE NOTE - JUGULAR VENOUS DISTENTION
Inpatient consult to Vascular Surgery  Consult performed by: Omar Rivas MD  Consult ordered by: Tressa Shaffer MD               VASCULAR SURGERY CONSULT NOTE           Patient: Remberto Brantley   MRN: [id number]   : 1929     ATTENDING NOTE    History of Present Illness  Mr. Remberto Brantley is a 89 year old gentleman with hypertension, hypercholesterolemia and end-stage renal failure requiring hemodialysis.  He is dialyzed via a left upper arm arteriovenous fistula which was created several years ago and has functioned primarily.  He is now transferred from [facility] with persistent hemorrhage from the fistula.    Physical Examination  On physical examination, the patient is cooperative and conversant.  There is a bandage overlying the left upper extremity arteriovenous fistula which is hemostatic.  There is a strong thrill in the fistula which degenerates to a pulse at the peak of the cardiac cycle.    Assessment & Plan  In summary, we are suspicious for outflow stenosis in Mr. Brantley's left upper arm arteriovenous fistula.  He will be admitted for catheter placement and fistulography.    Omar Rivas M.D.  [facility]  Vascular Surgery  [phone]      History of present illness:  Remberto Brantley is a 89 year old male who presents with a bleeding left upper extremity AV fistula after dialysis today. The patient and his son endorse bleeding last night as well that stopped with manual pressure. Today, they presented to [facility] due to bleeding that wouldn't stop. The patient was transferred here for vascular surgery evaluation.     Review of Systems:  Review of Systems   Constitutional: Negative for activity change and appetite change.   Respiratory: Negative for shortness of breath.    Cardiovascular: Negative for chest pain and palpitations.   Gastrointestinal: Negative for abdominal distention, abdominal pain, constipation, diarrhea, nausea and vomiting.    Genitourinary: Negative for difficulty urinating.   Musculoskeletal: Negative for arthralgias and back pain.        Bleeding AVF   Skin: Negative for color change and rash.   Neurological: Negative for dizziness and weakness.   Psychiatric/Behavioral: Negative for agitation and confusion.       Past Medical History:   Diagnosis Date   • Cataract, left    • Dialysis patient (CMS/[facility])     M-W-F   • Essential (primary) hypertension    • High cholesterol    • Renal failure    • Vascular dialysis catheter in place (CMS/[facility])     left hand dialysis catheter       Past Surgical History:   Procedure Laterality Date   • Cataract extraction w/  intraocular lens implant Left 02/2021   • Dialysis catheter insertion  2018    left hand   • Skin biopsy      Cancer excised face and mouth        Family History   Family history unknown: Yes        Social History     Tobacco Use   • Smoking status: Former Smoker   • Smokeless tobacco: Never Used   Substance Use Topics   • Alcohol use: Yes     Comment: 1 vodka a week   • Drug use: Never        Allergies:  ALLERGIES:  No Known Allergies    Presenting Meds:  No current facility-administered medications for this encounter.     Current Outpatient Medications   Medication Sig Dispense Refill   • metoPROLOL succinate (TOPROL-XL) 50 MG 24 hr tablet Take 50 mg by mouth daily.     • calcium acetate gelcap (PHOSLO) 667 MG capsule Take 667 mg by mouth. Indications: One tab in the a.m., one tab in the afternoon and two tabs in evening.     • amLODIPine (NORVASC) 5 MG tablet Take 5 mg by mouth 2 times daily.     • simvastatin (ZOCOR) 40 MG tablet Take 40 mg by mouth daily.         Physical Exam:  Vitals:  Blood pressure 133/57, pulse 76, temperature 98.2 °F (36.8 °C), temperature source Tympanic, resp. rate 18, weight 78 kg (171 lb 15.3 oz), SpO2 96 %.    Physical Exam  Vitals and nursing note reviewed.   Constitutional:       General: He is not in acute distress.  HENT:      Head: Normocephalic  and atraumatic.   Eyes:      Pupils: Pupils are equal, round, and reactive to light.   Cardiovascular:      Rate and Rhythm: Normal rate and regular rhythm.   Pulmonary:      Effort: Pulmonary effort is normal.      Breath sounds: Normal breath sounds.   Abdominal:      General: There is no distension.      Palpations: Abdomen is soft.      Tenderness: There is no abdominal tenderness.   Musculoskeletal:         General: No deformity. Normal range of motion.      Cervical back: Neck supple.      Comments: LUE fistula with palpable thrill that is pulsatile in nature, palpable radial pulse   Skin:     General: Skin is warm and dry.   Neurological:      Mental Status: He is alert and oriented to person, place, and time.      Cranial Nerves: No cranial nerve deficit.       LABS  Recent Results (from the past 24 hour(s))   Comprehensive Metabolic Panel    Collection Time: 04/25/22 12:33 PM   Result Value Ref Range    Fasting Status      Sodium 132 (L) 135 - 145 mmol/L    Potassium 4.4 3.4 - 5.1 mmol/L    Chloride 93 (L) 98 - 107 mmol/L    Carbon Dioxide 30 21 - 32 mmol/L    Anion Gap 13 10 - 20 mmol/L    Glucose 111 (H) 70 - 99 mg/dL    BUN 87 (H) 6 - 20 mg/dL    Creatinine 8.55 (H) 0.67 - 1.17 mg/dL    Glomerular Filtration Rate 5 (L) >=60    BUN/ Creatinine Ratio 10 7 - 25    Calcium 8.9 8.4 - 10.2 mg/dL    Bilirubin, Total 0.4 0.2 - 1.0 mg/dL    GOT/AST 22 <=37 Units/L    GPT/ALT 20 <64 Units/L    Alkaline Phosphatase 69 45 - 117 Units/L    Albumin 3.2 (L) 3.6 - 5.1 g/dL    Protein, Total 6.5 6.4 - 8.2 g/dL    Globulin 3.3 2.0 - 4.0 g/dL    A/G Ratio 1.0 1.0 - 2.4   Prothrombin Time    Collection Time: 04/25/22 12:33 PM   Result Value Ref Range    Prothrombin Time 10.5 9.7 - 11.8 sec    INR 1.0     Partial Thromboplastin Time    Collection Time: 04/25/22 12:33 PM   Result Value Ref Range    PTT 24 22 - 30 sec   CBC with Automated Differential (performable only)    Collection Time: 04/25/22 12:33 PM   Result Value Ref  Range    WBC 6.6 4.2 - 11.0 K/mcL    RBC 2.84 (L) 4.50 - 5.90 mil/mcL    HGB 9.7 (L) 13.0 - 17.0 g/dL    HCT 29.9 (L) 39.0 - 51.0 %    .3 (H) 78.0 - 100.0 fl    MCH 34.2 (H) 26.0 - 34.0 pg    MCHC 32.4 32.0 - 36.5 g/dL    RDW-CV 15.7 (H) 11.0 - 15.0 %    RDW-SD 59.8 (H) 39.0 - 50.0 fL     140 - 450 K/mcL    NRBC 0 <=0 /100 WBC    Neutrophil, Percent 70 %    Lymphocytes, Percent 11 %    Mono, Percent 16 %    Eosinophils, Percent 1 %    Basophils, Percent 1 %    Immature Granulocytes 1 %    Absolute Neutrophils 4.6 1.8 - 7.7 K/mcL    Absolute Lymphocytes 0.8 (L) 1.0 - 4.0 K/mcL    Absolute Monocytes 1.1 (H) 0.3 - 0.9 K/mcL    Absolute Eosinophils  0.1 0.0 - 0.5 K/mcL    Absolute Basophils 0.1 0.0 - 0.3 K/mcL    Absolute Immmature Granulocytes 0.0 0.0 - 0.2 K/mcL   Rapid SARS-CoV-2 by PCR    Collection Time: 04/25/22 12:59 PM    Specimen: Nasal, Mid-turbinate; Swab   Result Value Ref Range    Rapid SARS-COV-2 by PCR Not Detected Not Detected / Detected / Presumptive Positive / Inhibitors present    Isolation Guidelines      Procedural Comment         IMAGING  No results found.     Assessment / Plan  Remberto Brantley is a 89 year old male who presents with a bleeding AVF. He was unable to get dialysis today. There is currently no active bleeding from the AVF.     - admit to hospitalist service  - nephrology consult  - recommend IR consultation tomorrow for fistulagram and temporary HD catheter placement  - maintain ACE wrap over AVF  - rest per primary team     Patient seen and plan discussed with attending, Dr. Rob Ng, PGY-4  General Surgery  Please call   with questions and follow prompts for appropriate resident coverage. Do not PerfectServe.           absent

## 2023-09-13 NOTE — ED ADULT NURSE NOTE - NSFALLUNIVINTERV_ED_ALL_ED
Bed/Stretcher in lowest position, wheels locked, appropriate side rails in place/Call bell, personal items and telephone in reach/Instruct patient to call for assistance before getting out of bed/chair/stretcher/Non-slip footwear applied when patient is off stretcher/[place] to call system/Physically safe environment - no spills, clutter or unnecessary equipment/Purposeful proactive rounding/Room/bathroom lighting operational, light cord in reach

## 2023-09-13 NOTE — ED ADULT NURSE NOTE - OBJECTIVE STATEMENT
pt aox4 with steady gait. c/o itching to body and slight cough today. breathing is even and unlabored.

## 2023-09-17 DIAGNOSIS — L29.9 PRURITUS, UNSPECIFIED: ICD-10-CM

## 2023-09-17 DIAGNOSIS — Z53.21 PROCEDURE AND TREATMENT NOT CARRIED OUT DUE TO PATIENT LEAVING PRIOR TO BEING SEEN BY HEALTH CARE PROVIDER: ICD-10-CM

## 2023-09-18 ENCOUNTER — EMERGENCY (EMERGENCY)
Facility: HOSPITAL | Age: 73
LOS: 1 days | Discharge: LEFT BEFORE DISCHARGE | End: 2023-09-18
Attending: EMERGENCY MEDICINE | Admitting: EMERGENCY MEDICINE
Payer: MEDICARE

## 2023-09-18 VITALS
WEIGHT: 134.92 LBS | SYSTOLIC BLOOD PRESSURE: 112 MMHG | OXYGEN SATURATION: 98 % | TEMPERATURE: 99 F | DIASTOLIC BLOOD PRESSURE: 79 MMHG | HEIGHT: 65 IN | HEART RATE: 90 BPM | RESPIRATION RATE: 18 BRPM

## 2023-09-18 DIAGNOSIS — Z79.82 LONG TERM (CURRENT) USE OF ASPIRIN: ICD-10-CM

## 2023-09-18 DIAGNOSIS — R07.89 OTHER CHEST PAIN: ICD-10-CM

## 2023-09-18 DIAGNOSIS — R05.9 COUGH, UNSPECIFIED: ICD-10-CM

## 2023-09-18 DIAGNOSIS — I48.91 UNSPECIFIED ATRIAL FIBRILLATION: ICD-10-CM

## 2023-09-18 DIAGNOSIS — Z88.6 ALLERGY STATUS TO ANALGESIC AGENT: ICD-10-CM

## 2023-09-18 DIAGNOSIS — Z53.29 PROCEDURE AND TREATMENT NOT CARRIED OUT BECAUSE OF PATIENT'S DECISION FOR OTHER REASONS: ICD-10-CM

## 2023-09-18 DIAGNOSIS — E78.5 HYPERLIPIDEMIA, UNSPECIFIED: ICD-10-CM

## 2023-09-18 DIAGNOSIS — J45.909 UNSPECIFIED ASTHMA, UNCOMPLICATED: ICD-10-CM

## 2023-09-18 DIAGNOSIS — Z95.2 PRESENCE OF PROSTHETIC HEART VALVE: Chronic | ICD-10-CM

## 2023-09-18 DIAGNOSIS — L29.9 PRURITUS, UNSPECIFIED: ICD-10-CM

## 2023-09-18 DIAGNOSIS — I21.9 ACUTE MYOCARDIAL INFARCTION, UNSPECIFIED: ICD-10-CM

## 2023-09-18 DIAGNOSIS — Z86.73 PERSONAL HISTORY OF TRANSIENT ISCHEMIC ATTACK (TIA), AND CEREBRAL INFARCTION WITHOUT RESIDUAL DEFICITS: ICD-10-CM

## 2023-09-18 DIAGNOSIS — I25.10 ATHEROSCLEROTIC HEART DISEASE OF NATIVE CORONARY ARTERY WITHOUT ANGINA PECTORIS: ICD-10-CM

## 2023-09-18 DIAGNOSIS — J44.9 CHRONIC OBSTRUCTIVE PULMONARY DISEASE, UNSPECIFIED: ICD-10-CM

## 2023-09-18 DIAGNOSIS — I10 ESSENTIAL (PRIMARY) HYPERTENSION: ICD-10-CM

## 2023-09-18 DIAGNOSIS — Z95.1 PRESENCE OF AORTOCORONARY BYPASS GRAFT: Chronic | ICD-10-CM

## 2023-09-18 DIAGNOSIS — Z98.890 OTHER SPECIFIED POSTPROCEDURAL STATES: ICD-10-CM

## 2023-09-18 DIAGNOSIS — F17.210 NICOTINE DEPENDENCE, CIGARETTES, UNCOMPLICATED: ICD-10-CM

## 2023-09-18 DIAGNOSIS — R06.02 SHORTNESS OF BREATH: ICD-10-CM

## 2023-09-18 DIAGNOSIS — Z95.1 PRESENCE OF AORTOCORONARY BYPASS GRAFT: ICD-10-CM

## 2023-09-18 DIAGNOSIS — F41.9 ANXIETY DISORDER, UNSPECIFIED: ICD-10-CM

## 2023-09-18 DIAGNOSIS — Z79.01 LONG TERM (CURRENT) USE OF ANTICOAGULANTS: ICD-10-CM

## 2023-09-18 PROCEDURE — 99285 EMERGENCY DEPT VISIT HI MDM: CPT

## 2023-09-18 RX ORDER — ACETAMINOPHEN 500 MG
650 TABLET ORAL ONCE
Refills: 0 | Status: DISCONTINUED | OUTPATIENT
Start: 2023-09-18 | End: 2023-09-22

## 2023-09-18 NOTE — ED ADULT NURSE NOTE - NSICDXPASTMEDICALHX_GEN_ALL_CORE_FT
PAST MEDICAL HISTORY:  Afib     Afib     Afib     Anxiety     Asthma     Asthma     CAD (coronary artery disease)     Cerebrovascular accident (CVA)     Chronic obstructive pulmonary disease, unspecified COPD type     Emphysema/COPD     History of COPD     HLD (hyperlipidemia)     HLD (hyperlipidemia)     HTN (hypertension)     HTN (hypertension)     Hypertension, unspecified type     Myocardial infarction     
107

## 2023-09-18 NOTE — ED PROVIDER NOTE - CLINICAL SUMMARY MEDICAL DECISION MAKING FREE TEXT BOX
A/P: Hx of CABG, Asthma, HTN, HLD, CAD, Afib, Anxiety presenting to E.D. with chest pain  --Differential includes but not limited to CAD, MI, MSK pain, PNA, PTX  --Plan to obtain labs, EKG, CXR,  --APAP for pain

## 2023-09-18 NOTE — ED PROVIDER NOTE - PHYSICAL EXAMINATION
Constitutional:  Well appearing, awake, alert, oriented  and in no apparent distress.  HEENT: Airway patent, Nasal mucosa clear. Mouth with normal mucosa.   Eyes: Clear bilaterally, pupils equal, round and reactive to light.  Cardiac: Normal rate, regular rhythm.  Respiratory: Breath sounds clear and equal bilaterally.  GI: Abdomen soft, non-tender, no guarding.  MSK: Spine appears normal, range of motion is not limited, no muscle or joint tenderness  Neuro: Alert and oriented, no focal deficits, no motor or sensory deficits.  Skin: Skin normal color for race, warm, dry and intact. No evidence of rash.

## 2023-09-18 NOTE — ED PROVIDER NOTE - OBJECTIVE STATEMENT
72-year old M with PMHx that includes CABG, Afib, Asthma, HLD presenting to E.D. for chest tightness, states he has been coughing for the past 3 days.  States his chest feels tight from all the coughing.  Denies fevers, chills.  States he smokes 3 cigarettes a day.  Chest pain is nonradiating, nonexertional.  Associated with shortness of breath.  Also states he feels itching skin.  Denies rash or skin eruption.  Denies new exposures. 72-year old M with PMHx that includes CABG, Afib, Asthma, HLD presenting to E.D. for chest tightness, states he has been coughing for the past 3 days.  States his chest feels tight from all the coughing.  Denies fevers, chills.  States he smokes 3 cigarettes a day.  Chest pain is nonradiating, nonexertional.  Associated with shortness of breath.  Also states he feels itching skin.  Denies rash or skin eruption.  Denies new exposures.  No recent travel, no covid exposures.

## 2023-10-03 ENCOUNTER — EMERGENCY (EMERGENCY)
Facility: HOSPITAL | Age: 73
LOS: 1 days | Discharge: LEFT BEFORE DISCHARGE | End: 2023-10-03
Admitting: EMERGENCY MEDICINE
Payer: MEDICARE

## 2023-10-03 VITALS
OXYGEN SATURATION: 97 % | SYSTOLIC BLOOD PRESSURE: 133 MMHG | WEIGHT: 125 LBS | RESPIRATION RATE: 16 BRPM | HEIGHT: 65 IN | DIASTOLIC BLOOD PRESSURE: 80 MMHG | TEMPERATURE: 98 F | HEART RATE: 82 BPM

## 2023-10-03 VITALS — HEART RATE: 72 BPM

## 2023-10-03 DIAGNOSIS — Z95.1 PRESENCE OF AORTOCORONARY BYPASS GRAFT: Chronic | ICD-10-CM

## 2023-10-03 DIAGNOSIS — Z95.2 PRESENCE OF PROSTHETIC HEART VALVE: Chronic | ICD-10-CM

## 2023-10-03 PROCEDURE — L9991: CPT

## 2023-10-03 NOTE — ED ADULT NURSE NOTE - NSFALLUNIVINTERV_ED_ALL_ED
Bed/Stretcher in lowest position, wheels locked, appropriate side rails in place/Call bell, personal items and telephone in reach/Instruct patient to call for assistance before getting out of bed/chair/stretcher/Non-slip footwear applied when patient is off stretcher/[place] to call system/Physically safe environment - no spills, clutter or unnecessary equipment/Purposeful proactive rounding/Room/bathroom lighting operational, light cord in reach

## 2023-10-03 NOTE — ED ADULT NURSE NOTE - NSSUHOSCREENINGYN_ED_ALL_ED
Yes - the patient is able to be screened Recognize danger situations.  For example, stress, drinking alcohol, urges to smoke, smoking cues, cigarette availability

## 2023-10-05 DIAGNOSIS — R07.2 PRECORDIAL PAIN: ICD-10-CM

## 2023-10-05 DIAGNOSIS — Z53.21 PROCEDURE AND TREATMENT NOT CARRIED OUT DUE TO PATIENT LEAVING PRIOR TO BEING SEEN BY HEALTH CARE PROVIDER: ICD-10-CM

## 2023-10-05 DIAGNOSIS — L29.9 PRURITUS, UNSPECIFIED: ICD-10-CM

## 2023-10-13 ENCOUNTER — EMERGENCY (EMERGENCY)
Facility: HOSPITAL | Age: 73
LOS: 1 days | Discharge: LEFT BEFORE DISCHARGE | End: 2023-10-13
Attending: EMERGENCY MEDICINE | Admitting: EMERGENCY MEDICINE
Payer: MEDICARE

## 2023-10-13 VITALS
HEART RATE: 88 BPM | DIASTOLIC BLOOD PRESSURE: 86 MMHG | RESPIRATION RATE: 18 BRPM | TEMPERATURE: 98 F | HEIGHT: 65 IN | WEIGHT: 145.06 LBS | OXYGEN SATURATION: 99 % | SYSTOLIC BLOOD PRESSURE: 137 MMHG

## 2023-10-13 DIAGNOSIS — Z95.2 PRESENCE OF PROSTHETIC HEART VALVE: Chronic | ICD-10-CM

## 2023-10-13 DIAGNOSIS — Z95.1 PRESENCE OF AORTOCORONARY BYPASS GRAFT: Chronic | ICD-10-CM

## 2023-10-13 PROCEDURE — 99284 EMERGENCY DEPT VISIT MOD MDM: CPT

## 2023-10-13 NOTE — ED PROVIDER NOTE - PROGRESS NOTE DETAILS
Before I could enter orders, patient got up and walked out. Said he felt better and no longer wanted work-up.

## 2023-10-13 NOTE — ED PROVIDER NOTE - CLINICAL SUMMARY MEDICAL DECISION MAKING FREE TEXT BOX
I spoke to patient about doing a cardiac w/u. Patient agreed but after I left patient's chair, patient got up and walked out. Patient was Awake, alert, oriented x3, gait steady, speech clear.

## 2023-10-13 NOTE — ED PROVIDER NOTE - PHYSICAL EXAMINATION
Gen: Well-developed, well-nourished, NAD, VS as noted by nursing. HEENT: NCAT, mmm   Chest: irregularly irregular, nl S1 and S2, no m/r/g. Resp: CTAB, no w/r/r  Abd: nl BS, soft, nt/nd. Ext: Warm, dry  Neuro: CN II-XII intact, normal and equal strength, sensation, and reflexes bilaterally, normal gait  Psych: AAOx3

## 2023-10-13 NOTE — ED PROVIDER NOTE - OBJECTIVE STATEMENT
Patient complained of chest pain and shortness of breath for several days, worse with exertion.  No fever, cough, abdominal pain, nausea, vomiting, diarrhea.

## 2023-10-16 DIAGNOSIS — I48.91 UNSPECIFIED ATRIAL FIBRILLATION: ICD-10-CM

## 2023-10-16 DIAGNOSIS — R07.9 CHEST PAIN, UNSPECIFIED: ICD-10-CM

## 2023-10-16 DIAGNOSIS — J43.9 EMPHYSEMA, UNSPECIFIED: ICD-10-CM

## 2023-10-16 DIAGNOSIS — Z53.29 PROCEDURE AND TREATMENT NOT CARRIED OUT BECAUSE OF PATIENT'S DECISION FOR OTHER REASONS: ICD-10-CM

## 2023-10-16 DIAGNOSIS — F41.9 ANXIETY DISORDER, UNSPECIFIED: ICD-10-CM

## 2023-10-16 DIAGNOSIS — Z95.1 PRESENCE OF AORTOCORONARY BYPASS GRAFT: ICD-10-CM

## 2023-10-16 DIAGNOSIS — Z79.82 LONG TERM (CURRENT) USE OF ASPIRIN: ICD-10-CM

## 2023-10-16 DIAGNOSIS — E78.5 HYPERLIPIDEMIA, UNSPECIFIED: ICD-10-CM

## 2023-10-16 DIAGNOSIS — I25.2 OLD MYOCARDIAL INFARCTION: ICD-10-CM

## 2023-10-16 DIAGNOSIS — I25.10 ATHEROSCLEROTIC HEART DISEASE OF NATIVE CORONARY ARTERY WITHOUT ANGINA PECTORIS: ICD-10-CM

## 2023-10-16 DIAGNOSIS — Z95.4 PRESENCE OF OTHER HEART-VALVE REPLACEMENT: ICD-10-CM

## 2023-10-16 DIAGNOSIS — Z86.73 PERSONAL HISTORY OF TRANSIENT ISCHEMIC ATTACK (TIA), AND CEREBRAL INFARCTION WITHOUT RESIDUAL DEFICITS: ICD-10-CM

## 2023-10-16 DIAGNOSIS — Z88.6 ALLERGY STATUS TO ANALGESIC AGENT: ICD-10-CM

## 2023-10-16 DIAGNOSIS — R06.02 SHORTNESS OF BREATH: ICD-10-CM

## 2023-10-16 DIAGNOSIS — I10 ESSENTIAL (PRIMARY) HYPERTENSION: ICD-10-CM

## 2023-10-25 ENCOUNTER — EMERGENCY (EMERGENCY)
Facility: HOSPITAL | Age: 73
LOS: 1 days | Discharge: HOME | End: 2023-10-25
Attending: EMERGENCY MEDICINE | Admitting: EMERGENCY MEDICINE
Payer: MEDICARE

## 2023-10-25 VITALS
HEART RATE: 94 BPM | OXYGEN SATURATION: 98 % | SYSTOLIC BLOOD PRESSURE: 130 MMHG | HEIGHT: 65 IN | RESPIRATION RATE: 16 BRPM | DIASTOLIC BLOOD PRESSURE: 92 MMHG | TEMPERATURE: 98 F

## 2023-10-25 DIAGNOSIS — Z95.1 PRESENCE OF AORTOCORONARY BYPASS GRAFT: Chronic | ICD-10-CM

## 2023-10-25 DIAGNOSIS — Z95.2 PRESENCE OF PROSTHETIC HEART VALVE: Chronic | ICD-10-CM

## 2023-10-25 PROCEDURE — 99283 EMERGENCY DEPT VISIT LOW MDM: CPT

## 2023-10-25 NOTE — ED ADULT TRIAGE NOTE - CHIEF COMPLAINT QUOTE
Pt reports he hasn't slept in 2 days. Denies any other medical complaints. Pt utilizing personal inhaler during triage.

## 2023-10-25 NOTE — ED PROVIDER NOTE - OBJECTIVE STATEMENT
71 y/o M with PMH of COPD presents to the ED for insomnia. Pt reports having difficulty with sleeping for the last few days. He wanted to come to the ED to have "a place to rest." Pt denies fevers, chills, CP or shortness of breath.

## 2023-10-25 NOTE — ED ADULT TRIAGE NOTE - MODE OF ARRIVAL
"[facility]        Subjective     HPI:  Carrie Lovell is a 72 year old female with DM2, HTN, hypercalcemia who presents for the following concerns:    DM2: feels things are getting better. BS in AM is 65-98 fasting. PM BS is 145-189. Was taking metformin, stopped it 3 weeks ago due to diarrhea. Usually had some warning. Lantus 14 units at night. 4 units with meals. Sometimes takes 5-6 units if \"rough day\". Two meals per day, more salads, down 15lbs!. Continuing exercise.     BP at home 147/80. Here it is 155/74. Always up at the doctor's office. Checks BP AM and PM, sometimes as low as 125/60. 2-3x/week. 50% over 140/80.     ROS: constitutional, cardiovascular, respiratory, GI, , MSK systems reviewed and negative except as noted above.    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    Wt Readings from Last 3 Encounters:   06/09/17 215 lb 12.8 oz (97.9 kg)   09/16/16 232 lb 12.8 oz (105.6 kg)   09/16/16 232 lb 12.8 oz (105.6 kg)       Gen: healthy, alert and no distress  HEENT: No asymmetry, MMM  CV: RRR, no murmurs. 2+ peripheral pulses  Lungs: CTAB, no wheezing or crackles, normal effort  Abd: obese, soft, ND/NT  Extremities: warm, 1+ bilateral edema  Psych: mood neutral, affect appropriate  Neuro: CN II- XII grossly intact, no focal deficits. Normal strength and tone; mentation appears intact and speech normal      Labs/Imaging this visit:  Recent Results (from the past 100 hour(s))   Basic Metabolic Panel ([facility])  - Results < 1 hr    Collection Time: 06/09/17  3:36 PM   Result Value Ref Range    Glucose 214.0 (H) 60.0 - 109.0 mg/dL    Urea Nitrogen 27.0 7.0 - 30.0 mg/dL    Creatinine 1.2 0.6 - 1.3 mg/dL    Sodium 141.0 133.0 - " 144.0 mmol/L    Potassium 3.8 3.4 - 5.3 mmol/L    Chloride 100.0 94.0 - 109.0 mmol/L    Carbon Dioxide 29.0 20.0 - 32.0 mmol/L    Calcium 10.8 (H) 8.5 - 10.4 mg/dL    eGFR Calculated (Non Black Reference) 46.8 (L) >60.0 mL/min    eGFR Calculated (Black Reference) 56.6 (L) >60.0 mL/min   Hemoglobin A1c ([facility])    Collection Time: 06/09/17  3:36 PM   Result Value Ref Range    Hemoglobin A1C 8.2 (H) 4.1 - 5.7 %            Assessment & Plan     72 year old female with:    1. Type 2 diabetes mellitus with hyperglycemia, with long-term current use of insulin (H)  - Hemoglobin A1c returned at 8.2%, discussed result and goal will be to continue diet/exercise change with increase in her insulin regimen:  - insulin aspart (NOVOLOG FLEXPEN) 100 UNIT/ML injection; 6 units before breakfast and dinner  Dispense: 15 mL; Refill: 3  - insulin glargine (LANTUS SOLOSTAR) 100 UNIT/ML injection; Inject 18 Units Subcutaneous At Bedtime  Dispense: 15 mL; Refill: 3  - she will f/u in 2-3 months with A1c  - encouraged her to call with questions regarding insulin adjustment or if she experiences low BS.     2. Screening for hypercalcemia. Will work up her mild hypercalcemia of 10.8 with the following labs:  - Basic Metabolic Panel ([facility])  - Results < 1 hr  - TSH  Sensitive ([facility])  - Parathyroid Horm.Intact ([facility])  - Vitamin D 25-Hydroxy ([facility])    3. Essential hypertension  She desires to bring in BP log in 1-2 weeks with readings prior to adjusting any meds. Reasonable to continue current regimen and refills sent:  - amLODIPine (NORVASC) 10 MG tablet; Take 1 tablet (10 mg) by mouth daily  Dispense: 90 tablet; Refill: 3  - hydrochlorothiazide 12.5 MG TABS tablet; Take 1 tablet (12.5 mg) by mouth daily  Dispense: 90 tablet; Refill: 3  - carvedilol (COREG) 25 MG tablet; Take 1 tablet (25 mg) by mouth 2 times daily (with meals)  Dispense: 180 tablet; Refill: 3  - losartan (COZAAR) 100 MG tablet; Take 1 tablet (100 mg) by  mouth daily  Dispense: 90 tablet; Refill: 3  - she will bring BP log to clinic in 1-2 weeks for us to make decision regarding BP management    Follow up: 2-3 months with A1c  Return to care as needed if symptoms worsen or fail to improve.    Radha Orozco MD    Precepted today with: Linda Haque MD  -------  PMH:  Patient Active Problem List   Diagnosis     Type 2 diabetes mellitus with hyperglycemia, with long-term current use of insulin (H)     Hyperlipidemia LDL goal <100     Hypothyroidism     Instability of knee joint     Health Care Home     Osteoarthritis of both knees      Current Outpatient Prescriptions   Medication     levothyroxine (SYNTHROID/LEVOTHROID) 150 MCG tablet     metFORMIN (GLUCOPHAGE) 500 MG tablet     amLODIPine (NORVASC) 10 MG tablet     losartan (COZAAR) 100 MG tablet     blood glucose monitoring (FREESTYLE LITE) test strip     insulin aspart (NOVOLOG FLEXPEN) 100 UNIT/ML soln     simvastatin (ZOCOR) 40 MG tablet     hydrochlorothiazide 12.5 MG TABS     insulin glargine (LANTUS SOLOSTAR) 100 UNIT/ML PEN     carvedilol (COREG) 25 MG tablet     Blood Pressure Monitoring (BLOOD PRESSURE KIT) KIT     blood glucose monitoring (FREESTYLE) lancets     insulin pen needle (NOVOFINE) 30G X 8 MM     aspirin 81 MG tablet     calcium carbonate-vitamin D (CALCIUM 600+D) 600-200 MG-UNIT TABS     POTASSIUM GLUCONATE     No current facility-administered medications for this visit.       ALLERGIES: Codeine    Options for treatment and follow-up care were reviewed with the patient. Carrie Lovell engaged in the decision making process and verbalized understanding of the options discussed and agreed with the final plan.     Walk in

## 2023-10-25 NOTE — ED PROVIDER NOTE - CLINICAL SUMMARY MEDICAL DECISION MAKING FREE TEXT BOX
Pt presents to the ED for insomnia / difficulty with sleep. On exam, Pt appears well and in no acute distress. No work up indicated at this time. Will give Pt time to rest. Anticipate discharge afterwards.

## 2023-10-25 NOTE — ED PROVIDER NOTE - PATIENT PORTAL LINK FT
You can access the FollowMyHealth Patient Portal offered by [facility] by registering at the following website: [email or web address]. By joining [facility]’s FollowMyHealth portal, you will also be able to view your health information using other applications (apps) compatible with our system.

## 2023-10-25 NOTE — ED ADULT NURSE NOTE - OBJECTIVE STATEMENT
Pt is a 72y male complaining of sleep issues. Pt states that he has not been able to sleep for 3 days because he has full body itching. Pt states that he has been using cream with no relief. PT also requesting inhaler.

## 2023-10-25 NOTE — ED ADULT NURSE NOTE - NSFALLUNIVINTERV_ED_ALL_ED
Bed/Stretcher in lowest position, wheels locked, appropriate side rails in place/Call bell, personal items and telephone in reach/Instruct patient to call for assistance before getting out of bed/chair/stretcher/Non-slip footwear applied when patient is off stretcher/[place] to call system/Physically safe environment - no spills, clutter or unnecessary equipment/Purposeful proactive rounding/Room/bathroom lighting operational, light cord in reach

## 2023-10-28 DIAGNOSIS — Z88.6 ALLERGY STATUS TO ANALGESIC AGENT: ICD-10-CM

## 2023-10-28 DIAGNOSIS — I25.2 OLD MYOCARDIAL INFARCTION: ICD-10-CM

## 2023-10-28 DIAGNOSIS — I48.91 UNSPECIFIED ATRIAL FIBRILLATION: ICD-10-CM

## 2023-10-28 DIAGNOSIS — Z95.1 PRESENCE OF AORTOCORONARY BYPASS GRAFT: ICD-10-CM

## 2023-10-28 DIAGNOSIS — F41.9 ANXIETY DISORDER, UNSPECIFIED: ICD-10-CM

## 2023-10-28 DIAGNOSIS — Z95.4 PRESENCE OF OTHER HEART-VALVE REPLACEMENT: ICD-10-CM

## 2023-10-28 DIAGNOSIS — J45.909 UNSPECIFIED ASTHMA, UNCOMPLICATED: ICD-10-CM

## 2023-10-28 DIAGNOSIS — G47.00 INSOMNIA, UNSPECIFIED: ICD-10-CM

## 2023-10-28 DIAGNOSIS — J43.9 EMPHYSEMA, UNSPECIFIED: ICD-10-CM

## 2023-10-28 DIAGNOSIS — Z79.82 LONG TERM (CURRENT) USE OF ASPIRIN: ICD-10-CM

## 2023-10-28 DIAGNOSIS — F17.200 NICOTINE DEPENDENCE, UNSPECIFIED, UNCOMPLICATED: ICD-10-CM

## 2023-10-28 DIAGNOSIS — Z86.73 PERSONAL HISTORY OF TRANSIENT ISCHEMIC ATTACK (TIA), AND CEREBRAL INFARCTION WITHOUT RESIDUAL DEFICITS: ICD-10-CM

## 2023-10-28 DIAGNOSIS — I25.10 ATHEROSCLEROTIC HEART DISEASE OF NATIVE CORONARY ARTERY WITHOUT ANGINA PECTORIS: ICD-10-CM

## 2023-11-06 ENCOUNTER — EMERGENCY (EMERGENCY)
Facility: HOSPITAL | Age: 73
LOS: 1 days | Discharge: LEFT BEFORE DISCHARGE | End: 2023-11-06
Attending: EMERGENCY MEDICINE | Admitting: EMERGENCY MEDICINE
Payer: MEDICARE

## 2023-11-06 VITALS
SYSTOLIC BLOOD PRESSURE: 131 MMHG | OXYGEN SATURATION: 98 % | TEMPERATURE: 98 F | RESPIRATION RATE: 16 BRPM | HEIGHT: 65 IN | DIASTOLIC BLOOD PRESSURE: 77 MMHG | HEART RATE: 81 BPM

## 2023-11-06 DIAGNOSIS — Z95.1 PRESENCE OF AORTOCORONARY BYPASS GRAFT: Chronic | ICD-10-CM

## 2023-11-06 DIAGNOSIS — Z95.2 PRESENCE OF PROSTHETIC HEART VALVE: Chronic | ICD-10-CM

## 2023-11-06 PROCEDURE — L9991: CPT

## 2023-11-09 DIAGNOSIS — Z53.21 PROCEDURE AND TREATMENT NOT CARRIED OUT DUE TO PATIENT LEAVING PRIOR TO BEING SEEN BY HEALTH CARE PROVIDER: ICD-10-CM

## 2023-11-09 DIAGNOSIS — R05.9 COUGH, UNSPECIFIED: ICD-10-CM

## 2023-11-15 NOTE — ED ADULT NURSE NOTE - HOW MANY DRINKS CONTAINING ALCOHOL DO YOU HAVE ON A TYPICAL DAY WHEN YOU ARE DRINKING?
For information on Fall & Injury Prevention, visit: [email or web address] OR  [email or web address] OR  https://www.cdc.gov/steadi/patient.html
1 or 2

## 2023-11-20 ENCOUNTER — EMERGENCY (EMERGENCY)
Facility: HOSPITAL | Age: 73
LOS: 1 days | Discharge: LEFT BEFORE DISCHARGE | End: 2023-11-20
Admitting: EMERGENCY MEDICINE
Payer: MEDICARE

## 2023-11-20 VITALS
HEIGHT: 65 IN | HEART RATE: 72 BPM | SYSTOLIC BLOOD PRESSURE: 110 MMHG | TEMPERATURE: 98 F | OXYGEN SATURATION: 96 % | DIASTOLIC BLOOD PRESSURE: 63 MMHG

## 2023-11-20 DIAGNOSIS — Z95.1 PRESENCE OF AORTOCORONARY BYPASS GRAFT: Chronic | ICD-10-CM

## 2023-11-20 DIAGNOSIS — Z95.2 PRESENCE OF PROSTHETIC HEART VALVE: Chronic | ICD-10-CM

## 2023-11-20 PROCEDURE — L9991: CPT

## 2023-11-20 NOTE — ED PROVIDER NOTE - HIV OFFER
Previously Declined (within the last year) 69 yo M PMH HTN, BPH, prostate cancer s/p prostatectomy 2012, diverticulosis presenting with 5 weeks of generalized weakness, 25 lb weight loss, and poor PO intake and subjective chills. + travel to [place] in 2021 returned in 2021. asymptomatic until 5 weeks ago. + treated for malaria. + hiccups + recent travel to [place]. febrile 101 11/13; RVP neg; 11/11 initial UA neg; repeat UA + w/o overt symptoms;  Ucx Neg    Hepatic lesion- wt loss and decreased PO intake concern for malignancy/abscess                        - ID appreciated low suspicion for malaria.                       - Ct R hepatic lesion w/ mild intrahepatic ductal dilatation : CT chest w/o overt PNA or metastatic disease                       - CEA mildly elevated; hep panel neg;  129 elevated; procal elevated; AFP low                         - monitor LFTs TB downtrending and alk phos and AST/ALT downtrending                         -  MRCP with Lt and RT hepatic liver abscess increased since CT and bland thrombosis                          - 11/2018 Reviewed outpt ID note liver lesion in 2017 (strongyloides ab+ repeat neg, quant gold+ refused treatment for latent TB and positive echinococcus – IgG repeat serology neg, MRI 2.8 cm dominate mass w/ cirrhosis and smaller ring enhancing lesions, PET w/o  metabolic active lesion, liver bx :fibrous tissue with macrophage reaction and chronic active inflammation. Fragments of benign liver parenchyma. No fungal or mycobacteria neg for malignancy; 6/15/18 s/p Ivermectin x 2 days,                        -  hiccups poss from Diaphragmatic irration- s/p baclofen PRN; still complaining of hiccups. Starting gabapentin 200TID. monitor Qtc                        - c/w zosyn                        - GI with concern for poss abscess; ERCP if infectious work up unrevealing                        - IR bx delay till 11/21 given ASA use and prior hx echinococcus; prior Hx latent TB quant gold indeterminate     Portal vein thrombus- CT expansile filling defect in the right portal, left portal and main hepatic veins, which may represent tumor thrombus versus bland thrombus. US doppler occlusive thrombus main and Lt portal veins with avascular thrombus. MRI as above RT and Lt and main portal vein thrombosis                          -  on heparin drip     elevated Cr- poss poor PO intake                 - Cr rising;                  - IVF     Anemia- no overt GIB; fe studies consistent with AOCD; s/p 1 uprbc (11/16) with appropriate rise.

## 2023-11-22 DIAGNOSIS — Z53.21 PROCEDURE AND TREATMENT NOT CARRIED OUT DUE TO PATIENT LEAVING PRIOR TO BEING SEEN BY HEALTH CARE PROVIDER: ICD-10-CM

## 2023-11-22 DIAGNOSIS — L29.9 PRURITUS, UNSPECIFIED: ICD-10-CM

## 2023-12-17 ENCOUNTER — EMERGENCY (EMERGENCY)
Facility: HOSPITAL | Age: 73
LOS: 1 days | Discharge: LEFT BEFORE DISCHARGE | End: 2023-12-17
Admitting: EMERGENCY MEDICINE
Payer: MEDICARE

## 2023-12-17 VITALS
HEIGHT: 65 IN | DIASTOLIC BLOOD PRESSURE: 86 MMHG | OXYGEN SATURATION: 98 % | HEART RATE: 98 BPM | SYSTOLIC BLOOD PRESSURE: 134 MMHG | WEIGHT: 139.99 LBS | TEMPERATURE: 98 F | RESPIRATION RATE: 16 BRPM

## 2023-12-17 DIAGNOSIS — Z95.1 PRESENCE OF AORTOCORONARY BYPASS GRAFT: Chronic | ICD-10-CM

## 2023-12-17 DIAGNOSIS — Z95.2 PRESENCE OF PROSTHETIC HEART VALVE: Chronic | ICD-10-CM

## 2023-12-17 PROCEDURE — 99283 EMERGENCY DEPT VISIT LOW MDM: CPT

## 2023-12-18 NOTE — ED PROVIDER NOTE - SECONDARY DIAGNOSIS.
Trevor Gonzales from [facility] on [facility] [phone] states they do have the medication but , control substance medications cannot be transfer a new script is needed. Afib

## 2023-12-20 DIAGNOSIS — R05.1 ACUTE COUGH: ICD-10-CM

## 2023-12-20 DIAGNOSIS — Z53.21 PROCEDURE AND TREATMENT NOT CARRIED OUT DUE TO PATIENT LEAVING PRIOR TO BEING SEEN BY HEALTH CARE PROVIDER: ICD-10-CM

## 2024-01-03 ENCOUNTER — EMERGENCY (EMERGENCY)
Facility: HOSPITAL | Age: 74
LOS: 1 days | Discharge: LEFT BEFORE DISCHARGE | End: 2024-01-03
Attending: EMERGENCY MEDICINE | Admitting: EMERGENCY MEDICINE
Payer: MEDICARE

## 2024-01-03 VITALS
HEART RATE: 100 BPM | RESPIRATION RATE: 17 BRPM | OXYGEN SATURATION: 97 % | TEMPERATURE: 98 F | HEIGHT: 65 IN | SYSTOLIC BLOOD PRESSURE: 118 MMHG | DIASTOLIC BLOOD PRESSURE: 76 MMHG

## 2024-01-03 DIAGNOSIS — R05.9 COUGH, UNSPECIFIED: ICD-10-CM

## 2024-01-03 DIAGNOSIS — Z95.1 PRESENCE OF AORTOCORONARY BYPASS GRAFT: Chronic | ICD-10-CM

## 2024-01-03 DIAGNOSIS — Z95.2 PRESENCE OF PROSTHETIC HEART VALVE: Chronic | ICD-10-CM

## 2024-01-03 DIAGNOSIS — Z88.6 ALLERGY STATUS TO ANALGESIC AGENT: ICD-10-CM

## 2024-01-03 DIAGNOSIS — Z53.29 PROCEDURE AND TREATMENT NOT CARRIED OUT BECAUSE OF PATIENT'S DECISION FOR OTHER REASONS: ICD-10-CM

## 2024-01-03 PROCEDURE — L9991: CPT

## 2024-01-04 NOTE — ED PROVIDER NOTE - CLINICAL SUMMARY MEDICAL DECISION MAKING FREE TEXT BOX
Spoke to patient about getting chest x-ray and viral swab. Patient verbally agreed, but as I was walking back to computer to place orders, patient walked out of ED. Patient eloped without completing treatment.

## 2024-01-04 NOTE — ED PROVIDER NOTE - OBJECTIVE STATEMENT
Patient reports he has been coughing for 3-4 weeks. Cough is dry. No fever, ha, cp, sob, ap, n/v/d, rhinorrhea.

## 2024-01-10 NOTE — ED ADULT NURSE NOTE - NSFALLRSKOUTCOME_ED_ALL_ED
Pt is Alert and oriented x1-2. Tele rhythm NSR O2 saturation 94% on room air Breath sounds clear/diminished . Bed is locked and in low position. Call light and personal items within reach. No C/O chest pain or shortness of breath. Pt voiding with no issue. Skin dry/Intact. Reviewed plan of care and patient verbalizes understanding.       POC: Diurese \"day by day\" per cards.   Sitter bedside, pt had a few sundowning episodes while trying to get out of bed.    Universal Safety Interventions

## 2024-01-18 NOTE — ED PROVIDER NOTE - NSFOLLOWUPINSTRUCTIONS_ED_ALL_ED_FT
Follow up with your primary care doctor or clinics listed below if you do not have a doctor  [address] [address]  To make an appointment, call [phone]  [facility]  Address: [address] [place] [id number]  Appointment Center: [phone] ([phone])

## 2024-01-18 NOTE — ED PROVIDER NOTE - CLINICAL SUMMARY MEDICAL DECISION MAKING FREE TEXT BOX
Pt with cough x 1 day.  Seen in triage and pt eloped from ER in time between triage and going to room.  Reviewed prior charts and pt with multiple visits where he left prior to full evaluation.

## 2024-02-16 ENCOUNTER — EMERGENCY (EMERGENCY)
Facility: HOSPITAL | Age: 74
LOS: 1 days | Discharge: HOME | End: 2024-02-16
Attending: EMERGENCY MEDICINE | Admitting: EMERGENCY MEDICINE
Payer: MEDICARE

## 2024-02-16 VITALS
SYSTOLIC BLOOD PRESSURE: 144 MMHG | TEMPERATURE: 98 F | DIASTOLIC BLOOD PRESSURE: 96 MMHG | RESPIRATION RATE: 16 BRPM | OXYGEN SATURATION: 98 % | HEIGHT: 65 IN | HEART RATE: 88 BPM

## 2024-02-16 DIAGNOSIS — Z95.2 PRESENCE OF PROSTHETIC HEART VALVE: Chronic | ICD-10-CM

## 2024-02-16 DIAGNOSIS — Z95.1 PRESENCE OF AORTOCORONARY BYPASS GRAFT: Chronic | ICD-10-CM

## 2024-02-16 PROCEDURE — 99283 EMERGENCY DEPT VISIT LOW MDM: CPT

## 2024-02-16 NOTE — ED PROVIDER NOTE - CLINICAL SUMMARY MEDICAL DECISION MAKING FREE TEXT BOX
Patient states he has left sided abdominal pain.  Cannot recall when it started.  No fever or chills, no diarrhea.  Patient eloped from ED prior to full evaluation.

## 2024-02-16 NOTE — ED PROVIDER NOTE - OBJECTIVE STATEMENT
Patient states he has left sided abdominal pain.  Cannot recall when it started.  No fever or chills, no diarrhea.

## 2024-02-20 DIAGNOSIS — Z88.6 ALLERGY STATUS TO ANALGESIC AGENT: ICD-10-CM

## 2024-02-20 DIAGNOSIS — Z53.21 PROCEDURE AND TREATMENT NOT CARRIED OUT DUE TO PATIENT LEAVING PRIOR TO BEING SEEN BY HEALTH CARE PROVIDER: ICD-10-CM

## 2024-02-20 DIAGNOSIS — R10.9 UNSPECIFIED ABDOMINAL PAIN: ICD-10-CM

## 2024-02-23 NOTE — ED PROVIDER NOTE - PRO INTERPRETER NEED 2
Currently SpO2: 91 % on Nasal Cannula O2 Flow Rate (L/min): 5 L/min    At baseline, uses 4L NC  CT Imaging showing mild pulm edema, scattered areas of tree-in-bud opacity  Continue to treat underlying NSTEMI, COVID, mild pulm edema  Overnight pt complained of right sided chest pain, had episode of desaturating into the 80s, was placed on nonrebreather. Currently satting well on 5L NC.    
English
not examined

## 2024-02-24 ENCOUNTER — EMERGENCY (EMERGENCY)
Facility: HOSPITAL | Age: 74
LOS: 1 days | Discharge: LEFT BEFORE DISCHARGE | End: 2024-02-24
Attending: EMERGENCY MEDICINE | Admitting: EMERGENCY MEDICINE
Payer: MEDICARE

## 2024-02-24 VITALS
TEMPERATURE: 98 F | RESPIRATION RATE: 17 BRPM | HEIGHT: 65 IN | HEART RATE: 82 BPM | DIASTOLIC BLOOD PRESSURE: 74 MMHG | SYSTOLIC BLOOD PRESSURE: 131 MMHG | OXYGEN SATURATION: 97 %

## 2024-02-24 DIAGNOSIS — Z95.1 PRESENCE OF AORTOCORONARY BYPASS GRAFT: Chronic | ICD-10-CM

## 2024-02-24 DIAGNOSIS — Z95.2 PRESENCE OF PROSTHETIC HEART VALVE: Chronic | ICD-10-CM

## 2024-02-24 PROCEDURE — L9991: CPT

## 2024-02-24 NOTE — ED ADULT NURSE NOTE - CHIEF COMPLAINT QUOTE
[address] ED  EMERGENCY DEPARTMENT ENCOUNTER      Pt Name: Jose Luis Hill  MRN: [id number]  [place] 1972  Date of evaluation: 9/9/2022  Provider: Jairo Quezada MD    CHIEF COMPLAINT       Chief Complaint   Patient presents with    Dizziness     Pt experiencing some dizziness. Has had episodes before but it goes away. Dizzy when he moves his head. Feels pressure in his face and feels nauseated. HISTORY OF PRESENT ILLNESS   (Location/Symptom, Timing/Onset, Context/Setting, Quality, Duration, Modifying Factors, Severity)  Note limiting factors. Jose Luis Hill is a 45 y.o. male with past medical history of obesity, hyperlipidemia and hypertension here today with dizziness. The patient states that this morning he had a relatively acute onset of dizziness and vertiginous symptoms. States he feels like the room is spinning. This is associated with significant nausea and emesis. Denies headache but does have a \"fullness\" behind his ears particular in the left. No tinnitus. No runny nose or nasal congestion. No fevers or chills. No chest pain or palpitations. He has no numbness, tingling or weakness in his extremities. Denies recent head injury or trauma. No changes in speech or voice. HPI    Nursing Notes were reviewed. REVIEW OF SYSTEMS    (2-9 systems for level 4, 10 or more for level 5)     Review of Systems    Please see HPI for pertinent positive and negative review of system findings. A full 10 system ROS was performed and otherwise negative.         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 Garcia:[facility]         CURRENT MEDICATIONS       Previous Medications LISINOPRIL-HYDROCHLOROTHIAZIDE (PRINZIDE;ZESTORETIC) 10-12.5 MG PER TABLET    Take 1 tablet by mouth daily       ALLERGIES     Patient has no known allergies. FAMILY HISTORY       Family History   Problem Relation Age of Onset    COPD Mother         diverticulititis     Other Father 77        brain tumor:type not known    Other Brother         diverticulitis     Rheum Arthritis Neg Hx     Osteoarthritis Neg Hx     Asthma Neg Hx     Cancer Neg Hx     Diabetes Neg Hx     Heart Failure Neg Hx     High Cholesterol Neg Hx     Hypertension Neg Hx     Migraines Neg Hx     Rashes/Skin Problems Neg Hx     Seizures Neg Hx     Stroke Neg Hx     Thyroid Disease Neg Hx           SOCIAL HISTORY       Social History     Socioeconomic History    Marital status:      Spouse name: None    Number of children: None    Years of education: None    Highest education level: None   Occupational History    Occupation: Unemployed   Tobacco Use    Smoking status: Some Days     Packs/day: 0.25     Years: 20.00     Pack years: 5.00     Types: Cigarettes     Last attempt to quit: 2015     Years since quittin.1    Smokeless tobacco: Never    Tobacco comments:     trying to quit    Substance and Sexual Activity    Alcohol use: Yes     Alcohol/week: 0.0 standard drinks     Comment: very rarely    Drug use: No    Sexual activity: Not Currently       SCREENINGS    Serena Coma Scale  Eye Opening: Spontaneous  Best Verbal Response: Oriented  Best Motor Response: Obeys commands  Serena Coma Scale Score: 15          PHYSICAL EXAM    (up to 7 for level 4, 8 or more for level 5)     ED Triage Vitals [22 1256]   BP Temp Temp Source Heart Rate Resp SpO2 Height Weight   (!) 138/97 97.6 °F (36.4 °C) Oral 78 15 93 % 5' 11.5\" (1.816 m) 300 lb (136.1 kg)       Physical Exam    General appearance:  Cooperative. No acute distress. Skin:  Warm. Dry. Eye:  Extraocular movements intact.    Pupils are equally round and reactive to light and accommodation. CN II-XII intact. Rightward beating nystagmus with right lateral gaze  Ears, nose, mouth and throat:  Oral mucosa moist, clear tympanic membranes bilaterally. No significant tenderness over the mastoids. No overlying skin changes posterior to the ears  Neck:  Trachea midline. Heart:  Regular rate and rhythm  Perfusion:  intact  Respiratory:  Lungs clear to auscultation bilaterally. Respirations nonlabored. Abdominal:   Non distended. Nontender  Neurological:  Alert and oriented x 3. Moves all extremities spontaneously. Intact sensation throughout. Intact finger-to-nose bilaterally. No drift in the upper or lower extremities. Gait markedly unsteady. 2+ bilateral patellar reflexes  Musculoskeletal:   Normal ROM, no deformities          Psychiatric:  Normal mood    NIH Stroke Scale     Interval: Baseline  Performed upon patient arrival in the ED. Person Administering Scale: Domonique Noyola MD     1a  Level of consciousness: 0=alert; keenly responsive   1b. LOC questions:  0=Performs both tasks correctly   1c. LOC commands: 0=Performs both tasks correctly   2. Best Gaze: 0=normal   3. Visual: 0=No visual loss   4. Facial Palsy: 0=Normal symmetric movement   5a. Motor left arm: 0=No drift, limb holds 90 (or 45) degrees for full 10 seconds   5b. Motor right arm: 0=No drift, limb holds 90 (or 45) degrees for full 10 seconds   6a. motor left le=No drift, limb holds 90 (or 45) degrees for full 10 seconds   6b  Motor right le=No drift, limb holds 90 (or 45) degrees for full 10 seconds   7. Limb Ataxia: 0=Absent   8. Sensory: 0=Normal; no sensory loss   9. Best Language:  0=No aphasia, normal   10. Dysarthria: 0=Normal   11.  Extinction and Inattention: 0=No abnormality         Total:  0           DIAGNOSTIC RESULTS       Labs Reviewed   CBC WITH AUTO DIFFERENTIAL - Abnormal; Notable for the following components:       Result Value    WBC 11.3 (*)     Neutrophils Absolute 8.8 for stroke at this time    ICatie M.D., am the primary clinician of record. MDM      CONSULTS     IP CONSULT TO HOSPITALIST  IP CONSULT TO HOSPITALIST  IP CONSULT TO OTOLARYNGOLOGY    Critical Care:   None    REASSESSMENT          PROCEDURE     Unless otherwise noted below, none     Procedures      FINAL IMPRESSION      1. Peripheral vertigo, unspecified laterality    2. Mastoiditis, unspecified laterality            DISPOSITION/PLAN   DISPOSITION Decision To Admit 09/09/2022 03:45:46 PM        PATIENT REFERRED TO:  No follow-up provider specified. DISCHARGE MEDICATIONS:  New Prescriptions    No medications on file     Controlled Substances Monitoring:     No flowsheet data found.     (Please note that portions of this note were completed with a voice recognition program.  Efforts were made to edit the dictations but occasionally words are mis-transcribed.)    Catie Espinoza MD (electronically signed)  Attending Emergency Physician            Rubi Denton MD  09/09/22 [id number] c/o abd pain

## 2024-02-27 ENCOUNTER — EMERGENCY (EMERGENCY)
Facility: HOSPITAL | Age: 74
LOS: 1 days | Discharge: LEFT BEFORE DISCHARGE | End: 2024-02-27
Attending: EMERGENCY MEDICINE | Admitting: EMERGENCY MEDICINE
Payer: MEDICARE

## 2024-02-27 VITALS
DIASTOLIC BLOOD PRESSURE: 92 MMHG | HEIGHT: 65 IN | HEART RATE: 88 BPM | TEMPERATURE: 98 F | OXYGEN SATURATION: 98 % | SYSTOLIC BLOOD PRESSURE: 138 MMHG | RESPIRATION RATE: 19 BRPM

## 2024-02-27 DIAGNOSIS — R10.9 UNSPECIFIED ABDOMINAL PAIN: ICD-10-CM

## 2024-02-27 DIAGNOSIS — Z95.1 PRESENCE OF AORTOCORONARY BYPASS GRAFT: Chronic | ICD-10-CM

## 2024-02-27 DIAGNOSIS — Z53.21 PROCEDURE AND TREATMENT NOT CARRIED OUT DUE TO PATIENT LEAVING PRIOR TO BEING SEEN BY HEALTH CARE PROVIDER: ICD-10-CM

## 2024-02-27 DIAGNOSIS — Z95.2 PRESENCE OF PROSTHETIC HEART VALVE: Chronic | ICD-10-CM

## 2024-02-27 PROCEDURE — 99283 EMERGENCY DEPT VISIT LOW MDM: CPT

## 2024-02-27 NOTE — ED PROVIDER NOTE - CLINICAL SUMMARY MEDICAL DECISION MAKING FREE TEXT BOX
Patient well-known to the department presents with abdominal pain he is a poor historian upon returning to examine patient he eloped from the emergency.  Per past charts patient has history of multiple elopements.  Has a normal gait ambulated independently out of the emergency room

## 2024-02-27 NOTE — ED PROVIDER NOTE - OBJECTIVE STATEMENT
Patient is a 73-year-old male well-known to the department presents for evaluation of abdominal pain.  Patient is a poor historian upon returning to exam chair patient walked out of the emergency department prior to full evaluation

## 2024-02-27 NOTE — ED ADULT TRIAGE NOTE - NS ED NURSE ELOPE DATE TIME
Birth to three referral faxed to [facility], [phone]. Confirmation receipt received. Original placed in file cabinet.   27-Feb-2024 04:45

## 2024-02-28 NOTE — ED ADULT NURSE NOTE - IS THE PATIENT ABLE TO BE SCREENED?
Gisell JACKI Booth is a 81 year old female presenting with back pain   Medication reconciliation reviewed  Allergies reviewed  Latex Allergy none     Current Pain Score: 2/10  Pain level at best: 2/10  Pain level at worst: 7/10  Pain level at rest: 2/10  Pain level with activity: 7/10     Yes

## 2024-02-29 DIAGNOSIS — R10.9 UNSPECIFIED ABDOMINAL PAIN: ICD-10-CM

## 2024-02-29 DIAGNOSIS — Z88.6 ALLERGY STATUS TO ANALGESIC AGENT: ICD-10-CM

## 2024-02-29 DIAGNOSIS — Z53.29 PROCEDURE AND TREATMENT NOT CARRIED OUT BECAUSE OF PATIENT'S DECISION FOR OTHER REASONS: ICD-10-CM

## 2024-02-29 NOTE — ED ADULT TRIAGE NOTE - SPO2 (%)
80 yo M requesting refill of his lantus pen needles. no medical complaints. Not requesting abx. only needles. patient is able to show the lantus pens. refill sent. follow up with pmd. 97

## 2024-02-29 NOTE — ED ADULT TRIAGE NOTE - INTERNATIONAL TRAVEL
Chief Complaint: Medicare Wellness Exam/Comprehensive Problem Focused Follow Up and Physical Exam    HPI:  Hypertension for many years has had adequate control with her current regimen  Hypercholesteremia remains on rosuvastatin with good success  Paroxysmal atrial fibrillation and atrial tachycardia was chemically converted however converted back to A-fib has had 2 separate ablations remains in A-fib but believes she has had some intermittent atrial fibrillation over time she does follow routinely with EP  Dr Best and is on anticoagulation with Xarelto and also metoprolol for rate control  Severe DJD had right knee replacement left shoulder replacement  Diabetes has been able to lose weight has been on metformin has continued to struggle and recently has seen endocrinology Dr. 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  She ended her career and consulting in healthcare  retired 2019     to her  for over 50 years    No children    Diet is okay    Does not exercise routinely but does have an active lifestyle    Previous smoker but more than 30 years ago    Rare alcohol         Allergies and Medications  Erythromycin and Shellfish derived  Current Outpatient Medications on File Prior to Visit   Medication Sig Dispense Refill    azelastine (Astelin) 137 mcg (0.1 %) nasal spray Administer 2 sprays into each nostril 2 times a day.      cholecalciferol (Vitamin D-3) 50 mcg (2,000 unit) capsule Take 1 capsule (50 mcg) by mouth early in the morning..      fluticasone (Flonase) 50 mcg/actuation nasal spray Administer 2 sprays into each nostril once daily.      hydroCHLOROthiazide (HYDRODiuril) 12.5 mg tablet Take I tablet daily 90 tablet 3    lisinopril 20 mg tablet TAKE 1 TABLET BY MOUTH EVERY DAY 90 tablet 3    metFORMIN  mg 24 hr tablet Take 4 daily 360 tablet 3    metoprolol succinate XL (Toprol-XL) 25 mg 24 hr tablet Take 1 tablet (25 mg) by mouth 2 times a day.      multivit-min/iron/FA/vit K/lut (CENTRUM SILVER WOMEN ORAL) Take 1 tablet by mouth once daily.      rosuvastatin (Crestor) 10 mg tablet rosuvastatin 10 mg tablet   TAKE 1 TABLET BY MOUTH EVERY DAY      semaglutide (Ozempic) 0.25 mg or 0.5 mg (2 mg/3 mL) pen injector Inject 0.25 mg under the skin 1 (one) time per week. 2 mL 3    Xarelto 20 mg tablet Xarelto 20 mg tablet   TAKE 1 TABLET BY MOUTH DAILY WITH THE EVENING MEAL      [DISCONTINUED] tirzepatide (Mounjaro) 2.5 mg/0.5 mL pen injector Inject 2.5 mg under the skin 1 (one) time per week. 0.5 mL 2     No current facility-administered medications on file prior to visit.       Medications and Supplements  prescribed by me and other practitioners or clinical pharmacist (such as prescriptions, OTC's, herbal therapies and supplements) were  "reviewed and documented in the medical record.    Tobacco/Alcohol/Opioid use, as well as Illicit Drug Use was screened for/reviewed and documented in Social History section and medication list as appropriate  Activities of Daily Living  In your present state of health, do you have any difficulty performing the following activities?:   Preparing food and eating?: No  Bathing yourself: No  Getting dressed: No  Using the toilet:No  Moving around from place to place: No  In the past year have you fallen or had a near fall?:No    Depression Screen  (Note: if answer to either of the following is \"Yes\", then a more complete depression screening is indicated)   Q1: Over the past two weeks, have you felt down, depressed or hopeless? No  Q2: Over the past two weeks, have you felt little interest or pleasure in doing things? No    Current exercise habits: The patient does not participate in regular exercise at present.   Dietary issues discussed: Yes  Hearing difficulties: Yes  Safe in current home environment:   yes  Visual Acuity assessed: no  Cognitive Impairment assessed: no       Advance directives  Advanced Care Planning (including a Living Will, Healthcare POA, as well as specific end of life choices and/or directives), was discussed  is POA for healthcare    Cardiac Risk Assessment  Cardiovascular risk was discussed and, if needed, lifestyle modifications recommended, including nutritional choices, exercise, and elimination of habits contributing to risk. We agreed on a plan to reduce the current cardiovascular risk based on above discussion as needed.  Aspirin use/disuse was discussed after reviewing the updated guidelines below:    Consider low dose Aspirin ( mg) use if the benefit for cardiovascular disease prevention outweighs risk for bleeding complications.   In general, low dose ASA should be considered:  In patients WITHOUT prior MI/stroke/PAD (primary prevention):   a. Age <60: Use if 10-year " "cardiovascular disease risk >20%, with discussion of risks and benefits with patient  b. Age 60-<70: Use if 10-year cardiovascular disease risk >20% and low bleeding (e.g., gastrointenstinal) risk, with discussion of risks and benefits with patient  c. Age >=70: Do not use    In patients WITH prior MI/stroke/PAD (secondary prevention):   Generally use unless extremely high bleeding (e.g., gastrointenstinal) risk, with discussion of risks and benefits with patient    ROS otherwise negative aside from what was mentioned above in HPI.    Vitals  /64   Ht 1.676 m (5' 6\")   Wt 106 kg (233 lb)   BMI 37.61 kg/m²   Body mass index is 37.61 kg/m².  Physical Exam  Gen: Alert, NAD  HEENT:  PERRLA, EOMI, conjunctiva and sclera normal in appearance. External auditory canals/TMs normal; Oral cavity and posterior pharynx without lesions/exudate  Neck:  Supple with FROM; No masses/nodes palpable; Thyroid nontender and without nodules; No PILLO  Respiratory:  Lungs CTAB  Cardiovascular:  Heart RRR. No M/R/G. Peripheral pulses equal bilaterally  Abdomen:  Soft, nontender, BS present throughout; No R/G/R; No HSM or masses palpated  Extremities:  FROM all extremities; Muscle strength grossly normal with good tone  Neuro:  CN II-XII intact; Reflexes 2+/2+; Gross motor and sensory intact  Skin:  No suspicious lesions present  Breast: No masses, skin lesions or nipple discharges, no axillary lymphadenopathy    Assessment and Plan:  Problem List Items Addressed This Visit    None  1 paroxysmal atrial fibrillation doing well with current regimen continue follow-up with cardiology remains on Xarelto beta-blocker low-dose  2.  Hypertension good control continue current regimen  3.  Hypercholesteremia excellent control continue current regimen  4.  DJD stable  5.  Hearing loss stable  6.  Diabetes following routinely with endocrine Ozempic has just been prescribed  7.  Health maintenance  Mammogram requisition given bone density " requisition given  Up-to-date with immunizations consider RSV next year  Colonoscopy 2025        During the course of the visit the patient was educated and counseled about age appropriate screening and preventive services. Completed preventive screenings were documented in the chart and orders were placed for outstanding screenings/procedures as documented in the Assessment and Plan.      Patient Instructions (the written plan) was given to the patient at check out.      Tatum Peoples MD    No

## 2024-03-09 ENCOUNTER — EMERGENCY (EMERGENCY)
Facility: HOSPITAL | Age: 74
LOS: 1 days | Discharge: LEFT BEFORE DISCHARGE | End: 2024-03-09
Admitting: EMERGENCY MEDICINE
Payer: MEDICARE

## 2024-03-09 VITALS
TEMPERATURE: 98 F | DIASTOLIC BLOOD PRESSURE: 94 MMHG | RESPIRATION RATE: 16 BRPM | OXYGEN SATURATION: 98 % | SYSTOLIC BLOOD PRESSURE: 136 MMHG | HEART RATE: 86 BPM

## 2024-03-09 DIAGNOSIS — Z95.1 PRESENCE OF AORTOCORONARY BYPASS GRAFT: Chronic | ICD-10-CM

## 2024-03-09 DIAGNOSIS — Z95.2 PRESENCE OF PROSTHETIC HEART VALVE: Chronic | ICD-10-CM

## 2024-03-09 LAB
ANION GAP SERPL CALC-SCNC: 8 MMOL/L — LOW (ref 9–16)
BASOPHILS # BLD AUTO: 0.03 K/UL — SIGNIFICANT CHANGE UP (ref 0–0.2)
BASOPHILS NFR BLD AUTO: 0.4 % — SIGNIFICANT CHANGE UP (ref 0–2)
BUN SERPL-MCNC: 24 MG/DL — HIGH (ref 7–23)
CALCIUM SERPL-MCNC: 9.1 MG/DL — SIGNIFICANT CHANGE UP (ref 8.5–10.5)
CHLORIDE SERPL-SCNC: 102 MMOL/L — SIGNIFICANT CHANGE UP (ref 96–108)
CO2 SERPL-SCNC: 32 MMOL/L — HIGH (ref 22–31)
CREAT SERPL-MCNC: 1.19 MG/DL — SIGNIFICANT CHANGE UP (ref 0.5–1.3)
EGFR: 64 ML/MIN/1.73M2 — SIGNIFICANT CHANGE UP
EOSINOPHIL # BLD AUTO: 0.24 K/UL — SIGNIFICANT CHANGE UP (ref 0–0.5)
EOSINOPHIL NFR BLD AUTO: 3.1 % — SIGNIFICANT CHANGE UP (ref 0–6)
GLUCOSE SERPL-MCNC: 121 MG/DL — HIGH (ref 70–99)
HCT VFR BLD CALC: 47.7 % — SIGNIFICANT CHANGE UP (ref 39–50)
HGB BLD-MCNC: 15.2 G/DL — SIGNIFICANT CHANGE UP (ref 13–17)
IMM GRANULOCYTES NFR BLD AUTO: 0.3 % — SIGNIFICANT CHANGE UP (ref 0–0.9)
LYMPHOCYTES # BLD AUTO: 1.36 K/UL — SIGNIFICANT CHANGE UP (ref 1–3.3)
LYMPHOCYTES # BLD AUTO: 17.3 % — SIGNIFICANT CHANGE UP (ref 13–44)
MCHC RBC-ENTMCNC: 30.2 PG — SIGNIFICANT CHANGE UP (ref 27–34)
MCHC RBC-ENTMCNC: 31.9 GM/DL — LOW (ref 32–36)
MCV RBC AUTO: 94.8 FL — SIGNIFICANT CHANGE UP (ref 80–100)
MONOCYTES # BLD AUTO: 0.76 K/UL — SIGNIFICANT CHANGE UP (ref 0–0.9)
MONOCYTES NFR BLD AUTO: 9.7 % — SIGNIFICANT CHANGE UP (ref 2–14)
NEUTROPHILS # BLD AUTO: 5.45 K/UL — SIGNIFICANT CHANGE UP (ref 1.8–7.4)
NEUTROPHILS NFR BLD AUTO: 69.2 % — SIGNIFICANT CHANGE UP (ref 43–77)
NRBC # BLD: 0 /100 WBCS — SIGNIFICANT CHANGE UP (ref 0–0)
PLATELET # BLD AUTO: 188 K/UL — SIGNIFICANT CHANGE UP (ref 150–400)
POTASSIUM SERPL-MCNC: 3.8 MMOL/L — SIGNIFICANT CHANGE UP (ref 3.5–5.3)
POTASSIUM SERPL-SCNC: 3.8 MMOL/L — SIGNIFICANT CHANGE UP (ref 3.5–5.3)
RBC # BLD: 5.03 M/UL — SIGNIFICANT CHANGE UP (ref 4.2–5.8)
RBC # FLD: 13 % — SIGNIFICANT CHANGE UP (ref 10.3–14.5)
SODIUM SERPL-SCNC: 142 MMOL/L — SIGNIFICANT CHANGE UP (ref 132–145)
TROPONIN I, HIGH SENSITIVITY RESULT: 8.5 NG/L — SIGNIFICANT CHANGE UP
WBC # BLD: 7.86 K/UL — SIGNIFICANT CHANGE UP (ref 3.8–10.5)
WBC # FLD AUTO: 7.86 K/UL — SIGNIFICANT CHANGE UP (ref 3.8–10.5)

## 2024-03-09 PROCEDURE — 99285 EMERGENCY DEPT VISIT HI MDM: CPT

## 2024-03-09 PROCEDURE — 71046 X-RAY EXAM CHEST 2 VIEWS: CPT | Mod: 26

## 2024-03-09 RX ORDER — HYDROXYZINE HCL 10 MG
25 TABLET ORAL ONCE
Refills: 0 | Status: COMPLETED | OUTPATIENT
Start: 2024-03-09 | End: 2024-03-09

## 2024-03-09 RX ADMIN — Medication 25 MILLIGRAM(S): at 16:56

## 2024-03-09 NOTE — ED PROVIDER NOTE - WR INTERPRETATION 1
CXR negative - No infiltrates, No consolidation, No atelectasis seen, + sternotomy wires visualized and artificial valve

## 2024-03-09 NOTE — ED PROVIDER NOTE - CLINICAL SUMMARY MEDICAL DECISION MAKING FREE TEXT BOX
pt presents with chest pain. h/o CAD. frequent ER visits (MRN [id number]) with elopement. will obtain ekg, labs including cardiac enzymes and CXR. also reports itching, will give hydroxyzine. pt presents with chest pain. h/o CAD. frequent ER visits (MRN [id number]) with elopement. will obtain ekg, labs including cardiac enzymes and CXR. also reports itching, will give hydroxyzine.    labs nonacute, cxr nonacute. eloped before ekg.

## 2024-03-09 NOTE — ED ADULT NURSE NOTE - NSFALLOOBATTEMPT_ED_ALL_ED
I have personally performed a face to face diagnostic evaluation on this patient.  I have reviewed the ACP note and agree with the history, exam, and plan of care, except as noted.   My medical decision making and observations are found above.
No

## 2024-03-09 NOTE — ED ADULT NURSE NOTE - OBJECTIVE STATEMENT
pt c/o itchiness with associated purple/reddish marks on arms x2 month and L sided chest pain starting yesterday. denies SOB.

## 2024-03-09 NOTE — ED PROVIDER NOTE - OBJECTIVE STATEMENT
MRN: [id number]    74yo M with frequent ER visits presents with itching and chest pain. he often c/o chest pain and itching. has cardiac hx. not forthcoming with additional information.

## 2024-03-09 NOTE — ED ADULT NURSE NOTE - NSFALLUNIVINTERV_ED_ALL_ED
Bed/Stretcher in lowest position, wheels locked, appropriate side rails in place/Call bell, personal items and telephone in reach/Instruct patient to call for assistance before getting out of bed/chair/stretcher/Non-slip footwear applied when patient is off stretcher/[place] to call system/Physically safe environment - no spills, clutter or unnecessary equipment/Purposeful proactive rounding/Room/bathroom lighting operational, light cord in reach

## 2024-03-13 ENCOUNTER — EMERGENCY (EMERGENCY)
Facility: HOSPITAL | Age: 74
LOS: 1 days | Discharge: LEFT BEFORE DISCHARGE | End: 2024-03-13
Admitting: EMERGENCY MEDICINE
Payer: SELF-PAY

## 2024-03-13 VITALS
RESPIRATION RATE: 16 BRPM | HEIGHT: 65 IN | OXYGEN SATURATION: 95 % | TEMPERATURE: 99 F | SYSTOLIC BLOOD PRESSURE: 158 MMHG | HEART RATE: 96 BPM | DIASTOLIC BLOOD PRESSURE: 74 MMHG

## 2024-03-13 DIAGNOSIS — L29.9 PRURITUS, UNSPECIFIED: ICD-10-CM

## 2024-03-13 DIAGNOSIS — R07.9 CHEST PAIN, UNSPECIFIED: ICD-10-CM

## 2024-03-13 DIAGNOSIS — Z95.1 PRESENCE OF AORTOCORONARY BYPASS GRAFT: Chronic | ICD-10-CM

## 2024-03-13 DIAGNOSIS — Z53.29 PROCEDURE AND TREATMENT NOT CARRIED OUT BECAUSE OF PATIENT'S DECISION FOR OTHER REASONS: ICD-10-CM

## 2024-03-13 DIAGNOSIS — Z95.2 PRESENCE OF PROSTHETIC HEART VALVE: Chronic | ICD-10-CM

## 2024-03-13 DIAGNOSIS — R21 RASH AND OTHER NONSPECIFIC SKIN ERUPTION: ICD-10-CM

## 2024-03-13 DIAGNOSIS — Z53.21 PROCEDURE AND TREATMENT NOT CARRIED OUT DUE TO PATIENT LEAVING PRIOR TO BEING SEEN BY HEALTH CARE PROVIDER: ICD-10-CM

## 2024-03-13 PROBLEM — Z78.9 OTHER SPECIFIED HEALTH STATUS: Chronic | Status: ACTIVE | Noted: 2024-03-09

## 2024-03-13 PROCEDURE — L9991: CPT

## 2024-03-21 ENCOUNTER — EMERGENCY (EMERGENCY)
Facility: HOSPITAL | Age: 74
LOS: 1 days | Discharge: LEFT BEFORE DISCHARGE | End: 2024-03-21
Attending: EMERGENCY MEDICINE | Admitting: EMERGENCY MEDICINE
Payer: MEDICARE

## 2024-03-21 VITALS
WEIGHT: 134.48 LBS | DIASTOLIC BLOOD PRESSURE: 77 MMHG | RESPIRATION RATE: 18 BRPM | HEIGHT: 65 IN | OXYGEN SATURATION: 100 % | SYSTOLIC BLOOD PRESSURE: 125 MMHG | HEART RATE: 79 BPM

## 2024-03-21 DIAGNOSIS — Z88.6 ALLERGY STATUS TO ANALGESIC AGENT: ICD-10-CM

## 2024-03-21 DIAGNOSIS — F17.210 NICOTINE DEPENDENCE, CIGARETTES, UNCOMPLICATED: ICD-10-CM

## 2024-03-21 DIAGNOSIS — L29.9 PRURITUS, UNSPECIFIED: ICD-10-CM

## 2024-03-21 DIAGNOSIS — Z95.1 PRESENCE OF AORTOCORONARY BYPASS GRAFT: Chronic | ICD-10-CM

## 2024-03-21 DIAGNOSIS — Z53.29 PROCEDURE AND TREATMENT NOT CARRIED OUT BECAUSE OF PATIENT'S DECISION FOR OTHER REASONS: ICD-10-CM

## 2024-03-21 DIAGNOSIS — Z95.2 PRESENCE OF PROSTHETIC HEART VALVE: Chronic | ICD-10-CM

## 2024-03-21 PROCEDURE — 99283 EMERGENCY DEPT VISIT LOW MDM: CPT

## 2024-03-21 RX ORDER — DIPHENHYDRAMINE HCL 50 MG
25 CAPSULE ORAL ONCE
Refills: 0 | Status: COMPLETED | OUTPATIENT
Start: 2024-03-21 | End: 2024-03-21

## 2024-03-21 NOTE — ED ADULT TRIAGE NOTE - CHIEF COMPLAINT QUOTE
pt c/o itching to the entire body. pt c/o itching to the entire body. pt with no other medical complaints.

## 2024-03-21 NOTE — ED ADULT NURSE NOTE - SUICIDE SCREENING DEPRESSION
No symptoms of acute exacerbation. Continue daily weights, sodium and fluid restriction. Emergency action plan reviewed today. Continue management and follow-up with cardiology as scheduled. Negative

## 2024-03-21 NOTE — ED PROVIDER NOTE - NSICDXPASTMEDICALHX_GEN_ALL_CORE_FT
PAST MEDICAL HISTORY:  Afib     Afib     Afib     Anxiety     Asthma     Asthma     CAD (coronary artery disease)     Cerebrovascular accident (CVA)     Chronic obstructive pulmonary disease, unspecified COPD type     Emphysema/COPD     History of COPD     HLD (hyperlipidemia)     HLD (hyperlipidemia)     HTN (hypertension)     HTN (hypertension)     Hypertension, unspecified type     Myocardial infarction     No pertinent past medical history

## 2024-03-21 NOTE — ED PROVIDER NOTE - CLINICAL SUMMARY MEDICAL DECISION MAKING FREE TEXT BOX
No obvious rash.  Exam benign and NAD.  Will give benadryl and re-evaluate.     Pt apparently eloped right after MD evaluation.  RN went to give Benadryl but pt appears to have walked out.

## 2024-03-21 NOTE — ED PROVIDER NOTE - PHYSICAL EXAMINATION
VITAL SIGNS: I have reviewed nursing notes and confirm.  CONST: Well-developed; well-nourished; No apparent distress.  ENT: No nasal discharge; airway clear.  EYES: Sclera clear. Pupils round and symmetrical bilaterally.  RESP: Breathing comfortably; speaking in full sentences.   MSK: No acute deformities noted to extremities.  NEURO: Alert, oriented. Speech is fluent and appropriate. Moving all extremities appropriately. No gross motor or sensory abnormalities.  SKIN: Skin is normal temperature; no diaphoresis; no pallor; no rash or papules noted.   PSYCH: Cooperative. Appropriate mood, language, and behavior.

## 2024-03-21 NOTE — ED ADULT TRIAGE NOTE - NSWEIGHTCALCTOOLDRUG_GEN_A_CORE
used How Many Mls Were Removed From The 40 Mg/Ml (1ml) Vial When Preparing The Injectable Solution?: 0 Consent: The risks of atrophy were reviewed with the patient. Include Z78.9 (Other Specified Conditions Influencing Health Status) As An Associated Diagnosis?: No Expiration Date For Kenalog (Optional): DEC 2025 Require Ndc Code?: Yes Show Inventory Tab: Hide Concentration Of Kenalog Solution Injected (Mg/Ml): 5.0 Ndc# For Kenalog Only: 9289-4049-81 Which Kenalog Vial Was Used?: Kenalog 10 mg/ml (5 ml vial) Kenalog Preparation: Kenalog Total Volume (Ccs): .03 Medical Necessity Clause: This procedure was medically necessary because the lesions that were treated were: Detail Level: Detailed Administered By (Optional): Mauro Lot # For Kenalog (Optional): [id number] Kenalog Type Of Vial: Multiple Dose

## 2024-03-21 NOTE — ED PROVIDER NOTE - OBJECTIVE STATEMENT
Pt is a 74yo M who is well known to ED staff for multiple visits.  Today he reports total body itching.  Denies any SOB or CP.  Denies any skin lesions or infestations.  Denies any jaundice or abd pain.  No other complaint.  Requesting Benadryl.

## 2024-03-21 NOTE — ED ADULT NURSE NOTE - OBJECTIVE STATEMENT
Patient is a 74 y/o M c/o itching. patient reports itching for the past 2 days. Patient denies taking meds prior to arrival.

## 2024-03-24 ENCOUNTER — EMERGENCY (EMERGENCY)
Facility: HOSPITAL | Age: 74
LOS: 1 days | Discharge: LEFT BEFORE DISCHARGE | End: 2024-03-24
Admitting: EMERGENCY MEDICINE
Payer: SELF-PAY

## 2024-03-24 VITALS
RESPIRATION RATE: 18 BRPM | SYSTOLIC BLOOD PRESSURE: 129 MMHG | HEIGHT: 65 IN | DIASTOLIC BLOOD PRESSURE: 81 MMHG | OXYGEN SATURATION: 99 % | TEMPERATURE: 97 F | HEART RATE: 94 BPM | WEIGHT: 149.91 LBS

## 2024-03-24 DIAGNOSIS — Z95.2 PRESENCE OF PROSTHETIC HEART VALVE: Chronic | ICD-10-CM

## 2024-03-24 DIAGNOSIS — Z95.1 PRESENCE OF AORTOCORONARY BYPASS GRAFT: Chronic | ICD-10-CM

## 2024-03-24 PROCEDURE — L9991: CPT

## 2024-03-27 ENCOUNTER — EMERGENCY (EMERGENCY)
Facility: HOSPITAL | Age: 74
LOS: 1 days | Discharge: HOME | End: 2024-03-27
Attending: EMERGENCY MEDICINE | Admitting: EMERGENCY MEDICINE
Payer: MEDICARE

## 2024-03-27 VITALS
RESPIRATION RATE: 18 BRPM | OXYGEN SATURATION: 100 % | SYSTOLIC BLOOD PRESSURE: 129 MMHG | HEART RATE: 93 BPM | DIASTOLIC BLOOD PRESSURE: 77 MMHG | HEIGHT: 65 IN

## 2024-03-27 VITALS — TEMPERATURE: 98 F

## 2024-03-27 DIAGNOSIS — R06.02 SHORTNESS OF BREATH: ICD-10-CM

## 2024-03-27 DIAGNOSIS — Z95.1 PRESENCE OF AORTOCORONARY BYPASS GRAFT: Chronic | ICD-10-CM

## 2024-03-27 DIAGNOSIS — Z53.21 PROCEDURE AND TREATMENT NOT CARRIED OUT DUE TO PATIENT LEAVING PRIOR TO BEING SEEN BY HEALTH CARE PROVIDER: ICD-10-CM

## 2024-03-27 DIAGNOSIS — Z95.2 PRESENCE OF PROSTHETIC HEART VALVE: Chronic | ICD-10-CM

## 2024-03-27 PROCEDURE — 99283 EMERGENCY DEPT VISIT LOW MDM: CPT

## 2024-03-27 RX ORDER — HYDROCORTISONE 1 %
1 OINTMENT (GRAM) TOPICAL ONCE
Refills: 0 | Status: COMPLETED | OUTPATIENT
Start: 2024-03-27 | End: 2024-03-27

## 2024-03-27 RX ADMIN — Medication 1 APPLICATION(S): at 17:17

## 2024-03-27 NOTE — ED PROVIDER NOTE - CLINICAL SUMMARY MEDICAL DECISION MAKING FREE TEXT BOX
74 yo M presented to the ED for full body pruritis and bleeding from tip of nose. Band-aid was placed on nose and pt given hydrocortisone cream and discharged home.

## 2024-03-27 NOTE — ED PROVIDER NOTE - PATIENT PORTAL LINK FT
You can access the FollowMyHealth Patient Portal offered by [facility] by registering at the following website: [email or web address]. By joining [employer]’s FollowMyHealth portal, you will also be able to view your health information using other applications (apps) compatible with our system.

## 2024-03-27 NOTE — ED PROVIDER NOTE - PHYSICAL EXAMINATION
Const: No apparent distress  Eyes: PERRL, no conjunctival injection  HENT:  Neck supple without meningismus, small bleeding area to tip of nose  CV: RRR, Warm, well-perfused extremities  RESP: CTA B/L, no tachypnea   GI: soft, non-tender, non-distended  MSK: No gross deformities appreciated  Skin: Warm, dry. multiple sores throughout his body  Neuro: Alert,

## 2024-03-27 NOTE — ED PROVIDER NOTE - PRINCIPAL DIAGNOSIS
[facility]    History and Physical - Hospitalist Service       Date of Admission:  3/28/2022  PRIMARY CARE PROVIDER:    Amanda Dawkins    Assessment & Plan   Anil Jarrett George Montoya is a 33 year old male with PMH significant for HTN, DM2, VIVEK, ADHD, Hypothyroidism, Obesity, History of cryptococcal pneumonia (2012) transferred from [facility] (Hospitalized 3/22-3/28) and admitted to [facility] on 3/28/2022 for ongoing treatment of endocarditis.     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  No significant tricuspid regurgitation reported.  Left ventricular EF 55 to 60%.  Negative bubble study.  - CV surgery consult  - ID consult  - Continue IV ancef q 8 hours  - AM blood cultures ordered  - Trend fever and WBC     Uncontrolled DM2  A1c of 12.6% (3/27/22).  Patient stopped taking PTA Victoza and metformin for unclear reasons.   - Started on Lantus 25 units BID and will continue.    - Prandial Novolog 2 units per 15 g carb.    - Medium intensity sliding scale.   - Glucose checks.    - Hypoglycemic protocol.    - Hold PTA metformin and Vicotza.      Transaminitis  Suspect secondary to sepsis.  - Monitor.      TCP  Platelet count has normalized.    - Monitor.      Hypertension  - Resumed on PTA losartan 50 mg/d.  Hold parameters in place.      VIVEK  ADHD  - Resumed on PTA Bupropion 300 mg/d.      Hypothyroidism  - Resumed on PTA synthroid.      Obesity with associated WARNER  Increase in all-cause morbidity and mortality.   -  "Continue CPAP with home settings.      Clinically Significant Risk Factors Present on Admission                  # Diabetes, type II: last A1C 12.6 % (Ref range: 0.0 - 5.6 %)  # Obesity: Estimated body mass index is 38.31 kg/m  as calculated from the following:    Height as of 3/22/22: 1.778 m (5' 10\").    Weight as of an earlier encounter on 3/28/22: 121.1 kg (267 lb).         Diet: Combination Diet Regular Diet Adult; Moderate Consistent Carb (60 g CHO per Meal) Dietregular  DVT Prophylaxis: Pneumatic Compression Devices and Ambulate every shift  Schwartz Catheter: Not present  Code Status: Full Code       Disposition Plan   Expected Discharge: 5+ days pending CV surgery plan and blood culture results  Anticipated discharge location:  Awaiting care coordination reese Borrego MD  [facility]  Securely message with the Vocera Web Console (learn more here)  Text page via [email or web address] Paging/Directory    ______________________________________________________________________    Chief Complaint   Direct admit due to infective endocarditis.      History is obtained from the patient and EMR.      History of Present Illness   Anil Montoya is a 33 year old male with a past medical history significant for HTN, DM2, VIVEK, ADHD, Hypothyroidism, Obesity, History of cryptococcal pneumonia (2012) who was directly admitted to [facility] 3/28/2022 due to endocarditis.  Hospitalized at [facility] from 3/22-3/28/22.     Patient was admitted to [facility] on 3/22/22 after presenting for generalized weakness. Upon presentation he was found to have a leukocytosis, thrombocytopenia, tachycardic.  He had an levated CRP of 393, lactic acid of 2.3 (improved to 1.7 with IV fluids).  Abd/pelvis CT revealed patchy consolidation of left lung base (suggestive for pneumonia) along with bilateral nonobstructing intrarenal stones and started on Vancomycin and Zosyn for management of sepsis.  "     Since admission, blood cultures came back positive for MSSA.  They were persistently positive for 3-4 days and TEJINDER was completed.  This revealed large TV vegetation consistent with infective endocarditis.  ID was consulted and recommended continuation of IV ancef.  Cardiology was consulted and recommended transfer to [facility] for CV surgery eval.  Infection source is suspected to be related to right groin boil (now resolved).  TTE was unremarkable.  Lumbar spine MRI completed due to acute back pain which was negative. Abd US without bilary dilatation but with fatty liver.      Review of Systems    The 10 point Review of Systems is negative other than noted in the HPI.      Past Medical History    I have reviewed this patient's medical history and updated it with pertinent information if needed.   Past Medical History:   Diagnosis Date     Pneumonia    HTN, DM2, VIVEK, ADHD, Hypothyroidism, Obesity, History of cryptococcal pneumonia ()    Past Surgical History   Thoracotomy    Social History   He has never smoked.  He consumes alcohol rarely.  He does not use illicit drugs.       Family History   Father: Alive  Mother:  at age 54; Diabetic    Prior to Admission Medications   Prior to Admission Medications   Prescriptions Last Dose Informant Patient Reported? Taking?   acetaminophen (TYLENOL) 500 MG tablet 3/21/2022 at pm Pharmacy Yes Yes   Sig: Take 1,000 mg by mouth every 6 hours as needed for mild pain   buPROPion (WELLBUTRIN XL) 300 MG 24 hr tablet 3/21/2022 at am Pharmacy Yes No   Sig: Take 300 mg by mouth every morning   ciprofloxacin (CIPRO) 750 MG tablet 3/22/2022 at 1000 Pharmacy Yes No   Sig: Take 750 mg by mouth 2 times daily For 10 days (3/21-3/31).   dextromethorphan (TUSSIN COUGH) 15 MG/5ML syrup 3/18/2022 at am Pharmacy Yes No   Sig: Take 10 mLs by mouth 4 times daily as needed for cough   ibuprofen (ADVIL/MOTRIN) 200 MG tablet 3/21/2022 at pm Pharmacy Yes No   Sig: Take 600 mg by mouth  "every 6 hours as needed for mild pain   levothyroxine (SYNTHROID/LEVOTHROID) 125 MCG tablet 3/21/2022 at am Pharmacy Yes No   Sig: Take 125 mcg by mouth six times a week Monday thru Saturday   levothyroxine (SYNTHROID/LEVOTHROID) 125 MCG tablet Unknown at Unknown time Pharmacy Yes Yes   Sig: Take 250 mcg by mouth once a week On Sunday   liraglutide (VICTOZA) 18 MG/3ML solution 3/21/2022 at am Pharmacy Yes No   Sig: Inject 1.8 mg Subcutaneous daily   losartan (COZAAR) 50 MG tablet 3/21/2022 at am Pharmacy Yes No   Sig: Take 50 mg by mouth daily   metFORMIN (GLUCOPHAGE) 1000 MG tablet 3/21/2022 at pm Pharmacy Yes No   Sig: Take 1,000 mg by mouth 2 times daily (with meals)      Facility-Administered Medications: None     Allergies   Allergies   Allergen Reactions     Clindamycin      Vancomycin      \"Red Sonia Syndrome\"     Physical Exam   Vital Signs: Temp: 98.1  F (36.7  C) Temp src: Oral BP: 125/82 Pulse: 96   Resp: 18 SpO2: 97 % O2 Device: None (Room air)    Weight: 0 lbs 0 oz    Constitutional: Awake, alert, cooperative, no apparent distress  ENT: Normocephalic, without obvious abnormality, atraumatic, oral pharynx with moist mucus membranes, tonsils without erythema or exudates.  Eyes pupils are equal, round and reactive to light; extra occular movements intact.  Normal sclera.    Neck: Supple, symmetrical, trachea midline, no adenopathy.  Pulmonary: No increased work of breathing, good air exchange, clear to auscultation bilaterally, no crackles or wheezing.  Cardiovascular: Regular rate and rhythm with intermittent sinus tach, +systolic murmur  GI: Normal bowel sounds, soft, non-distended, non-tender.  Obese.  Skin/Integument: Visualized skin appeared clear without acute rashes  Neuro: No focal deficits, speech normal.   Psych:  Alert and oriented x 3. Normal affect.  Extremities: No lower extremity edema noted, and calves are non-tender to palpation bilaterally.    " Itching

## 2024-03-27 NOTE — ED PROVIDER NOTE - OBJECTIVE STATEMENT
72 yo M presented to the ED for chronic body itching and bleeding from the tip of his nose. Pt has been seen in the ED for this multiple times in the past. Pt states he scratched his nose and it started bleeding. Pt is requesting a cream.

## 2024-03-28 ENCOUNTER — EMERGENCY (EMERGENCY)
Facility: HOSPITAL | Age: 74
LOS: 1 days | Discharge: HOME | End: 2024-03-28
Admitting: EMERGENCY MEDICINE
Payer: MEDICARE

## 2024-03-28 VITALS
DIASTOLIC BLOOD PRESSURE: 92 MMHG | HEART RATE: 104 BPM | OXYGEN SATURATION: 99 % | SYSTOLIC BLOOD PRESSURE: 143 MMHG | HEIGHT: 65 IN | TEMPERATURE: 99 F | RESPIRATION RATE: 16 BRPM

## 2024-03-28 DIAGNOSIS — Z95.1 PRESENCE OF AORTOCORONARY BYPASS GRAFT: Chronic | ICD-10-CM

## 2024-03-28 DIAGNOSIS — Z95.2 PRESENCE OF PROSTHETIC HEART VALVE: Chronic | ICD-10-CM

## 2024-03-28 PROCEDURE — 99283 EMERGENCY DEPT VISIT LOW MDM: CPT

## 2024-03-28 NOTE — ED PROVIDER NOTE - PATIENT PORTAL LINK FT
You can access the FollowMyHealth Patient Portal offered by [facility] by registering at the following website: [email or web address]. By joining [employer]’s FollowMyHealth portal, you will also be able to view your health information using other applications (apps) compatible with our system.

## 2024-03-28 NOTE — ED PROVIDER NOTE - OBJECTIVE STATEMENT
73-year-old male presents this emerged part for generalized itching.  However patient denies any itching currently.  Denies any rashes.  Denies past previous similar episodes.  Has not  seen PCP or dermatologist for this. Denies fever, sloughing of skin, new medications, burrows in webs of fingers or toes, palmar or sole involvement, mucosal involvement.  Denies any new soap, laundry detergent, new clothes. Has not tried any new foods that they are aware of.  No one else in the same household is exhibiting similar symptoms. Denies respiratory distress.

## 2024-03-28 NOTE — ED PROVIDER NOTE - CLINICAL SUMMARY MEDICAL DECISION MAKING FREE TEXT BOX
No new medications started, no mucosal involvement, negative Nikolsky sign - SJS unlikely  No burrows in the webs of toes/fingers, no other individual in house is affected - scabies unlikely  No fevers, hypotension, specific progression noted - Roseola, Rubella, Rubeola unlikely  VSS.   All results reviewed with pt. Pt understands and agrees with plan. Agreed to follow up with pcp in 2-3 days.

## 2024-03-29 ENCOUNTER — EMERGENCY (EMERGENCY)
Facility: HOSPITAL | Age: 74
LOS: 1 days | Discharge: LEFT BEFORE DISCHARGE | End: 2024-03-29
Attending: EMERGENCY MEDICINE | Admitting: EMERGENCY MEDICINE
Payer: MEDICARE

## 2024-03-29 VITALS
SYSTOLIC BLOOD PRESSURE: 113 MMHG | RESPIRATION RATE: 19 BRPM | DIASTOLIC BLOOD PRESSURE: 73 MMHG | HEIGHT: 65 IN | HEART RATE: 102 BPM | TEMPERATURE: 98 F | OXYGEN SATURATION: 99 %

## 2024-03-29 DIAGNOSIS — R04.0 EPISTAXIS: ICD-10-CM

## 2024-03-29 DIAGNOSIS — Z95.2 PRESENCE OF PROSTHETIC HEART VALVE: Chronic | ICD-10-CM

## 2024-03-29 DIAGNOSIS — Z88.6 ALLERGY STATUS TO ANALGESIC AGENT: ICD-10-CM

## 2024-03-29 DIAGNOSIS — Z53.29 PROCEDURE AND TREATMENT NOT CARRIED OUT BECAUSE OF PATIENT'S DECISION FOR OTHER REASONS: ICD-10-CM

## 2024-03-29 DIAGNOSIS — F41.9 ANXIETY DISORDER, UNSPECIFIED: ICD-10-CM

## 2024-03-29 DIAGNOSIS — I10 ESSENTIAL (PRIMARY) HYPERTENSION: ICD-10-CM

## 2024-03-29 DIAGNOSIS — R00.0 TACHYCARDIA, UNSPECIFIED: ICD-10-CM

## 2024-03-29 DIAGNOSIS — I48.91 UNSPECIFIED ATRIAL FIBRILLATION: ICD-10-CM

## 2024-03-29 DIAGNOSIS — Z86.73 PERSONAL HISTORY OF TRANSIENT ISCHEMIC ATTACK (TIA), AND CEREBRAL INFARCTION WITHOUT RESIDUAL DEFICITS: ICD-10-CM

## 2024-03-29 DIAGNOSIS — Z95.1 PRESENCE OF AORTOCORONARY BYPASS GRAFT: Chronic | ICD-10-CM

## 2024-03-29 DIAGNOSIS — J44.9 CHRONIC OBSTRUCTIVE PULMONARY DISEASE, UNSPECIFIED: ICD-10-CM

## 2024-03-29 DIAGNOSIS — L29.8 OTHER PRURITUS: ICD-10-CM

## 2024-03-29 DIAGNOSIS — L29.9 PRURITUS, UNSPECIFIED: ICD-10-CM

## 2024-03-29 DIAGNOSIS — I25.10 ATHEROSCLEROTIC HEART DISEASE OF NATIVE CORONARY ARTERY WITHOUT ANGINA PECTORIS: ICD-10-CM

## 2024-03-29 DIAGNOSIS — J45.909 UNSPECIFIED ASTHMA, UNCOMPLICATED: ICD-10-CM

## 2024-03-29 LAB
ALBUMIN SERPL ELPH-MCNC: 3.7 G/DL — SIGNIFICANT CHANGE UP (ref 3.4–5)
ALP SERPL-CCNC: 69 U/L — SIGNIFICANT CHANGE UP (ref 40–120)
ALT FLD-CCNC: 16 U/L — SIGNIFICANT CHANGE UP (ref 12–42)
ANION GAP SERPL CALC-SCNC: 6 MMOL/L — LOW (ref 9–16)
AST SERPL-CCNC: 25 U/L — SIGNIFICANT CHANGE UP (ref 15–37)
BASOPHILS # BLD AUTO: 0.04 K/UL — SIGNIFICANT CHANGE UP (ref 0–0.2)
BASOPHILS NFR BLD AUTO: 0.4 % — SIGNIFICANT CHANGE UP (ref 0–2)
BILIRUB SERPL-MCNC: 0.7 MG/DL — SIGNIFICANT CHANGE UP (ref 0.2–1.2)
BUN SERPL-MCNC: 17 MG/DL — SIGNIFICANT CHANGE UP (ref 7–23)
CALCIUM SERPL-MCNC: 9.1 MG/DL — SIGNIFICANT CHANGE UP (ref 8.5–10.5)
CHLORIDE SERPL-SCNC: 104 MMOL/L — SIGNIFICANT CHANGE UP (ref 96–108)
CO2 SERPL-SCNC: 31 MMOL/L — SIGNIFICANT CHANGE UP (ref 22–31)
CREAT SERPL-MCNC: 1.18 MG/DL — SIGNIFICANT CHANGE UP (ref 0.5–1.3)
EGFR: 65 ML/MIN/1.73M2 — SIGNIFICANT CHANGE UP
EOSINOPHIL # BLD AUTO: 0.09 K/UL — SIGNIFICANT CHANGE UP (ref 0–0.5)
EOSINOPHIL NFR BLD AUTO: 0.9 % — SIGNIFICANT CHANGE UP (ref 0–6)
GLUCOSE SERPL-MCNC: 64 MG/DL — LOW (ref 70–99)
HCT VFR BLD CALC: 47.6 % — SIGNIFICANT CHANGE UP (ref 39–50)
HGB BLD-MCNC: 15.4 G/DL — SIGNIFICANT CHANGE UP (ref 13–17)
IMM GRANULOCYTES NFR BLD AUTO: 0.3 % — SIGNIFICANT CHANGE UP (ref 0–0.9)
LYMPHOCYTES # BLD AUTO: 1.66 K/UL — SIGNIFICANT CHANGE UP (ref 1–3.3)
LYMPHOCYTES # BLD AUTO: 17.2 % — SIGNIFICANT CHANGE UP (ref 13–44)
MCHC RBC-ENTMCNC: 30.5 PG — SIGNIFICANT CHANGE UP (ref 27–34)
MCHC RBC-ENTMCNC: 32.4 GM/DL — SIGNIFICANT CHANGE UP (ref 32–36)
MCV RBC AUTO: 94.3 FL — SIGNIFICANT CHANGE UP (ref 80–100)
MONOCYTES # BLD AUTO: 0.69 K/UL — SIGNIFICANT CHANGE UP (ref 0–0.9)
MONOCYTES NFR BLD AUTO: 7.2 % — SIGNIFICANT CHANGE UP (ref 2–14)
NEUTROPHILS # BLD AUTO: 7.13 K/UL — SIGNIFICANT CHANGE UP (ref 1.8–7.4)
NEUTROPHILS NFR BLD AUTO: 74 % — SIGNIFICANT CHANGE UP (ref 43–77)
NRBC # BLD: 0 /100 WBCS — SIGNIFICANT CHANGE UP (ref 0–0)
PLATELET # BLD AUTO: 179 K/UL — SIGNIFICANT CHANGE UP (ref 150–400)
POTASSIUM SERPL-MCNC: 4.2 MMOL/L — SIGNIFICANT CHANGE UP (ref 3.5–5.3)
POTASSIUM SERPL-SCNC: 4.2 MMOL/L — SIGNIFICANT CHANGE UP (ref 3.5–5.3)
PROT SERPL-MCNC: 7.6 G/DL — SIGNIFICANT CHANGE UP (ref 6.4–8.2)
RBC # BLD: 5.05 M/UL — SIGNIFICANT CHANGE UP (ref 4.2–5.8)
RBC # FLD: 12.8 % — SIGNIFICANT CHANGE UP (ref 10.3–14.5)
SODIUM SERPL-SCNC: 141 MMOL/L — SIGNIFICANT CHANGE UP (ref 132–145)
WBC # BLD: 9.64 K/UL — SIGNIFICANT CHANGE UP (ref 3.8–10.5)
WBC # FLD AUTO: 9.64 K/UL — SIGNIFICANT CHANGE UP (ref 3.8–10.5)

## 2024-03-29 PROCEDURE — 99284 EMERGENCY DEPT VISIT MOD MDM: CPT

## 2024-03-29 RX ORDER — DIPHENHYDRAMINE HCL 50 MG
25 CAPSULE ORAL ONCE
Refills: 0 | Status: COMPLETED | OUTPATIENT
Start: 2024-03-29 | End: 2024-03-29

## 2024-03-29 RX ADMIN — Medication 25 MILLIGRAM(S): at 21:06

## 2024-03-29 NOTE — ED ADULT NURSE NOTE - NS ED NURSE LEVEL OF CONSCIOUSNESS AFFECT
H/O bilateral inguinal hernia repair  1991  H/O prostatectomy  2001  History of sarcoma  excision - 1993  S/P T&A (status post tonsillectomy and adenoidectomy)  5 y/o
Calm

## 2024-03-29 NOTE — ED PROVIDER NOTE - ATTENDING CONTRIBUTION TO CARE
73 year old male with PMH AFib, anxiety, asthma, cad, cva, copd, hld, htn, chronic pruritis, well known to ER presents with itchiness, patient reports unchanged from prior. No known new exposures. Arrived hemodynamically stable, nontoxic appearing, mildly tachycardic. Exam with no visible hives/rash, some scattered excoriations across anterior chest wall without surrounding tenderness, erythema, fluctuance of pus. Will treat with benadryl, obtain screening labs to eval for hematologic/electrolyte abnormalities, cxr, and patient eloped prior to further evaluation and treatment.

## 2024-03-29 NOTE — ED PROVIDER NOTE - CLINICAL SUMMARY MEDICAL DECISION MAKING FREE TEXT BOX
Deonna Mendoza DO PGY-3  73 year old male with PMH AFib, anxiety, asthma, cad, cva, copd, hld, htn, chronic pruritis, well known to ER presents with itchiness, patient reports unchanged from prior. No known new exposures. Arrived hemodynamically stable, nontoxic appearing, mildly tachycardic. Exam with no visible hives/rash, some scattered excoriations across anterior chest wall without surrounding tenderness, erythema, fluctuance of pus. Will treat with benadryl, obtain screening labs to eval for hematologic/electrolyte abnormalities, cxr, and re-assess.

## 2024-03-29 NOTE — ED ADULT TRIAGE NOTE - CHIEF COMPLAINT QUOTE
Pt presents to ed reporting "I'm itchy" ongoing for "a long time". no obvs rash appreciated in triage

## 2024-03-30 ENCOUNTER — EMERGENCY (EMERGENCY)
Facility: HOSPITAL | Age: 74
LOS: 1 days | Discharge: HOME | End: 2024-03-30
Attending: EMERGENCY MEDICINE | Admitting: EMERGENCY MEDICINE
Payer: MEDICARE

## 2024-03-30 VITALS
RESPIRATION RATE: 16 BRPM | SYSTOLIC BLOOD PRESSURE: 120 MMHG | DIASTOLIC BLOOD PRESSURE: 64 MMHG | HEART RATE: 80 BPM | OXYGEN SATURATION: 98 % | TEMPERATURE: 98 F | HEIGHT: 65 IN

## 2024-03-30 DIAGNOSIS — Z95.1 PRESENCE OF AORTOCORONARY BYPASS GRAFT: Chronic | ICD-10-CM

## 2024-03-30 DIAGNOSIS — Z95.2 PRESENCE OF PROSTHETIC HEART VALVE: Chronic | ICD-10-CM

## 2024-03-30 PROCEDURE — 99283 EMERGENCY DEPT VISIT LOW MDM: CPT

## 2024-03-30 NOTE — ED PROVIDER NOTE - OBJECTIVE STATEMENT
72 yo M with multiple visits to the ED for diffuse pruritis presents to the ED for itching and is requesting cream. No other concerns at this time.

## 2024-03-30 NOTE — ED ADULT NURSE NOTE - OBJECTIVE STATEMENT
Pt reports itching all over his body x months. Pt alert and oriented x3, ambulatory, respirations even and unlabored. No distress noted.

## 2024-03-30 NOTE — ED PROVIDER NOTE - PHYSICAL EXAMINATION
Const: Patient scratching   Eyes: PERRL, no conjunctival injection  HENT:  Neck supple without meningismus, eschar on tip of nose without active bleeding   MSK: No gross deformities appreciated  Skin: Warm, dry. diffuse eschars and excoriations   Neuro: Alert, CNs II-XII grossly intact. Sensation and motor function of extremities grossly intact.  Psych: Appropriate mood and affect.

## 2024-03-30 NOTE — ED PROVIDER NOTE - PATIENT PORTAL LINK FT
You can access the FollowMyHealth Patient Portal offered by [facility] by registering at the following website: [email or web address]. By joining "[employer]’s FollowMyHealth portal, you will also be able to view your health information using other applications (apps) compatible with our system.

## 2024-03-30 NOTE — ED ADULT TRIAGE NOTE - CCCP TRG CHIEF CMPLNT
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    Anesthesia Type:   No value filed.     Note:    Oropharynx: oropharynx clear of all foreign objects  Level of Consciousness: awake  Oxygen Supplementation: face mask    Independent Airway: airway patency satisfactory and stable  Dentition: dentition unchanged  Vital Signs Stable: post-procedure vital signs reviewed and stable  Report to RN Given: handoff report given  Patient transferred to: Phase II    Handoff Report: Identifed the Patient, Identified the Reponsible Provider, Reviewed the pertinent medical history, Discussed the surgical course, Reviewed Intra-OP anesthesia mangement and issues during anesthesia, Set expectations for post-procedure period and Allowed opportunity for questions and acknowledgement of understanding      Vitals:  Vitals Value Taken Time   BP 99/54 11/10/23 1239   Temp 36.3  C (97.4  F) 11/10/23 1239   Pulse 67 11/10/23 1239   Resp 16 11/10/23 1239   SpO2 97 % 11/10/23 1239       Electronically Signed By: MAURICIO Wilson CRNA  November 10, 2023  12:40 PM   itching

## 2024-04-01 DIAGNOSIS — L29.9 PRURITUS, UNSPECIFIED: ICD-10-CM

## 2024-04-01 DIAGNOSIS — Z88.6 ALLERGY STATUS TO ANALGESIC AGENT: ICD-10-CM

## 2024-04-02 DIAGNOSIS — L29.9 PRURITUS, UNSPECIFIED: ICD-10-CM

## 2024-04-02 DIAGNOSIS — Z88.6 ALLERGY STATUS TO ANALGESIC AGENT: ICD-10-CM

## 2024-04-04 ENCOUNTER — EMERGENCY (EMERGENCY)
Facility: HOSPITAL | Age: 74
LOS: 1 days | Discharge: LEFT BEFORE DISCHARGE | End: 2024-04-04
Attending: EMERGENCY MEDICINE | Admitting: EMERGENCY MEDICINE
Payer: MEDICARE

## 2024-04-04 VITALS
HEART RATE: 79 BPM | OXYGEN SATURATION: 98 % | DIASTOLIC BLOOD PRESSURE: 89 MMHG | TEMPERATURE: 98 F | SYSTOLIC BLOOD PRESSURE: 163 MMHG | HEIGHT: 65 IN | RESPIRATION RATE: 16 BRPM

## 2024-04-04 DIAGNOSIS — F17.200 NICOTINE DEPENDENCE, UNSPECIFIED, UNCOMPLICATED: ICD-10-CM

## 2024-04-04 DIAGNOSIS — Z95.1 PRESENCE OF AORTOCORONARY BYPASS GRAFT: ICD-10-CM

## 2024-04-04 DIAGNOSIS — Z95.2 PRESENCE OF PROSTHETIC HEART VALVE: Chronic | ICD-10-CM

## 2024-04-04 DIAGNOSIS — L29.9 PRURITUS, UNSPECIFIED: ICD-10-CM

## 2024-04-04 DIAGNOSIS — Z95.1 PRESENCE OF AORTOCORONARY BYPASS GRAFT: Chronic | ICD-10-CM

## 2024-04-04 DIAGNOSIS — R07.9 CHEST PAIN, UNSPECIFIED: ICD-10-CM

## 2024-04-04 DIAGNOSIS — Z53.29 PROCEDURE AND TREATMENT NOT CARRIED OUT BECAUSE OF PATIENT'S DECISION FOR OTHER REASONS: ICD-10-CM

## 2024-04-04 DIAGNOSIS — Z88.6 ALLERGY STATUS TO ANALGESIC AGENT: ICD-10-CM

## 2024-04-04 PROCEDURE — 99284 EMERGENCY DEPT VISIT MOD MDM: CPT

## 2024-04-04 RX ORDER — DIPHENHYDRAMINE HCL 50 MG
50 CAPSULE ORAL ONCE
Refills: 0 | Status: DISCONTINUED | OUTPATIENT
Start: 2024-04-04 | End: 2024-04-08

## 2024-04-04 NOTE — ED PROVIDER NOTE - NS_EDPROVIDERDISPOUSERTYPE_ED_A_ED
RT Inhaler-Nebulizer Bronchodilator Protocol Note    There is a bronchodilator order in the chart from a provider indicating to follow the RT Bronchodilator Protocol and there is an Initiate RT Inhaler-Nebulizer Bronchodilator Protocol order as well (see protocol at bottom of note). CXR Findings:  XR CHEST PORTABLE    Result Date: 11/15/2022  Cardiomegaly with vascular congestion and patchy mid and lower lung field infiltrates, worsened from the previous exam with suspected small effusions. Worsening patchy infiltrates may represent atelectasis or pneumonia. The findings from the last RT Protocol Assessment were as follows:   History Pulmonary Disease: Chronic pulmonary disease  Respiratory Pattern: Regular pattern and RR 12-20 bpm  Breath Sounds: Slightly diminished and/or crackles  Cough: Strong, spontaneous, non-productive  Indication for Bronchodilator Therapy: On home bronchodilators  Bronchodilator Assessment Score: 4    Aerosolized bronchodilator medication orders have been revised according to the RT Inhaler-Nebulizer Bronchodilator Protocol below. Respiratory Therapist to perform RT Therapy Protocol Assessment initially then follow the protocol. Repeat RT Therapy Protocol Assessment PRN for score 0-3 or on second treatment, BID, and PRN for scores above 3. No Indications - adjust the frequency to every 6 hours PRN wheezing or bronchospasm, if no treatments needed after 48 hours then discontinue using Per Protocol order mode. If indication present, adjust the RT bronchodilator orders based on the Bronchodilator Assessment Score as indicated below. Use Inhaler orders unless patient has one or more of the following: on home nebulizer, not able to hold breath for 10 seconds, is not alert and oriented, cannot activate and use MDI correctly, or respiratory rate 25 breaths per minute or more, then use the equivalent nebulizer order(s) with same Frequency and PRN reasons based on the score. If a patient is on this medication at home then do not decrease Frequency below that used at home. 0-3 - enter or revise RT bronchodilator order(s) to equivalent RT Bronchodilator order with Frequency of every 4 hours PRN for wheezing or increased work of breathing using Per Protocol order mode. 4-6 - enter or revise RT Bronchodilator order(s) to two equivalent RT bronchodilator orders with one order with BID Frequency and one order with Frequency of every 4 hours PRN wheezing or increased work of breathing using Per Protocol order mode. 7-10 - enter or revise RT Bronchodilator order(s) to two equivalent RT bronchodilator orders with one order with TID Frequency and one order with Frequency of every 4 hours PRN wheezing or increased work of breathing using Per Protocol order mode. 11-13 - enter or revise RT Bronchodilator order(s) to one equivalent RT bronchodilator order with QID Frequency and an Albuterol order with Frequency of every 4 hours PRN wheezing or increased work of breathing using Per Protocol order mode. Greater than 13 - enter or revise RT Bronchodilator order(s) to one equivalent RT bronchodilator order with every 4 hours Frequency and an Albuterol order with Frequency of every 2 hours PRN wheezing or increased work of breathing using Per Protocol order mode. RT to enter RT Home Evaluation for COPD & MDI Assessment order using Per Protocol order mode.     Electronically signed by Jadon Monteiro RCP on 11/16/2022 at 4:37 PM Attending Attestation (For Attendings USE Only)...

## 2024-04-04 NOTE — ED PROVIDER NOTE - OBJECTIVE STATEMENT
73-year-old male patient past medical history of CABG, everyday smoker who presents to the emergency department for full body itching and has been present for a "long time".  Patient denies any rashes or lesions.  Of note, patient has been experiencing worsening chest pain for the past couple of days localized to left side of chest.  Patient has presented to the emergency department the past with similar concerns.  Also, with dyspnea on exertion, but denies any palpitations, nausea, vomiting, abdominal pain, diarrhea, fevers, urine symptoms, diaphoresis, leg edema or calf pain. Patient endorses taking medication every day however does not follow-up with primary care doctor and is unable to recall his medications.

## 2024-04-04 NOTE — ED PROVIDER NOTE - CLINICAL SUMMARY MEDICAL DECISION MAKING FREE TEXT BOX
73-year-old male patient with known past medical history of CABG, everyday smoker who presents to the emergency department for diffuse itching, chest pain, dyspnea on exertion.  Endorses itching has been of chronic setting.  Also, patient with multiple ED visits for chest pain and shortness of breath.  Patient endorsing chest pain has worsened for the past couple days.  On exam, found disheveled and smelling of tobacco, auscultated with clear breath sounds, symmetric pulses.  Will evaluate blood work, EKG, chest x-ray.  Patient known to be unreliable with his medical care and history.  Given comorbidities will evaluate for ACS versus infectious etiology.

## 2024-04-04 NOTE — ED PROVIDER NOTE - PHYSICAL EXAMINATION
GENERAL: Awake, alert, NAD  HEENT: NC/AT, moist mucous membranes, EOMI  LUNGS: CTAB, no wheezes or crackles.  Midline chest wall surgical scar found well-healed.  CARDIAC: RRR, no m/r/g  ABDOMEN: Soft, non tender, non distended  BACK: No midline spinal tenderness  EXT: No edema, no calf tenderness, 2+ PT pulses bilaterally, no deformities.  NEURO: A&Ox3. Moving all extremities.  SKIN: Warm and dry. No rash.  PSYCH: Normal affect.

## 2024-04-04 NOTE — ED PROVIDER NOTE - ATTENDING CONTRIBUTION TO CARE
Patient reports itching, chest pain, shortness of breath for several months. Seen in ED multiple times for these complaints and usually leaves without being seen or elopes prior to testing and treatment.     Gen: Well-developed, well-nourished, NAD, VS as noted by nursing. HEENT: NCAT, mmm   Chest: RRR, nl S1 and S2, no m/r/g. Resp: CTAB, no w/r/r  Abd: nl BS, soft, nt/nd. Ext: Warm, dry  Neuro: CN II-XII intact, normal and equal strength, sensation, and reflexes bilaterally, normal gait  Psych: AAOx3     MDM: Patient with chest pain, shortness of breath, itching. Eloped prior to work-up. Awake, alert, oriented x3, gait steady, speech clear.

## 2024-04-04 NOTE — ED ADULT TRIAGE NOTE - BP NONINVASIVE SYSTOLIC (MM HG)
163 no dermatitis, no environmental allergies, no food allergies, no immunosuppressive disorder, and no pruritus.

## 2024-04-10 NOTE — ED ADULT TRIAGE NOTE - NS ED NURSE DIRECT TO ROOM YN
patient seen for pt admission into c services today with  present.  she has not been in hospital recently .  she had a fal about a month ago .   ordered [facility] services due to decline in function, weakness.  they live in a ranch home with a ramp to exit .  she needs assist occassionaly with dressing and needs assist with bathing .  she has bilateral knee oa.   can be assertive. she has not been walking or standing due to the knee pain.   pushes her in her rolloator to the toilet No

## 2024-04-17 NOTE — ED ADULT NURSE REASSESSMENT NOTE - GENERAL PATIENT STATE
Pharmacist Admission Medication History    Admission medication history is complete. The information provided in this note is only as accurate as the sources available at the time of the update.    Information Source(s): Patient, Patient's pharmacy, and [facility] via in-person and phone    Pertinent Information:   -capecitabine: patient reports he is done with this medication altogether as of 4/16 PM (last dose 4/16 PM). Based on pharmacy fill records and phone call with [facility] where med is filled, it would seem that patient would have run out of medication prior to 4/16 PM but patient reports this was not the case and he did not miss any doses. Per [facility], last shipped 3/15/24 and delivered 3/18/24 for a 21-day supply with directions of 3 tabs BID on days 1-14 of 21-day cycle. They have a prescription ready for him (but not shipped) with directions of 3 tabs BID Monday-Friday during radiation. Patient states he had been following those instructions (M-F during radiation) and that the other directions are wrong.     Changes made to PTA medication list:  Added: acetaminophen, nicotine patch  Deleted: None  Changed: capecitabine (1500 mg/m2 BID --> 3 tabs BID M-F during radiation with course complete 4/16 PM per patient, see note above), lorazepam (1 mg q6h PRN --> 1 mg at bedtime PRN)    Allergies reviewed with patient and updates made in EHR: no, already reviewed by RN    Medication History Completed By: Jessica Morales RPH 4/17/2024 12:23 PM    Prior to Admission medications    Medication Sig Last Dose Taking? Auth Provider Long Term End Date   acetaminophen (TYLENOL) 325 MG tablet Take 325-650 mg by mouth as needed for pain  at PRN Yes Unknown, Entered By History     capecitabine (XELODA) 500 MG tablet Take 3 tablets by mouth BID Monday-Friday during radiation per patient, done with this as of 4/16 PM Yes Reported, Patient Yes    LORazepam (ATIVAN) 1 MG tablet Take 1 mg by mouth nightly as needed  for anxiety Past Week at PRN Yes Reported, Patient     nicotine (NICODERM CQ) 14 MG/24HR 24 hr patch Place 1 patch onto the skin Every 24 hours PRN Past Week at PRN Yes Unknown, Entered By History            anxious

## 2024-04-24 ENCOUNTER — EMERGENCY (EMERGENCY)
Facility: HOSPITAL | Age: 74
LOS: 1 days | Discharge: LEFT BEFORE DISCHARGE | End: 2024-04-24
Admitting: EMERGENCY MEDICINE
Payer: MEDICARE

## 2024-04-24 VITALS
HEIGHT: 65 IN | WEIGHT: 139.99 LBS | DIASTOLIC BLOOD PRESSURE: 69 MMHG | HEART RATE: 77 BPM | OXYGEN SATURATION: 97 % | RESPIRATION RATE: 16 BRPM | SYSTOLIC BLOOD PRESSURE: 105 MMHG | TEMPERATURE: 98 F

## 2024-04-24 DIAGNOSIS — Z95.1 PRESENCE OF AORTOCORONARY BYPASS GRAFT: Chronic | ICD-10-CM

## 2024-04-24 DIAGNOSIS — Z95.2 PRESENCE OF PROSTHETIC HEART VALVE: Chronic | ICD-10-CM

## 2024-04-24 PROCEDURE — L9991: CPT

## 2024-04-24 NOTE — ED ADULT TRIAGE NOTE - HEART RATE (BEATS/MIN)
Pt very particular and needy throughout the morning, OOB multiple times to empty ostomy bag, repositions self, 1 assist to bathroom. Pt is medical-no tele. IVF NS at 83 mL/hr running with 40meq K riders order, second of four infusing, pt c/o pain although IV site looks good, switched to other IV site for comfort, all IVs are flushing, not red, swollen or hot, but rate on KCl was dropped to 50mL/hr per pt request, MD Palmer ok with this. Heat applied to IV site for comfort. Skin remains intact, feet are red but closed and blanchable, protective mepilex to sacrum but skin remains intact. Pt NPO for EGD with esophageal dilation today. Pt requesting Dilaudid for pain control, but per MD pt has a reported history with abusing this medication as an inpatient. Pt given Haldol for agitation x1 so far this shift as well as Compazine x1 so far this shift for reported nausea although patient has not been visualized retching or vomiting. Pt voiding adequately in bathroom.     77

## 2024-04-26 ENCOUNTER — EMERGENCY (EMERGENCY)
Facility: HOSPITAL | Age: 74
LOS: 1 days | Discharge: LEFT BEFORE DISCHARGE | End: 2024-04-26
Admitting: EMERGENCY MEDICINE
Payer: MEDICARE

## 2024-04-26 VITALS
RESPIRATION RATE: 18 BRPM | OXYGEN SATURATION: 98 % | HEART RATE: 81 BPM | HEIGHT: 65 IN | TEMPERATURE: 98 F | DIASTOLIC BLOOD PRESSURE: 63 MMHG | SYSTOLIC BLOOD PRESSURE: 106 MMHG

## 2024-04-26 VITALS — HEART RATE: 73 BPM

## 2024-04-26 DIAGNOSIS — G47.00 INSOMNIA, UNSPECIFIED: ICD-10-CM

## 2024-04-26 DIAGNOSIS — Z53.21 PROCEDURE AND TREATMENT NOT CARRIED OUT DUE TO PATIENT LEAVING PRIOR TO BEING SEEN BY HEALTH CARE PROVIDER: ICD-10-CM

## 2024-04-26 DIAGNOSIS — Z95.1 PRESENCE OF AORTOCORONARY BYPASS GRAFT: Chronic | ICD-10-CM

## 2024-04-26 DIAGNOSIS — Z95.2 PRESENCE OF PROSTHETIC HEART VALVE: Chronic | ICD-10-CM

## 2024-04-26 DIAGNOSIS — R06.02 SHORTNESS OF BREATH: ICD-10-CM

## 2024-04-26 PROCEDURE — L9991: CPT

## 2024-04-30 ENCOUNTER — EMERGENCY (EMERGENCY)
Facility: HOSPITAL | Age: 74
LOS: 1 days | Discharge: LEFT BEFORE DISCHARGE | End: 2024-04-30
Attending: EMERGENCY MEDICINE | Admitting: EMERGENCY MEDICINE
Payer: MEDICARE

## 2024-04-30 VITALS
DIASTOLIC BLOOD PRESSURE: 63 MMHG | HEART RATE: 71 BPM | SYSTOLIC BLOOD PRESSURE: 117 MMHG | OXYGEN SATURATION: 98 % | TEMPERATURE: 98 F | HEIGHT: 65 IN | RESPIRATION RATE: 18 BRPM

## 2024-04-30 DIAGNOSIS — Z95.1 PRESENCE OF AORTOCORONARY BYPASS GRAFT: Chronic | ICD-10-CM

## 2024-04-30 DIAGNOSIS — Z95.2 PRESENCE OF PROSTHETIC HEART VALVE: Chronic | ICD-10-CM

## 2024-04-30 PROCEDURE — 99284 EMERGENCY DEPT VISIT MOD MDM: CPT

## 2024-04-30 RX ORDER — DIPHENHYDRAMINE HCL 50 MG
25 CAPSULE ORAL ONCE
Refills: 0 | Status: DISCONTINUED | OUTPATIENT
Start: 2024-04-30 | End: 2024-04-30

## 2024-04-30 NOTE — ED ADULT TRIAGE NOTE - ISOLATION TYPE:
Medicare Wellness Visit, Female The best way to live healthy is to have a lifestyle where you eat a well-balanced diet, exercise regularly, limit alcohol use, and quit all forms of tobacco/nicotine, if applicable. Regular preventive services are another way to keep healthy. Preventive services (vaccines, screening tests, monitoring & exams) can help personalize your care plan, which helps you manage your own care. Screening tests can find health problems at the earliest stages, when they are easiest to treat. Will Camacho follows the current, evidence-based guidelines published by the [facility] Adria Magali ([facility]) when recommending preventive services for our patients. Because we follow these guidelines, sometimes recommendations change over time as research supports it. (For example, mammograms used to be recommended annually. Even though Medicare will still pay for an annual mammogram, the newer guidelines recommend a mammogram every two years for women of average risk.) Of course, you and your doctor may decide to screen more often for some diseases, based on your risk and your health status. Preventive services for you include: - Medicare offers their members a free annual wellness visit, which is time for you and your primary care provider to discuss and plan for your preventive service needs. Take advantage of this benefit every year! 
-All adults over the age of 72 should receive the recommended pneumonia vaccines. Current USPSTF guidelines recommend a series of two vaccines for the best pneumonia protection.  
-All adults should have a flu vaccine yearly and a tetanus vaccine every 10 years. All adults age 61 and older should receive a shingles vaccine once in their lifetime.   
-A bone mass density test is recommended when a woman turns 65 to screen for osteoporosis. This test is only recommended one time, as a screening. Some providers will use this same test as a disease monitoring tool if you already have osteoporosis. -All adults age 38-68 who are overweight should have a diabetes screening test once every three years.  
-Other screening tests and preventive services for persons with diabetes include: an eye exam to screen for diabetic retinopathy, a kidney function test, a foot exam, and stricter control over your cholesterol.  
-Cardiovascular screening for adults with routine risk involves an electrocardiogram (ECG) at intervals determined by your doctor.  
-Colorectal cancer screenings should be done for adults age 54-65 with no increased risk factors for colorectal cancer. There are a number of acceptable methods of screening for this type of cancer. Each test has its own benefits and drawbacks. Discuss with your doctor what is most appropriate for you during your annual wellness visit. The different tests include: colonoscopy (considered the best screening method), a fecal occult blood test, a fecal DNA test, and sigmoidoscopy. -Breast cancer screenings are recommended every other year for women of normal risk, age 54-69. 
-Cervical cancer screenings for women over age 72 are only recommended with certain risk factors.  
-All adults born between [facility] should be screened once for Hepatitis C. Here is a list of your current Health Maintenance items (your personalized list of preventive services) with a due date: 
Health Maintenance Due Topic Date Due  Shingles Vaccine (1 of 2) 01/22/2002  Glaucoma Screening   01/22/2017  Pneumococcal Vaccine (2 of 2 - PPSV23) 10/30/2018  Stool testing for trace blood  10/30/2018 Vishnu Callahan Annual Well Visit  10/31/2018 Well Visit, Over 72: Care Instructions Your Care Instructions Physical exams can help you stay healthy. Your doctor has checked your overall health and may have suggested ways to take good care of yourself. He or she also may have recommended tests. At home, you can help prevent illness with healthy eating, regular exercise, and other steps. Follow-up care is a key part of your treatment and safety. Be sure to make and go to all appointments, and call your doctor if you are having problems. It's also a good idea to know your test results and keep a list of the medicines you take. How can you care for yourself at home? · Reach and stay at a healthy weight. This will lower your risk for many problems, such as obesity, diabetes, heart disease, and high blood pressure. · Get at least 30 minutes of exercise on most days of the week. Walking is a good choice. You also may want to do other activities, such as running, swimming, cycling, or playing tennis or team sports. · Do not smoke. Smoking can make health problems worse. If you need help quitting, talk to your doctor about stop-smoking programs and medicines. These can increase your chances of quitting for good. · Protect your skin from too much sun. When you're outdoors from 10 a.m. to 4 p.m., stay in the shade or cover up with clothing and a hat with a wide brim. Wear sunglasses that block UV rays. Even when it's cloudy, put broad-spectrum sunscreen (SPF 30 or higher) on any exposed skin. · See a dentist one or two times a year for checkups and to have your teeth cleaned. · Wear a seat belt in the car. · Limit alcohol to 2 drinks a day for men and 1 drink a day for women. Too much alcohol can cause health problems. Follow your doctor's advice about when to have certain tests. These tests can spot problems early. For men and women · Cholesterol. Your doctor will tell you how often to have this done based on your overall health and other things that can increase your risk for heart attack and stroke. · Blood pressure. Have your blood pressure checked during a routine doctor visit.  Your doctor will tell you how often to check your blood pressure based on your age, your blood pressure results, and other factors. · Diabetes. Ask your doctor whether you should have tests for diabetes. · Vision. Experts recommend that you have yearly exams for glaucoma and other age-related eye problems. · Hearing. Tell your doctor if you notice any change in your hearing. You can have tests to find out how well you hear. · Colon cancer tests. Keep having colon cancer tests as your doctor recommends. You can have one of several types of tests. · Heart attack and stroke risk. At least every 4 to 6 years, you should have your risk for heart attack and stroke assessed. Your doctor uses factors such as your age, blood pressure, cholesterol, and whether you smoke or have diabetes to show what your risk for a heart attack or stroke is over the next 10 years. · Osteoporosis. Talk to your doctor about whether you should have a bone density test to find out whether you have thinning bones. Also ask your doctor about whether you should take calcium and vitamin D supplements. For women · Pap test and pelvic exam. You may no longer need a Pap test. Talk with your doctor about whether to stop or continue to have Pap tests. · Breast exam and mammogram. Ask how often you should have a mammogram, which is an X-ray of your breasts. A mammogram can spot breast cancer before it can be felt and when it is easiest to treat. · Thyroid disease. Talk to your doctor about whether to have your thyroid checked as part of a regular physical exam. Women have an increased chance of a thyroid problem. For men · Prostate exam. Talk to your doctor about whether you should have a blood test (called a PSA test) for prostate cancer. Experts disagree on whether men should have this test. Some experts recommend that you discuss the benefits and risks of the test with your doctor. · Abdominal aortic aneurysm.  Ask your doctor whether you should have a test to check for an aneurysm. You may need a test if you ever smoked or if your parent, brother, sister, or child has had an aneurysm. When should you call for help? Watch closely for changes in your health, and be sure to contact your doctor if you have any problems or symptoms that concern you. Where can you learn more? Go to http://leonie-kiko.info/. Enter I084 in the search box to learn more about \"Well Visit, Over 65: Care Instructions. \" Current as of: March 29, 2018 Content Version: 11.8 © 1867-9351 [employer]wise, [employer]. Care instructions adapted under license by [employer] (which disclaims liability or warranty for this information). If you have questions about a medical condition or this instruction, always ask your healthcare professional. [address] any warranty or liability for your use of this information. None

## 2024-05-02 ENCOUNTER — EMERGENCY (EMERGENCY)
Facility: HOSPITAL | Age: 74
LOS: 1 days | Discharge: LEFT BEFORE DISCHARGE | End: 2024-05-02
Attending: EMERGENCY MEDICINE | Admitting: EMERGENCY MEDICINE
Payer: MEDICARE

## 2024-05-02 VITALS
SYSTOLIC BLOOD PRESSURE: 130 MMHG | HEART RATE: 74 BPM | RESPIRATION RATE: 16 BRPM | OXYGEN SATURATION: 98 % | DIASTOLIC BLOOD PRESSURE: 83 MMHG | TEMPERATURE: 99 F | HEIGHT: 65 IN

## 2024-05-02 DIAGNOSIS — Z95.2 PRESENCE OF PROSTHETIC HEART VALVE: Chronic | ICD-10-CM

## 2024-05-02 DIAGNOSIS — Z95.1 PRESENCE OF AORTOCORONARY BYPASS GRAFT: Chronic | ICD-10-CM

## 2024-05-02 DIAGNOSIS — R05.9 COUGH, UNSPECIFIED: ICD-10-CM

## 2024-05-02 DIAGNOSIS — Z53.29 PROCEDURE AND TREATMENT NOT CARRIED OUT BECAUSE OF PATIENT'S DECISION FOR OTHER REASONS: ICD-10-CM

## 2024-05-02 DIAGNOSIS — Z88.6 ALLERGY STATUS TO ANALGESIC AGENT: ICD-10-CM

## 2024-05-02 DIAGNOSIS — F17.200 NICOTINE DEPENDENCE, UNSPECIFIED, UNCOMPLICATED: ICD-10-CM

## 2024-05-02 PROCEDURE — 99284 EMERGENCY DEPT VISIT MOD MDM: CPT

## 2024-05-02 NOTE — ED ADULT NURSE REASSESSMENT NOTE - NS ED NURSE REASSESS COMMENT FT1
Received report from Taisha ZUNIGA for continuity of care. Pt not in chair at onset of care. Pt reproted to have eloped. Care complete Received report from Taisha ZUNIGA for continuity of care. Pt not in chair at onset of care. Pt reported to have eloped. No care given as patient left

## 2024-05-02 NOTE — ED ADULT TRIAGE NOTE - CHIEF COMPLAINT QUOTE
Pt walked in with multiple medical complaints. Endorsing chest discomfort, cough, and generalized itching.

## 2024-05-02 NOTE — ED PROVIDER NOTE - CLINICAL SUMMARY MEDICAL DECISION MAKING FREE TEXT BOX
Patient with history of A-fib and numerous ED visits here for cough for unclear duration of time.  Patient ambulating around ED without difficulty.  No respiratory distress.  EKG A-fib.  Patient eloped from ED.

## 2024-05-02 NOTE — ED PROVIDER NOTE - INTERNATIONAL TRAVEL
Based on today's exam and after a review of the records, the determination was made for LUCENTIS 0.5mg treatment within [address] THEN WITHIN L0.5 WITHIN 2 MONTHS. No

## 2024-05-02 NOTE — ED PROVIDER NOTE - OBJECTIVE STATEMENT
73-year-old male with history of A-fib and numerous ED visits presents with cough and chest pain for unclear duration of time.

## 2024-05-06 ENCOUNTER — EMERGENCY (EMERGENCY)
Facility: HOSPITAL | Age: 74
LOS: 1 days | Discharge: LEFT BEFORE DISCHARGE | End: 2024-05-06
Admitting: EMERGENCY MEDICINE
Payer: MEDICARE

## 2024-05-06 VITALS
RESPIRATION RATE: 16 BRPM | TEMPERATURE: 98 F | HEART RATE: 87 BPM | HEIGHT: 65 IN | DIASTOLIC BLOOD PRESSURE: 76 MMHG | OXYGEN SATURATION: 98 % | SYSTOLIC BLOOD PRESSURE: 124 MMHG

## 2024-05-06 DIAGNOSIS — Z95.2 PRESENCE OF PROSTHETIC HEART VALVE: Chronic | ICD-10-CM

## 2024-05-06 DIAGNOSIS — Z88.6 ALLERGY STATUS TO ANALGESIC AGENT: ICD-10-CM

## 2024-05-06 DIAGNOSIS — I48.91 UNSPECIFIED ATRIAL FIBRILLATION: ICD-10-CM

## 2024-05-06 DIAGNOSIS — R05.9 COUGH, UNSPECIFIED: ICD-10-CM

## 2024-05-06 DIAGNOSIS — Z95.1 PRESENCE OF AORTOCORONARY BYPASS GRAFT: Chronic | ICD-10-CM

## 2024-05-06 DIAGNOSIS — Z53.29 PROCEDURE AND TREATMENT NOT CARRIED OUT BECAUSE OF PATIENT'S DECISION FOR OTHER REASONS: ICD-10-CM

## 2024-05-06 DIAGNOSIS — R07.9 CHEST PAIN, UNSPECIFIED: ICD-10-CM

## 2024-05-06 PROCEDURE — L9991: CPT

## 2024-05-07 NOTE — ED PROVIDER NOTE - RESPIRATORY, MLM
81 yo F with PMH of morbid obesity, HTN, HLD, NIDDM, paroxysmal afib (on Eliquis), h/o B/L PE's (3/2023), DURAN (on CPAP), ?airway disease, GERD, hospital admission @ [facility] 8/2-8/7/23 for EPPS thought to be 2/2 anemia, asthma and ? CHF who presented to Dr. Vora for evaluation of possible pulmonary hypertension given pt w/ continued SOB over 1 yr since diagnosed with PE's. Pt reports persistent EPPS with any activity for the past year. Pt denies fever, chills, cough, CP, palpitations, LE edema, abdominal pain, N/V/D, dizziness, syncope.   Breath sounds clear and equal bilaterally.  Normal WOB, speaking in full, clear, uninterrupted sentences. Pulmonologist: Dr. Vora, Dr. Keita  Pharmacy:   Escort:    79 yo F with PMH of morbid obesity, HTN, HLD, NIDDM, paroxysmal Afib (on Eliquis, last dose ______), h/o B/L PE's (3/2023), DURAN (on CPAP), ?airway disease, GERD, hospital admission @ [facility] -23 for EPPS thought to be 2/2 anemia, asthma and ? CHF who presented to Dr. Vora for evaluation of possible pulmonary hypertension given pt w/ continued SOB with minimal activity for over 1 yr since diagnosed with PE's. Pt denies fever, chills, cough, CP, palpitations, LE edema, abdominal pain, N/V/D, dizziness, syncope. In light of pt's risk factors, h/o PE and hypoxia pt is referred to [facility] for right heart catheterization to evaluate for possible pulmonary hypertension, WHO group II, III and IV disease.     Prior testinMWT 2024 201m, SpO2 desaturation to 88%, decrease in distance by 30 meters, worsening hypoxia compared to 10/2023  PFT 2024: FEV1, FVC, FEV1/FVC are WNL, no BDR, significantly improved from prior.  TTE 2023: EF 60-65%, Mitral annular calcification, otherwise normal mitral valve. Minimal MR, mild AS/AI, LV/RV not well visualized  CTA PE protocol 5/10/2023: Interval resolution of the previously seen pulmonary emboli. No acute pulmonary embolism.  CTA chest 3/18/2023: Acute pulmonary emboli in the right interlobar artery and right upper, middle, and lower lobar branches. Acute pulmonary emboli within the left upper and lower lobar branches. Increased size of the right ventricle as compared to the left ventricle, with leftward convexity of the interventricular septum. No pericardial effusion.  ? Pulmonologist: Dr. Vora, Dr. Keita  Pharmacy:   Escort:    79 yo F with PMH of morbid obesity, HTN, HLD, NIDDM, paroxysmal Afib (on Eliquis, last dose  PM), h/o B/L PE's (3/2023), DURAN (on CPAP), ?airway disease, GERD, hospital admission @ [facility] -23 for EPPS thought to be 2/2 anemia, asthma and ? CHF who presented to Dr. Vora for evaluation of possible pulmonary hypertension given pt w/ continued SOB with minimal activity for over 1 yr since diagnosed with PE's. Pt denies fever, chills, cough, CP, palpitations, LE edema, abdominal pain, N/V/D, dizziness, syncope. In light of pt's risk factors, h/o PE and hypoxia pt is referred to [facility] for right heart catheterization to evaluate for possible pulmonary hypertension, WHO group II, III and IV disease.     Prior testinMWT 2024 201m, SpO2 desaturation to 88%, decrease in distance by 30 meters, worsening hypoxia compared to 10/2023  PFT 2024: FEV1, FVC, FEV1/FVC are WNL, no BDR, significantly improved from prior.  TTE 2023: EF 60-65%, Mitral annular calcification, otherwise normal mitral valve. Minimal MR, mild AS/AI, LV/RV not well visualized  CTA PE protocol 5/10/2023: Interval resolution of the previously seen pulmonary emboli. No acute pulmonary embolism.  CTA chest 3/18/2023: Acute pulmonary emboli in the right interlobar artery and right upper, middle, and lower lobar branches. Acute pulmonary emboli within the left upper and lower lobar branches. Increased size of the right ventricle as compared to the left ventricle, with leftward convexity of the interventricular septum. No pericardial effusion.  ? Pulmonologist: Dr. Vora, Dr. Keita  Pharmacy: [facility], [id number] ([phone])  Escort: Daughter    80F with hx of morbid obesity, HTN, HLD, DM2, pAfib (on Eliquis, last dose  PM), B/L PE's (3/2023), DURAN (on CPAP), GERD, and recent hospital admission @ [facility] -23 for EPPS thought to be 2/2 anemia, asthma and ?CHF, who presented to Dr. Vora for evaluation of possible pulmonary hypertension given pt w/ continued SOB with minimal activity for over 1 yr since diagnosed with PE's. Pt denies fever, chills, cough, CP, palpitations, LE edema, abdominal pain, N/V/D, dizziness, syncope.     In light of pt's risk factors, h/o PE and hypoxia pt is referred to [facility] for right heart catheterization to evaluate for possible pulmonary hypertension, WHO group II, III and IV disease.     Prior testinMWT 2024 201m, SpO2 desaturation to 88%, decrease in distance by 30 meters, worsening hypoxia compared to 10/2023  PFT 2024: FEV1, FVC, FEV1/FVC are WNL, no BDR, significantly improved from prior.  TTE 2023: EF 60-65%, Mitral annular calcification, otherwise normal mitral valve. Minimal MR, mild AS/AI, LV/RV not well visualized  CTA PE protocol 5/10/2023: Interval resolution of the previously seen pulmonary emboli. No acute pulmonary embolism.  CTA chest 3/18/2023: Acute pulmonary emboli in the right interlobar artery and right upper, middle, and lower lobar branches. Acute pulmonary emboli within the left upper and lower lobar branches. Increased size of the right ventricle as compared to the left ventricle, with leftward convexity of the interventricular septum. No pericardial effusion.  ?

## 2024-05-09 DIAGNOSIS — R09.81 NASAL CONGESTION: ICD-10-CM

## 2024-05-09 DIAGNOSIS — Z53.21 PROCEDURE AND TREATMENT NOT CARRIED OUT DUE TO PATIENT LEAVING PRIOR TO BEING SEEN BY HEALTH CARE PROVIDER: ICD-10-CM

## 2024-05-13 ENCOUNTER — EMERGENCY (EMERGENCY)
Facility: HOSPITAL | Age: 74
LOS: 1 days | Discharge: LEFT BEFORE DISCHARGE | End: 2024-05-13
Attending: EMERGENCY MEDICINE | Admitting: EMERGENCY MEDICINE
Payer: MEDICARE

## 2024-05-13 VITALS
HEIGHT: 65 IN | SYSTOLIC BLOOD PRESSURE: 136 MMHG | HEART RATE: 100 BPM | RESPIRATION RATE: 18 BRPM | OXYGEN SATURATION: 98 % | DIASTOLIC BLOOD PRESSURE: 74 MMHG | TEMPERATURE: 98 F

## 2024-05-13 DIAGNOSIS — Z95.1 PRESENCE OF AORTOCORONARY BYPASS GRAFT: Chronic | ICD-10-CM

## 2024-05-13 DIAGNOSIS — Z95.2 PRESENCE OF PROSTHETIC HEART VALVE: Chronic | ICD-10-CM

## 2024-05-13 PROCEDURE — L9991: CPT

## 2024-05-16 ENCOUNTER — EMERGENCY (EMERGENCY)
Facility: HOSPITAL | Age: 74
LOS: 1 days | Discharge: LEFT BEFORE DISCHARGE | End: 2024-05-16
Admitting: EMERGENCY MEDICINE
Payer: MEDICARE

## 2024-05-16 VITALS
HEART RATE: 89 BPM | OXYGEN SATURATION: 97 % | DIASTOLIC BLOOD PRESSURE: 80 MMHG | HEIGHT: 65 IN | SYSTOLIC BLOOD PRESSURE: 119 MMHG | TEMPERATURE: 98 F | RESPIRATION RATE: 16 BRPM

## 2024-05-16 DIAGNOSIS — R05.9 COUGH, UNSPECIFIED: ICD-10-CM

## 2024-05-16 DIAGNOSIS — Z95.2 PRESENCE OF PROSTHETIC HEART VALVE: Chronic | ICD-10-CM

## 2024-05-16 DIAGNOSIS — Z95.1 PRESENCE OF AORTOCORONARY BYPASS GRAFT: Chronic | ICD-10-CM

## 2024-05-16 DIAGNOSIS — Z53.21 PROCEDURE AND TREATMENT NOT CARRIED OUT DUE TO PATIENT LEAVING PRIOR TO BEING SEEN BY HEALTH CARE PROVIDER: ICD-10-CM

## 2024-05-16 PROCEDURE — L9991: CPT

## 2024-05-18 NOTE — ED PROVIDER NOTE - NS ED ATTENDING STATEMENT MOD
Subjective   History of Present Illness  Patient is a pleasant 83-year-old male, retired [employer] employee.  Presents to the emergency department with hypertension and chest discomfort.  He says that throughout the week he has been having episodes of hypertension often elevating above 200.  He presented to the emergency department 2 days ago and was given clonidine which resolved the hypertensive episode.  He was at home and states that the next day his pressure dropped to systolic of just under 90.  He went to the urgent treatment center yesterday and dosing was changed to half a milligram of clonidine as needed.  He is to take that twice throughout the day today.  This morning his blood pressure elevated to 197/115 at home and these significant fluctuations and persistent hypertension was concerning to him.  He states that additionally had some substernal chest pain radiating to his back this evening.  Pain was rated as mild to moderate.  This is a combination of repeated hypertension and now with chest pain prompted his visit to the ED.      Denies fever, chills, breathing difficulty, abdominal pain, vomiting, diarrhea, or other acute complaints.  Patient seen Dr. Craft in the past as he had cardiac ablation for atrial fibrillation.      Review of Systems   All other systems reviewed and are negative.      Past Medical History:   Diagnosis Date    Abnormal ECG     Alcohol abuse     with no consumption for 12 years     Arrhythmia     Arthritis     Depression     Hyperlipidemia     With Statin intolerance      Hypertension     Osteoarthritis     Paroxysmal atrial fibrillation 12/22/2021    Sick sinus syndrome     SVT (supraventricular tachycardia)     Wears hearing aid     Wears prescription eyeglasses        Allergies   Allergen Reactions    Other Cough    Ace Inhibitors      Cough     Lipitor [Atorvastatin]      Severe depression    Paroxetine Other (See Comments)    Penicillins Hives    Diltiazem Unknown (See  Comments)       Past Surgical History:   Procedure Laterality Date    ABLATION OF DYSRHYTHMIC FOCUS      CARDIAC ELECTROPHYSIOLOGY PROCEDURE N/A 1/18/2017    Procedure: Ablation AVNRT;  Surgeon: Gennaro Kirk MD;  Location: [facility] INVASIVE LOCATION;  Service:     COLONOSCOPY      INSERT / REPLACE / REMOVE PACEMAKER      KNEE ARTHROSCOPY W/ MENISCAL REPAIR Left     PACEMAKER IMPLANTATION  01/13/2015    [place] ER presentation  with supraventricular tachycardia at a rate of 180, converted with 12 mg of adenosine     TONSILLECTOMY         Family History   Problem Relation Age of Onset    Hypertension Mother     Heart attack Father        Social History     Socioeconomic History    Marital status:    Tobacco Use    Smoking status: Never     Passive exposure: Never    Smokeless tobacco: Never   Vaping Use    Vaping status: Never Used   Substance and Sexual Activity    Alcohol use: No     Comment: recovered alcoholic-14 YEAR SOBER08/21/2015 12 years sober     Drug use: No    Sexual activity: Yes     Partners: Female           Objective   Physical Exam  Vitals and nursing note reviewed.   Constitutional:       General: He is not in acute distress.  HENT:      Head: Normocephalic and atraumatic.   Eyes:      Conjunctiva/sclera: Conjunctivae normal.      Pupils: Pupils are equal, round, and reactive to light.   Neck:      Thyroid: No thyromegaly.   Cardiovascular:      Rate and Rhythm: Normal rate and regular rhythm.      Heart sounds: Normal heart sounds. No murmur heard.     No friction rub. No gallop.   Pulmonary:      Effort: Pulmonary effort is normal. No respiratory distress.      Breath sounds: Normal breath sounds.   Chest:      Chest wall: No tenderness.   Abdominal:      Palpations: Abdomen is soft.      Tenderness: There is no abdominal tenderness.   Musculoskeletal:         General: Normal range of motion.      Cervical back: Normal range of motion and neck supple.   Lymphadenopathy:       Cervical: No cervical adenopathy.   Skin:     General: Skin is warm and dry.   Neurological:      Mental Status: He is alert and oriented to person, place, and time.         Procedures           ED Course      No results found for this or any previous visit (from the past 24 hour(s)).  Note: In addition to lab results from this visit, the labs listed above may include labs taken at another facility or during a different encounter within the last 24 hours. Please correlate lab times with ED admission and discharge times for further clarification of the services performed during this visit.    XR Chest 1 View   Final Result   Impression:   No acute process identified         Electronically Signed: Monster Corrigan MD     5/18/2024 6:15 PM EDT     Workstation ID: [id number]        Vitals:    05/20/24 0630 05/20/24 0700 05/20/24 0826 05/20/24 1054   BP: 175/94 174/99 171/94 145/90   BP Location:   Left arm Left arm   Patient Position:   Lying Lying   Pulse: 64 59 61 60   Resp:   18    Temp:   97 °F (36.1 °C) 97.5 °F (36.4 °C)   TempSrc:   Oral Oral   SpO2:  95% 99% 95%   Weight:       Height:         Medications   LORazepam (ATIVAN) injection 0.25 mg (0.25 mg Intravenous Given 5/19/24 1515)   nebivolol (BYSTOLIC) tablet 2.5 mg (2.5 mg Oral Given 5/19/24 1119)   sertraline (ZOLOFT) tablet 25 mg (25 mg Oral Given 5/19/24 1118)   valsartan (DIOVAN) tablet 40 mg (40 mg Oral Given 5/19/24 1222)     ECG/EMG Results (last 24 hours)       ** No results found for the last 24 hours. **          ECG 12 Lead ED Triage Standing Order; Chest Pain   Preliminary Result   Test Reason : HTN/CP   Blood Pressure :   */*   mmHG   Vent. Rate :  67 BPM     Atrial Rate :  67 BPM      P-R Int : 240 ms          QRS Dur : 158 ms       QT Int : 424 ms       P-R-T Axes :  -6  -8   7 degrees      QTc Int : 448 ms      Atrial-paced rhythm with prolonged AV conduction with occasional premature    ventricular complexes   Right bundle branch block   Abnormal  ECG   No previous ECGs available      Referred By: ED MD           Confirmed By:       ECG 12 Lead ED Triage Standing Order; Chest Pain    (Results Pending)                                             Latest Reference Range & Units 05/18/24 18:23   HS Troponin T <22 ng/L 13   proBNP 0.0 - 1,800.0 pg/mL 269.9   Sodium 136 - 145 mmol/L 135 (L)   Potassium 3.5 - 5.2 mmol/L 4.3   Chloride 98 - 107 mmol/L 98   CO2 22.0 - 29.0 mmol/L 30.0 (H)   Anion Gap 5.0 - 15.0 mmol/L 7.0   BUN 8 - 23 mg/dL 19   Creatinine 0.76 - 1.27 mg/dL 0.92   BUN/Creatinine Ratio 7.0 - 25.0  20.7   eGFR >60.0 mL/min/1.73 82.0   Glucose 65 - 99 mg/dL 158 (H)   Calcium 8.6 - 10.5 mg/dL 9.4   Alkaline Phosphatase 39 - 117 U/L 39   Total Protein 6.0 - 8.5 g/dL 6.9   Albumin 3.5 - 5.2 g/dL 4.2   Globulin gm/dL 2.7   A/G Ratio g/dL 1.6   AST (SGOT) 1 - 40 U/L 13   ALT (SGPT) 1 - 41 U/L 10   Total Bilirubin 0.0 - 1.2 mg/dL 0.5   Lipase 13 - 60 U/L 92 (H)   WBC 3.40 - 10.80 10*3/mm3 5.56   RBC 4.14 - 5.80 10*6/mm3 4.80   Hemoglobin 13.0 - 17.7 g/dL 14.6   Hematocrit 37.5 - 51.0 % 44.3   Platelets 140 - 450 10*3/mm3 217   RDW 12.3 - 15.4 % 13.6   MCV 79.0 - 97.0 fL 92.3   MCH 26.6 - 33.0 pg 30.4   MCHC 31.5 - 35.7 g/dL 33.0   MPV 6.0 - 12.0 fL 8.8   RDW-SD 37.0 - 54.0 fl 46.5   Neutrophil Rel % 42.7 - 76.0 % 68.7   Lymphocyte Rel % 19.6 - 45.3 % 18.7 (L)   Monocyte Rel % 5.0 - 12.0 % 9.5   Eosinophil Rel % 0.3 - 6.2 % 2.2   Basophil Rel % 0.0 - 1.5 % 0.5   Immature Granulocyte Rel % 0.0 - 0.5 % 0.4   Neutrophils Absolute 1.70 - 7.00 10*3/mm3 3.82   Lymphocytes Absolute 0.70 - 3.10 10*3/mm3 1.04   Monocytes Absolute 0.10 - 0.90 10*3/mm3 0.53   Eosinophils Absolute 0.00 - 0.40 10*3/mm3 0.12   Basophils Absolute 0.00 - 0.20 10*3/mm3 0.03   Immature Grans, Absolute 0.00 - 0.05 10*3/mm3 0.02   nRBC 0.0 - 0.2 /100 WBC 0.0   (L): Data is abnormally low  (H): Data is abnormally high  Medical Decision Making  Problems Addressed:  Chest pain, unspecified type:  complicated acute illness or injury  Failure of outpatient treatment: complicated acute illness or injury  Hypertensive urgency: complicated acute illness or injury    Amount and/or Complexity of Data Reviewed  External Data Reviewed: notes.  Labs: ordered. Decision-making details documented in ED Course.  Radiology: ordered and independent interpretation performed. Decision-making details documented in ED Course.  ECG/medicine tests: ordered and independent interpretation performed. Decision-making details documented in ED Course.    Risk  OTC drugs.  Prescription drug management.  Decision regarding hospitalization.        Final diagnoses:   Hypertensive urgency   Chest pain, unspecified type   Failure of outpatient treatment       ED Disposition  ED Disposition       ED Disposition   Decision to Admit    Condition   --    Comment   Level of Care: Telemetry [5]   Diagnosis: Hypertensive urgency [535170]                  Uvaldo You DO  05/21/24 5608     Attending Only

## 2024-05-19 ENCOUNTER — EMERGENCY (EMERGENCY)
Facility: HOSPITAL | Age: 74
LOS: 1 days | Discharge: HOME | End: 2024-05-19
Attending: EMERGENCY MEDICINE | Admitting: EMERGENCY MEDICINE
Payer: MEDICARE

## 2024-05-19 VITALS
RESPIRATION RATE: 19 BRPM | DIASTOLIC BLOOD PRESSURE: 62 MMHG | HEIGHT: 65 IN | SYSTOLIC BLOOD PRESSURE: 101 MMHG | HEART RATE: 104 BPM | OXYGEN SATURATION: 97 % | TEMPERATURE: 98 F

## 2024-05-19 DIAGNOSIS — L29.9 PRURITUS, UNSPECIFIED: ICD-10-CM

## 2024-05-19 DIAGNOSIS — R05.9 COUGH, UNSPECIFIED: ICD-10-CM

## 2024-05-19 DIAGNOSIS — Z53.29 PROCEDURE AND TREATMENT NOT CARRIED OUT BECAUSE OF PATIENT'S DECISION FOR OTHER REASONS: ICD-10-CM

## 2024-05-19 DIAGNOSIS — Z95.2 PRESENCE OF PROSTHETIC HEART VALVE: Chronic | ICD-10-CM

## 2024-05-19 DIAGNOSIS — Z88.6 ALLERGY STATUS TO ANALGESIC AGENT: ICD-10-CM

## 2024-05-19 DIAGNOSIS — F17.210 NICOTINE DEPENDENCE, CIGARETTES, UNCOMPLICATED: ICD-10-CM

## 2024-05-19 DIAGNOSIS — Z95.1 PRESENCE OF AORTOCORONARY BYPASS GRAFT: Chronic | ICD-10-CM

## 2024-05-19 PROCEDURE — 99283 EMERGENCY DEPT VISIT LOW MDM: CPT

## 2024-05-19 NOTE — ED PROVIDER NOTE - CLINICAL SUMMARY MEDICAL DECISION MAKING FREE TEXT BOX
1.  Itching is chronic for patient and has a known dermatitis.  Will have him undressed and offer topical cream.  2.  Cough, lungs are clear bilaterally.  We will reevaluate patient and offer chest x-ray.

## 2024-05-19 NOTE — ED PROVIDER NOTE - PHYSICAL EXAMINATION
VITAL SIGNS: I have reviewed nursing notes and confirm.  CONST: Well-developed; well-nourished; No apparent distress.  ENT: No nasal discharge; airway clear.  EYES: Sclera clear. Pupils round and symmetrical bilaterally.  RESP: Breathing comfortably; speaking in full sentences; Clear to auscultation bilaterally.  MSK: No acute deformities noted to extremities.  NEURO: Alert, oriented. Speech is fluent and appropriate. Moving all extremities appropriately. No gross motor or sensory abnormalities.  SKIN: Skin is normal temperature; no diaphoresis; no pallor; No obvious rash.  PSYCH: Cooperative. Appropriate mood, language, and behavior.

## 2024-05-19 NOTE — ED PROVIDER NOTE - OBJECTIVE STATEMENT
Patient is a 73-year-old male who is well-known to the emergency department for frequent visits.  Patient presents today reporting cough and total body itching.  Patient was evaluated in triage, lungs were auscultated and were clear bilaterally.  Patient was then placed in exam area and asked to wait for full evaluation.  While I was examining another patient, this patient reportedly eloped.

## 2024-05-24 ENCOUNTER — EMERGENCY (EMERGENCY)
Facility: HOSPITAL | Age: 74
LOS: 1 days | Discharge: LEFT BEFORE DISCHARGE | End: 2024-05-24
Attending: EMERGENCY MEDICINE | Admitting: EMERGENCY MEDICINE
Payer: MEDICARE

## 2024-05-24 VITALS
HEIGHT: 65 IN | DIASTOLIC BLOOD PRESSURE: 76 MMHG | SYSTOLIC BLOOD PRESSURE: 110 MMHG | OXYGEN SATURATION: 96 % | RESPIRATION RATE: 19 BRPM | HEART RATE: 88 BPM | TEMPERATURE: 98 F

## 2024-05-24 DIAGNOSIS — Z95.1 PRESENCE OF AORTOCORONARY BYPASS GRAFT: Chronic | ICD-10-CM

## 2024-05-24 DIAGNOSIS — Z95.2 PRESENCE OF PROSTHETIC HEART VALVE: Chronic | ICD-10-CM

## 2024-05-24 PROCEDURE — L9991: CPT

## 2024-05-26 ENCOUNTER — EMERGENCY (EMERGENCY)
Facility: HOSPITAL | Age: 74
LOS: 1 days | Discharge: LEFT BEFORE DISCHARGE | End: 2024-05-26
Admitting: EMERGENCY MEDICINE
Payer: MEDICARE

## 2024-05-26 VITALS
OXYGEN SATURATION: 98 % | TEMPERATURE: 98 F | SYSTOLIC BLOOD PRESSURE: 115 MMHG | DIASTOLIC BLOOD PRESSURE: 76 MMHG | RESPIRATION RATE: 18 BRPM | HEART RATE: 93 BPM | HEIGHT: 65 IN

## 2024-05-26 DIAGNOSIS — Z95.1 PRESENCE OF AORTOCORONARY BYPASS GRAFT: Chronic | ICD-10-CM

## 2024-05-26 DIAGNOSIS — Z95.2 PRESENCE OF PROSTHETIC HEART VALVE: Chronic | ICD-10-CM

## 2024-05-26 PROCEDURE — L9991: CPT

## 2024-05-27 DIAGNOSIS — R05.3 CHRONIC COUGH: ICD-10-CM

## 2024-05-27 DIAGNOSIS — Z53.21 PROCEDURE AND TREATMENT NOT CARRIED OUT DUE TO PATIENT LEAVING PRIOR TO BEING SEEN BY HEALTH CARE PROVIDER: ICD-10-CM

## 2024-05-28 DIAGNOSIS — R05.3 CHRONIC COUGH: ICD-10-CM

## 2024-05-28 DIAGNOSIS — R05.9 COUGH, UNSPECIFIED: ICD-10-CM

## 2024-05-28 DIAGNOSIS — Z53.21 PROCEDURE AND TREATMENT NOT CARRIED OUT DUE TO PATIENT LEAVING PRIOR TO BEING SEEN BY HEALTH CARE PROVIDER: ICD-10-CM

## 2024-06-01 ENCOUNTER — EMERGENCY (EMERGENCY)
Facility: HOSPITAL | Age: 74
LOS: 1 days | Discharge: LEFT BEFORE DISCHARGE | End: 2024-06-01
Attending: EMERGENCY MEDICINE | Admitting: EMERGENCY MEDICINE
Payer: MEDICARE

## 2024-06-01 VITALS
HEART RATE: 95 BPM | OXYGEN SATURATION: 96 % | RESPIRATION RATE: 18 BRPM | TEMPERATURE: 98 F | SYSTOLIC BLOOD PRESSURE: 121 MMHG | HEIGHT: 65 IN | DIASTOLIC BLOOD PRESSURE: 77 MMHG

## 2024-06-01 DIAGNOSIS — Z95.2 PRESENCE OF PROSTHETIC HEART VALVE: Chronic | ICD-10-CM

## 2024-06-01 DIAGNOSIS — Z95.1 PRESENCE OF AORTOCORONARY BYPASS GRAFT: Chronic | ICD-10-CM

## 2024-06-01 PROCEDURE — 99284 EMERGENCY DEPT VISIT MOD MDM: CPT

## 2024-06-01 NOTE — ED ADULT NURSE REASSESSMENT NOTE - NS ED NURSE REASSESS COMMENT FT1
Pt is A&Ox4 at this time and states "I'm not staying here. I'm going to leave." Pt educated on risks of leaving without receiving medical treatment. Pt refused exit VS. Walking with steady gait. Dr. Tamayo made aware.

## 2024-06-01 NOTE — ED PROVIDER NOTE - CLINICAL SUMMARY MEDICAL DECISION MAKING FREE TEXT BOX
Patient with cough and chest discomfort x days.  Patient states he smokes three cigarettes a day.  No fever or chills.  No abd pain or back pain.    609pm EKG performed on patient and requested patient be placed on monitor and in the time that I returned to room patient had eloped from ED prior to full evaluation

## 2024-06-01 NOTE — ED ADULT NURSE NOTE - OBJECTIVE STATEMENT
Pt presents to ED A&Ox4 with c/o chronic cough and midsternal chest pain. Pain is non-radiating and not exacerbated with inspiration or palpation as per patient. Upon inspection, respirations are equal and unlabored with no retractions present; pt speaking in full sentences. Skin is warm, dry, and color WNL. Pt reports having inhaler at home but ran out of medication. Pt appears to be poor historian and is unable to recall full PMH and prescribed medications.

## 2024-06-01 NOTE — ED ADULT NURSE NOTE - EXTENSIONS OF SELF_ADULT
None Infliximab Pregnancy And Lactation Text: This medication is Pregnancy Category B and is considered safe during pregnancy. It is unknown if this medication is excreted in breast milk.
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
## 2024-06-01 NOTE — ED ADULT NURSE NOTE - NSFALLRISKASMT_ED_ALL_ED_DT
[No Acute Distress] : no acute distress [Well-Appearing] : well-appearing [Normal Voice/Communication] : normal voice/communication [Normal Sclera/Conjunctiva] : normal sclera/conjunctiva [PERRL] : pupils equal round and reactive to light [Normal Oropharynx] : the oropharynx was normal [No JVD] : no jugular venous distention [No Lymphadenopathy] : no lymphadenopathy [Supple] : supple [Thyroid Normal, No Nodules] : the thyroid was normal and there were no nodules present [No Respiratory Distress] : no respiratory distress  [No Accessory Muscle Use] : no accessory muscle use [Clear to Auscultation] : lungs were clear to auscultation bilaterally [Normal Rate] : normal rate  [Normal S1, S2] : normal S1 and S2 [Regular Rhythm] : with a regular rhythm [No Murmur] : no murmur heard [No Edema] : there was no peripheral edema [No Rash] : no rash [Normal Affect] : the affect was normal [Normal Mood] : the mood was normal [Alert and Oriented x3] : oriented to person, place, and time [Normal Insight/Judgement] : insight and judgment were intact 01-Jun-2024 17:38

## 2024-06-01 NOTE — ED PROVIDER NOTE - OBJECTIVE STATEMENT
Patient with cough and chest discomfort x days.  Patient states he smokes three cigarettes a day.  No fever or chills.  No abd pain or back pain.

## 2024-06-03 NOTE — ED ADULT NURSE NOTE - CHIEF COMPLAINT
Home Blood Pressure Readings  Date BP   5/31 140/80   6/1 136/96   6/2 143/83   6/3 140/98     States he headaches went away when she went off triamterene-hydrochlorothiazide     Current BP Medications:  Amlodipine 5mg daily     The patient is a 71y Male complaining of chest pain.

## 2024-06-04 ENCOUNTER — EMERGENCY (EMERGENCY)
Facility: HOSPITAL | Age: 74
LOS: 1 days | Discharge: LEFT BEFORE DISCHARGE | End: 2024-06-04
Attending: EMERGENCY MEDICINE | Admitting: EMERGENCY MEDICINE
Payer: MEDICARE

## 2024-06-04 VITALS
RESPIRATION RATE: 17 BRPM | SYSTOLIC BLOOD PRESSURE: 119 MMHG | TEMPERATURE: 98 F | OXYGEN SATURATION: 98 % | DIASTOLIC BLOOD PRESSURE: 77 MMHG | HEART RATE: 109 BPM | HEIGHT: 65 IN

## 2024-06-04 DIAGNOSIS — Z95.1 PRESENCE OF AORTOCORONARY BYPASS GRAFT: Chronic | ICD-10-CM

## 2024-06-04 DIAGNOSIS — Z95.2 PRESENCE OF PROSTHETIC HEART VALVE: Chronic | ICD-10-CM

## 2024-06-04 PROCEDURE — 99283 EMERGENCY DEPT VISIT LOW MDM: CPT

## 2024-06-04 NOTE — ED PROVIDER NOTE - CLINICAL SUMMARY MEDICAL DECISION MAKING FREE TEXT BOX
Patient with history of A-fib and CAD, frequent ED visits for chest pain and cough, usually leaves prior to evaluation by provider, presents with cough productive of "dirty" sputum.  Patient is unable to state how long he has had the symptoms for.  No respiratory distress on arrival to ED or during my evaluation.  Speaking in full sentences without becoming short of breath.  Ambulating around ED without difficulty or becoming short of breath.  Differential includes viral syndrome, pneumonia, atypical ACS, CHF, other.  Plan for labs, chest x-ray, viral swab, reassess.    Patient eloped from ED.

## 2024-06-04 NOTE — ED PROVIDER NOTE - OBJECTIVE STATEMENT
73-year-old male with history of A-fib, history of numerous ED visits for chest pain and cough, presents with central chest discomfort and cough productive of "dirty" sputum.  No fevers or chills.  No shortness of breath.

## 2024-06-04 NOTE — ED ADULT TRIAGE NOTE - NS ED NURSE ELOPE COMMENTS
"I need to leave". Breathing is even and unlabored. denies pain at this time. md donnelly made aware

## 2024-06-04 NOTE — ED PROVIDER NOTE - PHYSICAL EXAMINATION
Constitutional: awake and alert, poor personal hygiene  HEENT: head normocephalic and atraumatic. moist mucous membranes  Eyes: extraocular movements intact, normal conjunctiva  Neck: supple, normal ROM  Cardiovascular: regular rate   Pulmonary: no respiratory distress  Gastrointestinal: abdomen flat and nondistended  Skin: warm, dry, normal for ethnicity  Musculoskeletal: no edema, no deformity  Neurological: oriented x4, no focal neurologic deficit.   Psychiatric: calm and cooperative

## 2024-06-05 ENCOUNTER — EMERGENCY (EMERGENCY)
Facility: HOSPITAL | Age: 74
LOS: 1 days | Discharge: LEFT BEFORE DISCHARGE | End: 2024-06-05
Attending: EMERGENCY MEDICINE | Admitting: EMERGENCY MEDICINE
Payer: MEDICARE

## 2024-06-05 VITALS
RESPIRATION RATE: 16 BRPM | TEMPERATURE: 98 F | DIASTOLIC BLOOD PRESSURE: 74 MMHG | HEART RATE: 103 BPM | SYSTOLIC BLOOD PRESSURE: 107 MMHG | OXYGEN SATURATION: 96 % | HEIGHT: 65 IN

## 2024-06-05 DIAGNOSIS — Z53.29 PROCEDURE AND TREATMENT NOT CARRIED OUT BECAUSE OF PATIENT'S DECISION FOR OTHER REASONS: ICD-10-CM

## 2024-06-05 DIAGNOSIS — Z88.6 ALLERGY STATUS TO ANALGESIC AGENT: ICD-10-CM

## 2024-06-05 DIAGNOSIS — Z95.2 PRESENCE OF PROSTHETIC HEART VALVE: Chronic | ICD-10-CM

## 2024-06-05 DIAGNOSIS — I48.91 UNSPECIFIED ATRIAL FIBRILLATION: ICD-10-CM

## 2024-06-05 DIAGNOSIS — R05.9 COUGH, UNSPECIFIED: ICD-10-CM

## 2024-06-05 DIAGNOSIS — R07.89 OTHER CHEST PAIN: ICD-10-CM

## 2024-06-05 DIAGNOSIS — Z95.1 PRESENCE OF AORTOCORONARY BYPASS GRAFT: Chronic | ICD-10-CM

## 2024-06-05 DIAGNOSIS — Z53.21 PROCEDURE AND TREATMENT NOT CARRIED OUT DUE TO PATIENT LEAVING PRIOR TO BEING SEEN BY HEALTH CARE PROVIDER: ICD-10-CM

## 2024-06-05 DIAGNOSIS — F17.210 NICOTINE DEPENDENCE, CIGARETTES, UNCOMPLICATED: ICD-10-CM

## 2024-06-05 PROCEDURE — 99283 EMERGENCY DEPT VISIT LOW MDM: CPT

## 2024-06-05 NOTE — ED PROVIDER NOTE - CHPI ED SYMPTOMS NEG
29.0 07/06/2023    AMMONIA 33.0 04/17/2022    AMMONIA 26.0 04/10/2022       Assessment:    Active Hospital Problems    Diagnosis Date Noted    ISA (acute kidney injury) ([address]) [N17.9] 07/05/2023    Elevated troponin [R77.8] 07/05/2023   DT'S  HYPOMAGNESEMIA   RIGHT HIP PAIN   FALL   TOBACCO ABUSE   MARIJUANA USE  COPD WITHOUT EXACERBATION   ETOH ABUSE     PLAN:  HYDRATION   LOPRESSOR  AEROSOLS   CIWA     DVT Prophylaxis:  HEPARIN  Diet: NPO  Code Status: Full Code     PT/OT Eval Status:  ORDERED     Dispo -  HOME       Electronically signed by Siomara Pérez DO on 7/7/2023 at 4:24 PM [facility] no fever

## 2024-06-05 NOTE — ED PROVIDER NOTE - OBJECTIVE STATEMENT
Patient presented to ED with cough.  Patient seen initially in triage.  Patient eloped from ED prior to full evaluation and auscultation.

## 2024-06-06 DIAGNOSIS — I48.91 UNSPECIFIED ATRIAL FIBRILLATION: ICD-10-CM

## 2024-06-06 DIAGNOSIS — Z53.29 PROCEDURE AND TREATMENT NOT CARRIED OUT BECAUSE OF PATIENT'S DECISION FOR OTHER REASONS: ICD-10-CM

## 2024-06-06 DIAGNOSIS — I25.10 ATHEROSCLEROTIC HEART DISEASE OF NATIVE CORONARY ARTERY WITHOUT ANGINA PECTORIS: ICD-10-CM

## 2024-06-06 DIAGNOSIS — Z88.6 ALLERGY STATUS TO ANALGESIC AGENT: ICD-10-CM

## 2024-06-06 DIAGNOSIS — R07.89 OTHER CHEST PAIN: ICD-10-CM

## 2024-06-06 DIAGNOSIS — R05.9 COUGH, UNSPECIFIED: ICD-10-CM

## 2024-06-07 ENCOUNTER — EMERGENCY (EMERGENCY)
Facility: HOSPITAL | Age: 74
LOS: 1 days | Discharge: LEFT BEFORE DISCHARGE | End: 2024-06-07
Attending: EMERGENCY MEDICINE | Admitting: EMERGENCY MEDICINE
Payer: MEDICARE

## 2024-06-07 VITALS
RESPIRATION RATE: 15 BRPM | TEMPERATURE: 98 F | DIASTOLIC BLOOD PRESSURE: 74 MMHG | SYSTOLIC BLOOD PRESSURE: 113 MMHG | HEART RATE: 93 BPM | HEIGHT: 65 IN | OXYGEN SATURATION: 97 %

## 2024-06-07 DIAGNOSIS — Z95.1 PRESENCE OF AORTOCORONARY BYPASS GRAFT: Chronic | ICD-10-CM

## 2024-06-07 DIAGNOSIS — Z95.2 PRESENCE OF PROSTHETIC HEART VALVE: Chronic | ICD-10-CM

## 2024-06-07 PROCEDURE — 99283 EMERGENCY DEPT VISIT LOW MDM: CPT

## 2024-06-07 RX ORDER — DIPHENHYDRAMINE HCL 50 MG
25 CAPSULE ORAL ONCE
Refills: 0 | Status: COMPLETED | OUTPATIENT
Start: 2024-06-07 | End: 2024-06-07

## 2024-06-07 RX ORDER — ALBUTEROL 90 UG/1
1 AEROSOL, METERED ORAL ONCE
Refills: 0 | Status: COMPLETED | OUTPATIENT
Start: 2024-06-07 | End: 2024-06-07

## 2024-06-07 RX ADMIN — ALBUTEROL 1 PUFF(S): 90 AEROSOL, METERED ORAL at 17:00

## 2024-06-07 RX ADMIN — Medication 25 MILLIGRAM(S): at 16:59

## 2024-06-07 NOTE — ED ADULT NURSE NOTE - NSFALLUNIVINTERV_ED_ALL_ED
Bed/Stretcher in lowest position, wheels locked, appropriate side rails in place/Call bell, personal items and telephone in reach/Instruct patient to call for assistance before getting out of bed/chair/stretcher/Non-slip footwear applied when patient is off stretcher/[place] to call system/Physically safe environment - no spills, clutter or unnecessary equipment/Purposeful proactive rounding/Room/bathroom lighting operational, light cord in reach

## 2024-06-07 NOTE — ED ADULT TRIAGE NOTE - BP NONINVASIVE DIASTOLIC (MM HG)
Return to the emergency department for worsening symptoms or concerning symptoms.  Take the antibiotics as prescribed.  Follow-up with primary care provider this week.  
73

## 2024-06-07 NOTE — ED PROVIDER NOTE - PHYSICAL EXAMINATION
Constitutional: awake and alert, in no acute distress  HEENT: head normocephalic and atraumatic. moist mucous membranes  Eyes: extraocular movements intact, normal conjunctiva  Neck: supple, normal ROM  Cardiovascular: regular rate   Pulmonary: no respiratory distress  Gastrointestinal: abdomen flat and nondistended  Skin: excoriations to BUE and b/l ankles  Musculoskeletal: no edema, no deformity  Neurological: oriented x4, no focal neurologic deficit.   Psychiatric: calm and cooperative

## 2024-06-07 NOTE — ED PROVIDER NOTE - CLINICAL SUMMARY MEDICAL DECISION MAKING FREE TEXT BOX
Patient with history of CAD, COPD, numerous ED visits for chest pain and cough, presents with generalized itching, with no discernible new exposures.  On exam, patient is afebrile, vital signs are stable.  Patient has excoriations to bilateral upper extremities and bilateral ankles, no erythematous rash with hives.  Patient is not coughing in the ED.  He is also requesting a new albuterol inhaler.  Plan for symptomatic treatment with Benadryl and albuterol refill, reassess.  Will offer empiric treatment for possible scabies as well.    Patient eloped from ED.

## 2024-06-07 NOTE — ED ADULT TRIAGE NOTE - CHIEF COMPLAINT QUOTE
Pt in to triage and states "everything is wrong." Pt reports easily fatigued and needing to lay down and watch tv. Pt also reports diffuse itching. Pt is AOx4, GCS 15.

## 2024-06-07 NOTE — ED PROVIDER NOTE - OBJECTIVE STATEMENT
73-year-old male with history of CAD, A-fib, numerous ED visits, mostly for cough and chest pain, presents with generalized itching.  No new medications or exposures.  Patient is also requesting a new albuterol inhaler.  No difficulty breathing.  No sensation of throat swelling.  No chest pain or cough today.

## 2024-06-07 NOTE — ED ADULT TRIAGE NOTE - MODE OF ARRIVAL
Physical Therapy Daily Progress Note    VISIT#: 4    Subjective   Rand Asencio reports she was sore with the manual last session, but felt more loose afterwards. Pt states she didn't do much this weekend so her foot isn't aggravated.     Pain Rating (0-10): 1    Objective     See Exercise, Manual, and Modality Logs for complete treatment.     Patient Education: cues for therex    Assessment/Plan   Pt tolerated session fairly well with reduced pain overall and tolerated some theracts with brace off today. Pt did require cues for form with step overs and exs.     Progress per Plan of Care and Progress strengthening /stabilization /functional activity            Timed:         Manual Therapy:    4     mins  65799;     Therapeutic Exercise:    13     mins  85509;     Neuromuscular Sinan:   8     mins  06627;    Therapeutic Activity:    10    mins  19034;     Gait Training:           mins  54475;     Ultrasound:         mins  59209;    Ionto                                   mins   84421  Self Care                            mins   29429  Canalith Repos                   mins  65541    Un-Timed:  Electrical Stimulation:         mins  67253 ( );  Dry Needling          mins self-pay  Traction          mins 79826  Low Eval          Mins  61015  Mod Eval          Mins  74944  High Eval                           Mins  17636  Re-Eval                               mins  92708    Ice msg x3' N/C    Timed Treatment:  35    mins   Total Treatment:     38   mins    Georgina Fuentes, PT  [place] License # [id number]  Physical Therapist   Walk in

## 2024-06-10 DIAGNOSIS — J44.9 CHRONIC OBSTRUCTIVE PULMONARY DISEASE, UNSPECIFIED: ICD-10-CM

## 2024-06-10 DIAGNOSIS — L29.8 OTHER PRURITUS: ICD-10-CM

## 2024-06-10 DIAGNOSIS — R05.9 COUGH, UNSPECIFIED: ICD-10-CM

## 2024-06-10 DIAGNOSIS — I48.91 UNSPECIFIED ATRIAL FIBRILLATION: ICD-10-CM

## 2024-06-10 DIAGNOSIS — R07.9 CHEST PAIN, UNSPECIFIED: ICD-10-CM

## 2024-06-10 DIAGNOSIS — I25.10 ATHEROSCLEROTIC HEART DISEASE OF NATIVE CORONARY ARTERY WITHOUT ANGINA PECTORIS: ICD-10-CM

## 2024-06-10 DIAGNOSIS — Z88.6 ALLERGY STATUS TO ANALGESIC AGENT: ICD-10-CM

## 2024-06-10 DIAGNOSIS — Z53.29 PROCEDURE AND TREATMENT NOT CARRIED OUT BECAUSE OF PATIENT'S DECISION FOR OTHER REASONS: ICD-10-CM

## 2024-06-12 ENCOUNTER — EMERGENCY (EMERGENCY)
Facility: HOSPITAL | Age: 74
LOS: 1 days | Discharge: LEFT BEFORE DISCHARGE | End: 2024-06-12
Attending: EMERGENCY MEDICINE | Admitting: EMERGENCY MEDICINE
Payer: MEDICARE

## 2024-06-12 VITALS — HEIGHT: 65 IN

## 2024-06-12 DIAGNOSIS — Z95.1 PRESENCE OF AORTOCORONARY BYPASS GRAFT: Chronic | ICD-10-CM

## 2024-06-12 DIAGNOSIS — Z95.2 PRESENCE OF PROSTHETIC HEART VALVE: Chronic | ICD-10-CM

## 2024-06-12 DIAGNOSIS — Z53.21 PROCEDURE AND TREATMENT NOT CARRIED OUT DUE TO PATIENT LEAVING PRIOR TO BEING SEEN BY HEALTH CARE PROVIDER: ICD-10-CM

## 2024-06-12 PROCEDURE — 99283 EMERGENCY DEPT VISIT LOW MDM: CPT

## 2024-07-05 ENCOUNTER — EMERGENCY (EMERGENCY)
Facility: HOSPITAL | Age: 74
LOS: 1 days | Discharge: LEFT BEFORE DISCHARGE | End: 2024-07-05
Admitting: EMERGENCY MEDICINE
Payer: MEDICARE

## 2024-07-05 VITALS
SYSTOLIC BLOOD PRESSURE: 114 MMHG | TEMPERATURE: 98 F | DIASTOLIC BLOOD PRESSURE: 77 MMHG | OXYGEN SATURATION: 97 % | HEART RATE: 96 BPM | HEIGHT: 65 IN | RESPIRATION RATE: 16 BRPM

## 2024-07-05 DIAGNOSIS — Z95.1 PRESENCE OF AORTOCORONARY BYPASS GRAFT: Chronic | ICD-10-CM

## 2024-07-05 DIAGNOSIS — Z95.2 PRESENCE OF PROSTHETIC HEART VALVE: Chronic | ICD-10-CM

## 2024-07-05 PROCEDURE — L9991: CPT

## 2024-07-08 DIAGNOSIS — R05.9 COUGH, UNSPECIFIED: ICD-10-CM

## 2024-07-08 DIAGNOSIS — Z53.21 PROCEDURE AND TREATMENT NOT CARRIED OUT DUE TO PATIENT LEAVING PRIOR TO BEING SEEN BY HEALTH CARE PROVIDER: ICD-10-CM

## 2024-07-14 ENCOUNTER — EMERGENCY (EMERGENCY)
Facility: HOSPITAL | Age: 74
LOS: 1 days | Discharge: LEFT BEFORE DISCHARGE | End: 2024-07-14
Attending: STUDENT IN AN ORGANIZED HEALTH CARE EDUCATION/TRAINING PROGRAM | Admitting: STUDENT IN AN ORGANIZED HEALTH CARE EDUCATION/TRAINING PROGRAM

## 2024-07-14 VITALS
DIASTOLIC BLOOD PRESSURE: 64 MMHG | HEIGHT: 65 IN | OXYGEN SATURATION: 95 % | HEART RATE: 101 BPM | SYSTOLIC BLOOD PRESSURE: 112 MMHG | TEMPERATURE: 98 F | WEIGHT: 139.99 LBS | RESPIRATION RATE: 20 BRPM

## 2024-07-14 DIAGNOSIS — Z95.1 PRESENCE OF AORTOCORONARY BYPASS GRAFT: Chronic | ICD-10-CM

## 2024-07-14 DIAGNOSIS — Z95.2 PRESENCE OF PROSTHETIC HEART VALVE: Chronic | ICD-10-CM

## 2024-07-14 PROCEDURE — 99284 EMERGENCY DEPT VISIT MOD MDM: CPT

## 2024-07-14 RX ORDER — ALBUTEROL 90 MCG
2.5 AEROSOL REFILL (GRAM) INHALATION
Refills: 0 | Status: ACTIVE | OUTPATIENT
Start: 2024-07-14 | End: 2024-07-14

## 2024-07-14 RX ORDER — IPRATROPIUM BROMIDE 0.5 MG/2.5ML
500 SOLUTION RESPIRATORY (INHALATION)
Refills: 0 | Status: ACTIVE | OUTPATIENT
Start: 2024-07-14

## 2024-07-14 RX ORDER — IPRATROPIUM BROMIDE AND ALBUTEROL SULFATE .5; 3 MG/3ML; MG/3ML
3 SOLUTION RESPIRATORY (INHALATION)
Refills: 0 | Status: ACTIVE | OUTPATIENT
Start: 2024-07-14

## 2024-07-18 ENCOUNTER — EMERGENCY (EMERGENCY)
Facility: HOSPITAL | Age: 74
LOS: 1 days | Discharge: LEFT BEFORE DISCHARGE | End: 2024-07-18
Attending: EMERGENCY MEDICINE | Admitting: EMERGENCY MEDICINE
Payer: MEDICARE

## 2024-07-18 VITALS
SYSTOLIC BLOOD PRESSURE: 104 MMHG | DIASTOLIC BLOOD PRESSURE: 67 MMHG | RESPIRATION RATE: 16 BRPM | TEMPERATURE: 98 F | OXYGEN SATURATION: 98 % | HEIGHT: 65 IN | HEART RATE: 91 BPM

## 2024-07-18 DIAGNOSIS — F17.210 NICOTINE DEPENDENCE, CIGARETTES, UNCOMPLICATED: ICD-10-CM

## 2024-07-18 DIAGNOSIS — Z53.29 PROCEDURE AND TREATMENT NOT CARRIED OUT BECAUSE OF PATIENT'S DECISION FOR OTHER REASONS: ICD-10-CM

## 2024-07-18 DIAGNOSIS — Z88.6 ALLERGY STATUS TO ANALGESIC AGENT: ICD-10-CM

## 2024-07-18 DIAGNOSIS — R06.02 SHORTNESS OF BREATH: ICD-10-CM

## 2024-07-18 DIAGNOSIS — Z95.2 PRESENCE OF PROSTHETIC HEART VALVE: Chronic | ICD-10-CM

## 2024-07-18 DIAGNOSIS — Z95.1 PRESENCE OF AORTOCORONARY BYPASS GRAFT: Chronic | ICD-10-CM

## 2024-07-18 DIAGNOSIS — J44.89 OTHER SPECIFIED CHRONIC OBSTRUCTIVE PULMONARY DISEASE: ICD-10-CM

## 2024-07-18 PROCEDURE — L9991: CPT

## 2024-07-18 NOTE — ED PROVIDER NOTE - OBJECTIVE STATEMENT
Presented with shortness of breath the left shortly after arriving without being seen.  This is his usual pattern of presentation.  He is well-known to the ED for asthma/COPD and cigarette smoking

## 2024-07-18 NOTE — ED ADULT TRIAGE NOTE - NS ED NURSE BANDS TYPE
LANEY Keller [facility] Nurse Msg Pool 38 minutes ago (7:24 AM)   SD  Please see refill request needs ASAP    Routing comment     LANEY Keller M Im Nurse Msg Pool 2 days ago   SD  Patient enrolled in RN program today.   Refill request for Metoprolol and Xarelto to [facility] pharmacy.          Name band;

## 2024-07-20 NOTE — ED ADULT NURSE NOTE - HIV OFFER
Medication Reconciliation was completed and the patient's home medication list was verified. The Med List Status has been marked \"Complete\". The following sources were used to assist with Medication Reconciliation:      [x] Home medications reviewed and confirmed with patient.             Previously Declined (within the last year)

## 2024-07-24 NOTE — ED ADULT NURSE NOTE - TEMPLATE
Update History & Physical    The patient's History and Physical of July 23, 2024 was reviewed with the patient and I examined the patient. There was no change. The surgical site was confirmed by the patient and me.     Plan: The risks, benefits, expected outcome, and alternative to the recommended procedure have been discussed with the patient. Patient understands and wants to proceed with the procedure.     Electronically signed by Hany Lomax MD on 7/24/2024 at 9:28 AM      
Respiratory

## 2024-07-27 ENCOUNTER — EMERGENCY (EMERGENCY)
Facility: HOSPITAL | Age: 74
LOS: 1 days | Discharge: HOME | End: 2024-07-27
Attending: EMERGENCY MEDICINE | Admitting: EMERGENCY MEDICINE
Payer: MEDICARE

## 2024-07-27 VITALS
SYSTOLIC BLOOD PRESSURE: 120 MMHG | HEART RATE: 78 BPM | HEIGHT: 65 IN | OXYGEN SATURATION: 97 % | DIASTOLIC BLOOD PRESSURE: 76 MMHG | RESPIRATION RATE: 18 BRPM | TEMPERATURE: 98 F

## 2024-07-27 DIAGNOSIS — M79.10 MYALGIA, UNSPECIFIED SITE: ICD-10-CM

## 2024-07-27 DIAGNOSIS — M79.604 PAIN IN RIGHT LEG: ICD-10-CM

## 2024-07-27 DIAGNOSIS — Z88.6 ALLERGY STATUS TO ANALGESIC AGENT: ICD-10-CM

## 2024-07-27 DIAGNOSIS — Z95.2 PRESENCE OF PROSTHETIC HEART VALVE: Chronic | ICD-10-CM

## 2024-07-27 DIAGNOSIS — Z95.1 PRESENCE OF AORTOCORONARY BYPASS GRAFT: Chronic | ICD-10-CM

## 2024-07-27 PROCEDURE — 99283 EMERGENCY DEPT VISIT LOW MDM: CPT

## 2024-07-27 NOTE — ED PROVIDER NOTE - OBJECTIVE STATEMENT
72 yo male with muscle pain in legs from walking around a lot today.  no other complaints    frequent pt in [facility] ER

## 2024-07-27 NOTE — ED PROVIDER NOTE - CLINICAL SUMMARY MEDICAL DECISION MAKING FREE TEXT BOX
muscle pain to legs from walking around     pt does not want any rx and wants to leave er     ambulatory at [place]     ED evaluation and management discussed with the patient and family (if available) in detail.  Close PMD follow up encouraged.  Strict ED return instructions discussed in detail and patient given the opportunity to ask any questions about their discharge diagnosis and instructions. Patient verbalized understanding.

## 2024-07-27 NOTE — ED ADULT TRIAGE NOTE - CHIEF COMPLAINT QUOTE
regular attender to our er, usually walks in on 7[address] and leaves just after being triaged, today biba stating hes been to multiple er's today for painful leg and "I keep walking out" walked into er nil obvious issue

## 2024-07-27 NOTE — ED PROVIDER NOTE - PATIENT PORTAL LINK FT
You can access the FollowMyHealth Patient Portal offered by [facility] by registering at the following website: [email or web address]. By joining [facility]’s FollowMyHealth portal, you will also be able to view your health information using other applications (apps) compatible with our system.

## 2024-07-29 ENCOUNTER — EMERGENCY (EMERGENCY)
Facility: HOSPITAL | Age: 74
LOS: 1 days | Discharge: LEFT BEFORE DISCHARGE | End: 2024-07-29
Attending: EMERGENCY MEDICINE | Admitting: EMERGENCY MEDICINE
Payer: MEDICARE

## 2024-07-29 VITALS
WEIGHT: 119.93 LBS | SYSTOLIC BLOOD PRESSURE: 134 MMHG | OXYGEN SATURATION: 98 % | TEMPERATURE: 98 F | HEART RATE: 86 BPM | HEIGHT: 65 IN | RESPIRATION RATE: 18 BRPM | DIASTOLIC BLOOD PRESSURE: 87 MMHG

## 2024-07-29 DIAGNOSIS — Z53.29 PROCEDURE AND TREATMENT NOT CARRIED OUT BECAUSE OF PATIENT'S DECISION FOR OTHER REASONS: ICD-10-CM

## 2024-07-29 DIAGNOSIS — M79.605 PAIN IN LEFT LEG: ICD-10-CM

## 2024-07-29 DIAGNOSIS — Z88.6 ALLERGY STATUS TO ANALGESIC AGENT: ICD-10-CM

## 2024-07-29 DIAGNOSIS — F17.200 NICOTINE DEPENDENCE, UNSPECIFIED, UNCOMPLICATED: ICD-10-CM

## 2024-07-29 DIAGNOSIS — Z95.1 PRESENCE OF AORTOCORONARY BYPASS GRAFT: Chronic | ICD-10-CM

## 2024-07-29 DIAGNOSIS — Z95.2 PRESENCE OF PROSTHETIC HEART VALVE: Chronic | ICD-10-CM

## 2024-07-29 PROCEDURE — 99283 EMERGENCY DEPT VISIT LOW MDM: CPT

## 2024-07-29 RX ORDER — ACETAMINOPHEN 325 MG
650 TABLET ORAL ONCE
Refills: 0 | Status: DISCONTINUED | OUTPATIENT
Start: 2024-07-29 | End: 2024-07-29

## 2024-07-29 NOTE — ED ADULT NURSE NOTE - NSFALLUNIVINTERV_ED_ALL_ED
Bed/Stretcher in lowest position, wheels locked, appropriate side rails in place/Call bell, personal items and telephone in reach/Instruct patient to call for assistance before getting out of bed/chair/stretcher/Non-slip footwear applied when patient is off stretcher/[place] to call system/Physically safe environment - no spills, clutter or unnecessary equipment/Purposeful proactive rounding/Room/bathroom lighting operational, light cord in reach

## 2024-08-01 NOTE — ED PROVIDER NOTE - CCCP TRG CHIEF CMPLNT
Eyes:  No visual changes, eye pain or discharge.  ENMT:  No hearing changes, pain, discharge or infections. No neck pain or stiffness.  Cardiac:  +chest pain. no SOB or edema. No chest pain with exertion.  Respiratory:  No cough or respiratory distress. No hemoptysis. No history of asthma or RAD.  GI:  No nausea, vomiting, diarrhea or abdominal pain.  :  No dysuria, frequency or burning.  MS:  No myalgia, muscle weakness, joint pain or back pain.  Neuro:  No headache or weakness.  No LOC.  Skin:  No skin rash.   Endocrine: No history of thyroid disease or diabetes.
pain, upper leg

## 2024-08-01 NOTE — ED PROVIDER NOTE - OBJECTIVE STATEMENT
Patient states he has upper left leg discomfort for days.  I walked patient from triage to area A of [facility] and in the time that it took to go to the computer and enter an xray, the patient eloped.

## 2024-08-01 NOTE — ED PROVIDER NOTE - CLINICAL SUMMARY MEDICAL DECISION MAKING FREE TEXT BOX
Patient states he has upper left leg discomfort for days.  I walked patient from triage to area A of [facility] and in the time that it took to go to the computer and enter an xray, the patient eloped.

## 2024-08-10 ENCOUNTER — EMERGENCY (EMERGENCY)
Facility: HOSPITAL | Age: 74
LOS: 1 days | Discharge: LEFT BEFORE DISCHARGE | End: 2024-08-10
Attending: EMERGENCY MEDICINE | Admitting: EMERGENCY MEDICINE
Payer: MEDICARE

## 2024-08-10 VITALS
RESPIRATION RATE: 16 BRPM | HEIGHT: 65 IN | TEMPERATURE: 98 F | HEART RATE: 89 BPM | SYSTOLIC BLOOD PRESSURE: 110 MMHG | OXYGEN SATURATION: 98 % | WEIGHT: 139.99 LBS | DIASTOLIC BLOOD PRESSURE: 69 MMHG

## 2024-08-10 DIAGNOSIS — E78.5 HYPERLIPIDEMIA, UNSPECIFIED: ICD-10-CM

## 2024-08-10 DIAGNOSIS — Z88.6 ALLERGY STATUS TO ANALGESIC AGENT: ICD-10-CM

## 2024-08-10 DIAGNOSIS — J44.89 OTHER SPECIFIED CHRONIC OBSTRUCTIVE PULMONARY DISEASE: ICD-10-CM

## 2024-08-10 DIAGNOSIS — I25.10 ATHEROSCLEROTIC HEART DISEASE OF NATIVE CORONARY ARTERY WITHOUT ANGINA PECTORIS: ICD-10-CM

## 2024-08-10 DIAGNOSIS — Z95.1 PRESENCE OF AORTOCORONARY BYPASS GRAFT: Chronic | ICD-10-CM

## 2024-08-10 DIAGNOSIS — R07.89 OTHER CHEST PAIN: ICD-10-CM

## 2024-08-10 DIAGNOSIS — Z53.29 PROCEDURE AND TREATMENT NOT CARRIED OUT BECAUSE OF PATIENT'S DECISION FOR OTHER REASONS: ICD-10-CM

## 2024-08-10 DIAGNOSIS — I10 ESSENTIAL (PRIMARY) HYPERTENSION: ICD-10-CM

## 2024-08-10 DIAGNOSIS — F17.210 NICOTINE DEPENDENCE, CIGARETTES, UNCOMPLICATED: ICD-10-CM

## 2024-08-10 DIAGNOSIS — I48.91 UNSPECIFIED ATRIAL FIBRILLATION: ICD-10-CM

## 2024-08-10 DIAGNOSIS — Z95.2 PRESENCE OF PROSTHETIC HEART VALVE: Chronic | ICD-10-CM

## 2024-08-10 PROCEDURE — 99284 EMERGENCY DEPT VISIT MOD MDM: CPT

## 2024-08-10 NOTE — ED PROVIDER NOTE - CLINICAL SUMMARY MEDICAL DECISION MAKING FREE TEXT BOX
Pt is 8 days of CP.  EKG shows AF, this is a known dx.  Pt has scattered wheezing on exam.  Discussed with pt that we would like to give a nebulized treatment, check CXR, and check blood work, but he walked out before anything could be performed.

## 2024-08-10 NOTE — ED PROVIDER NOTE - PHYSICAL EXAMINATION
VITAL SIGNS: I have reviewed nursing notes and confirm.  CONSTITUTIONAL: Well-developed; well-nourished; in no apparent distress.  SKIN: Skin is warm and dry, no acute rash.  HEAD: Normocephalic; atraumatic.  EYES: PERRL, EOM intact; conjunctiva and sclera clear.  ENT: No nasal discharge; airway clear.  NECK: Supple; non tender.  CARD: S1, S2 normal; no murmurs, gallops, or rubs. Regular rate and rhythm.  RESP: Mild scattered expiratory wheezing.   ABD: Normal bowel sounds; soft; non-distended; non-tender; no hepatosplenomegaly.  MSK: Normal ROM. No clubbing, cyanosis or edema.  NEURO: Grossly unremarkable.

## 2024-08-10 NOTE — ED ADULT TRIAGE NOTE - NS ED TRIAGE AVPU SCALE
Laura Heath, ANUM Sanchez Staff  Please advise the patient that I have reviewed her CT of her cervical and lumbar spine. She is scheduled for SCS explant. Please advise the patient that we can review her CT after her explant.  I do not see any acute findings on her CT scans. If she would like to come in and review prior to her explant we can do that to.    Thank you,  Laura Heath, NP   Alert-The patient is alert, awake and responds to voice. The patient is oriented to time, place, and person. The triage nurse is able to obtain subjective information.

## 2024-08-10 NOTE — ED PROVIDER NOTE - OBJECTIVE STATEMENT
Patient is a 73-year-old male with a history of atrial fibrillation, asthma/COPD, coronary artery disease, hyperlipidemia, and hypertension who presents with chest pain x 8 days.  Patient describes chest pain as a tightness in reports that it feels like his asthma.  He reports he feels like he needs a breathing treatment.  Patient is an active smoker.  He denies any fevers, significant cough, abdominal pain, shortness of breath, or other concerns.

## 2024-08-11 ENCOUNTER — EMERGENCY (EMERGENCY)
Facility: HOSPITAL | Age: 74
LOS: 1 days | Discharge: LEFT BEFORE DISCHARGE | End: 2024-08-11
Admitting: EMERGENCY MEDICINE
Payer: MEDICARE

## 2024-08-11 VITALS
SYSTOLIC BLOOD PRESSURE: 108 MMHG | OXYGEN SATURATION: 97 % | HEIGHT: 65 IN | RESPIRATION RATE: 17 BRPM | HEART RATE: 91 BPM | DIASTOLIC BLOOD PRESSURE: 87 MMHG | TEMPERATURE: 98 F

## 2024-08-11 DIAGNOSIS — R05.9 COUGH, UNSPECIFIED: ICD-10-CM

## 2024-08-11 DIAGNOSIS — I25.10 ATHEROSCLEROTIC HEART DISEASE OF NATIVE CORONARY ARTERY WITHOUT ANGINA PECTORIS: ICD-10-CM

## 2024-08-11 DIAGNOSIS — Z95.2 PRESENCE OF PROSTHETIC HEART VALVE: Chronic | ICD-10-CM

## 2024-08-11 DIAGNOSIS — Z88.6 ALLERGY STATUS TO ANALGESIC AGENT: ICD-10-CM

## 2024-08-11 DIAGNOSIS — I48.91 UNSPECIFIED ATRIAL FIBRILLATION: ICD-10-CM

## 2024-08-11 DIAGNOSIS — I10 ESSENTIAL (PRIMARY) HYPERTENSION: ICD-10-CM

## 2024-08-11 DIAGNOSIS — F17.200 NICOTINE DEPENDENCE, UNSPECIFIED, UNCOMPLICATED: ICD-10-CM

## 2024-08-11 DIAGNOSIS — Z20.822 CONTACT WITH AND (SUSPECTED) EXPOSURE TO COVID-19: ICD-10-CM

## 2024-08-11 DIAGNOSIS — J44.89 OTHER SPECIFIED CHRONIC OBSTRUCTIVE PULMONARY DISEASE: ICD-10-CM

## 2024-08-11 DIAGNOSIS — Z95.1 PRESENCE OF AORTOCORONARY BYPASS GRAFT: Chronic | ICD-10-CM

## 2024-08-11 DIAGNOSIS — E78.5 HYPERLIPIDEMIA, UNSPECIFIED: ICD-10-CM

## 2024-08-11 DIAGNOSIS — Z53.29 PROCEDURE AND TREATMENT NOT CARRIED OUT BECAUSE OF PATIENT'S DECISION FOR OTHER REASONS: ICD-10-CM

## 2024-08-11 LAB
ALBUMIN SERPL ELPH-MCNC: 3.2 G/DL — LOW (ref 3.4–5)
ALP SERPL-CCNC: 70 U/L — SIGNIFICANT CHANGE UP (ref 40–120)
ALT FLD-CCNC: 19 U/L — SIGNIFICANT CHANGE UP (ref 12–42)
ANION GAP SERPL CALC-SCNC: 9 MMOL/L — SIGNIFICANT CHANGE UP (ref 9–16)
AST SERPL-CCNC: 24 U/L — SIGNIFICANT CHANGE UP (ref 15–37)
BASOPHILS # BLD AUTO: 0.03 K/UL — SIGNIFICANT CHANGE UP (ref 0–0.2)
BASOPHILS NFR BLD AUTO: 0.3 % — SIGNIFICANT CHANGE UP (ref 0–2)
BILIRUB SERPL-MCNC: 0.7 MG/DL — SIGNIFICANT CHANGE UP (ref 0.2–1.2)
BUN SERPL-MCNC: 25 MG/DL — HIGH (ref 7–23)
CALCIUM SERPL-MCNC: 8.9 MG/DL — SIGNIFICANT CHANGE UP (ref 8.5–10.5)
CHLORIDE SERPL-SCNC: 105 MMOL/L — SIGNIFICANT CHANGE UP (ref 96–108)
CO2 SERPL-SCNC: 27 MMOL/L — SIGNIFICANT CHANGE UP (ref 22–31)
CREAT SERPL-MCNC: 1.54 MG/DL — HIGH (ref 0.5–1.3)
EGFR: 47 ML/MIN/1.73M2 — LOW
EOSINOPHIL # BLD AUTO: 0.1 K/UL — SIGNIFICANT CHANGE UP (ref 0–0.5)
EOSINOPHIL NFR BLD AUTO: 1.1 % — SIGNIFICANT CHANGE UP (ref 0–6)
GLUCOSE SERPL-MCNC: 97 MG/DL — SIGNIFICANT CHANGE UP (ref 70–99)
HCT VFR BLD CALC: 41.5 % — SIGNIFICANT CHANGE UP (ref 39–50)
HGB BLD-MCNC: 13.2 G/DL — SIGNIFICANT CHANGE UP (ref 13–17)
IMM GRANULOCYTES NFR BLD AUTO: 0.2 % — SIGNIFICANT CHANGE UP (ref 0–0.9)
LYMPHOCYTES # BLD AUTO: 1.71 K/UL — SIGNIFICANT CHANGE UP (ref 1–3.3)
LYMPHOCYTES # BLD AUTO: 19.4 % — SIGNIFICANT CHANGE UP (ref 13–44)
MCHC RBC-ENTMCNC: 30.4 PG — SIGNIFICANT CHANGE UP (ref 27–34)
MCHC RBC-ENTMCNC: 31.8 GM/DL — LOW (ref 32–36)
MCV RBC AUTO: 95.6 FL — SIGNIFICANT CHANGE UP (ref 80–100)
MONOCYTES # BLD AUTO: 0.7 K/UL — SIGNIFICANT CHANGE UP (ref 0–0.9)
MONOCYTES NFR BLD AUTO: 8 % — SIGNIFICANT CHANGE UP (ref 2–14)
NEUTROPHILS # BLD AUTO: 6.24 K/UL — SIGNIFICANT CHANGE UP (ref 1.8–7.4)
NEUTROPHILS NFR BLD AUTO: 71 % — SIGNIFICANT CHANGE UP (ref 43–77)
NRBC # BLD: 0 /100 WBCS — SIGNIFICANT CHANGE UP (ref 0–0)
PLATELET # BLD AUTO: 194 K/UL — SIGNIFICANT CHANGE UP (ref 150–400)
POTASSIUM SERPL-MCNC: 4.6 MMOL/L — SIGNIFICANT CHANGE UP (ref 3.5–5.3)
POTASSIUM SERPL-SCNC: 4.6 MMOL/L — SIGNIFICANT CHANGE UP (ref 3.5–5.3)
PROT SERPL-MCNC: 7.4 G/DL — SIGNIFICANT CHANGE UP (ref 6.4–8.2)
RBC # BLD: 4.34 M/UL — SIGNIFICANT CHANGE UP (ref 4.2–5.8)
RBC # FLD: 12.9 % — SIGNIFICANT CHANGE UP (ref 10.3–14.5)
SODIUM SERPL-SCNC: 141 MMOL/L — SIGNIFICANT CHANGE UP (ref 132–145)
TROPONIN I, HIGH SENSITIVITY RESULT: 6.9 NG/L — SIGNIFICANT CHANGE UP
WBC # BLD: 8.8 K/UL — SIGNIFICANT CHANGE UP (ref 3.8–10.5)
WBC # FLD AUTO: 8.8 K/UL — SIGNIFICANT CHANGE UP (ref 3.8–10.5)

## 2024-08-11 PROCEDURE — 99285 EMERGENCY DEPT VISIT HI MDM: CPT

## 2024-08-11 RX ORDER — BENZONATATE 100 MG/1
100 CAPSULE, LIQUID FILLED ORAL ONCE
Refills: 0 | Status: COMPLETED | OUTPATIENT
Start: 2024-08-11 | End: 2024-08-11

## 2024-08-11 RX ADMIN — BENZONATATE 100 MILLIGRAM(S): 100 CAPSULE, LIQUID FILLED ORAL at 14:32

## 2024-08-11 NOTE — ED PROVIDER NOTE - PHYSICAL EXAMINATION
CONSTITUTIONAL: Well-appearing; well-nourished; in no apparent distress.   	HEAD: Normocephalic; atraumatic.   	EYES:  conjunctiva and sclera clear  	ENT: normal nose; no rhinorrhea; normal pharynx with no erythema or lesions.   	NECK: Supple; non-tender;   	CARDIOVASCULAR: Normal S1, S2; no murmurs, rubs, or gallops. Regular rate and rhythm.   	RESPIRATORY: Breathing easily; breath sounds clear and equal bilaterally; no wheezes, rhonchi, or rales.  	GI: Soft; non-distended; non-tender;  	EXT: JEFFRIES x 4. normal gait.   	SKIN: Normal for age and race; warm; dry; good turgor; no apparent lesions or rash.   	NEURO: A & O x 3; face symmetric; grossly unremarkable.   PSYCHOLOGICAL: The patient’s mood and manner are appropriate. e

## 2024-08-11 NOTE — ED PROVIDER NOTE - OBJECTIVE STATEMENT
73-year-old male with a history of atrial fibrillation, asthma/COPD, coronary artery disease, hyperlipidemia, and hypertension who presents with cough and chest pain x 1 week.  Patient describes chest pain as a tightness in reports that it feels like his asthma. Patient is an active smoker.  He denies any fever, abdominal pain, shortness of breath, vomiting, dizziness.

## 2024-08-11 NOTE — ED PROVIDER NOTE - CLINICAL SUMMARY MEDICAL DECISION MAKING FREE TEXT BOX
73-year-old male with a history of atrial fibrillation, asthma/COPD, coronary artery disease, hyperlipidemia, and hypertension who presents with cough and chest pain x 1 week.  Patient describes chest pain as a tightness in reports that it feels like his asthma. Patient is an active smoker.  He denies any fever, abdominal pain, shortness of breath, vomiting, dizziness.    patient eloped while awaiting medical workup

## 2024-08-28 NOTE — ED ADULT NURSE NOTE - NS ED NURSE ELOPE COMMENTS
PROCEDURE NOTE - LACERATION CLOSURE    PATIENT NAME: Do Adriana Sandoval  MEDICAL RECORD NO. [id number]  DATE: 8/27/2024  TIME OF CLOSURE: 1350  Laceration Repair Performed by: Jesus Sue MD    PREOPERATIVE DIAGNOSIS: Laceration(s) as follows:   LOCATION: R cheek   LENGTH: 1.5cm   LAYERED CLOSURE: No    Consent: Unable to be obtained due to the emergent nature of this procedure.    Time out performed: Immediately prior to the procedure a \"time out\" was called to verify the correct patient, the correct procedure, equipment, support staff and site/side marked as required.      Procedure: The patient was placed in the appropriate position and anesthesia around the laceration was not performed due to emergent nature of the case. The area was then cleansed using betadine. The laceration was closed with 2-0 Prolene using running sutures. There were no additional lacerations requiring repair. The wound area was then dressed with gauze.      Total repaired wound length: 1.5 cm.     Other Items: None    The patient tolerated the procedure: N/A, pt unconscious.    Complications: None    Dejah Sumner MD  8/27/24, 3:15 PM       
    Chest Tube PROCEDURE NOTE     PATIENT NAME: Do Adriana Sandoval  MEDICAL RECORD NO. [id number]  DATE: 8/27/2024  PRIMARY CARE PHYSICIAN: No primary care provider on file.      PREOPERATIVE DIAGNOSIS:  left pneumothorax, hemothorax, and decreased breath sounds                                                                                              POSTOPERATIVE DIAGNOSIS:  Same  PROCEDURE PERFORMED:  Placement of a left thoracostomy tube  PERFORMED BY: Dejah Sumner MD  SUPERVISED BY: Gloria Hanson MD; Jesus Sue MD  ANESTHESIA:  None, emergent tube and patient unconscious   ESTIMATED BLOOD LOSS:  Less than 25 ml from incision, ~200 cc blood from tube  COMPLICATIONS:  None immediately appreciated.  OPERATIVE NOTE PREPARED BY: Dejah Sumner MD  TIME OF PROCEDURE: 1340     DISCUSSION:  Do Adriana Sandoval is a 89 y.o.-year-old male who requires chest drainage for  pneumothorax, hemothorax, and decreased breath sounds. The history and physical examination were reviewed and confirmed.  The diagnoses, proposed procedure, risks, possible complications, benefits and alternatives were discussed with the patient or family. He was given the opportunity to ask questions, and once answered, informed consent was obtained.  The patient was then prepared for the procedure.    PROCEDURE:  A timeout was initiated by the bedside nurse and was confirmed by those present.  The patient was placed in a supine position.  unable to be prepped due to the emergent nature of the procedure, rapid betadine scrub.  An incision was made and the subcutaneous tissue was cut through with a scalpel.  
    PROCEDURE NOTE - CENTRAL VENOUS CATHETER     PATIENT NAME: Phu Sutton  MEDICAL RECORD NO. [id number]  DATE: 8/28/2024  SURGEON:  Dr. Sue / Gloria Hanson MD  PREOPERATIVE DIAGNOSIS:  Need for IV access  POSTOPERATIVE DIAGNOSIS:  Same  PROCEDURE PERFORMED:  Right Femoral Vein Central Line Insertion Quattro   ANESTHESIA: none  ESTIMATED BLOOD LOSS:  Less than 25 ml  COMPLICATIONS:  None immediately appreciated.  OPERATIVE NOTE PREPARED BY: Gloria Hanson MD     DISCUSSION:  Phu Sutton is a 39 y.o.-year-old male who requires additional IV access and systemic warming capabilities. The history and physical examination were reviewed and confirmed. Consent was implied as this procedure was required emergently. The patient was then prepared for the procedure.    PROCEDURE:  A timeout was initiated by the bedside nurse and was confirmed by those present. The patient was placed in a supine position. The introducer needle was inserted into the femoral vein returning dark red non pulsatile blood. A guidewire was placed through the center of the needle with no resistance. A small incision made in the skin with a #11 scalpel blade. The dilator was inserted into the skin and vein over guidewire using Seldinger technique. The dilator was then removed and the catheter was placed in the vein over the guidewire using Seldinger technique. The guidewire was then removed and all ports aspirated and flushed appropriately. The catheter then secured using silk suture and a temporary sterile dressing was applied.  No immediate complication was evident.  All sponge, instrument and needle counts were correct at the completion of the procedure.    he patient tolerated the procedure well with no immediate complication evident.     Gloria Hanson MD  8/28/2024, 9:00 AM    
  Chest Tube PROCEDURE NOTE     PATIENT NAME: Do Adriana Sandoval  MEDICAL RECORD NO. [id number]  Flaquita Braun  SUPERVISED BY: Dr. Jesus Sue  
  PROCEDURE NOTE - CENTRAL VENOUS LINE PLACEMENT    PATIENT NAME: Do Adriana Sandoval  MEDICAL RECORD NO. [id number]  DATE: 8/27/2024  SURGEON: Dr. Jesus Sue  PRIMARY CARE PHYSICIAN: No primary care provider on file.    PREOPERATIVE DIAGNOSIS:  Need for IV access  POSTOPERATIVE DIAGNOSIS:  Same  PROCEDURE PERFORMED:  Left Femoral Vein Central Line Insertion  SURGEON: Dr. Jesus Sue  ANESTHESIA:   None  ESTIMATED BLOOD LOSS:  Less than 25 ml  COMPLICATIONS:  None immediately appreciated.  OPERATIVE NOTE PREPARED BY: Dejah Sumner MD  TIME OF PROCEDURE: 3:21 PM     DISCUSSION:  Do Adriana Sandoval is a 89 y.o.-year-old male who requires additional IV access and central pressure monitoring. The history and physical examination were reviewed and confirmed.  The diagnoses, proposed procedure, risks, possible complications, benefits and alternatives were discussed with the patient or family. He was given the opportunity to ask questions, and once answered, informed consent was obtained.  The patient was then prepared for the procedure.    PROCEDURE:  A timeout was initiated by the bedside nurse and was confirmed by those present.  The patient was placed in a supine position. The skin overlying the Left Femoral Vein was prepped with chlorhexadine and draped in sterile fashion. The skin was infiltrated with local anesthetic. Through the anesthetized region, the introducer needle was inserted into the femoral vein returning dark red non pulsatile blood. A guidewire was placed through the center of the needle with no resistance. A small incision made in the skin with a #11 scalpel blade. The dilator was inserted into the skin and vein over guidewire using Seldinger technique. The dilator was then removed and the catheter was placed in the vein over the guidewire using Seldinger technique. The guidewire was then removed and all ports aspirated and flushed appropriately. The catheter then secured using silk suture and a 
Pt spoke with MD brian. Not assessed by primary rn yet. Pt walked out of ED after speaking with MD. Ambulatory with steady gait.

## 2024-08-29 ENCOUNTER — EMERGENCY (EMERGENCY)
Facility: HOSPITAL | Age: 74
LOS: 1 days | Discharge: LEFT BEFORE DISCHARGE | End: 2024-08-29
Admitting: EMERGENCY MEDICINE
Payer: MEDICARE

## 2024-08-29 VITALS
HEART RATE: 75 BPM | OXYGEN SATURATION: 98 % | DIASTOLIC BLOOD PRESSURE: 85 MMHG | SYSTOLIC BLOOD PRESSURE: 135 MMHG | TEMPERATURE: 98 F | RESPIRATION RATE: 15 BRPM | HEIGHT: 65 IN

## 2024-08-29 DIAGNOSIS — Z95.2 PRESENCE OF PROSTHETIC HEART VALVE: Chronic | ICD-10-CM

## 2024-08-29 DIAGNOSIS — R07.9 CHEST PAIN, UNSPECIFIED: ICD-10-CM

## 2024-08-29 DIAGNOSIS — Z95.1 PRESENCE OF AORTOCORONARY BYPASS GRAFT: Chronic | ICD-10-CM

## 2024-08-29 DIAGNOSIS — Z53.21 PROCEDURE AND TREATMENT NOT CARRIED OUT DUE TO PATIENT LEAVING PRIOR TO BEING SEEN BY HEALTH CARE PROVIDER: ICD-10-CM

## 2024-08-29 PROCEDURE — L9991: CPT

## 2024-09-07 ENCOUNTER — EMERGENCY (EMERGENCY)
Facility: HOSPITAL | Age: 74
LOS: 1 days | Discharge: HOME | End: 2024-09-07
Admitting: EMERGENCY MEDICINE
Payer: MEDICARE

## 2024-09-07 VITALS
HEIGHT: 65 IN | SYSTOLIC BLOOD PRESSURE: 140 MMHG | HEART RATE: 97 BPM | RESPIRATION RATE: 16 BRPM | OXYGEN SATURATION: 98 % | TEMPERATURE: 98 F | DIASTOLIC BLOOD PRESSURE: 74 MMHG

## 2024-09-07 DIAGNOSIS — Z95.1 PRESENCE OF AORTOCORONARY BYPASS GRAFT: Chronic | ICD-10-CM

## 2024-09-07 DIAGNOSIS — Z95.2 PRESENCE OF PROSTHETIC HEART VALVE: Chronic | ICD-10-CM

## 2024-09-07 PROCEDURE — L9991: CPT

## 2024-09-11 DIAGNOSIS — Z53.21 PROCEDURE AND TREATMENT NOT CARRIED OUT DUE TO PATIENT LEAVING PRIOR TO BEING SEEN BY HEALTH CARE PROVIDER: ICD-10-CM

## 2024-09-11 DIAGNOSIS — R05.9 COUGH, UNSPECIFIED: ICD-10-CM

## 2024-09-28 ENCOUNTER — EMERGENCY (EMERGENCY)
Facility: HOSPITAL | Age: 74
LOS: 1 days | Discharge: LEFT BEFORE DISCHARGE | End: 2024-09-28
Admitting: EMERGENCY MEDICINE
Payer: MEDICARE

## 2024-09-28 VITALS
SYSTOLIC BLOOD PRESSURE: 120 MMHG | TEMPERATURE: 97 F | DIASTOLIC BLOOD PRESSURE: 67 MMHG | HEART RATE: 65 BPM | HEIGHT: 65 IN | OXYGEN SATURATION: 97 % | RESPIRATION RATE: 16 BRPM

## 2024-09-28 DIAGNOSIS — Z95.1 PRESENCE OF AORTOCORONARY BYPASS GRAFT: Chronic | ICD-10-CM

## 2024-09-28 DIAGNOSIS — Z95.2 PRESENCE OF PROSTHETIC HEART VALVE: Chronic | ICD-10-CM

## 2024-09-28 PROCEDURE — L9991: CPT

## 2024-10-01 DIAGNOSIS — Z53.21 PROCEDURE AND TREATMENT NOT CARRIED OUT DUE TO PATIENT LEAVING PRIOR TO BEING SEEN BY HEALTH CARE PROVIDER: ICD-10-CM

## 2024-10-01 DIAGNOSIS — R05.9 COUGH, UNSPECIFIED: ICD-10-CM

## 2024-10-03 ENCOUNTER — EMERGENCY (EMERGENCY)
Facility: HOSPITAL | Age: 74
LOS: 1 days | Discharge: LEFT BEFORE DISCHARGE | End: 2024-10-03
Attending: EMERGENCY MEDICINE | Admitting: EMERGENCY MEDICINE
Payer: MEDICARE

## 2024-10-03 VITALS
SYSTOLIC BLOOD PRESSURE: 121 MMHG | DIASTOLIC BLOOD PRESSURE: 73 MMHG | RESPIRATION RATE: 15 BRPM | HEART RATE: 99 BPM | TEMPERATURE: 98 F | HEIGHT: 65 IN | OXYGEN SATURATION: 99 %

## 2024-10-03 DIAGNOSIS — Z95.1 PRESENCE OF AORTOCORONARY BYPASS GRAFT: Chronic | ICD-10-CM

## 2024-10-03 DIAGNOSIS — Z95.2 PRESENCE OF PROSTHETIC HEART VALVE: Chronic | ICD-10-CM

## 2024-10-03 PROCEDURE — 99283 EMERGENCY DEPT VISIT LOW MDM: CPT

## 2024-10-03 NOTE — ED PROVIDER NOTE - CLINICAL SUMMARY MEDICAL DECISION MAKING FREE TEXT BOX
Pt ambulates into triage with no difficulty. Pt well known to ED. Pt not clear on reason for visit today, Pt states somebody was going " " on the street so he had to walk.    Patient seen in triage and brought to Chair K for eval.  Patient eloped from ED

## 2024-10-03 NOTE — ED ADULT TRIAGE NOTE - ARRIVAL FROM
WENDI ANDREWS Attestation Note    I supervised care provided by the midlevel provider.    The NADIYA and I have discussed this patient's history, physical exam, and treatment plan. I have reviewed the documentation and personally had a face to face interaction with the patient  I affirm the documentation and agree with the treatment and plan. I provided a substantive portion of the care of this patient.  I personally performed the physical exam, in its entirety.  My personal findings are documented in below:    History:  73-year-old male who had recent chest discomfort and cardiac intervention returned secondary to intractable pain in the right side of the chest and felt to have herpes zoster.  Patient reports feeling better after receiving IV valacyclovir earlier this morning.  Patient also noted to have lesion on his left buttock.    Physical Exam:  General: No acute distress.  HENT: NCAT, PERRL, Nares patent.  Eyes: no scleral icterus.  Neck: trachea midline, no ROM limitations.  CV: Pink warm and well-perfused throughout  Respiratory: No distress or increased work of breathing  Musculoskeletal: no deformity.  Neuro: alert, moves all extremities, follows commands.  Skin: warm, dry.    Assessment and Plan:  Patient with fever yesterday, zoster in 2 different areas of his body concerning for dissemination.  Likely would benefit from infectious disease consultation, consider hospital admission.     Street

## 2024-10-03 NOTE — ED ADULT TRIAGE NOTE - CHIEF COMPLAINT QUOTE
Pt ambulates into triage with no difficulty. Pt well known to ED. Pt not clear on reason for visit today, Pt states somebody was going " " on the street so he had to walk.

## 2024-10-07 DIAGNOSIS — Z53.29 PROCEDURE AND TREATMENT NOT CARRIED OUT BECAUSE OF PATIENT'S DECISION FOR OTHER REASONS: ICD-10-CM

## 2024-10-07 DIAGNOSIS — Z88.6 ALLERGY STATUS TO ANALGESIC AGENT: ICD-10-CM

## 2024-10-07 DIAGNOSIS — Z01.89 ENCOUNTER FOR OTHER SPECIFIED SPECIAL EXAMINATIONS: ICD-10-CM

## 2024-12-02 NOTE — ED PROVIDER NOTE - CPE EDP RESP NORM
Spoke with patient.  She schedule for        ----- Message from Robin DAVID sent at 2024  9:21 AM EST -----  Regardin year follow up  Please contact patient to schedule a 2 year follow up appointment and document the results of the call in EPIC.  Thank You  
normal...

## 2024-12-10 NOTE — ED ADULT NURSE NOTE - NSFALLRSKOUTCOME_ED_ALL_ED
What Type Of Note Output Would You Prefer (Optional)?: Standard Output Hpi Title: Evaluation of Skin Lesions How Severe Are Your Spot(S)?: mild Have Your Spot(S) Been Treated In The Past?: has been treated Universal Safety Interventions

## 2024-12-16 NOTE — ED ADULT NURSE NOTE - NS ED NURSE LEVEL OF CONSCIOUSNESS AFFECT
Dr. Abdulaziz Castro called [facility] office wanting to speak with surgery scheduler for Dr. Topete in regards to pre-op orders for patient for surgery on 12/18/24. Please call the physician back at your earliest convenience, his cell is [phone]. Thank you!   
Calm

## 2024-12-20 NOTE — ED PROVIDER NOTE - DISPOSITION TYPE
Telephone call to Mom notifying her of KUB results from yesterday showing moderate to large volume of stool in the colon and rectum. Advised clean out with Miralax as discussed, followed by a daily dose of Miralax. Understanding verbalized.   ELOPED

## 2024-12-24 NOTE — ED PROVIDER NOTE - CLINICAL SUMMARY MEDICAL DECISION MAKING FREE TEXT BOX
Yes Patient walked out after being evaluated by provider. EKG showed Afib at 98 bpm. no labs or XR was obtained.

## 2025-01-03 NOTE — ED ADULT TRIAGE NOTE - ESI TRIAGE ACUITY LEVEL, MLM
Caller: Anh Belcher    Relationship: Emergency Contact    Best call back number: [phone]     What is the best time to reach you: ANY    Who are you requesting to speak with (clinical staff, provider,  specific staff member): NURSE    Do you know the name of the person who called: WIFE    What was the call regarding: PATIENT HAS VERY HARD COUGH.  SEEN YESTERDAY BY DR PEÑA.  WAS TOLD TO TAKE OTC COUGH MEDICATION.  NO MEDICATION WAS GIVEN.  DOESN'T SLEEP FOR COUGH.  OTC COUGH MEDICATION NOT WORKING.      PHARMACY:  [facility]    Is it okay if the provider responds through MyChart: PHONE CALL PLEASE   3

## 2025-01-21 NOTE — ED ADULT NURSE NOTE - PLAN OF CARE DISCUSSED WITH:
Medication:     Disp Refills Start End    zolpidem (AMBIEN) 5 MG tablet 30 tablet 1 10/10/2024 --    Sig: Take 1 tablet AT night as needed FOR SLEEP. MUST LAST 30 DAYS.]      Last office visit date: 10/10/2024   Appointment: 04/10/2025    Last refill: 12/19/2024 per PDMP  Is the patient due for refill of this medication(s): Yes  PDMP review: Criteria met. Forwarded to Physician/CORI for signature.     No UDS in past 12 months    Medication Refill Protocol Failed.     FORWARD TO PROVIDER - Refill requests for these medications must ALWAYS be forwarded to the ordering provider, even if all criteria are met    PDMP has been reviewed in last 24 hours    Urine/serum drug screen on file in the appropriate time frame   Sent to clinician to review.   Patient

## 2025-04-18 NOTE — ED PROVIDER NOTE - INTERNATIONAL TRAVEL
Detail Level: Detailed No Quality 137: Melanoma: Continuity Of Care - Recall System: Patient information entered into a recall system that includes: target date for the next exam specified AND a process to follow up with patients regarding missed or unscheduled appointments When Should The Patient Follow-Up For Their Next Full-Body Skin Exam?: 1 Year Detail Level: Generalized

## 2025-05-01 NOTE — ED ADULT NURSE NOTE - PRO INTERPRETER NEED 2
English no recent or remote memory deficits, good judgement, mood and affect appropriate  Skin: warm, dry, no obvious lesions, port area mildly bruised to slightly reddened no drainage noted    LAB DATA REVIEWED:      No visits with results within 7 Day(s) from this visit.   Latest known visit with results is:   Hospital Outpatient Visit on 04/16/2025   Component Date Value    WBC 04/16/2025 7.04     RBC 04/16/2025 3.64 (L)     Hemoglobin 04/16/2025 11.4 (L)     Hematocrit 04/16/2025 35.0 (L)     MCV 04/16/2025 96.2     MCH 04/16/2025 31.3 (H)     MCHC 04/16/2025 32.6 (L)     RDW 04/16/2025 17.2 (H)     Platelets 04/16/2025 480 (H)     MPV 04/16/2025 9.0 (L)     Neutrophils % 04/16/2025 68.8 (H)     Lymphocytes % 04/16/2025 14.2 (L)     Monocytes % 04/16/2025 14.2 (H)     Eosinophils % 04/16/2025 0.7     Basophils % 04/16/2025 0.3     Neutrophils Absolute 04/16/2025 4.84     Lymphocytes Absolute 04/16/2025 1.00 (L)     Monocytes Absolute 04/16/2025 1.00 (H)     Eosinophils Absolute 04/16/2025 0.05     Basophils Absolute 04/16/2025 0.02     Sodium 04/16/2025 138     Potassium 04/16/2025 4.4     Chloride 04/16/2025 99     CO2 04/16/2025 29     Anion Gap 04/16/2025 10     Glucose 04/16/2025 115 (H)     BUN 04/16/2025 10     Creatinine 04/16/2025 0.6     Est, Glom Filt Rate 04/16/2025 >90     Calcium 04/16/2025 8.9     Total Protein 04/16/2025 7.0     Albumin 04/16/2025 4.0     Total Bilirubin 04/16/2025 0.3     Alkaline Phosphatase 04/16/2025 116 (H)     ALT 04/16/2025 21     AST 04/16/2025 21     Magnesium 04/16/2025 2.1     Phosphorus 04/16/2025 3.9         Total Visit Time: 70 minutes including face to face, chart review, and documentation     This dictation was generated by voice recognition computer software.  Although all attempts are made to edit the dictation for accuracy, there may be errors in the transcription that are not intended.    Electronically signed by ERIN Kidd CNP on 5/1/2025 at 1:55

## 2025-06-06 NOTE — ED PROVIDER NOTE - NS_ATTENDINGSCRIBE_ED_ALL_ED
Follow-up with hepatology.  Orders:    Comprehensive Metabolic Panel; Future    Referral to Gastroenterology; Future    Hepatitis B Surface Antigen; Future    Hepatitis B Core Antibody, Total; Future    Hepatitis B Surface Antibody; Future     I personally performed the service described in the documentation recorded by the scribe in my presence, and it accurately and completely records my words and actions.

## 2025-07-18 NOTE — ED ADULT NURSE NOTE - NS ED PATIENT SAFETY CONCERN
Discharge Summary/Instructions after an Endoscopic Procedure  Patient Name: Xenia Garcia  Patient MRN: [id number]  Patient YOB: 1941 Friday, July 18, 2025  MARIO Willams MD  Dear patient,  As a result of recent federal legislation (The Federal Cures Act), you may   receive lab or pathology results from your procedure in your [facility]   account before your physician is able to contact you. Your physician or   their representative will relay the results to you with their   recommendations at their soonest availability.  Thank you,  RESTRICTIONS:  During your procedure today, you received medications for sedation.  These   medications may affect your judgment, balance and coordination.  Therefore,   for 24 hours, you have the following restrictions:   - DO NOT drive a car, operate machinery, make legal/financial decisions,   sign important papers or drink alcohol.    ACTIVITY:  Today: no heavy lifting, straining or running due to procedural   sedation/anesthesia.  The following day: return to full activity including work.  DIET:  Eat and drink normally unless instructed otherwise.     TREATMENT FOR COMMON SIDE EFFECTS:  - Mild abdominal pain, nausea, belching, bloating or excessive gas:  rest,   eat lightly and use a heating pad.  - Sore Throat: treat with throat lozenges and/or gargle with warm salt   water.  - Because air was used during the procedure, expelling large amounts of air   from your rectum or belching is normal.  - If a bowel prep was taken, you may not have a bowel movement for 1-3 days.    This is normal.  SYMPTOMS TO WATCH FOR AND REPORT TO YOUR PHYSICIAN:  1. Abdominal pain or bloating, other than gas cramps.  2. Chest pain.  3. Back pain.  4. Signs of infection such as: chills or fever occurring within 24 hours   after the procedure.  5. Rectal bleeding, which would show as bright red, maroon, or black stools.   (A tablespoon of blood from the rectum is not serious, especially  if   hemorrhoids are present.)  6. Vomiting.  7. Weakness or dizziness.  GO DIRECTLY TO THE NEAREST EMERGENCY ROOM IF YOU HAVE ANY OF THE FOLLOWING:      Difficulty breathing              Chills and/or fever over 101 F   Persistent vomiting and/or vomiting blood   Severe abdominal pain   Severe chest pain   Black, tarry stools   Bleeding- more than one tablespoon   Any other symptom or condition that you feel may need urgent attention  Your doctor recommends these additional instructions:  If any biopsies were taken, your doctors clinic will contact you in 1 to 2   weeks with any results.  Recommendations:  - Perform a colonoscopy today.   - Discharge patient to home (with escort).  Impressions:  - Normal esophagus.   - Erythematous mucosa in the antrum.  Biopsied.   - Normal duodenal bulb, first portion of the duodenum, second portion of the   duodenum and third portion of the duodenum.  For questions, problems or results please call your physician - MARIO Willams MD at Work:  [phone].  [facility] ED at [phone]  IF A COMPLICATION OR EMERGENCY SITUATION ARISES AND YOU ARE UNABLE TO REACH   YOUR PHYSICIAN - GO DIRECTLY TO THE EMERGENCY ROOM.  MD MARIO Guajardo MD  7/18/2025 12:34:16 PM  This report has been verified and signed electronically.  Dear patient,  As a result of recent federal legislation (The Federal Cures Act), you may   receive lab or pathology results from your procedure in your [facility]   account before your physician is able to contact you. Your physician or   their representative will relay the results to you with their   recommendations at their soonest availability.  Thank you,  [employer]   Unable to assess due to medical condition